# Patient Record
Sex: FEMALE | Race: WHITE | NOT HISPANIC OR LATINO | Employment: UNEMPLOYED | URBAN - METROPOLITAN AREA
[De-identification: names, ages, dates, MRNs, and addresses within clinical notes are randomized per-mention and may not be internally consistent; named-entity substitution may affect disease eponyms.]

---

## 2017-01-30 ENCOUNTER — OFFICE VISIT (OUTPATIENT)
Dept: FAMILY MEDICINE | Facility: OTHER | Age: 32
End: 2017-01-30
Payer: COMMERCIAL

## 2017-01-30 ENCOUNTER — TELEPHONE (OUTPATIENT)
Dept: FAMILY MEDICINE | Facility: OTHER | Age: 32
End: 2017-01-30

## 2017-01-30 ENCOUNTER — APPOINTMENT (OUTPATIENT)
Dept: ULTRASOUND IMAGING | Facility: CLINIC | Age: 32
End: 2017-01-30
Attending: EMERGENCY MEDICINE
Payer: COMMERCIAL

## 2017-01-30 ENCOUNTER — ALLIED HEALTH/NURSE VISIT (OUTPATIENT)
Dept: FAMILY MEDICINE | Facility: CLINIC | Age: 32
End: 2017-01-30
Payer: COMMERCIAL

## 2017-01-30 ENCOUNTER — INFUSION THERAPY VISIT (OUTPATIENT)
Dept: INFUSION THERAPY | Facility: CLINIC | Age: 32
End: 2017-01-30
Attending: PHYSICIAN ASSISTANT
Payer: COMMERCIAL

## 2017-01-30 ENCOUNTER — HOSPITAL ENCOUNTER (OUTPATIENT)
Facility: CLINIC | Age: 32
Setting detail: OBSERVATION
Discharge: HOME | End: 2017-01-31
Attending: EMERGENCY MEDICINE | Admitting: FAMILY MEDICINE
Payer: COMMERCIAL

## 2017-01-30 VITALS
DIASTOLIC BLOOD PRESSURE: 64 MMHG | RESPIRATION RATE: 16 BRPM | TEMPERATURE: 97.6 F | WEIGHT: 114.2 LBS | OXYGEN SATURATION: 97 % | HEART RATE: 105 BPM | BODY MASS INDEX: 19.5 KG/M2 | SYSTOLIC BLOOD PRESSURE: 102 MMHG | HEIGHT: 64 IN

## 2017-01-30 VITALS
TEMPERATURE: 99.6 F | DIASTOLIC BLOOD PRESSURE: 56 MMHG | OXYGEN SATURATION: 99 % | HEART RATE: 100 BPM | RESPIRATION RATE: 16 BRPM | SYSTOLIC BLOOD PRESSURE: 104 MMHG

## 2017-01-30 DIAGNOSIS — R52 BODY ACHES: ICD-10-CM

## 2017-01-30 DIAGNOSIS — A08.4 VIRAL GASTROENTERITIS: Primary | ICD-10-CM

## 2017-01-30 DIAGNOSIS — B34.9 VIRAL ILLNESS: Primary | ICD-10-CM

## 2017-01-30 DIAGNOSIS — R10.13 ABDOMINAL PAIN, EPIGASTRIC: ICD-10-CM

## 2017-01-30 DIAGNOSIS — R11.10 INTRACTABLE VOMITING, PRESENCE OF NAUSEA NOT SPECIFIED, UNSPECIFIED VOMITING TYPE: ICD-10-CM

## 2017-01-30 DIAGNOSIS — R11.2 NAUSEA AND VOMITING: Primary | ICD-10-CM

## 2017-01-30 DIAGNOSIS — E86.0 DEHYDRATION: ICD-10-CM

## 2017-01-30 DIAGNOSIS — F32.A DEPRESSION, UNSPECIFIED DEPRESSION TYPE: ICD-10-CM

## 2017-01-30 PROBLEM — R11.14 BILIOUS VOMITING WITH NAUSEA: Status: ACTIVE | Noted: 2017-01-30

## 2017-01-30 PROBLEM — R79.89 ELEVATED LFTS: Status: ACTIVE | Noted: 2017-01-30

## 2017-01-30 PROBLEM — K52.9 GASTROENTERITIS: Status: ACTIVE | Noted: 2017-01-30

## 2017-01-30 LAB
ALBUMIN SERPL-MCNC: 3 G/DL (ref 3.4–5)
ALBUMIN UR-MCNC: 30 MG/DL
ALP SERPL-CCNC: 113 U/L (ref 40–150)
ALT SERPL W P-5'-P-CCNC: 104 U/L (ref 0–50)
ANION GAP SERPL CALCULATED.3IONS-SCNC: 9 MMOL/L (ref 3–14)
APPEARANCE UR: CLEAR
AST SERPL W P-5'-P-CCNC: 188 U/L (ref 0–45)
BASOPHILS # BLD AUTO: 0 10E9/L (ref 0–0.2)
BASOPHILS NFR BLD AUTO: 0.4 %
BILIRUB SERPL-MCNC: 0.3 MG/DL (ref 0.2–1.3)
BILIRUB UR QL STRIP: ABNORMAL
BUN SERPL-MCNC: 12 MG/DL (ref 7–30)
CALCIUM SERPL-MCNC: 7.9 MG/DL (ref 8.5–10.1)
CHLORIDE SERPL-SCNC: 112 MMOL/L (ref 94–109)
CO2 SERPL-SCNC: 23 MMOL/L (ref 20–32)
COLOR UR AUTO: YELLOW
CREAT SERPL-MCNC: 0.54 MG/DL (ref 0.52–1.04)
DEPRECATED S PYO AG THROAT QL EIA: NORMAL
DIFFERENTIAL METHOD BLD: NORMAL
EOSINOPHIL # BLD AUTO: 0 10E9/L (ref 0–0.7)
EOSINOPHIL NFR BLD AUTO: 0 %
ERYTHROCYTE [DISTWIDTH] IN BLOOD BY AUTOMATED COUNT: 12.9 % (ref 10–15)
FLUAV+FLUBV AG SPEC QL: NEGATIVE
FLUAV+FLUBV AG SPEC QL: NORMAL
GFR SERPL CREATININE-BSD FRML MDRD: ABNORMAL ML/MIN/1.7M2
GLUCOSE SERPL-MCNC: 80 MG/DL (ref 70–99)
GLUCOSE UR STRIP-MCNC: NEGATIVE MG/DL
HCG UR QL: NEGATIVE
HCT VFR BLD AUTO: 35.3 % (ref 35–47)
HGB BLD-MCNC: 11.8 G/DL (ref 11.7–15.7)
HGB UR QL STRIP: NEGATIVE
IMM GRANULOCYTES # BLD: 0 10E9/L (ref 0–0.4)
IMM GRANULOCYTES NFR BLD: 0.2 %
KETONES UR STRIP-MCNC: >=80 MG/DL
LEUKOCYTE ESTERASE UR QL STRIP: NEGATIVE
LIPASE SERPL-CCNC: 100 U/L (ref 73–393)
LYMPHOCYTES # BLD AUTO: 1.4 10E9/L (ref 0.8–5.3)
LYMPHOCYTES NFR BLD AUTO: 26.7 %
MCH RBC QN AUTO: 28.9 PG (ref 26.5–33)
MCHC RBC AUTO-ENTMCNC: 33.4 G/DL (ref 31.5–36.5)
MCV RBC AUTO: 87 FL (ref 78–100)
MICRO REPORT STATUS: NORMAL
MONOCYTES # BLD AUTO: 0.6 10E9/L (ref 0–1.3)
MONOCYTES NFR BLD AUTO: 12 %
MUCOUS THREADS #/AREA URNS LPF: PRESENT /LPF
NEUTROPHILS # BLD AUTO: 3.1 10E9/L (ref 1.6–8.3)
NEUTROPHILS NFR BLD AUTO: 60.7 %
NITRATE UR QL: NEGATIVE
PH UR STRIP: 6.5 PH (ref 5–7)
PLATELET # BLD AUTO: 202 10E9/L (ref 150–450)
POTASSIUM SERPL-SCNC: 3.9 MMOL/L (ref 3.4–5.3)
PROT SERPL-MCNC: 6.1 G/DL (ref 6.8–8.8)
RBC # BLD AUTO: 4.08 10E12/L (ref 3.8–5.2)
RBC #/AREA URNS AUTO: ABNORMAL /HPF (ref 0–2)
SODIUM SERPL-SCNC: 144 MMOL/L (ref 133–144)
SP GR UR STRIP: 1.02 (ref 1–1.03)
SPECIMEN SOURCE: NORMAL
SPECIMEN SOURCE: NORMAL
URN SPEC COLLECT METH UR: ABNORMAL
UROBILINOGEN UR STRIP-ACNC: 0.2 EU/DL (ref 0.2–1)
WBC # BLD AUTO: 5.2 10E9/L (ref 4–11)
WBC #/AREA URNS AUTO: ABNORMAL /HPF (ref 0–2)

## 2017-01-30 PROCEDURE — 99207 ZZC NO CHARGE NURSE ONLY: CPT

## 2017-01-30 PROCEDURE — G0378 HOSPITAL OBSERVATION PER HR: HCPCS

## 2017-01-30 PROCEDURE — 99214 OFFICE O/P EST MOD 30 MIN: CPT | Performed by: PHYSICIAN ASSISTANT

## 2017-01-30 PROCEDURE — 99285 EMERGENCY DEPT VISIT HI MDM: CPT | Mod: 25

## 2017-01-30 PROCEDURE — 25000125 ZZHC RX 250: Performed by: FAMILY MEDICINE

## 2017-01-30 PROCEDURE — 25000128 H RX IP 250 OP 636: Performed by: EMERGENCY MEDICINE

## 2017-01-30 PROCEDURE — 87081 CULTURE SCREEN ONLY: CPT | Performed by: NURSE PRACTITIONER

## 2017-01-30 PROCEDURE — 96376 TX/PRO/DX INJ SAME DRUG ADON: CPT

## 2017-01-30 PROCEDURE — 96375 TX/PRO/DX INJ NEW DRUG ADDON: CPT

## 2017-01-30 PROCEDURE — 25000132 ZZH RX MED GY IP 250 OP 250 PS 637: Performed by: EMERGENCY MEDICINE

## 2017-01-30 PROCEDURE — 25000125 ZZHC RX 250: Performed by: EMERGENCY MEDICINE

## 2017-01-30 PROCEDURE — 25000128 H RX IP 250 OP 636: Performed by: FAMILY MEDICINE

## 2017-01-30 PROCEDURE — 87804 INFLUENZA ASSAY W/OPTIC: CPT | Performed by: NURSE PRACTITIONER

## 2017-01-30 PROCEDURE — 80053 COMPREHEN METABOLIC PANEL: CPT | Performed by: EMERGENCY MEDICINE

## 2017-01-30 PROCEDURE — 99285 EMERGENCY DEPT VISIT HI MDM: CPT | Performed by: EMERGENCY MEDICINE

## 2017-01-30 PROCEDURE — 81001 URINALYSIS AUTO W/SCOPE: CPT | Performed by: EMERGENCY MEDICINE

## 2017-01-30 PROCEDURE — 85025 COMPLETE CBC W/AUTO DIFF WBC: CPT | Performed by: EMERGENCY MEDICINE

## 2017-01-30 PROCEDURE — 81025 URINE PREGNANCY TEST: CPT | Performed by: EMERGENCY MEDICINE

## 2017-01-30 PROCEDURE — 96361 HYDRATE IV INFUSION ADD-ON: CPT

## 2017-01-30 PROCEDURE — 87880 STREP A ASSAY W/OPTIC: CPT | Performed by: NURSE PRACTITIONER

## 2017-01-30 PROCEDURE — 96374 THER/PROPH/DIAG INJ IV PUSH: CPT

## 2017-01-30 PROCEDURE — 99219 ZZC INITIAL OBSERVATION CARE,LEVL II: CPT | Performed by: FAMILY MEDICINE

## 2017-01-30 PROCEDURE — 83690 ASSAY OF LIPASE: CPT | Performed by: EMERGENCY MEDICINE

## 2017-01-30 RX ORDER — NALOXONE HYDROCHLORIDE 0.4 MG/ML
.1-.4 INJECTION, SOLUTION INTRAMUSCULAR; INTRAVENOUS; SUBCUTANEOUS
Status: DISCONTINUED | OUTPATIENT
Start: 2017-01-30 | End: 2017-01-31 | Stop reason: HOSPADM

## 2017-01-30 RX ORDER — ONDANSETRON 2 MG/ML
4 INJECTION INTRAMUSCULAR; INTRAVENOUS EVERY 30 MIN PRN
Status: DISCONTINUED | OUTPATIENT
Start: 2017-01-30 | End: 2017-01-30

## 2017-01-30 RX ORDER — METOCLOPRAMIDE HYDROCHLORIDE 5 MG/ML
10 INJECTION INTRAMUSCULAR; INTRAVENOUS EVERY 6 HOURS PRN
Status: DISCONTINUED | OUTPATIENT
Start: 2017-01-30 | End: 2017-01-31 | Stop reason: HOSPADM

## 2017-01-30 RX ORDER — METOCLOPRAMIDE HYDROCHLORIDE 5 MG/ML
10 INJECTION INTRAMUSCULAR; INTRAVENOUS EVERY 4 HOURS PRN
Status: DISCONTINUED | OUTPATIENT
Start: 2017-01-30 | End: 2017-01-30

## 2017-01-30 RX ORDER — HYDROMORPHONE HYDROCHLORIDE 1 MG/ML
0.5 INJECTION, SOLUTION INTRAMUSCULAR; INTRAVENOUS; SUBCUTANEOUS
Status: COMPLETED | OUTPATIENT
Start: 2017-01-30 | End: 2017-01-30

## 2017-01-30 RX ORDER — SODIUM CHLORIDE 9 MG/ML
1000 INJECTION, SOLUTION INTRAVENOUS CONTINUOUS
Status: DISCONTINUED | OUTPATIENT
Start: 2017-01-30 | End: 2017-01-31

## 2017-01-30 RX ORDER — KETOROLAC TROMETHAMINE 30 MG/ML
30 INJECTION, SOLUTION INTRAMUSCULAR; INTRAVENOUS ONCE
Qty: 1 ML | Refills: 0 | OUTPATIENT
Start: 2017-01-30 | End: 2017-01-30

## 2017-01-30 RX ORDER — KETOROLAC TROMETHAMINE 30 MG/ML
30 INJECTION, SOLUTION INTRAMUSCULAR; INTRAVENOUS ONCE
Status: COMPLETED | OUTPATIENT
Start: 2017-01-30 | End: 2017-01-30

## 2017-01-30 RX ORDER — LORAZEPAM 2 MG/ML
0.5 INJECTION INTRAMUSCULAR
Status: DISCONTINUED | OUTPATIENT
Start: 2017-01-30 | End: 2017-01-30

## 2017-01-30 RX ORDER — PROCHLORPERAZINE 25 MG
25 SUPPOSITORY, RECTAL RECTAL EVERY 12 HOURS PRN
Status: DISCONTINUED | OUTPATIENT
Start: 2017-01-30 | End: 2017-01-31 | Stop reason: HOSPADM

## 2017-01-30 RX ORDER — ONDANSETRON 2 MG/ML
4 INJECTION INTRAMUSCULAR; INTRAVENOUS ONCE
Status: COMPLETED | OUTPATIENT
Start: 2017-01-30 | End: 2017-01-30

## 2017-01-30 RX ORDER — LORAZEPAM 2 MG/ML
0.5 INJECTION INTRAMUSCULAR EVERY 4 HOURS PRN
Status: DISCONTINUED | OUTPATIENT
Start: 2017-01-30 | End: 2017-01-31

## 2017-01-30 RX ORDER — METOCLOPRAMIDE 5 MG/1
10 TABLET ORAL EVERY 6 HOURS PRN
Status: DISCONTINUED | OUTPATIENT
Start: 2017-01-30 | End: 2017-01-31 | Stop reason: HOSPADM

## 2017-01-30 RX ORDER — PROCHLORPERAZINE MALEATE 5 MG
5-10 TABLET ORAL EVERY 6 HOURS PRN
Status: DISCONTINUED | OUTPATIENT
Start: 2017-01-30 | End: 2017-01-31 | Stop reason: HOSPADM

## 2017-01-30 RX ORDER — ONDANSETRON 2 MG/ML
4 INJECTION INTRAMUSCULAR; INTRAVENOUS EVERY 6 HOURS PRN
Status: DISCONTINUED | OUTPATIENT
Start: 2017-01-30 | End: 2017-01-31 | Stop reason: HOSPADM

## 2017-01-30 RX ORDER — LIDOCAINE 40 MG/G
CREAM TOPICAL
Status: DISCONTINUED | OUTPATIENT
Start: 2017-01-30 | End: 2017-01-31 | Stop reason: HOSPADM

## 2017-01-30 RX ORDER — ONDANSETRON 4 MG/1
4 TABLET, ORALLY DISINTEGRATING ORAL EVERY 6 HOURS PRN
Status: DISCONTINUED | OUTPATIENT
Start: 2017-01-30 | End: 2017-01-31 | Stop reason: HOSPADM

## 2017-01-30 RX ORDER — HYDROMORPHONE HYDROCHLORIDE 1 MG/ML
.3-.5 INJECTION, SOLUTION INTRAMUSCULAR; INTRAVENOUS; SUBCUTANEOUS
Status: DISCONTINUED | OUTPATIENT
Start: 2017-01-30 | End: 2017-01-31

## 2017-01-30 RX ADMIN — METOCLOPRAMIDE 10 MG: 5 INJECTION, SOLUTION INTRAMUSCULAR; INTRAVENOUS at 16:51

## 2017-01-30 RX ADMIN — HYDROMORPHONE HYDROCHLORIDE 0.5 MG: 1 INJECTION, SOLUTION INTRAMUSCULAR; INTRAVENOUS; SUBCUTANEOUS at 16:50

## 2017-01-30 RX ADMIN — LIDOCAINE HYDROCHLORIDE 30 ML: 20 SOLUTION ORAL; TOPICAL at 20:12

## 2017-01-30 RX ADMIN — ONDANSETRON 4 MG: 2 INJECTION INTRAMUSCULAR; INTRAVENOUS at 13:06

## 2017-01-30 RX ADMIN — SODIUM CHLORIDE 1000 ML: 9 INJECTION, SOLUTION INTRAVENOUS at 16:14

## 2017-01-30 RX ADMIN — HYDROMORPHONE HYDROCHLORIDE 0.5 MG: 1 INJECTION, SOLUTION INTRAMUSCULAR; INTRAVENOUS; SUBCUTANEOUS at 21:42

## 2017-01-30 RX ADMIN — LORAZEPAM 0.5 MG: 2 INJECTION INTRAMUSCULAR; INTRAVENOUS at 17:30

## 2017-01-30 RX ADMIN — KETOROLAC TROMETHAMINE 30 MG: 30 INJECTION, SOLUTION INTRAMUSCULAR at 13:42

## 2017-01-30 RX ADMIN — SODIUM CHLORIDE 1000 ML: 9 INJECTION, SOLUTION INTRAVENOUS at 19:22

## 2017-01-30 RX ADMIN — PROCHLORPERAZINE EDISYLATE 10 MG: 5 INJECTION INTRAMUSCULAR; INTRAVENOUS at 16:43

## 2017-01-30 RX ADMIN — ONDANSETRON HYDROCHLORIDE 4 MG: 2 SOLUTION INTRAMUSCULAR; INTRAVENOUS at 16:14

## 2017-01-30 RX ADMIN — HYDROMORPHONE HYDROCHLORIDE 0.5 MG: 1 INJECTION, SOLUTION INTRAMUSCULAR; INTRAVENOUS; SUBCUTANEOUS at 16:14

## 2017-01-30 RX ADMIN — SODIUM CHLORIDE 1000 ML: 9 INJECTION, SOLUTION INTRAVENOUS at 13:03

## 2017-01-30 RX ADMIN — SODIUM CHLORIDE 1000 ML: 9 INJECTION, SOLUTION INTRAVENOUS at 16:56

## 2017-01-30 RX ADMIN — ONDANSETRON 4 MG: 2 INJECTION INTRAMUSCULAR; INTRAVENOUS at 22:33

## 2017-01-30 ASSESSMENT — ENCOUNTER SYMPTOMS
CHOKING: 1
CONSTIPATION: 0
DIARRHEA: 0
CHILLS: 1
FEVER: 1
FREQUENCY: 0
BLOOD IN STOOL: 0
DYSURIA: 0
NAUSEA: 1
ABDOMINAL PAIN: 1
FLANK PAIN: 0
VOMITING: 1

## 2017-01-30 ASSESSMENT — PAIN SCALES - GENERAL: PAINLEVEL: MODERATE PAIN (4)

## 2017-01-30 NOTE — TELEPHONE ENCOUNTER
[phone] [facility]     Pt is not improving, still dry heaving. Stacy just put in new vitals for you to view. Please return her call at the number above to talk about treatment plan and moving forward as pt is not showing improvement.

## 2017-01-30 NOTE — NURSING NOTE
Written order for IV fluids in [facility] from Brendan Ryder from visit today.      22 gauge IV started in right arm.  Fluids started at 1150.      At 1215, patient is having abdominal pain and dry heaves.      1220 call was placed to Cathy COX RN in [facility], she will speak with Brendan Ryder.     1235 per Cathy patient can be taken to Infusion for IV Zofran.     1240 fluids stopped and patient taken to Infusion.     Anamaria Aguilar RN

## 2017-01-30 NOTE — PROGRESS NOTES
S-(situation): Spoke with Dr. Ryder as patient does not feel well enough to go home. She is dry heaving and still feels overall unwell. Dr. Ryder said her next option would be to go through the Emergency department.     B-(background): Gave pt 1000 ml of normal saline over 1 hour as well as IV toradol, and  IV ondansetron with no results.     A-(assessment): patient still feeling very ill and unable to care for self or her 2 young children at home. Both children under the age of 3. Pt says her dizziness is increasing along with her stomach pain. Patient verbalized feeling like she would not be able to care for herself or others if she left the hospital at this time. Got Pt into wheelchair and brought her down to ER registration.     R-(recommendations): ER providers will assess patient and see what will best suit her in this current state of health.

## 2017-01-30 NOTE — PROGRESS NOTES
"Report from [facility] RN received that patient is worsening (increase in nausea and dry heaving) after \"a couple hundred\" cc's administered via IV.   Huddled with  for plan.   He does not feel patient needs ED.   [facility] states they do not have Zofran ODT.   Infusion orders placed for 1000 mls NS over 1 hour and Zofran 4 mg IV over 2-5 minutes once.   Infusion center notified.     Cathy Medel, RN, BSN    "

## 2017-01-30 NOTE — IP AVS SNAPSHOT
MRN:[id number]                      After Visit Summary   1/30/2017    Erni Ashley    MRN: [id number]           Thank you!     Thank you for choosing [facility] for your care. Our goal is always to provide you with excellent care. Hearing back from our patients is one way we can continue to improve our services. Please take a few minutes to complete the written survey that you may receive in the mail after you visit with us. Thank you!        Patient Information     Date Of Birth          1985        About your hospital stay     You were admitted on:  January 30, 2017 You last received care in the:  [address] [facility]    You were discharged on:  January 31, 2017        Reason for your hospital stay       Hospitalized for suspected gastroenteritis with dehydration and improved                  Who to Call     For medical emergencies, please call 911.  For non-urgent questions about your medical care, please call your primary care provider or clinic, None          Attending Provider     Provider    Joe Winters MD Gould, MD Yuliya Lazaro James T, MD       Primary Care Provider    None       No address on file        After Care Instructions     Activity       Your activity upon discharge: activity as tolerated and no driving while on narcotic analgesics (oxycodone)            Diet       Follow this diet upon discharge: Advance to a regular diet as tolerated                  Follow-up Appointments     Follow-up and recommended labs and tests        Follow up with primary care provider, as needed, .                  Pending Results     Date and Time Order Name Status Description    1/30/2017 1019 BETA STREP GROUP A CULTURE In process             Statement of Approval     Ordered          01/31/17 1355  I have reviewed and agree with all the recommendations and orders detailed in this document.   EFFECTIVE NOW     Approved and electronically signed by:   "Yonathan Dwyer MD             Admission Information        Provider Department Dept Phone    2017 Yonathan Dwyer MD [facility] [phone]      Your Vitals Were     Blood Pressure Pulse Temperature Respirations    114/67 mmHg 92 98.3  F (36.8  C) (Oral) 16    Height Weight BMI (Body Mass Index) Pulse Oximetry    1.626 m (5' 4\") 54 kg (119 lb 0.8 oz) 20.42 kg/m2 96%    Last Period             2017         [employer] Information     [employer] lets you send messages to your doctor, view your test results, renew your prescriptions, schedule appointments and more. To sign up, go to [email or web address] . Click on \"Log in\" on the left side of the screen, which will take you to the Welcome page. Then click on \"Sign up Now\" on the right side of the page.     You will be asked to enter the access code listed below, as well as some personal information. Please follow the directions to create your username and password.     Your access code is: [email or web address]  Expires: 2017 10:41 AM     Your access code will  in 90 days. If you need help or a new code, please call your [facility] clinic or [phone].        Care EveryWhere ID     This is your Care EveryWhere ID. This could be used by other organizations to access your [facility] medical records  [id number]           Review of your medicines      START taking        Dose / Directions    ondansetron 4 MG ODT tab   Commonly known as:  ZOFRAN-ODT   Used for:  Viral gastroenteritis        Dose:  4-8 mg   Take 1-2 tablets (4-8 mg) by mouth every 6 hours as needed for nausea or vomiting   Quantity:  20 tablet   Refills:  0       oxyCODONE 5 MG IR tablet   Commonly known as:  ROXICODONE   Used for:  Abdominal pain, epigastric        Dose:  5-10 mg   Take 1-2 tablets (5-10 mg) by mouth every 4 hours as needed for moderate to severe pain   Quantity:  20 tablet   Refills:  0       prochlorperazine 5 MG tablet   Commonly known as:  COMPAZINE   Used for: "  Viral gastroenteritis        Dose:  5-10 mg   Take 1-2 tablets (5-10 mg) by mouth every 6 hours as needed for vomiting   Quantity:  20 tablet   Refills:  0         CONTINUE these medicines which have NOT CHANGED        Dose / Directions    PARAGARD INTRAUTERINE COPPER IU        Refills:  0       traZODone 50 MG tablet   Commonly known as:  DESYREL   Used for:  Insomnia due to medical condition        Dose:  50 mg   Take 1 tablet (50 mg) by mouth nightly as needed for sleep   Quantity:  30 tablet   Refills:  3            Where to get your medicines      Some of these will need a paper prescription and others can be bought over the counter. Ask your nurse if you have questions.     Bring a paper prescription for each of these medications    - ondansetron 4 MG ODT tab  - oxyCODONE 5 MG IR tablet  - prochlorperazine 5 MG tablet             Protect others around you: Learn how to safely use, store and throw away your medicines at www.disposemymeds.org.             Medication List: This is a list of all your medications and when to take them. Check marks below indicate your daily home schedule. Keep this list as a reference.      Medications           Morning Afternoon Evening Bedtime As Needed    ondansetron 4 MG ODT tab   Commonly known as:  ZOFRAN-ODT   Take 1-2 tablets (4-8 mg) by mouth every 6 hours as needed for nausea or vomiting                                   oxyCODONE 5 MG IR tablet   Commonly known as:  ROXICODONE   Take 1-2 tablets (5-10 mg) by mouth every 4 hours as needed for moderate to severe pain   Last time this was given:  10 mg on 1/31/2017  1:02 PM   Next Dose Due:  Available at 5:00 PM                                   PARAGARD INTRAUTERINE COPPER IU                                prochlorperazine 5 MG tablet   Commonly known as:  COMPAZINE   Take 1-2 tablets (5-10 mg) by mouth every 6 hours as needed for vomiting                                   traZODone 50 MG tablet   Commonly known as:   LATOSHAYREL   Take 1 tablet (50 mg) by mouth nightly as needed for sleep                                             More Information        Patient Education    Oxycodone Hydrochloride Oral capsule    Oxycodone Hydrochloride Oral solution    Oxycodone Hydrochloride Oral tablet    Oxycodone Hydrochloride Oral tablet [Abuse Deterrent]    Oxycodone Hydrochloride Oral tablet, extended-release  Oxycodone Hydrochloride Oral tablet  What is this medicine?  OXYCODONE (ox i KOE done) is a pain reliever. It is used to treat moderate to severe pain.  This medicine may be used for other purposes; ask your health care provider or pharmacist if you have questions.  What should I tell my health care provider before I take this medicine?  They need to know if you have any of these conditions:    Rodríguez's disease    brain tumor    drug abuse or addiction    head injury    heart disease    if you frequently drink alcohol containing drinks    kidney disease or problems going to the bathroom    liver disease    lung disease, asthma, or breathing problems    mental problems    an unusual or allergic reaction to oxycodone, codeine, hydrocodone, morphine, other medicines, foods, dyes, or preservatives    pregnant or trying to get pregnant    breast-feeding  How should I use this medicine?  Take this medicine by mouth with a glass of water. Follow the directions on the prescription label. You can take it with or without food. If it upsets your stomach, take it with food. Take your medicine at regular intervals. Do not take it more often than directed. Do not stop taking except on your doctor's advice.  Some brands of this medicine, like Oxecta, have special instructions. Ask your doctor or pharmacist if these directions are for you: Do not cut, crush or chew this medicine. Swallow only one tablet at a time. Do not wet, soak, or lick the tablet before you take it.  Talk to your pediatrician regarding the use of this medicine in children.  Special care may be needed.  Overdosage: If you think you have taken too much of this medicine contact a poison control center or emergency room at once.  NOTE: This medicine is only for you. Do not share this medicine with others.  What if I miss a dose?  If you miss a dose, take it as soon as you can. If it is almost time for your next dose, take only that dose. Do not take double or extra doses.  What may interact with this medicine?    alcohol    antihistamines    certain medicines used for nausea like chlorpromazine, droperidol    erythromycin    ketoconazole    medicines for depression, anxiety, or psychotic disturbances    medicines for sleep    muscle relaxants    naloxone    naltrexone    narcotic medicines (opiates) for pain    nilotinib    phenobarbital    phenytoin    rifampin    ritonavir    voriconazole  This list may not describe all possible interactions. Give your health care provider a list of all the medicines, herbs, non-prescription drugs, or dietary supplements you use. Also tell them if you smoke, drink alcohol, or use illegal drugs. Some items may interact with your medicine.  What should I watch for while using this medicine?  Tell your doctor or health care professional if your pain does not go away, if it gets worse, or if you have new or a different type of pain. You may develop tolerance to the medicine. Tolerance means that you will need a higher dose of the medicine for pain relief. Tolerance is normal and is expected if you take this medicine for a long time.  Do not suddenly stop taking your medicine because you may develop a severe reaction. Your body becomes used to the medicine. This does NOT mean you are addicted. Addiction is a behavior related to getting and using a drug for a non-medical reason. If you have pain, you have a medical reason to take pain medicine. Your doctor will tell you how much medicine to take. If your doctor wants you to stop the medicine, the dose will be  slowly lowered over time to avoid any side effects.  You may get drowsy or dizzy when you first start taking this medicine or change doses. Do not drive, use machinery, or do anything that may be dangerous until you know how the medicine affects you. Stand or sit up slowly.  There are different types of narcotic medicines (opiates) for pain. If you take more than one type at the same time, you may have more side effects. Give your health care provider a list of all medicines you use. Your doctor will tell you how much medicine to take. Do not take more medicine than directed. Call emergency for help if you have problems breathing.  This medicine will cause constipation. Try to have a bowel movement at least every 2 to 3 days. If you do not have a bowel movement for 3 days, call your doctor or health care professional.  Your mouth may get dry. Drinking water, chewing sugarless gum, or sucking on hard candy may help. See your dentist every 6 months.  What side effects may I notice from receiving this medicine?  Side effects that you should report to your doctor or health care professional as soon as possible:    allergic reactions like skin rash, itching or hives, swelling of the face, lips, or tongue    breathing problems    confusion    feeling faint or lightheaded, falls    trouble passing urine or change in the amount of urine    unusually weak or tired  Side effects that usually do not require medical attention (report to your doctor or health care professional if they continue or are bothersome):    constipation    dry mouth    itching    nausea, vomiting    upset stomach  This list may not describe all possible side effects. Call your doctor for medical advice about side effects. You may report side effects to FDA at 5-438-FDA-4469.  Where should I keep my medicine?  Keep out of the reach of children. This medicine can be abused. Keep your medicine in a safe place to protect it from theft. Do not share this  medicine with anyone. Selling or giving away this medicine is dangerous and against the law.  Store at room temperature between 15 and 30 degrees C (59 and 86 degrees F). Protect from light. Keep container tightly closed.  This medicine may cause accidental overdose and death if it is taken by other adults, children, or pets. Flush any unused medicine down the toilet to reduce the chance of harm. Do not use the medicine after the expiration date.  NOTE: This sheet is a summary. It may not cover all possible information. If you have questions about this medicine, talk to your doctor, pharmacist, or health care provider.  NOTE:This sheet is a summary. It may not cover all possible information. If you have questions about this medicine, talk to your doctor, pharmacist, or health care provider. Copyright  2016 [employer]                Patient Education    Prochlorperazine Edisylate Solution for injection    Prochlorperazine Maleate Oral tablet    Prochlorperazine Rectal suppository  Prochlorperazine Maleate Oral tablet  What is this medicine?  PROCHLORPERAZINE (proe klor PER a taryn) helps to control severe nausea and vomiting. This medicine is also used to treat schizophrenia. It can also help patients who experience anxiety that is not due to psychological illness.  This medicine may be used for other purposes; ask your health care provider or pharmacist if you have questions.  What should I tell my health care provider before I take this medicine?  They need to know if you have any of these conditions:    blood disorders or disease    dementia    liver disease or jaundice    Parkinson's disease    uncontrollable movement disorder    an unusual or allergic reaction to prochlorperazine, other medicines, foods, dyes, or preservatives    pregnant or trying to get pregnant    breast-feeding  How should I use this medicine?  Take this medicine by mouth with a glass of water. Follow the directions on the prescription  label. Take your doses at regular intervals. Do not take your medicine more often than directed. Do not stop taking this medicine suddenly. This can cause nausea, vomiting, and dizziness. Ask your doctor or health care professional for advice.  Talk to your pediatrician regarding the use of this medicine in children. Special care may be needed. While this drug may be prescribed for children as young as 2 years for selected conditions, precautions do apply.  Overdosage: If you think you have taken too much of this medicine contact a poison control center or emergency room at once.  NOTE: This medicine is only for you. Do not share this medicine with others.  What if I miss a dose?  If you miss a dose, take it as soon as you can. If it is almost time for your next dose, take only that dose. Do not take double or extra doses.  What may interact with this medicine?  Do not take this medicine with any of the following medications:    amoxapine    antidepressants like citalopram, escitalopram, fluoxetine, paroxetine, and sertraline    deferoxamine    dofetilide    maprotiline    tricyclic antidepressants like amitriptyline, clomipramine, imipramine, nortiptyline and others  This medicine may also interact with the following medications:    lithium    medicines for pain    phenytoin    propranolol    warfarin  This list may not describe all possible interactions. Give your health care provider a list of all the medicines, herbs, non-prescription drugs, or dietary supplements you use. Also tell them if you smoke, drink alcohol, or use illegal drugs. Some items may interact with your medicine.  What should I watch for while using this medicine?  Visit your doctor or health care professional for regular checks on your progress.  You may get drowsy or dizzy. Do not drive, use machinery, or do anything that needs mental alertness until you know how this medicine affects you. Do not stand or sit up quickly, especially if you are  an older patient. This reduces the risk of dizzy or fainting spells. Alcohol may interfere with the effect of this medicine. Avoid alcoholic drinks.  This medicine can reduce the response of your body to heat or cold. Dress warm in cold weather and stay hydrated in hot weather. If possible, avoid extreme temperatures like saunas, hot tubs, very hot or cold showers, or activities that can cause dehydration such as vigorous exercise.  This medicine can make you more sensitive to the sun. Keep out of the sun. If you cannot avoid being in the sun, wear protective clothing and use sunscreen. Do not use sun lamps or tanning beds/booths.  Your mouth may get dry. Chewing sugarless gum or sucking hard candy, and drinking plenty of water may help. Contact your doctor if the problem does not go away or is severe.  What side effects may I notice from receiving this medicine?  Side effects that you should report to your doctor or health care professional as soon as possible:    blurred vision    breast enlargement in men or women    breast milk in women who are not breast-feeding    chest pain, fast or irregular heartbeat    confusion, restlessness    dark yellow or brown urine    difficulty breathing or swallowing    dizziness or fainting spells    drooling, shaking, movement difficulty (shuffling walk) or rigidity    fever, chills, sore throat    involuntary or uncontrollable movements of the eyes, mouth, head, arms, and legs    seizures    stomach area pain    unusually weak or tired    unusual bleeding or bruising    yellowing of skin or eyes  Side effects that usually do not require medical attention (report to your doctor or health care professional if they continue or are bothersome):    difficulty passing urine    difficulty sleeping    headache    sexual dysfunction    skin rash, or itching  This list may not describe all possible side effects. Call your doctor for medical advice about side effects. You may report side  effects to FDA at 6-545-FDA-5065.  Where should I keep my medicine?  Keep out of the reach of children.  Store at room temperature between 15 and 30 degrees C (59 and 86 degrees F). Protect from light. Throw away any unused medicine after the expiration date.  NOTE:This sheet is a summary. It may not cover all possible information. If you have questions about this medicine, talk to your doctor, pharmacist, or health care provider. Copyright  2016 [employer]                Patient Education    Dextrose, Ondansetron Hydrochloride Solution for injection    Ondansetron Hydrochloride Oral solution    Ondansetron Hydrochloride Oral tablet    Ondansetron Hydrochloride Solution for injection    Ondansetron Hydrochloride, Sodium Chloride Solution for injection    Ondansetron Oral disintegrating tablet    Ondansetron Oral dissolving film  Ondansetron Hydrochloride Oral tablet  What is this medicine?  ONDANSETRON (on DAN se tyson) is used to treat nausea and vomiting caused by chemotherapy. It is also used to prevent or treat nausea and vomiting after surgery.  This medicine may be used for other purposes; ask your health care provider or pharmacist if you have questions.  What should I tell my health care provider before I take this medicine?  They need to know if you have any of these conditions:    heart disease    history of irregular heartbeat    liver disease    low levels of magnesium or potassium in the blood    an unusual or allergic reaction to ondansetron, granisetron, other medicines, foods, dyes, or preservatives    pregnant or trying to get pregnant    breast-feeding  How should I use this medicine?  Take this medicine by mouth with a glass of water. Follow the directions on your prescription label. Take your doses at regular intervals. Do not take your medicine more often than directed.  Talk to your pediatrician regarding the use of this medicine in children. Special care may be needed.  Overdosage: If you  think you have taken too much of this medicine contact a poison control center or emergency room at once.  NOTE: This medicine is only for you. Do not share this medicine with others.  What if I miss a dose?  If you miss a dose, take it as soon as you can. If it is almost time for your next dose, take only that dose. Do not take double or extra doses.  What may interact with this medicine?  Do not take this medicine with any of the following medications:    apomorphine    certain medicines for fungal infections like fluconazole, itraconazole, ketoconazole, posaconazole, voriconazole    cisapride    dofetilide    dronedarone    pimozide    thioridazine    ziprasidone  This medicine may also interact with the following medications:    carbamazepine    certain medicines for depression, anxiety, or psychotic disturbances    fentanyl    linezolid    MAOIs like Carbex, Eldepryl, Marplan, Nardil, and Parnate    methylene blue (injected into a vein)    other medicines that prolong the QT interval (cause an abnormal heart rhythm)    phenytoin    rifampicin    tramadol  This list may not describe all possible interactions. Give your health care provider a list of all the medicines, herbs, non-prescription drugs, or dietary supplements you use. Also tell them if you smoke, drink alcohol, or use illegal drugs. Some items may interact with your medicine.  What should I watch for while using this medicine?  Check with your doctor or health care professional right away if you have any sign of an allergic reaction.  What side effects may I notice from receiving this medicine?  Side effects that you should report to your doctor or health care professional as soon as possible:    allergic reactions like skin rash, itching or hives, swelling of the face, lips or tongue    breathing problems    confusion    dizziness    fast or irregular heartbeat    feeling faint or lightheaded, falls    fever and chills    loss of balance or  coordination    seizures    sweating    swelling of the hands or feet    tightness in the chest    tremors    unusually weak or tired  Side effects that usually do not require medical attention (report to your doctor or health care professional if they continue or are bothersome):    constipation or diarrhea    headache  This list may not describe all possible side effects. Call your doctor for medical advice about side effects. You may report side effects to FDA at 7-010-LDM-5377.  Where should I keep my medicine?  Keep out of the reach of children.  Store between 2 and 30 degrees C (36 and 86 degrees F). Throw away any unused medicine after the expiration date.  NOTE:This sheet is a summary. It may not cover all possible information. If you have questions about this medicine, talk to your doctor, pharmacist, or health care provider. Copyright  2016 [employer]

## 2017-01-30 NOTE — MR AVS SNAPSHOT
"              After Visit Summary   1/30/2017    Erin Ashley    MRN: [id number]           Patient Information     Date Of Birth          1985        Visit Information        Provider Department      1/30/2017 10:00 AM Hussain Ryder PA-C [facility]        Today's Diagnoses     Viral illness    -  1     Dehydration         Body aches           Care Instructions    You likely have a viral illness but I believe you are also dehydrated.  The IV fluids so hopefully make you feel better.  Try to drink plenty of fluids and eat a bland diet.  Rest as much as possible.  Tylenol/ibuprofen as needed for fevers/aching.  Follow up if symptoms are not improving.         Follow-ups after your visit        Who to contact     If you have questions or need follow up information about today's clinic visit or your schedule please contact [facility] directly at [phone].  Normal or non-critical lab and imaging results will be communicated to you by [employer], letter or phone within 4 business days after the clinic has received the results. If you do not hear from us within 7 days, please contact the clinic through [employer] or phone. If you have a critical or abnormal lab result, we will notify you by phone as soon as possible.  Submit refill requests through [employer] or call your pharmacy and they will forward the refill request to us. Please allow 3 business days for your refill to be completed.          Additional Information About Your Visit        MyChart Information     [employer] lets you send messages to your doctor, view your test results, renew your prescriptions, schedule appointments and more. To sign up, go to [email or web address] . Click on \"Log in\" on the left side of the screen, which will take you to the Welcome page. Then click on \"Sign up Now\" on the right side of the page.     You will be asked to enter the access code listed below, as well as some personal information. " "Please follow the directions to create your username and password.     Your access code is: [email or web address]  Expires: 2017 10:41 AM     Your access code will  in 90 days. If you need help or a new code, please call your [place] clinic or [phone].        Care EveryWhere ID     This is your Care EveryWhere ID. This could be used by other organizations to access your [place] medical records  [id number]        Your Vitals Were     Pulse Temperature Respirations Height BMI (Body Mass Index) Pulse Oximetry    105 97.6  F (36.4  C) (Oral) 16 5' 4.33\" (1.634 m) 19.40 kg/m2 97%    Last Period                   2017            Blood Pressure from Last 3 Encounters:   17 102/64   10/07/16 92/60   16 100/64    Weight from Last 3 Encounters:   17 114 lb 3.2 oz (51.801 kg)   10/07/16 118 lb 6.4 oz (53.706 kg)   16 112 lb (50.803 kg)              We Performed the Following     Beta strep group A culture     Influenza A/B antigen     Strep, Rapid Screen        Primary Care Provider    None Specified       No primary provider on file.        Thank you!     Thank you for choosing [facility]  for your care. Our goal is always to provide you with excellent care. Hearing back from our patients is one way we can continue to improve our services. Please take a few minutes to complete the written survey that you may receive in the mail after your visit with us. Thank you!             Your Updated Medication List - Protect others around you: Learn how to safely use, store and throw away your medicines at www.disposemymeds.org.          This list is accurate as of: 17 10:41 AM.  Always use your most recent med list.                   Brand Name Dispense Instructions for use    HYDROcodone-acetaminophen 5-325 MG per tablet    NORCO    30 tablet    Take 1-2 tablets by mouth every 4 hours as needed for moderate to severe pain (maximum 10 tablets per day)       PARAGARD " INTRAUTERINE COPPER IU          traZODone 50 MG tablet    DESYREL    30 tablet    Take 1 tablet (50 mg) by mouth nightly as needed for sleep

## 2017-01-30 NOTE — PROGRESS NOTES
S-(situation): body aches     B-(background): patient here for IV saline and ondansetron.     A-(assessment): patient asking for something for body aches. Requesting ibuprofen, however patient has had nothing by mouth.     R-(recommendations): Contacted Dr. Ryder and obtained order for IV toradol to help with her body aches.

## 2017-01-30 NOTE — PROGRESS NOTES
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    Had a cough last week but started to have body aches this weekend along with chills and sweats. \"I feel like I am going to pass out when I walk\". Heart rate was really fast this morning, 148. No chest pain or shortness of breath. Was vomiting yesterday so has not been able to drink too much and thinks she may be dehydrated. Denies diarrhea.     Problem list and histories reviewed & adjusted, as indicated.  Additional history: none    Problem list, Medication list, Allergies, and Medical/Social/Surgical histories reviewed in [facility] and updated as appropriate.    ROS:  GENERAL: +Fatigue, chills. Denies fever, weakness, weight gain, or weight loss.  HEENT: Eyes-Denies pain, redness, loss of vision, double or blurred vision.     Ears/Nose- +Congestion, dry lips. Denies tinnitus, loss of hearing, epistaxis, decreased sense of smell. Denies loss of sense of taste.  CARDIOVASCULAR: Denies chest pain, shortness of breath, irregular heartbeats,  palpitations, or edema.  RESPIRATORY: +Cough. Denies hemoptysis and shortness of breath.  GASTROINTESTINAL: +Nausea, vomiting. Denies abdominal pain, diarrhea, or constipation.  NEUROLOGIC: +Lightheadedness. Denies headache, dizziness, memory loss, numbness, tingling, or " "seizures.    OBJECTIVE:                                                    /64 mmHg  Pulse 105  Temp(Src) 97.6  F (36.4  C) (Oral)  Resp 16  Ht 5' 4.33\" (1.634 m)  Wt 114 lb 3.2 oz (51.801 kg)  BMI 19.40 kg/m2  SpO2 97%  LMP 01/02/2017  Body mass index is 19.4 kg/(m^2).  GENERAL: healthy, alert and no distress  EYES: Eyes grossly normal to inspection, PERRL and conjunctivae and sclerae normal  HENT: ear canals and TM's normal, nose and mouth without ulcers or lesions. Tongue is moist but lips are dry.  NECK: no adenopathy, no asymmetry, masses  RESP: lungs clear to auscultation - no rales, rhonchi or wheezes  CV: slight sinus tachycardia with regular rhythm, normal S1 S2, no S3 or S4, no murmur, click or rub, no peripheral edema   NEURO: Normal strength and tone, mentation intact and speech normal    Rapid strep: Negative  Influenza A/B: Negative       ASSESSMENT/PLAN:                                                        ICD-10-CM    1. Viral illness B34.9 Influenza A/B antigen     Strep, Rapid Screen     Influenza A/B antigen     Strep, Rapid Screen     Beta strep group A culture     NORMAL SALINE SOLUTION INFUS   2. Dehydration E86.0 Influenza A/B antigen     Strep, Rapid Screen     Influenza A/B antigen     Strep, Rapid Screen     Beta strep group A culture     NORMAL SALINE SOLUTION INFUS   3. Body aches R52 Influenza A/B antigen     Strep, Rapid Screen     Influenza A/B antigen     Strep, Rapid Screen     Beta strep group A culture     NORMAL SALINE SOLUTION INFUS     ketorolac (TORADOL) 30 MG/ML injection       Negative influenza and strep testing.  Symptoms are consistent with a viral illness but patient appears dehydrated and with her lightheadedness and tachycardia, I have ordered 1 L of NS in [place] for rehydration.  *Patient developed nausea during infusion so was sent to infusion clinic and given IV Zofran. I also ordered Toradol to help with the body aches.   I encouraged a bland diet " at home and trying to push the fluids as much as possible.  Tylenol/ibuprofen for fevers/body aches.  Rest as much as possible.  Follow up if symptoms not improving.    Greater than 50% of 25 minute visit spent on education and plan of care.         Hussain Ryder PA-C  [facility]

## 2017-01-30 NOTE — LETTER
[facility] EMERGENCY DEPARTMENT  [address] [place] [address]  [phone]    2017    Erin Ashley  [address] [facility] [address]  [phone] (home)     : 1985      To Whom it may concern:    Erin Ashley was seen in our Emergency Department today, 2017.  She is admitted.   I expect her condition to improve over the next 7 days.  She may return to work/school when improved.    Sincerely,        Joe Winters MD

## 2017-01-30 NOTE — PATIENT INSTRUCTIONS
You likely have a viral illness but I believe you are also dehydrated.  The IV fluids so hopefully make you feel better.  Try to drink plenty of fluids and eat a bland diet.  Rest as much as possible.  Tylenol/ibuprofen as needed for fevers/aching.  Follow up if symptoms are not improving.

## 2017-01-30 NOTE — IP AVS SNAPSHOT
[address] [facility]    [address] [place] [address]    Phone:  [phone]                                       After Visit Summary   1/30/2017    Erin Ashley    MRN: [id number]           After Visit Summary Signature Page     I have received my discharge instructions, and my questions have been answered. I have discussed any challenges I see with this plan with the nurse or doctor.    ..........................................................................................................................................  Patient/Patient Representative Signature      ..........................................................................................................................................  Patient Representative Print Name and Relationship to Patient    ..................................................               ................................................  Date                                            Time    ..........................................................................................................................................  Reviewed by Signature/Title    ...................................................              ..............................................  Date                                                            Time

## 2017-01-30 NOTE — NURSING NOTE
"Chief Complaint   Patient presents with     Flu       Initial /64 mmHg  Pulse 105  Temp(Src) 97.6  F (36.4  C) (Oral)  Resp 16  Ht 5' 4.33\" (1.634 m)  Wt 114 lb 3.2 oz (51.801 kg)  BMI 19.40 kg/m2  SpO2 97%  LMP 01/02/2017 Estimated body mass index is 19.4 kg/(m^2) as calculated from the following:    Height as of this encounter: 5' 4.33\" (1.634 m).    Weight as of this encounter: 114 lb 3.2 oz (51.801 kg).  BP completed using cuff size: sawyer [place] CMA (AAMA)    "

## 2017-01-30 NOTE — TELEPHONE ENCOUNTER
I spoke with Stacy who states patient is not feeling better and does not feel like she can manage at home so I recommend she go to the ED for further evaluation.     Hussain yRder PA-C

## 2017-01-31 VITALS
OXYGEN SATURATION: 96 % | RESPIRATION RATE: 16 BRPM | HEIGHT: 64 IN | BODY MASS INDEX: 20.32 KG/M2 | SYSTOLIC BLOOD PRESSURE: 114 MMHG | TEMPERATURE: 98.3 F | WEIGHT: 119.05 LBS | DIASTOLIC BLOOD PRESSURE: 67 MMHG | HEART RATE: 92 BPM

## 2017-01-31 PROBLEM — R11.14 BILIOUS VOMITING WITH NAUSEA: Status: RESOLVED | Noted: 2017-01-30 | Resolved: 2017-01-31

## 2017-01-31 PROBLEM — R19.7 DIARRHEA: Status: ACTIVE | Noted: 2017-01-30

## 2017-01-31 LAB
ALBUMIN SERPL-MCNC: 3.1 G/DL (ref 3.4–5)
ALP SERPL-CCNC: 102 U/L (ref 40–150)
ALT SERPL W P-5'-P-CCNC: 93 U/L (ref 0–50)
ANION GAP SERPL CALCULATED.3IONS-SCNC: 12 MMOL/L (ref 3–14)
AST SERPL W P-5'-P-CCNC: 151 U/L (ref 0–45)
BILIRUB SERPL-MCNC: 0.4 MG/DL (ref 0.2–1.3)
BUN SERPL-MCNC: 8 MG/DL (ref 7–30)
CALCIUM SERPL-MCNC: 7.4 MG/DL (ref 8.5–10.1)
CHLORIDE SERPL-SCNC: 113 MMOL/L (ref 94–109)
CO2 SERPL-SCNC: 18 MMOL/L (ref 20–32)
CREAT SERPL-MCNC: 0.51 MG/DL (ref 0.52–1.04)
GFR SERPL CREATININE-BSD FRML MDRD: ABNORMAL ML/MIN/1.7M2
GLUCOSE SERPL-MCNC: 70 MG/DL (ref 70–99)
POTASSIUM SERPL-SCNC: 3.8 MMOL/L (ref 3.4–5.3)
PROT SERPL-MCNC: 6.1 G/DL (ref 6.8–8.8)
SODIUM SERPL-SCNC: 143 MMOL/L (ref 133–144)

## 2017-01-31 PROCEDURE — 36415 COLL VENOUS BLD VENIPUNCTURE: CPT | Performed by: FAMILY MEDICINE

## 2017-01-31 PROCEDURE — 25000132 ZZH RX MED GY IP 250 OP 250 PS 637: Performed by: PEDIATRICS

## 2017-01-31 PROCEDURE — 25000128 H RX IP 250 OP 636: Performed by: FAMILY MEDICINE

## 2017-01-31 PROCEDURE — G0378 HOSPITAL OBSERVATION PER HR: HCPCS

## 2017-01-31 PROCEDURE — 96376 TX/PRO/DX INJ SAME DRUG ADON: CPT

## 2017-01-31 PROCEDURE — 99217 ZZC OBSERVATION CARE DISCHARGE: CPT | Performed by: PEDIATRICS

## 2017-01-31 PROCEDURE — 80053 COMPREHEN METABOLIC PANEL: CPT | Performed by: FAMILY MEDICINE

## 2017-01-31 PROCEDURE — 25000125 ZZHC RX 250: Performed by: FAMILY MEDICINE

## 2017-01-31 RX ORDER — OXYCODONE HYDROCHLORIDE 5 MG/1
5-10 TABLET ORAL EVERY 4 HOURS PRN
Qty: 20 TABLET | Refills: 0 | Status: SHIPPED | OUTPATIENT
Start: 2017-01-31 | End: 2017-02-22

## 2017-01-31 RX ORDER — ONDANSETRON 4 MG/1
4-8 TABLET, ORALLY DISINTEGRATING ORAL EVERY 6 HOURS PRN
Qty: 20 TABLET | Refills: 0 | Status: SHIPPED | OUTPATIENT
Start: 2017-01-31 | End: 2017-02-22

## 2017-01-31 RX ORDER — PROCHLORPERAZINE MALEATE 5 MG
5-10 TABLET ORAL EVERY 6 HOURS PRN
Qty: 20 TABLET | Refills: 0 | Status: SHIPPED | OUTPATIENT
Start: 2017-01-31 | End: 2017-02-22

## 2017-01-31 RX ORDER — OXYCODONE HYDROCHLORIDE 5 MG/1
5-10 TABLET ORAL
Status: DISCONTINUED | OUTPATIENT
Start: 2017-01-31 | End: 2017-01-31 | Stop reason: HOSPADM

## 2017-01-31 RX ADMIN — ONDANSETRON 4 MG: 2 INJECTION INTRAMUSCULAR; INTRAVENOUS at 06:36

## 2017-01-31 RX ADMIN — HYDROMORPHONE HYDROCHLORIDE 0.5 MG: 1 INJECTION, SOLUTION INTRAMUSCULAR; INTRAVENOUS; SUBCUTANEOUS at 10:48

## 2017-01-31 RX ADMIN — OXYCODONE HYDROCHLORIDE 10 MG: 5 TABLET ORAL at 13:02

## 2017-01-31 RX ADMIN — PROCHLORPERAZINE EDISYLATE 10 MG: 5 INJECTION INTRAMUSCULAR; INTRAVENOUS at 01:34

## 2017-01-31 RX ADMIN — HYDROMORPHONE HYDROCHLORIDE 0.5 MG: 1 INJECTION, SOLUTION INTRAMUSCULAR; INTRAVENOUS; SUBCUTANEOUS at 06:37

## 2017-01-31 RX ADMIN — PROCHLORPERAZINE EDISYLATE 10 MG: 5 INJECTION INTRAMUSCULAR; INTRAVENOUS at 07:59

## 2017-01-31 RX ADMIN — HYDROMORPHONE HYDROCHLORIDE 0.5 MG: 1 INJECTION, SOLUTION INTRAMUSCULAR; INTRAVENOUS; SUBCUTANEOUS at 01:34

## 2017-01-31 RX ADMIN — HYDROMORPHONE HYDROCHLORIDE 0.5 MG: 1 INJECTION, SOLUTION INTRAMUSCULAR; INTRAVENOUS; SUBCUTANEOUS at 08:38

## 2017-01-31 NOTE — PROGRESS NOTES
S-(situation): Patient registered to Observation. Patient arrived to room 271 via cart from ED.    B-(background): N/V/D    A-(assessment): Patient still nauseated, no emesis, abdominal pain.    R-(recommendations): Orders and observation goals reviewed with patient.    Nursing Observation criteria listed below was met:    Skin issues/needs documented:NA  Isolation needs addressed, if appropriate: NA  Fall Prevention: Education given and documented and signage used: Yes  Education Assessment documented:Yes  Education Documented (Pre-existing chronic infection such as, MRSA/VRE need education on admission): Yes  New medication patient education completed and documented (Possible Side Effects of Common Medications handout): Yes  Home medications if not able to send immediately home with family stored here: NA  Reminder note placed in discharge instructions: NA  Patient has discharge needs (If yes, please explain): No

## 2017-01-31 NOTE — DISCHARGE SUMMARY
[facility] Observation Discharge Summary    Erin Ashley MRN# [id number]   Age: 31 year old YOB: 1985     Date of Observation:  1/30/2017  Date of Discharge:  1/31/2017   Initial Physician:  Gurjit Gotti MD  Discharge Physician:  Yonathan Dwyer MD     Primary care provider: None          Admission Diagnoses:   Abdominal pain, epigastric [R10.13]  Dehydration [E86.0]  Depression, unspecified depression type [F32.9]  Intractable vomiting, presence of nausea not specified, unspecified vomiting type [R11.10]          Discharge Diagnoses:   Principal diagnosis: Viral gastroenteritis    Secondary diagnoses:    Viral gastroenteritis    Elevated LFTs    Diarrhea    Bilious vomiting with nausea            Procedures:   No procedures performed during this hospital stay           Discharge Medications:     Outpatient Prescriptions Marked as Taking for the 1/30/17 encounter (Hospital Encounter)   Medication Sig     oxyCODONE (ROXICODONE) 5 MG IR tablet Take 1-2 tablets (5-10 mg) by mouth every 4 hours as needed for moderate to severe pain     ondansetron (ZOFRAN-ODT) 4 MG ODT tab Take 1-2 tablets (4-8 mg) by mouth every 6 hours as needed for nausea or vomiting     prochlorperazine (COMPAZINE) 5 MG tablet Take 1-2 tablets (5-10 mg) by mouth every 6 hours as needed for vomiting     traZODone (DESYREL) 50 MG tablet Take 1 tablet (50 mg) by mouth nightly as needed for sleep       Current Discharge Medication List      START taking these medications    Details   oxyCODONE (ROXICODONE) 5 MG IR tablet Take 1-2 tablets (5-10 mg) by mouth every 4 hours as needed for moderate to severe pain  Qty: 20 tablet, Refills: 0    Associated Diagnoses: Abdominal pain, epigastric      ondansetron (ZOFRAN-ODT) 4 MG ODT tab Take 1-2 tablets (4-8 mg) by mouth every 6 hours as needed for nausea or vomiting  Qty: 20 tablet, Refills: 0    Associated Diagnoses: Viral gastroenteritis      prochlorperazine (COMPAZINE) 5 MG  tablet Take 1-2 tablets (5-10 mg) by mouth every 6 hours as needed for vomiting  Qty: 20 tablet, Refills: 0    Associated Diagnoses: Viral gastroenteritis         CONTINUE these medications which have NOT CHANGED    Details   traZODone (DESYREL) 50 MG tablet Take 1 tablet (50 mg) by mouth nightly as needed for sleep  Qty: 30 tablet, Refills: 3    Associated Diagnoses: Insomnia due to medical condition      PARAGARD INTRAUTERINE COPPER IU                    Consultations:   No consultations were requested during this hospital stay          Brief History of Illness:   31 year old female patient presented with 2 days history of worsening abdominal pain, nausea, vomiting and diarrhea.  Due to concern for intractable vomiting refractory to initial treatment in the emergency room, she was hospitalized for observation. See ER note and history and physical for details.          Hospital Course:   Patient was observed in the hospital.  Initial laboratory evaluation was notable for mildly increased liver transaminases about 2-3 times the upper limit of normal but was otherwise generally unremarkable.  Right upper quadrant ultrasound was without any abnormalities.   She was treated with IV fluids, bowel rest, analgesics, and antiemetics.  She developed worsening diarrhea as nausea subsided and had some associated abdominal cramping.  However, she tolerated adequate oral intake by discharge, and ongoing symptoms were adequately controlled with oral analgesics and antibiotics.  She expressed preference to discharge home with outpatient follow-up.  She had an otherwise unremarkable clinical course during this hospital stay without fever, severe abdominal pain, or bloody diarrhea.    I evaluated this patient on the day of discharge.  She remained afebrile with stable vital signs. Bowel sounds were hypoactive.  Her abdomen was soft without distention.  There was mild epigastric tenderness without peritoneal signs.             Discharge Instructions and Follow-Up:   Discharge diet: Advance to a regular diet as tolerated   Discharge activity: Activity as tolerated, no driving while taking narcotic analgesics   Discharge follow-up: Follow-up with primary care provider as needed      Pending test results:   Unresulted Labs Ordered in the Past 30 Days of this Admission     Date and Time Order Name Status Description    1/30/2017 1019 BETA STREP GROUP A CULTURE In process                Discharge Disposition:   Discharged to home      Attestation:  I have reviewed today's vital signs, notes, medications, and test results.    Yonathan Dwyer MD

## 2017-01-31 NOTE — PROGRESS NOTES
Remains very tired and nauseated at times. Medicated for nausea and pain with dilaudid and compazine with relief. Afebrile.

## 2017-01-31 NOTE — PROGRESS NOTES
S-(situation): Patient discharged to home via ambulation with mother    B-(background): Observation goals met yes    A-(assessment): stomach pain controlled with oral oxycodone. Nausea better this afternoon, able to drink. Up ambulating independently. States she feels stronger than yesterday.     R-(recommendations): Discharge instructions reviewed with patient. Listed belongings gathered and returned to patient.  Patient Education resolved: Yes  New medications-Pt. Has been educated about reason of use and side effects Yes  Home and hospital acquired medications returned to patient NA  Medication Bin checked and emptied on discharge Yes

## 2017-01-31 NOTE — ED NOTES
ED Nursing criteria listed below was addressed during verbal handoff:     Abnormal vitals: Yes  Abnormal results: Yes  Med Reconciliation completed: Yes  Meds given in ED: Yes  Any Overdue Meds: Yes  Core Measures: Yes  Isolation: Yes  Special needs: Yes  Skin assessment: Yes    Observation Patient  Education provided: Yes

## 2017-01-31 NOTE — H&P
[facility]    History and Physical  Hospitalist       Date of Admission:  1/30/2017  Date of Service (when I saw the patient): 01/30/2017    Assessment and Plan  Erin Ashley is a 31 year old female who presents with nausea and vomiting over the past few days. Has progressed the point where he's not able to keep any fluids down and on arrival to the emergency room and had episode of diarrhea. This feeling weak, dizzy. Workup in the emergency department a showing of probable viral gastroenteritis with clinical dehydration with secondary tachycardia. Lab work is notable for elevation of her LFTs, likely due to the gastroenteritis with abdominal ultrasound being negative for any liver or gallbladder pathology. She will be registered observation with continued IV fluids, use of anti-emetics and will recheck liver function tests tomorrow. Hopefully will feel better and will be able to go home tomorrow.    Principal Problem:    Viral gastroenteritis  Active Problems:    Bilious vomiting with nausea    Elevated LFTs    DVT Prophylaxis: Low Risk/Ambulatory with no VTE prophylaxis indicated  Code Status: Full Code    Disposition: Expected discharge in 1-2 days once feeling better, able to tolerate a diet    Gurjit Gotti MD    Primary Care Physician  None    Chief Complaint  31-year-old female with 2 day history of vomiting    History is obtained from the patient, electronic health record and emergency department physician    History of Present Illness  Erin Ashley is a 31 year old female who presents with nausea, vomiting and diarrhea. Became ill 2 nights ago with vomiting which progressed through yesterday to the point that she was only throwing up bile. Not able to keep any fluid down. Feeling increasingly weak, dizzy with any exertion. Today had an episode of diarrhea, no blood in the emesis or stool noted. She has had some chills without fever. She's had mid-epigastric  abdominal pain without radiation. She said decreased urination but denies any dysuria. 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 She denies any recent travel or obvious bad food. She has 2 children at home ages 3 and 4 who had been healthy.    Past Medical History   I have reviewed this patient's medical history and updated it with pertinent information if needed.   Past Medical History   Diagnosis Date     Appendicitis with perforation 08/04/08     Admit. DIscharged 08/07/08     TALIB II (cervical intraepithelial neoplasia II) 2008 5/29/08 on coloposcopy bx TALIB 2/3.  CKC on 6/13/08 showed TALIB 1/2 *yearly paps are indicated*     Abnormal Pap smear      Renal calculus or stone        Past Surgical History  I have reviewed this patient's surgical history and updated it with pertinent information if needed.  Past Surgical History   Procedure Laterality Date     Tonsillectomy  7/3/2007     Hc conization cervix,knife/laser  06/130/8     cervix.  TALIB 1/2     Appendectomy  8/4/2008       Prior to Admission Medications  Prior to Admission Medications   Prescriptions Last Dose Informant Patient Reported? Taking?   PARAGARD INTRAUTERINE COPPER IU   Yes No   traZODone (DESYREL) 50 MG tablet 1/29/2017 at Unknown time  No Yes   Sig: Take 1 tablet (50 mg) by mouth nightly as needed for sleep      Facility-Administered Medications: None     Allergies  Allergies   Allergen Reactions     No Known Drug Allergies        Social History  I have reviewed this patient's social history and updated it with pertinent information if needed. Erin VAZQUEZ Gabi  reports that she has never smoked. She has never used smokeless tobacco. She reports that she does not drink alcohol or use illicit drugs.    Family History  I have reviewed this patient's family history and updated it with pertinent information if needed.   Family History   Problem Relation Age of Onset     Hypertension Maternal Grandmother       borderline     Lipids Maternal Grandmother      Respiratory Maternal Grandmother      COPD     Hypertension Maternal Grandfather      borderline     CANCER Maternal Grandfather      lung cancer, smoker     GASTROINTESTINAL DISEASE Mother      cholecystitis     Lipids Mother      Prostate Cancer Paternal Grandfather      Hearing Loss Paternal Grandfather      Unknown/Adopted No family hx of      Asthma No family hx of      C.A.D. No family hx of      DIABETES No family hx of      CEREBROVASCULAR DISEASE No family hx of      Breast Cancer No family hx of      Cancer - colorectal No family hx of      Alcohol/Drug No family hx of      Allergies No family hx of      Alzheimer Disease No family hx of      Anesthesia Reaction No family hx of      Arthritis No family hx of      Blood Disease No family hx of      Cardiovascular No family hx of      Circulatory No family hx of      Congenital Anomalies No family hx of      Connective Tissue Disorder No family hx of      Depression No family hx of      Genetic Disorder No family hx of      Genitourinary Problems No family hx of      Gynecology No family hx of      HEART DISEASE No family hx of      Musculoskeletal Disorder No family hx of      Neurologic Disorder No family hx of      Obesity No family hx of      OSTEOPOROSIS No family hx of      Psychotic Disorder No family hx of      Other Cancer Paternal Grandmother      pancreatic cancer       Review of Systems  The 10 point Review of Systems is negative other than noted in the HPI or here.     Physical Exam  Temp: 97.4  F (36.3  C) Temp src: Oral BP: 102/61 mmHg Pulse: 99   Resp: 14 SpO2: 96 % O2 Device: None (Room air)    Vital Signs with Ranges  Temp:  [97.4  F (36.3  C)-99.6  F (37.6  C)] 97.4  F (36.3  C)  Pulse:  [] 99  Resp:  [14-16] 14  BP: ()/(49-69) 102/61 mmHg  SpO2:  [95 %-99 %] 96 %  114 lbs 0 oz    EXAM:  Constitutional: alert, mild distress, cooperative and thin, pale looking   Cardiovascular:  PMI normal. No lifts, heaves, or thrills. RRR. No murmurs, clicks gallops or rub  Respiratory: Percussion normal. Good diaphragmatic excursion. Lungs clear  Psychiatric: fatigued  Head: Normocephalic. No masses, lesions, tenderness or abnormalities  Neck: Neck supple. No adenopathy. Thyroid symmetric, normal size,  ENT: ENT exam normal, no neck nodes or sinus tenderness  Abdomen: soft, no obvious distention. Mildly tender over the epigastric region without guarding a rebound. No masses palpated.  NEURO: nonfocal,  SKIN: no suspicious lesions or rashes  LYMPH: Normal cervical lymph nodes  JOINT/EXTREMITIES: extremities normal- no gross deformities noted and normal muscle tone     Data  Data reviewed today:  I personally reviewed the Ultrasound image(s) showing Radiology not showing any pathology.    Recent Labs  Lab 01/30/17  1615   WBC 5.2   HGB 11.8   MCV 87         POTASSIUM 3.9   CHLORIDE 112*   CO2 23   BUN 12   CR 0.54   ANIONGAP 9   RODRIGUE 7.9*   GLC 80   ALBUMIN 3.0*   PROTTOTAL 6.1*   BILITOTAL 0.3   ALKPHOS 113   *   *   LIPASE 100       Recent Results (from the past 24 hour(s))   Abdomen US, limited (RUQ only)    Narrative    US ABDOMEN LIMITED 1/30/2017 6:49 PM    CLINICAL INFORMATION: Abdominal pain, elevated LFTs.    COMPARISON: None.    FINDINGS:  Limited right upper quadrant ultrasound demonstrates a negative  gallbladder. No gallstones or gallbladder wall thickening. No bile  duct dilatation. The common hepatic duct measures 0.2 cm in diameter.  Negative liver. Visualized portions of the  pancreas are negative. The  visualized portion of the right kidney is unremarkable. No  hydronephrosis on the right.      Impression    IMPRESSION: No acute sonographic findings in the right upper quadrant.

## 2017-02-01 ENCOUNTER — TELEPHONE (OUTPATIENT)
Dept: FAMILY MEDICINE | Facility: OTHER | Age: 32
End: 2017-02-01

## 2017-02-01 LAB
BACTERIA SPEC CULT: NORMAL
MICRO REPORT STATUS: NORMAL
SPECIMEN SOURCE: NORMAL

## 2017-02-01 NOTE — TELEPHONE ENCOUNTER
RN to call for hospital follow up:    Reason for follow up: Erin Ashley appeared on our list for being seen in an Emergency Room or a recent Hospital discharge.    Admitting date: 1/30/17  Discharge date: 1/31/17  Location: [facility]  Reason for visit: Chief Complaint: Abdominal Pain, Epigastric, Viral Gastroenteritis    Sharron Rowe RN, BSN

## 2017-02-01 NOTE — TELEPHONE ENCOUNTER
IP Discharge Outreach Protocol  Attempt # 1   Was call answered? No. Left message for pt to call back.    Discharge diet:  Advance to a regular diet as tolerated    Discharge activity:  Activity as tolerated, no driving while taking narcotic analgesics    Discharge follow-up:  Follow-up with primary care provider as needed               Martha Mcclellan RN, BSN

## 2017-02-01 NOTE — TELEPHONE ENCOUNTER
"Hospital/TCU/ED for chronic condition Discharge Protocol    \"Hi, my name is Jude Quick, a registered nurse, and I am calling from [facility].  I am calling to follow up and see how things are going for you after your recent emergency visit/hospital/TCU stay.\"    Tell me how you are doing now that you are home?\" pain is getting better, felling a little better overall      Discharge Instructions    \"Let's review your discharge instructions.  What is/are the follow-up recommendations?  Pt. Response: follow up as needed    \"Has an appointment with your primary care provider been scheduled?\"   no , patient declined    \"When you see the provider, I would recommend that you bring your medications with you.\"    Medications    \"Tell me what changed about your medicines when you discharged?\"    Changes to chronic meds?    0-1    \"What questions do you have about your medications?\"    None     New diagnoses of heart failure, COPD, diabetes, or MI?    No              Medication reconciliation completed? Yes  Was MTM referral placed (*Make sure to put transitions as reason for referral)?   No    Call Summary    \"What questions or concerns do you have about your recent visit and your follow-up care?\"     none    \"If you have questions or things don't continue to improve, we encourage you contact us through the main clinic number (give number).  Even if the clinic is not open, triage nurses are available 24/7 to help you.     We would like you to know that our clinic has extended hours (provide information).  We also have urgent care (provide details on closest location and hours/contact info)\"      \"Thank you for your time and take care!\"             "

## 2017-02-21 ENCOUNTER — OFFICE VISIT (OUTPATIENT)
Dept: SURGERY | Facility: CLINIC | Age: 32
End: 2017-02-21
Payer: COMMERCIAL

## 2017-02-21 VITALS — WEIGHT: 117.9 LBS | BODY MASS INDEX: 20.24 KG/M2 | HEART RATE: 130 BPM | TEMPERATURE: 97.1 F | OXYGEN SATURATION: 97 %

## 2017-02-21 DIAGNOSIS — K64.4 RESIDUAL HEMORRHOIDAL SKIN TAG: Primary | ICD-10-CM

## 2017-02-21 PROCEDURE — 99213 OFFICE O/P EST LOW 20 MIN: CPT | Performed by: SPECIALIST

## 2017-02-21 NOTE — MR AVS SNAPSHOT
"              After Visit Summary   2/21/2017    Erin Ashley    MRN: [id number]           Patient Information     Date Of Birth          1985        Visit Information        Provider Department      2/21/2017 10:00 AM Britton Palomo MD [facility]         Follow-ups after your visit        Your next 10 appointments already scheduled     Feb 22, 2017  1:30 PM CST   Pre-Op physical with Hussain Ryder PA-C   [facility] ([facility]    [address] [place] [address] [address]  [facility] [id number] [phone]              Who to contact     If you have questions or need follow up information about today's clinic visit or your schedule please contact [facility] directly at [phone].  Normal or non-critical lab and imaging results will be communicated to you by MyChart, letter or phone within 4 business days after the clinic has received the results. If you do not hear from us within 7 days, please contact the clinic through MyChart or phone. If you have a critical or abnormal lab result, we will notify you by phone as soon as possible.  Submit refill requests through [facility] or call your pharmacy and they will forward the refill request to us. Please allow 3 business days for your refill to be completed.          Additional Information About Your Visit        MyChart Information     [facility] lets you send messages to your doctor, view your test results, renew your prescriptions, schedule appointments and more. To sign up, go to [email or web address] . Click on \"Log in\" on the left side of the screen, which will take you to the Welcome page. Then click on \"Sign up Now\" on the right side of the page.     You will be asked to enter the access code listed below, as well as some personal information. Please follow the directions to create your username and password.     Your access code is: [email or web address]  Expires: 4/30/2017 10:41 AM     Your access code " will  in 90 days. If you need help or a new code, please call your [facility] clinic or [phone].        Care EveryWhere ID     This is your Care EveryWhere ID. This could be used by other organizations to access your [facility] medical records  [id number]        Your Vitals Were     Pulse Temperature Last Period Pulse Oximetry BMI (Body Mass Index)       130 97.1  F (36.2  C) (Temporal) 2017 97% 20.24 kg/m2        Blood Pressure from Last 3 Encounters:   17 114/67   17 104/56   17 102/64    Weight from Last 3 Encounters:   17 117 lb 14.4 oz (53.5 kg)   17 119 lb 0.8 oz (54 kg)   17 114 lb 3.2 oz (51.8 kg)              Today, you had the following     No orders found for display       Primary Care Provider    None       No address on file        Thank you!     Thank you for choosing [facility]  for your care. Our goal is always to provide you with excellent care. Hearing back from our patients is one way we can continue to improve our services. Please take a few minutes to complete the written survey that you may receive in the mail after your visit with us. Thank you!             Your Updated Medication List - Protect others around you: Learn how to safely use, store and throw away your medicines at www.disposemymeds.org.          This list is accurate as of: 17 10:32 AM.  Always use your most recent med list.                   Brand Name Dispense Instructions for use    ondansetron 4 MG ODT tab    ZOFRAN-ODT    20 tablet    Take 1-2 tablets (4-8 mg) by mouth every 6 hours as needed for nausea or vomiting       oxyCODONE 5 MG IR tablet    ROXICODONE    20 tablet    Take 1-2 tablets (5-10 mg) by mouth every 4 hours as needed for moderate to severe pain       PARAGARD INTRAUTERINE COPPER IU          prochlorperazine 5 MG tablet    COMPAZINE    20 tablet    Take 1-2 tablets (5-10 mg) by mouth every 6 hours as needed for vomiting       traZODone 50  MG tablet    DESYREL    30 tablet    Take 1 tablet (50 mg) by mouth nightly as needed for sleep

## 2017-02-21 NOTE — NURSING NOTE
"Chief Complaint   Patient presents with     RECHECK     rectal skin tags       Initial Pulse 130  Temp 97.1  F (36.2  C) (Temporal)  Wt 117 lb 14.4 oz (53.5 kg)  LMP 01/02/2017  SpO2 97%  BMI 20.24 kg/m2 Estimated body mass index is 20.24 kg/(m^2) as calculated from the following:    Height as of 1/30/17: 5' 4\" (1.626 m).    Weight as of this encounter: 117 lb 14.4 oz (53.5 kg).  Medication Reconciliation: complete    "

## 2017-02-21 NOTE — PATIENT INSTRUCTIONS
Before Your Surgery      Call your surgeon if there is any change in your health. This includes signs of a cold or flu (such as a sore throat, runny nose, cough, rash or fever).    Do not smoke, drink alcohol or take over the counter medicine (unless your surgeon or primary care doctor tells you to) for the 24 hours before and after surgery.    If you take prescribed drugs: Follow your doctor s orders about which medicines to take and which to stop until after surgery.    Eating and drinking prior to surgery: follow the instructions from your surgeon    Take a shower or bath the night before surgery. Use the soap your surgeon gave you to gently clean your skin. If you do not have soap from your surgeon, use your regular soap. Do not shave or scrub the surgery site.  Wear clean pajamas and have clean sheets on your bed.

## 2017-02-21 NOTE — PROGRESS NOTES
F/U for hemorrhoidal tags    Patient is a 31-year-old white female who last had hemorrhoids 3 years ago her most recent pregnancy. Since then she has had residual skin tags in the area. She has difficulty keeping them clean and they do get irritated when she runs. She denies any pain constipation or diarrhea.       She was seen 9 months ago but procedure was delayed due to insurance issues.  She would like to have the tags excised.        Objective:  B/P: Data Unavailable, T: 97.1, P: 130, R: Data Unavailable  Rectal: No masses, residual tags at 7 and 5 o'clock positions.    Impression/plan:  This is a 31-year-old lady with residual hemorrhoidal tags after her most recent pregnancy. They are difficult to keep clean and get irritated with running.  After discussion with the patient, the  plan is for an excision. The procedure, risks, benefits alternatives discussed she agrees to proceed. She'll be scheduled in the near future.    Britton Palomo MD, FACS

## 2017-02-21 NOTE — PROGRESS NOTES
[address]  [facility] [address]  [phone]  Dept: [phone]    PRE-OP EVALUATION:  Today's date: 2017    Erin Ashley (: 1985) presents for pre-operative evaluation assessment as requested by Dr. Palomo.  She requires evaluation and anesthesia risk assessment prior to undergoing surgery/procedure for treatment of hemorrhoidal skin tags.  Proposed procedure: rectal exam under anesthesia, excision of hemorrhoid tags    Date of Surgery/ Procedure: 17  Time of Surgery/ Procedure: 11:45 am  Hospital/Surgical Facility: [facility]  Fax number for surgical facility:   Primary Physician: None  Type of Anesthesia Anticipated: to be determined    Patient has a Health Care Directive or Living Will:  NO    Preop Questions 2017   1.  Do you have a history of heart attack, stroke, stent, bypass or surgery on an artery in the head, neck, heart or legs? No   2.  Do you ever have any pain or discomfort in your chest? No   3.  Do you have a history of  Heart Failure? No   4.   Are you troubled by shortness of breath when:  walking on a level surface, or up a slight hill, or at night? No   5.  Do you currently have a cold, bronchitis or other respiratory infection? No   6.  Do you have a cough, shortness of breath, or wheezing? No   7.  Do you sometimes get pains in the calves of your legs when you walk? No   8. Do you or anyone in your family have previous history of blood clots? No   9.  Do you or does anyone in your family have a serious bleeding problem such as prolonged bleeding following surgeries or cuts? No   10. Have you ever had problems with anemia or been told to take iron pills? No   11. Have you had any abnormal blood loss such as black, tarry or bloody stools, or abnormal vaginal bleeding? No   12. Have you ever had a blood transfusion? No   13. Have you or any of your relatives ever had problems with anesthesia? No   14. Do you have  sleep apnea, excessive snoring or daytime drowsiness? No   15. Do you have any prosthetic heart valves? No   16. Do you have prosthetic joints? No   17. Is there any chance that you may be pregnant? No       HPI:                                                      Brief HPI related to upcoming procedure:   Patient has residual hemorrhoidal skin tags that she will have removed on 2/24/17 with Dr. Palomo.     She recovered from viral gastroenteritis 3 weeks ago.    See problem list for active medical problems.  Problems all longstanding and stable, except as noted/documented.  See ROS for pertinent symptoms related to these conditions.                                                                                                      MEDICAL HISTORY:                                                      Patient Active Problem List    Diagnosis Date Noted     Elevated LFTs 01/30/2017     Priority: Medium     Personal history of ovarian cyst 05/23/2016     Priority: Medium     Skin tags, anus or rectum 05/23/2016     Priority: Medium     Insomnia due to medical condition 05/23/2016              Acne 11/01/2013     Paragard placed 10/15/13 10/15/2013     CARDIOVASCULAR SCREENING; LDL GOAL LESS THAN 160 10/31/2010     TALIB 3 - cervical intraepithelial neoplasia grade 3 06/04/2008 4/9/08 pap- ASCUS + high risk HPV  5/29/08 colposcopy- bx (TALIB 2/3) ECC (neg)  6/4/08 consult- [facility] recommended  6/13/08 [facility]- (pathology- TALIB 1/2) repeat pap 3 months  11/7/08 pap-NIL- repeat pap in 4 months  5/21/09 reminder letter sent  6/5/09 pap-ASCUS negative HPV- recommend repeat pap in 1 year  6/7/10 pap- NIL- repeat in 1 year (6/2011)  7/29/11 pap NIL. Plan--pap in 1 year  6/14/12 pap NIL  8/1/13 pap NIL/neg HPV. Plan-- pap in 3 years  05/23/16 Pap= NIL.   Problem list name updated by automated process. Provider to review and confirm          Past Medical History   Diagnosis Date     Abnormal Pap smear      Appendicitis with  perforation 08/04/08     Admit. DIscharged 08/07/08     TALIB II (cervical intraepithelial neoplasia II) 2008 5/29/08 on coloposcopy bx TALIB 2/3.  CK[facility] on 6/13/08 showed TALIB 1/2 *yearly paps are indicated*     Renal calculus or stone      Past Surgical History   Procedure Laterality Date     Tonsillectomy  7/3/2007     Hc conization cervix,knife/laser  06/130/8     cervix.  TALIB 1/2     Appendectomy  8/4/2008     Current Outpatient Prescriptions   Medication Sig Dispense Refill     traZODone (DESYREL) 50 MG tablet Take 1 tablet (50 mg) by mouth nightly as needed for sleep (Patient not taking: Reported on 2/22/2017) 30 tablet 3     PARAGARD INTRAUTERINE COPPER IU        OTC products: no recent use of OTC ASA, NSAIDS or Steroids    Allergies   Allergen Reactions     No Known Drug Allergies       Latex Allergy: NO    Social History   Substance Use Topics     Smoking status: Never Smoker     Smokeless tobacco: Never Used      Comment: no smokers in the household     Alcohol use No      Comment: OCC. none during pregnancy     History   Drug Use No       REVIEW OF SYSTEMS:                                                    C: NEGATIVE for fever, chills, change in weight  I: +Hemorrhoidal skin tags. NEGATIVE for other worrisome rashes, moles or lesions  E: NEGATIVE for vision changes or irritation  E/M: NEGATIVE for ear, mouth and throat problems  R: NEGATIVE for significant cough or SOB  B: NEGATIVE for masses, tenderness or discharge  CV: NEGATIVE for chest pain, palpitations or peripheral edema  GI: NEGATIVE for nausea, abdominal pain, heartburn, or change in bowel habits  : NEGATIVE for frequency, dysuria, or hematuria  M: NEGATIVE for significant arthralgias or myalgia  N: NEGATIVE for weakness, dizziness or paresthesias  E: NEGATIVE for temperature intolerance, skin/hair changes  H: NEGATIVE for bleeding problems  P: NEGATIVE for changes in mood or affect    EXAM:                                                     /60  Pulse 60  Temp 97.6  F (36.4  C) (Temporal)  Resp 12  Wt 120 lb 3.2 oz (54.5 kg)  LMP 01/02/2017  BMI 20.63 kg/m2    GENERAL APPEARANCE: healthy, alert and no distress     EYES: EOMI,- PERRL     HENT: ear canals and TM's normal and nose and mouth without ulcers or lesions     NECK: no adenopathy, no asymmetry, masses, or scars and thyroid normal to palpation     RESP: lungs clear to auscultation - no rales, rhonchi or wheezes     CV: regular rate and rhythm, normal S1 S2, no S3 or S4 and no murmur, click or rub -     ABDOMEN:  soft, nontender, no HSM or masses and bowel sounds normal     MS: extremities normal- no gross deformities noted, no evidence of inflammation in joints, FROM in all extremities.     SKIN: no suspicious lesions or rashes     NEURO: Normal strength and tone, mentation intact and speech normal. Cranial nerves II-XII are grossly intact. DTRs are 2+/4 throughout and symmetric. Gait is stable. \     PSYCH: mentation appears normal. and affect normal/bright     LYMPHATICS: No axillary, cervical, inguinal, or supraclavicular nodes    DIAGNOSTICS:                                                    No labs or EKG required for low risk surgery (cataract, skin procedure, breast biopsy, etc)    Recent Labs   Lab Test  01/31/17   0805  01/30/17   1615  10/07/16   1135   HGB   --   11.8  13.1   PLT   --   202  219   NA  143  144   --    POTASSIUM  3.8  3.9   --    CR  0.51*  0.54   --       IMPRESSION:                                                    Reason for surgery/procedure: hemorrhoidal skin tags  Diagnosis/reason for consult: pre-op clearance    The proposed surgical procedure is considered LOW risk.    REVISED CARDIAC RISK INDEX  The patient has the following serious cardiovascular risks for perioperative complications such as (MI, PE, VFib and 3  AV Block):  No serious cardiac risks  INTERPRETATION: 0 risks: Class I (very low risk - 0.4% complication rate)    The patient has the  following additional risks for perioperative complications:  No identified additional risks      ICD-10-CM    1. Preop general physical exam Z01.818    2. Residual hemorrhoidal skin tags K64.4    3. Elevated LFTs R79.89 Comprehensive metabolic panel (BMP + Alb, Alk Phos, ALT, AST, Total. Bili, TP)     Will order updated CMP to make sure LFTs have improved since being treated for gastroenteritis.   RECOMMENDATIONS:                                                      APPROVAL GIVEN to proceed with proposed procedure, without further diagnostic evaluation     Signed Electronically by: Hussain Ryder PA-C    Copy of this evaluation report is provided to requesting physician.    [facility] Preop Guidelines

## 2017-02-21 NOTE — NURSING NOTE
[facility] Surgical Services    Erin Ashley has been given the following teaching information:  Before Your Surgery booklet  Instructions for Showering or Bathing before Surgery  Request for surgery faxed to Asha at [facility]

## 2017-02-22 ENCOUNTER — OFFICE VISIT (OUTPATIENT)
Dept: FAMILY MEDICINE | Facility: OTHER | Age: 32
End: 2017-02-22
Payer: COMMERCIAL

## 2017-02-22 ENCOUNTER — TELEPHONE (OUTPATIENT)
Dept: SURGERY | Facility: OTHER | Age: 32
End: 2017-02-22

## 2017-02-22 VITALS
SYSTOLIC BLOOD PRESSURE: 100 MMHG | RESPIRATION RATE: 12 BRPM | TEMPERATURE: 97.6 F | HEART RATE: 60 BPM | DIASTOLIC BLOOD PRESSURE: 60 MMHG | WEIGHT: 120.2 LBS | BODY MASS INDEX: 20.63 KG/M2

## 2017-02-22 DIAGNOSIS — K64.4 RESIDUAL HEMORRHOIDAL SKIN TAGS: ICD-10-CM

## 2017-02-22 DIAGNOSIS — Z01.818 PREOP GENERAL PHYSICAL EXAM: Primary | ICD-10-CM

## 2017-02-22 DIAGNOSIS — R79.89 ELEVATED LFTS: ICD-10-CM

## 2017-02-22 PROBLEM — E86.0 DEHYDRATION: Status: RESOLVED | Noted: 2017-01-30 | Resolved: 2017-02-22

## 2017-02-22 PROBLEM — R19.7 DIARRHEA: Status: RESOLVED | Noted: 2017-01-30 | Resolved: 2017-02-22

## 2017-02-22 PROBLEM — B34.9 VIRAL ILLNESS: Status: RESOLVED | Noted: 2017-01-30 | Resolved: 2017-02-22

## 2017-02-22 PROBLEM — A08.4 VIRAL GASTROENTERITIS: Status: RESOLVED | Noted: 2017-01-30 | Resolved: 2017-02-22

## 2017-02-22 PROCEDURE — 99213 OFFICE O/P EST LOW 20 MIN: CPT | Performed by: PHYSICIAN ASSISTANT

## 2017-02-22 PROCEDURE — 80053 COMPREHEN METABOLIC PANEL: CPT | Performed by: PHYSICIAN ASSISTANT

## 2017-02-22 PROCEDURE — 36415 COLL VENOUS BLD VENIPUNCTURE: CPT | Performed by: PHYSICIAN ASSISTANT

## 2017-02-22 NOTE — TELEPHONE ENCOUNTER
Surgery Scheduled    Date of Surgery 02/24/17 Time of Surgery 11:45am  Procedure: Rectal Exam Under Anesthsia, Excision of Hemorrhoid Tags  Hospital/Surgical Facility: [place]  Surgeon: Dr Palomo  Type of Anesthesia Anticipated: LMA  Pre-Op: 02/22/17 with Brendan Ryder   Post-Op: patient to schedule   Pre-Certification -to be completed  Consent Signed -to be completed  Hospital Stay -same day procedure    Surgery Packet (and/or) Colonscopy Prep (was given/or mailed) to patient. Patient was also instructed to arrive 1 hour(s) prior to surgery.  Patient understood and agrees to the plan.      Asha Lizarraga  Specialty

## 2017-02-22 NOTE — NURSING NOTE
"Chief Complaint   Patient presents with     Pre-Op Exam     Panel Management     mychart, flu       Initial Wt 120 lb 3.2 oz (54.5 kg)  LMP 01/02/2017  BMI 20.63 kg/m2 Estimated body mass index is 20.63 kg/(m^2) as calculated from the following:    Height as of 1/30/17: 5' 4\" (1.626 m).    Weight as of this encounter: 120 lb 3.2 oz (54.5 kg).  Medication Reconciliation: complete   Val Mckeon CMA    "

## 2017-02-22 NOTE — MR AVS SNAPSHOT
After Visit Summary   2/22/2017    Erin Ashley    MRN: [id number]           Patient Information     Date Of Birth          1985        Visit Information        Provider Department      2/22/2017 1:30 PM Hussain Ryder PA-C [facility]        Today's Diagnoses     Preop general physical exam    -  1    Residual hemorrhoidal skin tags        Elevated LFTs          Care Instructions      Before Your Surgery      Call your surgeon if there is any change in your health. This includes signs of a cold or flu (such as a sore throat, runny nose, cough, rash or fever).    Do not smoke, drink alcohol or take over the counter medicine (unless your surgeon or primary care doctor tells you to) for the 24 hours before and after surgery.    If you take prescribed drugs: Follow your doctor s orders about which medicines to take and which to stop until after surgery.    Eating and drinking prior to surgery: follow the instructions from your surgeon    Take a shower or bath the night before surgery. Use the soap your surgeon gave you to gently clean your skin. If you do not have soap from your surgeon, use your regular soap. Do not shave or scrub the surgery site.  Wear clean pajamas and have clean sheets on your bed.             Follow-ups after your visit        Your next 10 appointments already scheduled     Feb 24, 2017   Procedure with Britton Palomo MD   [facility] Periop Services ([facility])    [address] [place] [address]   [phone]           From [address]: Exit at [address] on [place] side of [place]. Turn right on [address]. Turn left at stoplight on [address]. [facility] will be in view two blocks ahead              Who to contact     If you have questions or need follow up information about today's clinic visit or your schedule please contact [facility] directly at [phone].  Normal or  "non-critical lab and imaging results will be communicated to you by MyChart, letter or phone within 4 business days after the clinic has received the results. If you do not hear from us within 7 days, please contact the clinic through [email or web address] or phone. If you have a critical or abnormal lab result, we will notify you by phone as soon as possible.  Submit refill requests through [employer] or call your pharmacy and they will forward the refill request to us. Please allow 3 business days for your refill to be completed.          Additional Information About Your Visit        [employer] Information     [employer] lets you send messages to your doctor, view your test results, renew your prescriptions, schedule appointments and more. To sign up, go to [email or web address] . Click on \"Log in\" on the left side of the screen, which will take you to the Welcome page. Then click on \"Sign up Now\" on the right side of the page.     You will be asked to enter the access code listed below, as well as some personal information. Please follow the directions to create your username and password.     Your access code is: [email or web address]  Expires: 2017 10:41 AM     Your access code will  in 90 days. If you need help or a new code, please call your [place] clinic or [phone].        Care EveryWhere ID     This is your Care EveryWhere ID. This could be used by other organizations to access your [place] medical records  [id number]        Your Vitals Were     Pulse Temperature Respirations Last Period BMI (Body Mass Index)       60 97.6  F (36.4  C) (Temporal) 12 2017 20.63 kg/m2        Blood Pressure from Last 3 Encounters:   17 100/60   17 114/67   17 104/56    Weight from Last 3 Encounters:   17 120 lb 3.2 oz (54.5 kg)   17 117 lb 14.4 oz (53.5 kg)   17 119 lb 0.8 oz (54 kg)              We Performed the Following     Comprehensive metabolic panel (BMP + Alb, Alk Phos, ALT, AST, " Total. Consuelo, DELVIS)          Today's Medication Changes          These changes are accurate as of: 2/22/17  1:52 PM.  If you have any questions, ask your nurse or doctor.               Stop taking these medicines if you haven't already. Please contact your care team if you have questions.     ondansetron 4 MG ODT tab   Commonly known as:  ZOFRAN-ODT   Stopped by:  Hussain Ryder PA-C           oxyCODONE 5 MG IR tablet   Commonly known as:  ROXICODONE   Stopped by:  Hussain Ryder PA-C           prochlorperazine 5 MG tablet   Commonly known as:  COMPAZINE   Stopped by:  Hussain Ryder PA-C                    Primary Care Provider    None       No address on file        Thank you!     Thank you for choosing [facility]  for your care. Our goal is always to provide you with excellent care. Hearing back from our patients is one way we can continue to improve our services. Please take a few minutes to complete the written survey that you may receive in the mail after your visit with us. Thank you!             Your Updated Medication List - Protect others around you: Learn how to safely use, store and throw away your medicines at www.disposemymeds.org.          This list is accurate as of: 2/22/17  1:52 PM.  Always use your most recent med list.                   Brand Name Dispense Instructions for use    PARAGARD INTRAUTERINE COPPER IU          traZODone 50 MG tablet    DESYREL    30 tablet    Take 1 tablet (50 mg) by mouth nightly as needed for sleep

## 2017-02-23 LAB
ALBUMIN SERPL-MCNC: 3.6 G/DL (ref 3.4–5)
ALP SERPL-CCNC: 69 U/L (ref 40–150)
ALT SERPL W P-5'-P-CCNC: 60 U/L (ref 0–50)
ANION GAP SERPL CALCULATED.3IONS-SCNC: 9 MMOL/L (ref 3–14)
AST SERPL W P-5'-P-CCNC: 28 U/L (ref 0–45)
BILIRUB SERPL-MCNC: 0.3 MG/DL (ref 0.2–1.3)
BUN SERPL-MCNC: 12 MG/DL (ref 7–30)
CALCIUM SERPL-MCNC: 8.3 MG/DL (ref 8.5–10.1)
CHLORIDE SERPL-SCNC: 104 MMOL/L (ref 94–109)
CO2 SERPL-SCNC: 27 MMOL/L (ref 20–32)
CREAT SERPL-MCNC: 0.68 MG/DL (ref 0.52–1.04)
GFR SERPL CREATININE-BSD FRML MDRD: ABNORMAL ML/MIN/1.7M2
GLUCOSE SERPL-MCNC: 90 MG/DL (ref 70–99)
POTASSIUM SERPL-SCNC: 4 MMOL/L (ref 3.4–5.3)
PROT SERPL-MCNC: 6.7 G/DL (ref 6.8–8.8)
SODIUM SERPL-SCNC: 140 MMOL/L (ref 133–144)

## 2017-02-23 NOTE — H&P (VIEW-ONLY)
[address]  [facility] [address]  [phone]  Dept: [phone]    PRE-OP EVALUATION:  Today's date: 2017    Erin Ashley (: 1985) presents for pre-operative evaluation assessment as requested by Dr. Palomo.  She requires evaluation and anesthesia risk assessment prior to undergoing surgery/procedure for treatment of hemorrhoidal skin tags.  Proposed procedure: rectal exam under anesthesia, excision of hemorrhoid tags    Date of Surgery/ Procedure: 17  Time of Surgery/ Procedure: 11:45 am  Hospital/Surgical Facility: [facility]  Fax number for surgical facility:   Primary Physician: None  Type of Anesthesia Anticipated: to be determined    Patient has a Health Care Directive or Living Will:  NO    Preop Questions 2017   1.  Do you have a history of heart attack, stroke, stent, bypass or surgery on an artery in the head, neck, heart or legs? No   2.  Do you ever have any pain or discomfort in your chest? No   3.  Do you have a history of  Heart Failure? No   4.   Are you troubled by shortness of breath when:  walking on a level surface, or up a slight hill, or at night? No   5.  Do you currently have a cold, bronchitis or other respiratory infection? No   6.  Do you have a cough, shortness of breath, or wheezing? No   7.  Do you sometimes get pains in the calves of your legs when you walk? No   8. Do you or anyone in your family have previous history of blood clots? No   9.  Do you or does anyone in your family have a serious bleeding problem such as prolonged bleeding following surgeries or cuts? No   10. Have you ever had problems with anemia or been told to take iron pills? No   11. Have you had any abnormal blood loss such as black, tarry or bloody stools, or abnormal vaginal bleeding? No   12. Have you ever had a blood transfusion? No   13. Have you or any of your relatives ever had problems with anesthesia? No   14. Do you have  sleep apnea, excessive snoring or daytime drowsiness? No   15. Do you have any prosthetic heart valves? No   16. Do you have prosthetic joints? No   17. Is there any chance that you may be pregnant? No       HPI:                                                      Brief HPI related to upcoming procedure:   Patient has residual hemorrhoidal skin tags that she will have removed on 2/24/17 with Dr. Palomo.     She recovered from viral gastroenteritis 3 weeks ago.    See problem list for active medical problems.  Problems all longstanding and stable, except as noted/documented.  See ROS for pertinent symptoms related to these conditions.                                                                                                      MEDICAL HISTORY:                                                      Patient Active Problem List    Diagnosis Date Noted     Elevated LFTs 01/30/2017     Priority: Medium     Personal history of ovarian cyst 05/23/2016     Priority: Medium     Skin tags, anus or rectum 05/23/2016     Priority: Medium     Insomnia due to medical condition 05/23/2016              Acne 11/01/2013     Paragard placed 10/15/13 10/15/2013     CARDIOVASCULAR SCREENING; LDL GOAL LESS THAN 160 10/31/2010     TALIB 3 - cervical intraepithelial neoplasia grade 3 06/04/2008 4/9/08 pap- ASCUS + high risk HPV  5/29/08 colposcopy- bx (TALIB 2/3) ECC (neg)  6/4/08 consult- [facility] recommended  6/13/08 [facility]- (pathology- TALIB 1/2) repeat pap 3 months  11/7/08 pap-NIL- repeat pap in 4 months  5/21/09 reminder letter sent  6/5/09 pap-ASCUS negative HPV- recommend repeat pap in 1 year  6/7/10 pap- NIL- repeat in 1 year (6/2011)  7/29/11 pap NIL. Plan--pap in 1 year  6/14/12 pap NIL  8/1/13 pap NIL/neg HPV. Plan-- pap in 3 years  05/23/16 Pap= NIL.   Problem list name updated by automated process. Provider to review and confirm          Past Medical History   Diagnosis Date     Abnormal Pap smear      Appendicitis with  perforation 08/04/08     Admit. DIscharged 08/07/08     TALIB II (cervical intraepithelial neoplasia II) 2008 5/29/08 on coloposcopy bx TALIB 2/3.  CKC on 6/13/08 showed TALIB 1/2 *yearly paps are indicated*     Renal calculus or stone      Past Surgical History   Procedure Laterality Date     Tonsillectomy  7/3/2007     Hc conization cervix,knife/laser  06/130/8     cervix.  TALIB 1/2     Appendectomy  8/4/2008     Current Outpatient Prescriptions   Medication Sig Dispense Refill     traZODone (DESYREL) 50 MG tablet Take 1 tablet (50 mg) by mouth nightly as needed for sleep (Patient not taking: Reported on 2/22/2017) 30 tablet 3     PARAGARD INTRAUTERINE COPPER IU        OTC products: no recent use of OTC ASA, NSAIDS or Steroids    Allergies   Allergen Reactions     No Known Drug Allergies       Latex Allergy: NO    Social History   Substance Use Topics     Smoking status: Never Smoker     Smokeless tobacco: Never Used      Comment: no smokers in the household     Alcohol use No      Comment: OCC. none during pregnancy     History   Drug Use No       REVIEW OF SYSTEMS:                                                    C: NEGATIVE for fever, chills, change in weight  I: +Hemorrhoidal skin tags. NEGATIVE for other worrisome rashes, moles or lesions  E: NEGATIVE for vision changes or irritation  E/M: NEGATIVE for ear, mouth and throat problems  R: NEGATIVE for significant cough or SOB  B: NEGATIVE for masses, tenderness or discharge  CV: NEGATIVE for chest pain, palpitations or peripheral edema  GI: NEGATIVE for nausea, abdominal pain, heartburn, or change in bowel habits  : NEGATIVE for frequency, dysuria, or hematuria  M: NEGATIVE for significant arthralgias or myalgia  N: NEGATIVE for weakness, dizziness or paresthesias  E: NEGATIVE for temperature intolerance, skin/hair changes  H: NEGATIVE for bleeding problems  P: NEGATIVE for changes in mood or affect    EXAM:                                                     /60  Pulse 60  Temp 97.6  F (36.4  C) (Temporal)  Resp 12  Wt 120 lb 3.2 oz (54.5 kg)  LMP 01/02/2017  BMI 20.63 kg/m2    GENERAL APPEARANCE: healthy, alert and no distress     EYES: EOMI,- PERRL     HENT: ear canals and TM's normal and nose and mouth without ulcers or lesions     NECK: no adenopathy, no asymmetry, masses, or scars and thyroid normal to palpation     RESP: lungs clear to auscultation - no rales, rhonchi or wheezes     CV: regular rate and rhythm, normal S1 S2, no S3 or S4 and no murmur, click or rub -     ABDOMEN:  soft, nontender, no HSM or masses and bowel sounds normal     MS: extremities normal- no gross deformities noted, no evidence of inflammation in joints, FROM in all extremities.     SKIN: no suspicious lesions or rashes     NEURO: Normal strength and tone, mentation intact and speech normal. Cranial nerves II-XII are grossly intact. DTRs are 2+/4 throughout and symmetric. Gait is stable. \     PSYCH: mentation appears normal. and affect normal/bright     LYMPHATICS: No axillary, cervical, inguinal, or supraclavicular nodes    DIAGNOSTICS:                                                    No labs or EKG required for low risk surgery (cataract, skin procedure, breast biopsy, etc)    Recent Labs   Lab Test  01/31/17   0805  01/30/17   1615  10/07/16   1135   HGB   --   11.8  13.1   PLT   --   202  219   NA  143  144   --    POTASSIUM  3.8  3.9   --    CR  0.51*  0.54   --       IMPRESSION:                                                    Reason for surgery/procedure: hemorrhoidal skin tags  Diagnosis/reason for consult: pre-op clearance    The proposed surgical procedure is considered LOW risk.    REVISED CARDIAC RISK INDEX  The patient has the following serious cardiovascular risks for perioperative complications such as (MI, PE, VFib and 3  AV Block):  No serious cardiac risks  INTERPRETATION: 0 risks: Class I (very low risk - 0.4% complication rate)    The patient has the  following additional risks for perioperative complications:  No identified additional risks      ICD-10-CM    1. Preop general physical exam Z01.818    2. Residual hemorrhoidal skin tags K64.4    3. Elevated LFTs R79.89 Comprehensive metabolic panel (BMP + Alb, Alk Phos, ALT, AST, Total. Bili, TP)     Will order updated CMP to make sure LFTs have improved since being treated for gastroenteritis.   RECOMMENDATIONS:                                                      APPROVAL GIVEN to proceed with proposed procedure, without further diagnostic evaluation     Signed Electronically by: Hussain Ryder PA-C    Copy of this evaluation report is provided to requesting physician.    [facility] Preop Guidelines

## 2017-02-24 ENCOUNTER — ANESTHESIA EVENT (OUTPATIENT)
Dept: SURGERY | Facility: CLINIC | Age: 32
End: 2017-02-24
Payer: COMMERCIAL

## 2017-02-24 ENCOUNTER — HOSPITAL ENCOUNTER (OUTPATIENT)
Facility: CLINIC | Age: 32
Discharge: HOME | End: 2017-02-24
Attending: SPECIALIST | Admitting: SPECIALIST
Payer: COMMERCIAL

## 2017-02-24 ENCOUNTER — ANESTHESIA (OUTPATIENT)
Dept: SURGERY | Facility: CLINIC | Age: 32
End: 2017-02-24
Payer: COMMERCIAL

## 2017-02-24 VITALS
WEIGHT: 120.2 LBS | OXYGEN SATURATION: 96 % | SYSTOLIC BLOOD PRESSURE: 111 MMHG | BODY MASS INDEX: 20.63 KG/M2 | TEMPERATURE: 98.7 F | DIASTOLIC BLOOD PRESSURE: 68 MMHG | RESPIRATION RATE: 12 BRPM

## 2017-02-24 DIAGNOSIS — K59.00 CONSTIPATION, UNSPECIFIED CONSTIPATION TYPE: ICD-10-CM

## 2017-02-24 DIAGNOSIS — G89.18 POST-OP PAIN: Primary | ICD-10-CM

## 2017-02-24 LAB — HCG UR QL: NEGATIVE

## 2017-02-24 PROCEDURE — 25000125 ZZHC RX 250: Performed by: NURSE ANESTHETIST, CERTIFIED REGISTERED

## 2017-02-24 PROCEDURE — 88304 TISSUE EXAM BY PATHOLOGIST: CPT | Performed by: SPECIALIST

## 2017-02-24 PROCEDURE — 36000050 ZZH SURGERY LEVEL 2 1ST 30 MIN: Performed by: SPECIALIST

## 2017-02-24 PROCEDURE — 81025 URINE PREGNANCY TEST: CPT | Performed by: NURSE ANESTHETIST, CERTIFIED REGISTERED

## 2017-02-24 PROCEDURE — 46230 REMOVAL OF ANAL TAGS: CPT | Performed by: SPECIALIST

## 2017-02-24 PROCEDURE — 25800025 ZZH RX 258: Performed by: NURSE ANESTHETIST, CERTIFIED REGISTERED

## 2017-02-24 PROCEDURE — 71000014 ZZH RECOVERY PHASE 1 LEVEL 2 FIRST HR: Performed by: SPECIALIST

## 2017-02-24 PROCEDURE — 71000027 ZZH RECOVERY PHASE 2 EACH 15 MINS: Performed by: SPECIALIST

## 2017-02-24 PROCEDURE — 25000125 ZZHC RX 250: Performed by: SPECIALIST

## 2017-02-24 PROCEDURE — 36000052 ZZH SURGERY LEVEL 2 EA 15 ADDTL MIN: Performed by: SPECIALIST

## 2017-02-24 PROCEDURE — 40000306 ZZH STATISTIC PRE PROC ASSESS II: Performed by: SPECIALIST

## 2017-02-24 PROCEDURE — S0074 INJECTION, CEFOTETAN DISODIU: HCPCS | Performed by: SPECIALIST

## 2017-02-24 PROCEDURE — 25000128 H RX IP 250 OP 636: Performed by: NURSE ANESTHETIST, CERTIFIED REGISTERED

## 2017-02-24 PROCEDURE — 25000132 ZZH RX MED GY IP 250 OP 250 PS 637: Performed by: NURSE ANESTHETIST, CERTIFIED REGISTERED

## 2017-02-24 PROCEDURE — 37000009 ZZH ANESTHESIA TECHNICAL FEE, EACH ADDTL 15 MIN: Performed by: SPECIALIST

## 2017-02-24 PROCEDURE — 37000008 ZZH ANESTHESIA TECHNICAL FEE, 1ST 30 MIN: Performed by: SPECIALIST

## 2017-02-24 PROCEDURE — 88304 TISSUE EXAM BY PATHOLOGIST: CPT | Mod: 26 | Performed by: SPECIALIST

## 2017-02-24 PROCEDURE — 25000132 ZZH RX MED GY IP 250 OP 250 PS 637: Performed by: SPECIALIST

## 2017-02-24 PROCEDURE — 27210794 ZZH OR GENERAL SUPPLY STERILE: Performed by: SPECIALIST

## 2017-02-24 RX ORDER — DIMENHYDRINATE 50 MG/ML
INJECTION, SOLUTION INTRAMUSCULAR; INTRAVENOUS PRN
Status: DISCONTINUED | OUTPATIENT
Start: 2017-02-24 | End: 2017-02-24

## 2017-02-24 RX ORDER — MEPERIDINE HYDROCHLORIDE 25 MG/ML
12.5 INJECTION INTRAMUSCULAR; INTRAVENOUS; SUBCUTANEOUS
Status: DISCONTINUED | OUTPATIENT
Start: 2017-02-24 | End: 2017-02-24 | Stop reason: HOSPADM

## 2017-02-24 RX ORDER — ALBUTEROL SULFATE 0.83 MG/ML
2.5 SOLUTION RESPIRATORY (INHALATION) EVERY 4 HOURS PRN
Status: DISCONTINUED | OUTPATIENT
Start: 2017-02-24 | End: 2017-02-24 | Stop reason: HOSPADM

## 2017-02-24 RX ORDER — FENTANYL CITRATE 50 UG/ML
25-50 INJECTION, SOLUTION INTRAMUSCULAR; INTRAVENOUS
Status: DISCONTINUED | OUTPATIENT
Start: 2017-02-24 | End: 2017-02-24 | Stop reason: HOSPADM

## 2017-02-24 RX ORDER — PROPOFOL 10 MG/ML
INJECTION, EMULSION INTRAVENOUS CONTINUOUS PRN
Status: DISCONTINUED | OUTPATIENT
Start: 2017-02-24 | End: 2017-02-24

## 2017-02-24 RX ORDER — HYDROMORPHONE HYDROCHLORIDE 1 MG/ML
.3-.5 INJECTION, SOLUTION INTRAMUSCULAR; INTRAVENOUS; SUBCUTANEOUS EVERY 10 MIN PRN
Status: DISCONTINUED | OUTPATIENT
Start: 2017-02-24 | End: 2017-02-24 | Stop reason: HOSPADM

## 2017-02-24 RX ORDER — ONDANSETRON 2 MG/ML
INJECTION INTRAMUSCULAR; INTRAVENOUS PRN
Status: DISCONTINUED | OUTPATIENT
Start: 2017-02-24 | End: 2017-02-24

## 2017-02-24 RX ORDER — KETOROLAC TROMETHAMINE 30 MG/ML
INJECTION, SOLUTION INTRAMUSCULAR; INTRAVENOUS PRN
Status: DISCONTINUED | OUTPATIENT
Start: 2017-02-24 | End: 2017-02-24

## 2017-02-24 RX ORDER — LIDOCAINE 40 MG/G
CREAM TOPICAL
Status: DISCONTINUED | OUTPATIENT
Start: 2017-02-24 | End: 2017-02-24 | Stop reason: HOSPADM

## 2017-02-24 RX ORDER — HYDRALAZINE HYDROCHLORIDE 20 MG/ML
2.5-5 INJECTION INTRAMUSCULAR; INTRAVENOUS EVERY 10 MIN PRN
Status: DISCONTINUED | OUTPATIENT
Start: 2017-02-24 | End: 2017-02-24 | Stop reason: HOSPADM

## 2017-02-24 RX ORDER — NALOXONE HYDROCHLORIDE 0.4 MG/ML
.1-.4 INJECTION, SOLUTION INTRAMUSCULAR; INTRAVENOUS; SUBCUTANEOUS
Status: DISCONTINUED | OUTPATIENT
Start: 2017-02-24 | End: 2017-02-24 | Stop reason: HOSPADM

## 2017-02-24 RX ORDER — BUPIVACAINE HYDROCHLORIDE AND EPINEPHRINE 2.5; 5 MG/ML; UG/ML
INJECTION, SOLUTION INFILTRATION; PERINEURAL PRN
Status: DISCONTINUED | OUTPATIENT
Start: 2017-02-24 | End: 2017-02-24 | Stop reason: HOSPADM

## 2017-02-24 RX ORDER — SODIUM CHLORIDE, SODIUM LACTATE, POTASSIUM CHLORIDE, CALCIUM CHLORIDE 600; 310; 30; 20 MG/100ML; MG/100ML; MG/100ML; MG/100ML
INJECTION, SOLUTION INTRAVENOUS CONTINUOUS
Status: DISCONTINUED | OUTPATIENT
Start: 2017-02-24 | End: 2017-02-24 | Stop reason: HOSPADM

## 2017-02-24 RX ORDER — DIMENHYDRINATE 50 MG/ML
25 INJECTION, SOLUTION INTRAMUSCULAR; INTRAVENOUS
Status: DISCONTINUED | OUTPATIENT
Start: 2017-02-24 | End: 2017-02-24 | Stop reason: HOSPADM

## 2017-02-24 RX ORDER — DEXAMETHASONE SODIUM PHOSPHATE 10 MG/ML
INJECTION, SOLUTION INTRAMUSCULAR; INTRAVENOUS PRN
Status: DISCONTINUED | OUTPATIENT
Start: 2017-02-24 | End: 2017-02-24

## 2017-02-24 RX ORDER — PROPOFOL 10 MG/ML
INJECTION, EMULSION INTRAVENOUS PRN
Status: DISCONTINUED | OUTPATIENT
Start: 2017-02-24 | End: 2017-02-24

## 2017-02-24 RX ORDER — CEFOTETAN DISODIUM 1 G/10ML
1 INJECTION, POWDER, FOR SOLUTION INTRAMUSCULAR; INTRAVENOUS SEE ADMIN INSTRUCTIONS
Status: DISCONTINUED | OUTPATIENT
Start: 2017-02-24 | End: 2017-02-24 | Stop reason: HOSPADM

## 2017-02-24 RX ORDER — ACETAMINOPHEN 325 MG/1
975 TABLET ORAL ONCE
Status: COMPLETED | OUTPATIENT
Start: 2017-02-24 | End: 2017-02-24

## 2017-02-24 RX ORDER — CEFOTETAN DISODIUM 1 G/10ML
1 INJECTION, POWDER, FOR SOLUTION INTRAMUSCULAR; INTRAVENOUS
Status: COMPLETED | OUTPATIENT
Start: 2017-02-24 | End: 2017-02-24

## 2017-02-24 RX ORDER — ONDANSETRON 2 MG/ML
4 INJECTION INTRAMUSCULAR; INTRAVENOUS EVERY 30 MIN PRN
Status: DISCONTINUED | OUTPATIENT
Start: 2017-02-24 | End: 2017-02-24 | Stop reason: HOSPADM

## 2017-02-24 RX ORDER — FENTANYL CITRATE 50 UG/ML
INJECTION, SOLUTION INTRAMUSCULAR; INTRAVENOUS PRN
Status: DISCONTINUED | OUTPATIENT
Start: 2017-02-24 | End: 2017-02-24

## 2017-02-24 RX ORDER — LIDOCAINE HYDROCHLORIDE 20 MG/ML
INJECTION, SOLUTION INFILTRATION; PERINEURAL PRN
Status: DISCONTINUED | OUTPATIENT
Start: 2017-02-24 | End: 2017-02-24

## 2017-02-24 RX ORDER — OXYCODONE AND ACETAMINOPHEN 5; 325 MG/1; MG/1
1-2 TABLET ORAL
Status: DISCONTINUED | OUTPATIENT
Start: 2017-02-24 | End: 2017-02-24 | Stop reason: HOSPADM

## 2017-02-24 RX ORDER — OXYCODONE HYDROCHLORIDE 5 MG/1
10 TABLET ORAL EVERY 4 HOURS PRN
Status: DISCONTINUED | OUTPATIENT
Start: 2017-02-24 | End: 2017-02-24 | Stop reason: HOSPADM

## 2017-02-24 RX ORDER — OXYCODONE HYDROCHLORIDE 5 MG/1
15 TABLET ORAL EVERY 4 HOURS PRN
Status: DISCONTINUED | OUTPATIENT
Start: 2017-02-24 | End: 2017-02-24 | Stop reason: HOSPADM

## 2017-02-24 RX ORDER — OXYCODONE AND ACETAMINOPHEN 5; 325 MG/1; MG/1
1-2 TABLET ORAL EVERY 4 HOURS PRN
Qty: 30 TABLET | Refills: 0 | Status: SHIPPED | OUTPATIENT
Start: 2017-02-24 | End: 2017-03-07

## 2017-02-24 RX ORDER — ONDANSETRON 4 MG/1
4 TABLET, ORALLY DISINTEGRATING ORAL EVERY 30 MIN PRN
Status: DISCONTINUED | OUTPATIENT
Start: 2017-02-24 | End: 2017-02-24 | Stop reason: HOSPADM

## 2017-02-24 RX ADMIN — KETOROLAC TROMETHAMINE 15 MG: 30 INJECTION, SOLUTION INTRAMUSCULAR at 12:18

## 2017-02-24 RX ADMIN — PROCHLORPERAZINE EDISYLATE 10 MG: 5 INJECTION INTRAMUSCULAR; INTRAVENOUS at 12:49

## 2017-02-24 RX ADMIN — CEFOTETAN DISODIUM 1 G: 1 INJECTION, POWDER, FOR SOLUTION INTRAMUSCULAR; INTRAVENOUS at 12:00

## 2017-02-24 RX ADMIN — FENTANYL CITRATE 50 MCG: 50 INJECTION, SOLUTION INTRAMUSCULAR; INTRAVENOUS at 13:00

## 2017-02-24 RX ADMIN — ONDANSETRON 4 MG: 2 INJECTION INTRAMUSCULAR; INTRAVENOUS at 12:18

## 2017-02-24 RX ADMIN — LIDOCAINE HYDROCHLORIDE 1 ML: 10 INJECTION, SOLUTION EPIDURAL; INFILTRATION; INTRACAUDAL; PERINEURAL at 11:21

## 2017-02-24 RX ADMIN — MIDAZOLAM HYDROCHLORIDE 2 MG: 1 INJECTION, SOLUTION INTRAMUSCULAR; INTRAVENOUS at 11:50

## 2017-02-24 RX ADMIN — FENTANYL CITRATE 50 MCG: 50 INJECTION, SOLUTION INTRAMUSCULAR; INTRAVENOUS at 11:50

## 2017-02-24 RX ADMIN — PROPOFOL 150 MG: 10 INJECTION, EMULSION INTRAVENOUS at 11:57

## 2017-02-24 RX ADMIN — SODIUM CHLORIDE, POTASSIUM CHLORIDE, SODIUM LACTATE AND CALCIUM CHLORIDE: 600; 310; 30; 20 INJECTION, SOLUTION INTRAVENOUS at 11:50

## 2017-02-24 RX ADMIN — LIDOCAINE HYDROCHLORIDE 40 MG: 20 INJECTION, SOLUTION INFILTRATION; PERINEURAL at 11:57

## 2017-02-24 RX ADMIN — SODIUM CHLORIDE, POTASSIUM CHLORIDE, SODIUM LACTATE AND CALCIUM CHLORIDE: 600; 310; 30; 20 INJECTION, SOLUTION INTRAVENOUS at 11:21

## 2017-02-24 RX ADMIN — FENTANYL CITRATE 50 MCG: 50 INJECTION, SOLUTION INTRAMUSCULAR; INTRAVENOUS at 11:55

## 2017-02-24 RX ADMIN — PROPOFOL 100 MCG/KG/MIN: 10 INJECTION, EMULSION INTRAVENOUS at 11:56

## 2017-02-24 RX ADMIN — FENTANYL CITRATE 50 MCG: 50 INJECTION, SOLUTION INTRAMUSCULAR; INTRAVENOUS at 13:27

## 2017-02-24 RX ADMIN — OXYCODONE HYDROCHLORIDE 10 MG: 5 TABLET ORAL at 13:59

## 2017-02-24 RX ADMIN — SODIUM CHLORIDE, POTASSIUM CHLORIDE, SODIUM LACTATE AND CALCIUM CHLORIDE: 600; 310; 30; 20 INJECTION, SOLUTION INTRAVENOUS at 13:04

## 2017-02-24 RX ADMIN — ACETAMINOPHEN 975 MG: 325 TABLET ORAL at 11:11

## 2017-02-24 RX ADMIN — DEXAMETHASONE SODIUM PHOSPHATE 10 MG: 10 INJECTION, SOLUTION INTRAMUSCULAR; INTRAVENOUS at 12:01

## 2017-02-24 RX ADMIN — DIMENHYDRINATE 25 MG: 50 INJECTION, SOLUTION INTRAMUSCULAR; INTRAVENOUS at 12:17

## 2017-02-24 RX ADMIN — FENTANYL CITRATE 50 MCG: 50 INJECTION, SOLUTION INTRAMUSCULAR; INTRAVENOUS at 12:54

## 2017-02-24 ASSESSMENT — PAIN DESCRIPTION - DESCRIPTORS
DESCRIPTORS: DISCOMFORT
DESCRIPTORS: BURNING
DESCRIPTORS: BURNING

## 2017-02-24 NOTE — OP NOTE
DATE OF PROCEDURE:  2/24/2017      PREOPERATIVE DIAGNOSIS:  Hemorrhoidal skin tags, symptomatic.      POSTOPERATIVE DIAGNOSIS:  Hemorrhoidal skin tags, symptomatic.      PROCEDURE PERFORMED:  Rectal exam under anesthesia, excision of hemorrhoidal tags at 7 and 5 o'clock position in lithotomy.      SURGEON:   Britton Palomo M.D.      ASSISTANT: Kristen Multani MS3.      ANESTHESIA:  LMA.      INDICATIONS FOR PROCEDURE:  Ms. Erin Ashley is a 31-year-old lady with a history of hemorrhoids and then returned with a residual tag that caused her discomfort when she would go running and they were very difficult to keep clean so she wanted to have them excised.      OPERATIVE FINDINGS:  Included hemorrhoidal tags at the 7 and 5 o'clock lithotomy position.      DETAILS OF PROCEDURE:  The patient was taken to the operating room and placed on the table in supine position.  After induction of anesthesia, she was placed in lithotomy position and the perirectal area was prepped and draped in sterile fashion.  A timeout was performed confirming the identity of the patient as well as the procedure to be performed.      After this was completed, a rectal exam was then carried out and there were no abnormalities noted in the canal.  The 7 o'clock tag was then grasped with an Allis clamp infiltrated with local anesthetic and a 3-0 Vicryl suture was then placed at the base.  This tag was excised using a 15 blade.  Hemostasis was achieved using cautery.  The defect was then closed using a running locking 3-0 Vicryl stitch.        We then turned our attention to the 5 o'clock tag; it was then grasped with an Allis clamp infiltrated with local anesthetic and excised using a 15 blade.  The skin defect was then closed using a running 3-0 Vicryl in a locking fashion.  Hemostasis was achieved using cautery.  The wound was then packed with dry gauze.      The patient was then taken from the operating room to the recovery room in stable  condition to be sent home.         STACEY RODRIGUEZ MD             D: 2017 12:34   T: 2017 13:17   MT: CHANTELLE#[id number]      Name:     FELIPE BOACNEGRA   MRN:      [id number]        Account:        [id number]   :      1985           Procedure Date: 2017      Document: [id number]

## 2017-02-24 NOTE — ANESTHESIA PREPROCEDURE EVALUATION
Anesthesia Evaluation     . Pt has had prior anesthetic. Type: General      ROS/MED HX    ENT/Pulmonary:  - neg pulmonary ROS     Neurologic:  - neg neurologic ROS     Cardiovascular:  - neg cardiovascular ROS       METS/Exercise Tolerance:     Hematologic:  - neg hematologic  ROS       Musculoskeletal:  - neg musculoskeletal ROS       GI/Hepatic: Comment: Mildly elevated LFT's in January of this year.          Renal/Genitourinary:  - ROS Renal section negative       Endo:  - neg endo ROS       Psychiatric:  - neg psychiatric ROS       Infectious Disease:  - neg infectious disease ROS       Malignancy:      - no malignancy   Other:    - neg other ROS           Physical Exam  Normal systems: dental    Airway   Mallampati: I  TM distance: >3 FB  Neck ROM: full    Dental     Cardiovascular   Rhythm and rate: regular and normal      Pulmonary    breath sounds clear to auscultation               PAC Discussion and Assessment    ASA Classification: 1  Case is suitable for:   Anesthetic techniques and relevant risks discussed: GA  Invasive monitoring and risk discussed: Yes  Types:   Possibility and Risk of blood transfusion discussed: No  NPO instructions given: NPO after midnight  Additional anesthetic preparation and risks discussed:   Needs early admission to pre-op area: No  Other:     PAC Resident/NP Anesthesia Assessment:        Mid-Level Provider/Resident:   Date:   Time:     Attending Anesthesiologist Anesthesia Assessment:        Anesthesiologist:   Date:   Time:   Pass/Fail:   Disposition:     PAC Pharmacist Assessment:        Pharmacist:   Date:   Time:      Anesthesia Plan      History & Physical Review  History and physical reviewed and following examination; no interval change.    ASA Status:  1 .    NPO Status:  > 8 hours    Plan for General and LMA with Intravenous and Propofol induction. Maintenance will be Balanced.    PONV prophylaxis:  Ondansetron (or other 5HT-3), Dexamethasone or Solumedrol and  Meclizine or Dimenhydrinate       Postoperative Care  Postoperative pain management:  IV analgesics and Oral pain medications.      Consents  Anesthetic plan, risks, benefits and alternatives discussed with:  Patient.  Use of blood products discussed: No .   .                          .

## 2017-02-24 NOTE — OR NURSING
Verbal report received from Kaye WHALEN. Phase 1 recovery assumed by Anabelle WHALEN. Pt post-op general anesthesia for rectal exam and excosopm of 2 hemrrhoidal skin tags per .

## 2017-02-24 NOTE — IP AVS SNAPSHOT
MRN:[id number]                      After Visit Summary   2/24/2017    Erin Ashley    MRN: [id number]           Thank you!     Thank you for choosing [facility] for your care. Our goal is always to provide you with excellent care. Hearing back from our patients is one way we can continue to improve our services. Please take a few minutes to complete the written survey that you may receive in the mail after you visit with us. Thank you!        Patient Information     Date Of Birth          1985        About your hospital stay     You were admitted on:  February 24, 2017 You last received care in the:  [facility] Phase II    You were discharged on:  February 24, 2017       Who to Call     For medical emergencies, please call 911.  For non-urgent questions about your medical care, please call your primary care provider or clinic, [phone]  For questions related to your surgery, please call your surgery clinic        Attending Provider     Provider Specialty    Britton Palomo MD General Surgery       Primary Care Provider Office Phone #    [facility] [phone]       No address on file        After Care Instructions     Diet Instructions       Resume pre-procedure diet            Discharge Instructions       Patient to follow up with appointment in  7-10 days.            Dressing       Keep dressing clean and dry.  Dressing / incisional care as instructed by RN and or Surgeon            Ice to affected area       Ice to operative site PRN            No Alcohol       For 24 hours post procedure            No driving or operating machinery        until the day after procedure            No lifting        No lifting over 20 lbs and no strenuous physical activity for 2 weeks            Shower       No shower for 24 hours post procedure. May shower Postoperative Day (POD)  1  - Start BID sitz baths BID and PRN POD 1                  Further instructions from your care  "team       A sitz bath refers to a warm water bath that people use for heating or cleansing.  To use it, you sit in a tub with water covering your hips and buttocks.  Doctors often recommend a sitz bath to minimize pain and encourage healing after hemorrhoid surgery or episiotomy.  A sitz bath can also reduce itching and muscle spasms.    Things you will need:  Warm water  Sitz bath pan or regular bath tub  Medication (optional)  Towel or hair dryer    Fill a sitz bath pan or regular bath tub with a few inches of warm water.    Pour any medication that your doctor recommends into the water.  Follow the recommended dosage and instructions.    Sit in the sitz bath for 20 minutes.    Rinse with warm water    Pat dry with towel or use hair dryer on LOW setting    Tips:  Sit in the sitz bath after each bowel movement and two to three additional times during the day.  Do not rub or wipe when drying the anal area with a towel.    Use Colace according to prescription..    Pending Results     Date and Time Order Name Status Description    2/24/2017 1214 Surgical pathology exam In process             Admission Information     Date & Time Provider Department Dept. Phone    2/24/2017 Britton Palomo MD [facility] Phase [phone]      Your Vitals Were     Blood Pressure Temperature Respirations Weight Last Period Pulse Oximetry    107/62 98.7  F (37.1  C) (Oral) 12 54.5 kg (120 lb 3.2 oz) 02/07/2017 94%    BMI (Body Mass Index)                   20.63 kg/m2           [employer] Information     [employer] lets you send messages to your doctor, view your test results, renew your prescriptions, schedule appointments and more. To sign up, go to [email or web address] . Click on \"Log in\" on the left side of the screen, which will take you to the Welcome page. Then click on \"Sign up Now\" on the right side of the page.     You will be asked to enter the access code listed below, as well as some personal information. Please " follow the directions to create your username and password.     Your access code is: [email or web address]  Expires: 2017 10:41 AM     Your access code will  in 90 days. If you need help or a new code, please call your [facility] clinic or [phone].        Care EveryWhere ID     This is your Care EveryWhere ID. This could be used by other organizations to access your [facility] medical records  [id number]           Review of your medicines      START taking        Dose / Directions    hydrocortisone 2.5 % cream   Commonly known as:  ANUSOL-HC   Used for:  Post-op pain        Place rectally 2 times daily   Quantity:  30 g   Refills:  1       oxyCODONE-acetaminophen 5-325 MG per tablet   Commonly known as:  PERCOCET   Used for:  Post-op pain        Dose:  1-2 tablet   Take 1-2 tablets by mouth every 4 hours as needed for pain (moderate to severe)   Quantity:  30 tablet   Refills:  0         CONTINUE these medicines which have NOT CHANGED        Dose / Directions    PARAGARD INTRAUTERINE COPPER IU        Refills:  0       traZODone 50 MG tablet   Commonly known as:  DESYREL   Used for:  Insomnia due to medical condition        Dose:  50 mg   Take 1 tablet (50 mg) by mouth nightly as needed for sleep   Quantity:  30 tablet   Refills:  3            Where to get your medicines      Some of these will need a paper prescription and others can be bought over the counter. Ask your nurse if you have questions.     Bring a paper prescription for each of these medications     hydrocortisone 2.5 % cream    oxyCODONE-acetaminophen 5-325 MG per tablet                Protect others around you: Learn how to safely use, store and throw away your medicines at www.disposemymeds.org.             Medication List: This is a list of all your medications and when to take them. Check marks below indicate your daily home schedule. Keep this list as a reference.      Medications           Morning Afternoon Evening Bedtime As Needed     hydrocortisone 2.5 % cream   Commonly known as:  ANUSOL-HC   Place rectally 2 times daily                                oxyCODONE-acetaminophen 5-325 MG per tablet   Commonly known as:  PERCOCET   Take 1-2 tablets by mouth every 4 hours as needed for pain (moderate to severe)   Next Dose Due:  6 pm.                     6 pm               PARAGARD INTRAUTERINE COPPER IU                                traZODone 50 MG tablet   Commonly known as:  DESYREL   Take 1 tablet (50 mg) by mouth nightly as needed for sleep

## 2017-02-24 NOTE — ANESTHESIA POSTPROCEDURE EVALUATION
Patient: Erin Ashley    Procedure(s):  rectal exam under anesthesia, excision of hemorrhoid tags      lma - Wound Class: II-Clean Contaminated   - Wound Class: II-Clean Contaminated    Diagnosis:hemorrhoid skin tags  Diagnosis Additional Information: No value filed.    Anesthesia Type:  General, LMA    Note:  Anesthesia Post Evaluation    Patient location during evaluation: PACU  Patient participation: Able to fully participate in evaluation  Level of consciousness: awake and alert  Pain management: satisfactory to patient  Airway patency: patent  Cardiovascular status: acceptable  Respiratory status: acceptable and room air  Hydration status: acceptable  PONV: controlled             Last vitals:  Vitals:    02/24/17 1102 02/24/17 1237 02/24/17 1240   BP: 99/66 101/57 102/64   Resp: 14 13 (!) 7   Temp: 98.7  F (37.1  C)     SpO2: 97% 99% 98%         Electronically Signed By: Alicia Razo  February 24, 2017  12:55 PM

## 2017-02-24 NOTE — BRIEF OP NOTE
[facility] Brief Operative Note    Pre-operative diagnosis: hemorrhoid skin tags   Post-operative diagnosis Same   Procedure: Procedure(s):  rectal exam under anesthesia, excision of hemorrhoid tags      lma - Wound Class: II-Clean Contaminated   - Wound Class: II-Clean Contaminated   Surgeon: Britton Palomo MD, FACS   Assistants(s): Kristen Multani MS3   Estimated blood loss: Less than 10 ml    Specimens: Hemorroidal tags   Findings: Hemorrhoidal tags at 7 and 5 o'clock lithotomy     Britton Palomo MD, FACS    #[id number]

## 2017-02-24 NOTE — IP AVS SNAPSHOT
[facility] Phase II    [address]     [place] [address]    Phone:  [phone]                                       After Visit Summary   2/24/2017    Erin Ashley    MRN: [id number]           After Visit Summary Signature Page     I have received my discharge instructions, and my questions have been answered. I have discussed any challenges I see with this plan with the nurse or doctor.    ..........................................................................................................................................  Patient/Patient Representative Signature      ..........................................................................................................................................  Patient Representative Print Name and Relationship to Patient    ..................................................               ................................................  Date                                            Time    ..........................................................................................................................................  Reviewed by Signature/Title    ...................................................              ..............................................  Date                                                            Time

## 2017-02-24 NOTE — DISCHARGE INSTRUCTIONS
A sitz bath refers to a warm water bath that people use for heating or cleansing.  To use it, you sit in a tub with water covering your hips and buttocks.  Doctors often recommend a sitz bath to minimize pain and encourage healing after hemorrhoid surgery or episiotomy.  A sitz bath can also reduce itching and muscle spasms.    Things you will need:  Warm water  Sitz bath pan or regular bath tub  Medication (optional)  Towel or hair dryer    Fill a sitz bath pan or regular bath tub with a few inches of warm water.    Pour any medication that your doctor recommends into the water.  Follow the recommended dosage and instructions.    Sit in the sitz bath for 20 minutes.    Rinse with warm water    Pat dry with towel or use hair dryer on LOW setting    Tips:  Sit in the sitz bath after each bowel movement and two to three additional times during the day.  Do not rub or wipe when drying the anal area with a towel.    Use Colace according to prescription..

## 2017-02-27 ENCOUNTER — TELEPHONE (OUTPATIENT)
Dept: SURGERY | Facility: CLINIC | Age: 32
End: 2017-02-27

## 2017-02-27 DIAGNOSIS — G89.18 POST-OP PAIN: Primary | ICD-10-CM

## 2017-02-27 RX ORDER — HYDROCODONE BITARTRATE AND ACETAMINOPHEN 5; 325 MG/1; MG/1
1 TABLET ORAL EVERY 4 HOURS PRN
Qty: 30 TABLET | Refills: 0 | Status: SHIPPED | OUTPATIENT
Start: 2017-02-27 | End: 2017-03-07

## 2017-02-27 NOTE — LETTER
[address] [address]  [phone]  Dept: [phone]      2/27/2017    Re: Erin Ashley      TO WHOM IT MAY CONCERN:    Erin VAZQUEZ Connorhina  was seen on 02/24/17.  Please excuse her  until 03/06/17 due to surgery.    Cordially,          Britton Palomo MD  [facility]

## 2017-02-27 NOTE — TELEPHONE ENCOUNTER
Writer walked script to [facility] retail pharmacy and letter is at front upper specialy desk.  Yaron JORDAN CMA

## 2017-02-27 NOTE — TELEPHONE ENCOUNTER
Patient had surgery on Friday 02/24/17. She is requesting a different pain medication. The Percocet 5/325mg is not covering her pain, she would like Norco or is up to other suggestions. Please advise.    Yaron JORDAN CMA

## 2017-03-01 ENCOUNTER — TELEPHONE (OUTPATIENT)
Dept: SURGERY | Facility: OTHER | Age: 32
End: 2017-03-01

## 2017-03-01 DIAGNOSIS — G89.18 POST-OP PAIN: Primary | ICD-10-CM

## 2017-03-01 LAB — COPATH REPORT: NORMAL

## 2017-03-01 RX ORDER — HYDROMORPHONE HYDROCHLORIDE 2 MG/1
2-4 TABLET ORAL EVERY 4 HOURS PRN
Qty: 30 TABLET | Refills: 0 | Status: SHIPPED | OUTPATIENT
Start: 2017-03-01 | End: 2017-03-07

## 2017-03-01 NOTE — TELEPHONE ENCOUNTER
Reason for call:  Symptom  Reason for call:  Patient reporting a symptom    Symptom or request: backwards with her pain nothing is helping, is more sharp now.     Duration (how long have symptoms been present):     Have you been treated for this before? Yes    Additional comments: is there anything she should be doing. Had surgery 5 days ago    Phone Number patient can be reached at:  Home number on file [phone] (home)     Best Time:  any    Can we leave a detailed message on this number:  YES    Call taken on 3/1/2017 at 8:26 AM by Akanksha Garza

## 2017-03-01 NOTE — TELEPHONE ENCOUNTER
Pt is 5 days post op from EUA and excision of hemorrhoidal tags with Dr. Palomo. She does not feel that she is making any progress with her pain. She is back to taking Percocet 2 tabs q4hrs. This is not relieving her pain. She tried the Norco and got HA from this right away. Has had 1 BM. Is seeing brown drainage on toilet paper, no bright red bldg. Is not sleeping. Having sharp pain dylan on right side. Is icing and sitz baths and using the Anusol cream.   I told her I would speak to Dr. Palomo and get back to her. MARLEE Veliz

## 2017-03-07 ENCOUNTER — OFFICE VISIT (OUTPATIENT)
Dept: SURGERY | Facility: CLINIC | Age: 32
End: 2017-03-07
Payer: COMMERCIAL

## 2017-03-07 VITALS — HEART RATE: 92 BPM | TEMPERATURE: 98.4 F | DIASTOLIC BLOOD PRESSURE: 58 MMHG | SYSTOLIC BLOOD PRESSURE: 100 MMHG

## 2017-03-07 DIAGNOSIS — G89.18 POST-OP PAIN: ICD-10-CM

## 2017-03-07 DIAGNOSIS — K64.4 SKIN TAGS, ANUS OR RECTUM: Primary | ICD-10-CM

## 2017-03-07 PROCEDURE — 99024 POSTOP FOLLOW-UP VISIT: CPT | Performed by: SURGERY

## 2017-03-07 RX ORDER — HYDROMORPHONE HYDROCHLORIDE 2 MG/1
2-4 TABLET ORAL EVERY 4 HOURS PRN
Qty: 30 TABLET | Refills: 0 | Status: SHIPPED | OUTPATIENT
Start: 2017-03-07 | End: 2017-03-13

## 2017-03-07 RX ORDER — LIDOCAINE 50 MG/G
OINTMENT TOPICAL PRN
Qty: 30 G | Refills: 1 | Status: SHIPPED | OUTPATIENT
Start: 2017-03-07 | End: 2017-03-27

## 2017-03-07 NOTE — NURSING NOTE
"Chief Complaint   Patient presents with     Surgical Followup     rectal exam under anesthesia, excision of hemorrhoid tags done on 02/24/17 with Dr. Palomo       Initial /58  Pulse 92  Temp 98.4  F (36.9  C) (Skin)  LMP 02/07/2017 Estimated body mass index is 20.63 kg/(m^2) as calculated from the following:    Height as of 1/30/17: 5' 4\" (1.626 m).    Weight as of 2/24/17: 120 lb 3.2 oz (54.5 kg).  Medication Reconciliation: complete   Yaron JORDAN CMA      "

## 2017-03-07 NOTE — MR AVS SNAPSHOT
After Visit Summary   3/7/2017    Erin Ashley    MRN: [id number]           Patient Information     Date Of Birth          1985        Visit Information        Provider Department      3/7/2017 2:00 PM Elvia Josue MD [facility]        Today's Diagnoses     Skin tags, anus or rectum    -  1    Post-op pain          Care Instructions    Return to see Dr. Tejada in 2 weeks.  Use Lidocaine to help with pain  Ice the area for comfort.        Follow-ups after your visit        Follow-up notes from your care team     Return for Physical Exam.      Your next 10 appointments already scheduled     Mar 16, 2017  8:00 AM CDT   [facility] Britton [facility]    [address] [address] [phone]              Who to contact     If you have questions or need follow up information about today's clinic visit or your schedule please contact [facility] directly at [phone].  Normal or non-critical lab and imaging results will be communicated to you by [employer], letter or phone within 4 business days after the clinic has received the results. If you do not hear from us within 7 days, please contact the clinic through [employer] or phone. If you have a critical or abnormal lab result, we will notify you by phone as soon as possible.  Submit refill requests through [employer] or call your pharmacy and they will forward the refill request to us. Please allow 3 business days for your refill to be completed.          Additional Information About Your Visit        [employer] Information     [employer] gives you secure access to your electronic health record. If you see a primary care provider, you can also send messages to your care team and make appointments. If you have questions, please call your primary care clinic.  If you do not have a primary care provider, please call [phone] and they will  assist you.        Care EveryWhere ID     This is your Care EveryWhere ID. This could be used by other organizations to access your [facility] medical records  [id number]        Your Vitals Were     Pulse Temperature Last Period             92 98.4  F (36.9  C) (Skin) 02/07/2017          Blood Pressure from Last 3 Encounters:   03/07/17 100/58   02/24/17 111/68   02/22/17 100/60    Weight from Last 3 Encounters:   02/24/17 120 lb 3.2 oz (54.5 kg)   02/22/17 120 lb 3.2 oz (54.5 kg)   02/21/17 117 lb 14.4 oz (53.5 kg)              Today, you had the following     No orders found for display         Today's Medication Changes          These changes are accurate as of: 3/7/17  2:40 PM.  If you have any questions, ask your nurse or doctor.               Start taking these medicines.        Dose/Directions    lidocaine 5 % ointment   Commonly known as:  XYLOCAINE   Used for:  Post-op pain, Skin tags, anus or rectum        Apply topically as needed for moderate pain Apply to perianal region every 2 hours as needed for moderate pain   Quantity:  30 g   Refills:  1            Where to get your medicines      These medications were sent to [facility], [place] - [address]  [address], [facility] [address]     Phone:  [phone]     lidocaine 5 % ointment         Some of these will need a paper prescription and others can be bought over the counter.  Ask your nurse if you have questions.     Bring a paper prescription for each of these medications     HYDROmorphone 2 MG tablet                Primary Care Provider Office Phone #    [facility] [phone]       No address on file        Thank you!     Thank you for choosing [facility]  for your care. Our goal is always to provide you with excellent care. Hearing back from our patients is one way we can continue to improve our services. Please take a few minutes to complete the written survey that you may receive  in the mail after your visit with us. Thank you!             Your Updated Medication List - Protect others around you: Learn how to safely use, store and throw away your medicines at www.disposemymeds.org.          This list is accurate as of: 3/7/17  2:40 PM.  Always use your most recent med list.                   Brand Name Dispense Instructions for use    hydrocortisone 2.5 % cream    ANUSOL-HC    30 g    Place rectally 2 times daily       HYDROmorphone 2 MG tablet    DILAUDID    30 tablet    Take 1-2 tablets (2-4 mg) by mouth every 4 hours as needed for pain       lidocaine 5 % ointment    XYLOCAINE    30 g    Apply topically as needed for moderate pain Apply to perianal region every 2 hours as needed for moderate pain       PARAGARD INTRAUTERINE COPPER IU          traZODone 50 MG tablet    DESYREL    30 tablet    Take 1 tablet (50 mg) by mouth nightly as needed for sleep

## 2017-03-07 NOTE — PROGRESS NOTES
General Surgery Postop Clinic Follow-up    Subjective:  Erin presents for follow-up of hemorrhoidal skin tags. Patient is 10 days postop from DREW and excision of hemorrhoidal tags at 5 and 7 o'clock. Patient reports sharp pain on the right side. The left side feels fine. . She has pain with Bowel movements and without. She is taking the miralax, dulcolax and doing sitz baths as prescribed.    Dull and achy by end of day, sharp off on. Feels sutures poking out.   Bleed until Friday, now still when passing stools.     Best 4-5/10, worst is 8/10. Taking 3 colace per day, 1 capful of miralax daily.     Objective:  /58  Pulse 92  Temp 98.4  F (36.9  C) (Skin)  LMP 02/07/2017  GEN: Patient appears in mild distress.  Exam: patient in decubitus position. She has a new large skin tag (or the original tag) at approximately 7 o'clock. The suture at the 5 o'clock skin tag is loose and dangling. The wound is open but in good opposition and clean.  The 7o'clock skin tag excision site has sutures in place and secured and was free of infection.      Assessment/Plan:  Erin Ashley is a 31 year old female s/p skin tag removal presenting for postop follow-up. She continues to have pain however has done a good job of keeping the area clean. We have given her lidocaine to help with the post operative pain control locally and have refilled the dilaudid prescription.   We have asked her to ice the area for comfort.    I have asked that she return to see Dr. Tejada within 2 weeks to address the new skin and any associated future plan and for follow up.

## 2017-03-08 ENCOUNTER — TELEPHONE (OUTPATIENT)
Dept: SURGERY | Facility: CLINIC | Age: 32
End: 2017-03-08

## 2017-03-08 NOTE — TELEPHONE ENCOUNTER
Reason for Call: Request for follow up    Order or referral being requested: Patient states she saw Dr Josue yesterday and was told to wait until next week when she sees Dr Palomo to close her incision.  Patient is asking if she can see someone else to close the incision, doesn't want to wait.  Please advise    Date needed: at your convenience    Has the patient been seen by the PCP for this problem? YES    Additional comments:     Phone number Patient can be reached at:  Home number on file [phone] (home)    Best Time:  any    Can we leave a detailed message on this number?  YES    Call taken on 3/8/2017 at 8:58 AM by Asha Orozco

## 2017-03-13 ENCOUNTER — OFFICE VISIT (OUTPATIENT)
Dept: SURGERY | Facility: CLINIC | Age: 32
End: 2017-03-13
Payer: COMMERCIAL

## 2017-03-13 ENCOUNTER — MYC REFILL (OUTPATIENT)
Dept: SURGERY | Facility: CLINIC | Age: 32
End: 2017-03-13

## 2017-03-13 VITALS — BODY MASS INDEX: 19.97 KG/M2 | WEIGHT: 117 LBS | HEIGHT: 64 IN | TEMPERATURE: 99.5 F

## 2017-03-13 DIAGNOSIS — Z98.890 POST-OPERATIVE STATE: Primary | ICD-10-CM

## 2017-03-13 DIAGNOSIS — L91.0 SCAR, HYPERTROPHIC: Primary | ICD-10-CM

## 2017-03-13 DIAGNOSIS — G89.18 POST-OP PAIN: ICD-10-CM

## 2017-03-13 PROCEDURE — 99024 POSTOP FOLLOW-UP VISIT: CPT | Performed by: SPECIALIST

## 2017-03-13 RX ORDER — HYDROMORPHONE HYDROCHLORIDE 2 MG/1
2-4 TABLET ORAL EVERY 4 HOURS PRN
Qty: 20 TABLET | Refills: 0 | Status: SHIPPED | OUTPATIENT
Start: 2017-03-13 | End: 2017-03-16

## 2017-03-13 NOTE — NURSING NOTE
"Chief Complaint   Patient presents with     Surgical Followup     stitch check from excision of hemorrhoid tags, done on 02/24/17       Initial Temp 99.5  F (37.5  C) (Skin)  Ht 5' 4\" (1.626 m)  Wt 117 lb (53.1 kg)  LMP 02/07/2017  BMI 20.08 kg/m2 Estimated body mass index is 20.08 kg/(m^2) as calculated from the following:    Height as of this encounter: 5' 4\" (1.626 m).    Weight as of this encounter: 117 lb (53.1 kg).  Medication Reconciliation: complete   Yaron JORDAN CMA      "

## 2017-03-13 NOTE — MR AVS SNAPSHOT
After Visit Summary   3/13/2017    Erin Ashley    MRN: [id number]           Patient Information     Date Of Birth          1985        Visit Information        Provider Department      3/13/2017 10:15 AM Britton Palomo MD [facility]        Today's Diagnoses     Post-operative state    -  1       Follow-ups after your visit        Your next 10 appointments already scheduled     Mar 16, 2017  8:00 AM CDT   [facility] Visit with Britton [facility]omo MD   [facility]    [address] [address] [phone]              Who to contact     If you have questions or need follow up information about today's clinic visit or your schedule please contact [facility] directly at [phone].  Normal or non-critical lab and imaging results will be communicated to you by MyChart, letter or phone within 4 business days after the clinic has received the results. If you do not hear from us within 7 days, please contact the clinic through MyChart or phone. If you have a critical or abnormal lab result, we will notify you by phone as soon as possible.  Submit refill requests through ZuzuChe or call your pharmacy and they will forward the refill request to us. Please allow 3 business days for your refill to be completed.          Additional Information About Your Visit        MyChart Information     ZuzuChe gives you secure access to your electronic health record. If you see a primary care provider, you can also send messages to your care team and make appointments. If you have questions, please call your primary care clinic.  If you do not have a primary care provider, please call [phone] and they will assist you.        Care EveryWhere ID     This is your Care EveryWhere ID. This could be used by other organizations to access your [place] medical records  [id number]        Your Vitals Were     Temperature Height  "Last Period BMI (Body Mass Index)          99.5  F (37.5  C) (Skin) 5' 4\" (1.626 m) 02/07/2017 20.08 kg/m2         Blood Pressure from Last 3 Encounters:   03/07/17 100/58   02/24/17 111/68   02/22/17 100/60    Weight from Last 3 Encounters:   03/13/17 117 lb (53.1 kg)   02/24/17 120 lb 3.2 oz (54.5 kg)   02/22/17 120 lb 3.2 oz (54.5 kg)              Today, you had the following     No orders found for display       Primary Care Provider Office Phone #    [facility] [phone]       No address on file        Thank you!     Thank you for choosing [facility]  for your care. Our goal is always to provide you with excellent care. Hearing back from our patients is one way we can continue to improve our services. Please take a few minutes to complete the written survey that you may receive in the mail after your visit with us. Thank you!             Your Updated Medication List - Protect others around you: Learn how to safely use, store and throw away your medicines at www.disposemymeds.org.          This list is accurate as of: 3/13/17 10:39 AM.  Always use your most recent med list.                   Brand Name Dispense Instructions for use    hydrocortisone 2.5 % cream    ANUSOL-HC    30 g    Place rectally 2 times daily       HYDROmorphone 2 MG tablet    DILAUDID    30 tablet    Take 1-2 tablets (2-4 mg) by mouth every 4 hours as needed for pain       lidocaine 5 % ointment    XYLOCAINE    30 g    Apply topically as needed for moderate pain Apply to perianal region every 2 hours as needed for moderate pain       PARAGARD INTRAUTERINE COPPER IU          traZODone 50 MG tablet    DESYREL    30 tablet    Take 1 tablet (50 mg) by mouth nightly as needed for sleep         "

## 2017-03-13 NOTE — PROGRESS NOTES
F/U for hemorrhoidal tags      Subjective:  Pt has some concerns - loose suture.  Has some concerns that new tags have formed and are looks worse than pre-op.  Pain improving.  Pre-op sx resolved.    Objective:  B/P: Data Unavailable, T: 99.5, P: Data Unavailable, R: Data Unavailable  Rectal - loose vicryl removed.  Hypertrophic scarring.  No infection    Assessment/Plan:  Pt s/p hemorrhoidal tag excision.  Has some concerns - explained that raised areas are hypertrophic scarring - and is cosmetic.  Explained scar remodels over 6 months and that we would just observe the area.  Reminded her that procedure was done for relief of sx and not cosmesis.  Will observe for now.  Consider scar revision after area has fully healed.    Britton Palomo MD, FACS

## 2017-03-13 NOTE — TELEPHONE ENCOUNTER
Message from Radha:  ConradlizbetOlivia davenportelizabeth Peoples Mar 13, 2017 11:16 AM        ----- Message -----   From: Erin Ashley   Sent: 3/13/2017 11:07 AM   To: [facility]  Subject: Medication Renewal Request     Original authorizing provider: MD Erin Ventura would like a refill of the following medications:  HYDROmorphone (DILAUDID) 2 MG tablet [Israel Josue MD]    Preferred pharmacy: [address]     Comment:

## 2017-03-15 ENCOUNTER — TELEPHONE (OUTPATIENT)
Dept: SURGERY | Facility: CLINIC | Age: 32
End: 2017-03-15

## 2017-03-16 ENCOUNTER — OFFICE VISIT (OUTPATIENT)
Dept: SURGERY | Facility: OTHER | Age: 32
End: 2017-03-16
Payer: COMMERCIAL

## 2017-03-16 VITALS — TEMPERATURE: 97.8 F | OXYGEN SATURATION: 97 % | BODY MASS INDEX: 19.53 KG/M2 | HEART RATE: 80 BPM | WEIGHT: 113.8 LBS

## 2017-03-16 DIAGNOSIS — G89.18 POST-OP PAIN: ICD-10-CM

## 2017-03-16 PROCEDURE — 99024 POSTOP FOLLOW-UP VISIT: CPT | Performed by: SPECIALIST

## 2017-03-16 RX ORDER — HYDROMORPHONE HYDROCHLORIDE 2 MG/1
2-4 TABLET ORAL EVERY 4 HOURS PRN
Qty: 20 TABLET | Refills: 0 | Status: SHIPPED | OUTPATIENT
Start: 2017-03-16 | End: 2017-03-27

## 2017-03-16 NOTE — MR AVS SNAPSHOT
After Visit Summary   3/16/2017    Erin Ashley    MRN: [id number]           Patient Information     Date Of Birth          1985        Visit Information        Provider Department      3/16/2017 8:00 AM Britton Palomo MD [facility]        Today's Diagnoses     Post-op pain           Follow-ups after your visit        Your next 10 appointments already scheduled     Mar 24, 2017  8:15 AM CDT   [facility]    [address] [place] [address] [address]  [facility] [address] [phone]              Who to contact     If you have questions or need follow up information about today's clinic visit or your schedule please contact [facility] directly at [phone].  Normal or non-critical lab and imaging results will be communicated to you by MyChart, letter or phone within 4 business days after the clinic has received the results. If you do not hear from us within 7 days, please contact the clinic through MyChart or phone. If you have a critical or abnormal lab result, we will notify you by phone as soon as possible.  Submit refill requests through Vigno or call your pharmacy and they will forward the refill request to us. Please allow 3 business days for your refill to be completed.          Additional Information About Your Visit        MyChart Information     Vigno gives you secure access to your electronic health record. If you see a primary care provider, you can also send messages to your care team and make appointments. If you have questions, please call your primary care clinic.  If you do not have a primary care provider, please call [phone] and they will assist you.        Care EveryWhere ID     This is your Care EveryWhere ID. This could be used by other organizations to access your [place] medical records  [id number]        Your Vitals Were     Pulse Temperature Last  Period Pulse Oximetry BMI (Body Mass Index)       80 97.8  F (36.6  C) (Temporal) 02/07/2017 97% 19.53 kg/m2        Blood Pressure from Last 3 Encounters:   03/07/17 100/58   02/24/17 111/68   02/22/17 100/60    Weight from Last 3 Encounters:   03/16/17 113 lb 12.8 oz (51.6 kg)   03/13/17 117 lb (53.1 kg)   02/24/17 120 lb 3.2 oz (54.5 kg)              Today, you had the following     No orders found for display         Where to get your medicines      Some of these will need a paper prescription and others can be bought over the counter.  Ask your nurse if you have questions.     Bring a paper prescription for each of these medications     HYDROmorphone 2 MG tablet          Primary Care Provider Office Phone #    [facility] [phone]       No address on file        Thank you!     Thank you for choosing [facility]  for your care. Our goal is always to provide you with excellent care. Hearing back from our patients is one way we can continue to improve our services. Please take a few minutes to complete the written survey that you may receive in the mail after your visit with us. Thank you!             Your Updated Medication List - Protect others around you: Learn how to safely use, store and throw away your medicines at www.disposemymeds.org.          This list is accurate as of: 3/16/17  8:40 AM.  Always use your most recent med list.                   Brand Name Dispense Instructions for use    * hydrocortisone 2.5 % cream    ANUSOL-HC    30 g    Place rectally 2 times daily       * hydrocortisone 2.5 % cream    ANUSOL-HC    30 g    Place rectally 2 times daily       HYDROmorphone 2 MG tablet    DILAUDID    20 tablet    Take 1-2 tablets (2-4 mg) by mouth every 4 hours as needed for pain       lidocaine 5 % ointment    XYLOCAINE    30 g    Apply topically as needed for moderate pain Apply to perianal region every 2 hours as needed for moderate pain       PARAGARD INTRAUTERINE  COPPER IU          traZODone 50 MG tablet    DESYREL    30 tablet    Take 1 tablet (50 mg) by mouth nightly as needed for sleep       * Notice:  This list has 2 medication(s) that are the same as other medications prescribed for you. Read the directions carefully, and ask your doctor or other care provider to review them with you.

## 2017-03-16 NOTE — NURSING NOTE
"Chief Complaint   Patient presents with     Rectal Problem     rectal skin tag       Initial Pulse 80  Temp 97.8  F (36.6  C) (Temporal)  Wt 113 lb 12.8 oz (51.6 kg)  LMP 02/07/2017  SpO2 97%  BMI 19.53 kg/m2 Estimated body mass index is 19.53 kg/(m^2) as calculated from the following:    Height as of 3/13/17: 5' 4\" (1.626 m).    Weight as of this encounter: 113 lb 12.8 oz (51.6 kg).  Medication Reconciliation: complete    "

## 2017-03-16 NOTE — PROGRESS NOTES
F/u for hemorrhoidal tag excision      Subjective:  Pt returns with concerns about scarring.  Has some hypertrophic scarring at 7 oclock postions.  Suspect it was from vicryl stitch.  Pt very anxious and concerned about it, thinks it will irritate her when she runs marathons.  We had a lengthy discussion regarding observation vs excision.      Objective:  B/P: Data Unavailable, T: 97.8, P: 80, R: Data Unavailable  Rectal:  No masses.  Some hypertrophic scarring at 7 o'clock position.    Assessment/Plan:  Pt is s/p excision of hemorrhoidal tags with some hypertrophic scarring at 7 oclock position.  We had a lengthy discussion regarding observation vs excision.  The patient wanted the the scar revised under local anesthesia with the understanding that it may scar even worse as she is concerned it will irritate her during her marathons.  She also is supposed to go to a concert tomorrow - I advised her to reschedule procedure or skip concert - pt stated she would give tickets away.  The procedure, risks benefits and alternatives were discussed and she agreed to proceed.  Scar was revised under local with a nylon closure.  Pt will f/u in 1 week for suture removal.    Britton Palomo MD, FACS

## 2017-03-20 ENCOUNTER — MYC MEDICAL ADVICE (OUTPATIENT)
Dept: SURGERY | Facility: CLINIC | Age: 32
End: 2017-03-20

## 2017-03-20 NOTE — TELEPHONE ENCOUNTER
Patient called and informed letter has been completed and placed at [facility] specialty upstairs  for patient pick-up, also patient informed Dr. Palomo will not be refilling pain RX at this time. Patient verbally states she understands and is in agreement and understands place of letter pickup.................................................Yari Rodas CMA  ([facility])

## 2017-03-23 ENCOUNTER — TELEPHONE (OUTPATIENT)
Dept: OBGYN | Facility: OTHER | Age: 32
End: 2017-03-23

## 2017-03-23 DIAGNOSIS — R10.2 PELVIC PAIN IN FEMALE: Primary | ICD-10-CM

## 2017-03-23 NOTE — TELEPHONE ENCOUNTER
Reason for call:  Medication  Reason for Call:  Medication or medication refill:    Do you use a [facility]?  Name of the pharmacy and phone number for the current request:  [phone]    Name of the medication requested: pain medication for pelvic pain, possible ovarian cyst    Other request: patient has seen Ana Maria for this in the past and she cannot get in to see her until next Tuesday. She is wondering if she will give her something for pain until then.     Can we leave a detailed message on this number? YES    Phone number patient can be reached at: Home number on file [phone] (home)    Best Time:any    Call taken on 3/23/2017 at 6:00 PM by Stephanie Nobles

## 2017-03-23 NOTE — TELEPHONE ENCOUNTER
Understands that we can't do narcotics without a visit or exam of some type.   Will order ultrasound.   GUANACO Kumar, CNM

## 2017-03-24 ENCOUNTER — OFFICE VISIT (OUTPATIENT)
Dept: SURGERY | Facility: OTHER | Age: 32
End: 2017-03-24
Payer: COMMERCIAL

## 2017-03-24 VITALS — HEART RATE: 80 BPM | BODY MASS INDEX: 19.87 KG/M2 | TEMPERATURE: 96.1 F | WEIGHT: 115.75 LBS

## 2017-03-24 DIAGNOSIS — Z98.890 POST-OPERATIVE STATE: Primary | ICD-10-CM

## 2017-03-24 PROCEDURE — 99024 POSTOP FOLLOW-UP VISIT: CPT | Performed by: SPECIALIST

## 2017-03-24 ASSESSMENT — PAIN SCALES - GENERAL: PAINLEVEL: NO PAIN (0)

## 2017-03-24 NOTE — MR AVS SNAPSHOT
After Visit Summary   3/24/2017    Erin Ashley    MRN: [id number]           Patient Information     Date Of Birth          1985        Visit Information        Provider Department      3/24/2017 8:15 AM Britton Palomo MD [facility]         Follow-ups after your visit        Your next 10 appointments already scheduled     Mar 27, 2017  8:30 AM CDT   US PELVIC COMPLETE W TRANSVAGINAL with ERUS1   [facility]    [address] [address]   [phone]           Please bring a list of your medicines (including vitamins, minerals and over-the-counter drugs). Also, tell your doctor about any allergies you may have. Wear comfortable clothes and leave your valuables at home.  Adults: Drink six 8-ounce glasses of fluid one hour before your exam. Do NOT empty your bladder.  If you need to empty your bladder before your exam, try to release only a little bit of urine. Then, drink another 8oz glass of fluid.  Children: Children who are potty trained should drink at least 4 cups (32 oz) of liquid 45 minutes to one hour prior to the exam. The child s bladder must be full in order to achieve a diagnostic exam. If your child is very uncomfortable or has an urgent need to pee, please notify a technologist; they will try to find out how much longer the wait may be and provide instructions to help relieve the pressure. Occasionally it is medically necessary to insert a urinary catheter to fill the bladder.  Please call the Imaging Department at your exam site with any questions.            Mar 28, 2017  1:15 PM CDT   Radha Blum with GUANACO Youngblood CNM   [facility]    [address] [address]   [phone]              Future tests that were ordered for you today     Open Future Orders        Priority Expected Expires Ordered    US Pelvic Complete with  Transvaginal Routine 6/21/2017 9/19/2017 3/23/2017            Who to contact     If you have questions or need follow up information about today's clinic visit or your schedule please contact [facility] directly at [phone].  Normal or non-critical lab and imaging results will be communicated to you by MyChart, letter or phone within 4 business days after the clinic has received the results. If you do not hear from us within 7 days, please contact the clinic through MEDOVENThart or phone. If you have a critical or abnormal lab result, we will notify you by phone as soon as possible.  Submit refill requests through Codemasters or call your pharmacy and they will forward the refill request to us. Please allow 3 business days for your refill to be completed.          Additional Information About Your Visit        MEDOVENThart Information     C[employer]asters gives you secure access to your electronic health record. If you see a primary care provider, you can also send messages to your care team and make appointments. If you have questions, please call your primary care clinic.  If you do not have a primary care provider, please call [phone] and they will assist you.        Care EveryWhere ID     This is your Care EveryWhere ID. This could be used by other organizations to access your [place] medical records  [id number]        Your Vitals Were     Pulse Temperature Last Period BMI (Body Mass Index)          80 96.1  F (35.6  C) (Temporal) 02/07/2017 19.87 kg/m2         Blood Pressure from Last 3 Encounters:   03/07/17 100/58   02/24/17 111/68   02/22/17 100/60    Weight from Last 3 Encounters:   03/24/17 52.5 kg (115 lb 12 oz)   03/16/17 51.6 kg (113 lb 12.8 oz)   03/13/17 53.1 kg (117 lb)              Today, you had the following     No orders found for display       Primary Care Provider Office Phone #    [facility] [phone]       No address on file        Thank you!     Thank you for choosing  [facility]  for your care. Our goal is always to provide you with excellent care. Hearing back from our patients is one way we can continue to improve our services. Please take a few minutes to complete the written survey that you may receive in the mail after your visit with us. Thank you!             Your Updated Medication List - Protect others around you: Learn how to safely use, store and throw away your medicines at www.disposemymeds.org.          This list is accurate as of: 3/24/17  8:17 AM.  Always use your most recent med list.                   Brand Name Dispense Instructions for use    * hydrocortisone 2.5 % cream    ANUSOL-HC    30 g    Place rectally 2 times daily       * hydrocortisone 2.5 % cream    ANUSOL-HC    30 g    Place rectally 2 times daily       HYDROmorphone 2 MG tablet    DILAUDID    20 tablet    Take 1-2 tablets (2-4 mg) by mouth every 4 hours as needed for pain       lidocaine 5 % ointment    XYLOCAINE    30 g    Apply topically as needed for moderate pain Apply to perianal region every 2 hours as needed for moderate pain       PARAGARD INTRAUTERINE COPPER IU          traZODone 50 MG tablet    DESYREL    30 tablet    Take 1 tablet (50 mg) by mouth nightly as needed for sleep       * Notice:  This list has 2 medication(s) that are the same as other medications prescribed for you. Read the directions carefully, and ask your doctor or other care provider to review them with you.

## 2017-03-24 NOTE — NURSING NOTE
"Chief Complaint   Patient presents with     RECHECK     removal of sutures       Initial Pulse 80  Temp 96.1  F (35.6  C) (Temporal)  Wt 52.5 kg (115 lb 12 oz)  LMP 02/07/2017  BMI 19.87 kg/m2 Estimated body mass index is 19.87 kg/(m^2) as calculated from the following:    Height as of 3/13/17: 1.626 m (5' 4\").    Weight as of this encounter: 52.5 kg (115 lb 12 oz).  Medication Reconciliation: complete    "

## 2017-03-24 NOTE — PROGRESS NOTES
F/u for hemorrhoidal tag excision      Subjective:  Pt returns with concerns about scarring.  Has some hypertrophic scarring at 7 oclock postions.  Suspect it was from vicryl stitch. Had a revision last week.  Much happier with the results.    Objective:  B/P: Data Unavailable, T: 96.1, P: 80, R: Data Unavailable  Rectal:  No masses.  Revised scar well healed.  Sutures removed.    Assessment/Plan:  Pt is s/p excision of hemorrhoidal tags with some hypertrophic scarring at 7 oclock position - much improved after revision.  She will f/u with me PRN.    Britton Palomo MD, FACS

## 2017-03-27 ENCOUNTER — APPOINTMENT (OUTPATIENT)
Dept: CT IMAGING | Facility: CLINIC | Age: 32
End: 2017-03-27
Attending: EMERGENCY MEDICINE
Payer: COMMERCIAL

## 2017-03-27 ENCOUNTER — HOSPITAL ENCOUNTER (EMERGENCY)
Facility: CLINIC | Age: 32
Discharge: HOME | End: 2017-03-27
Attending: EMERGENCY MEDICINE | Admitting: EMERGENCY MEDICINE
Payer: COMMERCIAL

## 2017-03-27 ENCOUNTER — TELEPHONE (OUTPATIENT)
Dept: FAMILY MEDICINE | Facility: OTHER | Age: 32
End: 2017-03-27

## 2017-03-27 ENCOUNTER — RADIANT APPOINTMENT (OUTPATIENT)
Dept: ULTRASOUND IMAGING | Facility: OTHER | Age: 32
End: 2017-03-27
Attending: ADVANCED PRACTICE MIDWIFE
Payer: COMMERCIAL

## 2017-03-27 VITALS
OXYGEN SATURATION: 98 % | RESPIRATION RATE: 18 BRPM | HEIGHT: 64 IN | HEART RATE: 80 BPM | DIASTOLIC BLOOD PRESSURE: 73 MMHG | WEIGHT: 112 LBS | SYSTOLIC BLOOD PRESSURE: 113 MMHG | BODY MASS INDEX: 19.12 KG/M2

## 2017-03-27 DIAGNOSIS — R51.9 NONINTRACTABLE HEADACHE, UNSPECIFIED CHRONICITY PATTERN, UNSPECIFIED HEADACHE TYPE: ICD-10-CM

## 2017-03-27 DIAGNOSIS — R10.2 PELVIC PAIN IN FEMALE: ICD-10-CM

## 2017-03-27 LAB
ANION GAP SERPL CALCULATED.3IONS-SCNC: 5 MMOL/L (ref 3–14)
BASOPHILS # BLD AUTO: 0 10E9/L (ref 0–0.2)
BASOPHILS NFR BLD AUTO: 0.3 %
BUN SERPL-MCNC: 9 MG/DL (ref 7–30)
CALCIUM SERPL-MCNC: 8.8 MG/DL (ref 8.5–10.1)
CHLORIDE SERPL-SCNC: 105 MMOL/L (ref 94–109)
CO2 SERPL-SCNC: 30 MMOL/L (ref 20–32)
CREAT SERPL-MCNC: 0.57 MG/DL (ref 0.52–1.04)
DIFFERENTIAL METHOD BLD: NORMAL
EOSINOPHIL # BLD AUTO: 0 10E9/L (ref 0–0.7)
EOSINOPHIL NFR BLD AUTO: 0.2 %
ERYTHROCYTE [DISTWIDTH] IN BLOOD BY AUTOMATED COUNT: 13.2 % (ref 10–15)
GFR SERPL CREATININE-BSD FRML MDRD: NORMAL ML/MIN/1.7M2
GLUCOSE SERPL-MCNC: 91 MG/DL (ref 70–99)
HCT VFR BLD AUTO: 40 % (ref 35–47)
HGB BLD-MCNC: 13.6 G/DL (ref 11.7–15.7)
IMM GRANULOCYTES # BLD: 0 10E9/L (ref 0–0.4)
IMM GRANULOCYTES NFR BLD: 0.2 %
LYMPHOCYTES # BLD AUTO: 2.1 10E9/L (ref 0.8–5.3)
LYMPHOCYTES NFR BLD AUTO: 22.7 %
MCH RBC QN AUTO: 29.2 PG (ref 26.5–33)
MCHC RBC AUTO-ENTMCNC: 34 G/DL (ref 31.5–36.5)
MCV RBC AUTO: 86 FL (ref 78–100)
MONOCYTES # BLD AUTO: 0.6 10E9/L (ref 0–1.3)
MONOCYTES NFR BLD AUTO: 6.2 %
NEUTROPHILS # BLD AUTO: 6.5 10E9/L (ref 1.6–8.3)
NEUTROPHILS NFR BLD AUTO: 70.4 %
PLATELET # BLD AUTO: 280 10E9/L (ref 150–450)
POTASSIUM SERPL-SCNC: 3.9 MMOL/L (ref 3.4–5.3)
RBC # BLD AUTO: 4.65 10E12/L (ref 3.8–5.2)
SODIUM SERPL-SCNC: 140 MMOL/L (ref 133–144)
WBC # BLD AUTO: 9.3 10E9/L (ref 4–11)

## 2017-03-27 PROCEDURE — 99285 EMERGENCY DEPT VISIT HI MDM: CPT | Mod: 25

## 2017-03-27 PROCEDURE — 70450 CT HEAD/BRAIN W/O DYE: CPT

## 2017-03-27 PROCEDURE — 80048 BASIC METABOLIC PNL TOTAL CA: CPT | Performed by: EMERGENCY MEDICINE

## 2017-03-27 PROCEDURE — 96361 HYDRATE IV INFUSION ADD-ON: CPT

## 2017-03-27 PROCEDURE — 76856 US EXAM PELVIC COMPLETE: CPT

## 2017-03-27 PROCEDURE — 85025 COMPLETE CBC W/AUTO DIFF WBC: CPT | Performed by: EMERGENCY MEDICINE

## 2017-03-27 PROCEDURE — 25000128 H RX IP 250 OP 636: Performed by: EMERGENCY MEDICINE

## 2017-03-27 PROCEDURE — 96375 TX/PRO/DX INJ NEW DRUG ADDON: CPT

## 2017-03-27 PROCEDURE — 76830 TRANSVAGINAL US NON-OB: CPT

## 2017-03-27 PROCEDURE — 96374 THER/PROPH/DIAG INJ IV PUSH: CPT

## 2017-03-27 PROCEDURE — 99284 EMERGENCY DEPT VISIT MOD MDM: CPT | Performed by: EMERGENCY MEDICINE

## 2017-03-27 RX ORDER — SODIUM CHLORIDE 9 MG/ML
1000 INJECTION, SOLUTION INTRAVENOUS CONTINUOUS
Status: DISCONTINUED | OUTPATIENT
Start: 2017-03-27 | End: 2017-03-27 | Stop reason: HOSPADM

## 2017-03-27 RX ORDER — LIDOCAINE 40 MG/G
CREAM TOPICAL
Status: DISCONTINUED | OUTPATIENT
Start: 2017-03-27 | End: 2017-03-27 | Stop reason: HOSPADM

## 2017-03-27 RX ORDER — KETOROLAC TROMETHAMINE 30 MG/ML
30 INJECTION, SOLUTION INTRAMUSCULAR; INTRAVENOUS ONCE
Status: COMPLETED | OUTPATIENT
Start: 2017-03-27 | End: 2017-03-27

## 2017-03-27 RX ADMIN — SODIUM CHLORIDE 1000 ML: 9 INJECTION, SOLUTION INTRAVENOUS at 15:39

## 2017-03-27 RX ADMIN — KETOROLAC TROMETHAMINE 30 MG: 30 INJECTION, SOLUTION INTRAMUSCULAR at 15:38

## 2017-03-27 RX ADMIN — PROCHLORPERAZINE EDISYLATE 10 MG: 5 INJECTION INTRAMUSCULAR; INTRAVENOUS at 15:52

## 2017-03-27 ASSESSMENT — ENCOUNTER SYMPTOMS
HEADACHES: 1
FEVER: 0
CHILLS: 1

## 2017-03-27 NOTE — ED AVS SNAPSHOT
[facility] Emergency Department    [address] [place] [address]    Phone:  [phone]    Fax:  [phone]                                       Erin Ashley   MRN: [id number]    Department:  [facility] Emergency Department   Date of Visit:  3/27/2017           After Visit Summary Signature Page     I have received my discharge instructions, and my questions have been answered. I have discussed any challenges I see with this plan with the nurse or doctor.    ..........................................................................................................................................  Patient/Patient Representative Signature      ..........................................................................................................................................  Patient Representative Print Name and Relationship to Patient    ..................................................               ................................................  Date                                            Time    ..........................................................................................................................................  Reviewed by Signature/Title    ...................................................              ..............................................  Date                                                            Time

## 2017-03-27 NOTE — ED NOTES
Suddenly had black spots in both eyes and sudden headache.  Did throw up in car before she got home, took ibuprofen and laid down and headache, vision remain the same.  Onset 3 hrs PTA

## 2017-03-27 NOTE — ED PROVIDER NOTES
History     Chief Complaint   Patient presents with     Headache     The history is provided by the patient.     Erin Ashley is a 32 year old female who presents to the ED with a headache and vision problems. Patient was sitting at a [facility] in [facility] 3 hours ago when she had a sudden onset of green and black blotches in her vision and right after she developed a headache. Her headache is all over and radiating down back of neck. She has been chilled since her symptom started. She is currently having the same symptoms. She has no history of a headache or migraine. No recent illness. Her speech is normal. No chronic health problems per patient.     Patient Active Problem List   Diagnosis     TALIB 3 - cervical intraepithelial neoplasia grade 3     CARDIOVASCULAR SCREENING; LDL GOAL LESS THAN 160     Paragard placed 10/15/13     Acne     Insomnia due to medical condition     Personal history of ovarian cyst     Skin tags, anus or rectum     Elevated LFTs     Past Medical History:   Diagnosis Date     Abnormal Pap smear      Appendicitis with perforation 08/04/08    Admit. DIscharged 08/07/08     TALIB II (cervical intraepithelial neoplasia II) 2008 5/29/08 on coloposcopy bx TALIB 2/3.  CKC on 6/13/08 showed TALIB 1/2 *yearly paps are indicated*     Renal calculus or stone        Past Surgical History:   Procedure Laterality Date     APPENDECTOMY  8/4/2008     EXAM UNDER ANESTHESIA RECTUM N/A 2/24/2017    Procedure: EXAM UNDER ANESTHESIA RECTUM;  Surgeon: Britton Palomo MD;  Location: [facility] OR     HC CONIZATION CERVIX,KNIFE/LASER  06/130/8    cervix.  TALIB 1/2     HEMORRHOIDECTOMY EXTERNAL N/A 2/24/2017    Procedure: HEMORRHOIDECTOMY EXTERNAL;  Surgeon: Britton Palomo MD;  Location: [facility]     TONSILLECTOMY  7/3/2007       Family History   Problem Relation Age of Onset     Hypertension Maternal Grandmother      borderline     Lipids Maternal Grandmother      Respiratory Maternal Grandmother      COPD      Hypertension Maternal Grandfather      borderline     CANCER Maternal Grandfather      lung cancer, smoker     GASTROINTESTINAL DISEASE Mother      cholecystitis     Lipids Mother      Prostate Cancer Paternal Grandfather      Hearing Loss Paternal Grandfather      Other Cancer Paternal Grandmother      pancreatic cancer     Unknown/Adopted No family hx of      Asthma No family hx of      C.A.D. No family hx of      DIABETES No family hx of      CEREBROVASCULAR DISEASE No family hx of      Breast Cancer No family hx of      Cancer - colorectal No family hx of      Alcohol/Drug No family hx of      Allergies No family hx of      Alzheimer Disease No family hx of      Anesthesia Reaction No family hx of      Arthritis No family hx of      Blood Disease No family hx of      Cardiovascular No family hx of      Circulatory No family hx of      Congenital Anomalies No family hx of      Connective Tissue Disorder No family hx of      Depression No family hx of      Genetic Disorder No family hx of      Genitourinary Problems No family hx of      Gynecology No family hx of      HEART DISEASE No family hx of      Musculoskeletal Disorder No family hx of      Neurologic Disorder No family hx of      Obesity No family hx of      OSTEOPOROSIS No family hx of      Psychotic Disorder No family hx of        Social History   Substance Use Topics     Smoking status: Never Smoker     Smokeless tobacco: Never Used      Comment: no smokers in the household     Alcohol use 0.0 oz/week     0 Standard drinks or equivalent per week      Comment: OCC.        Immunization History   Administered Date(s) Administered     Hepatitis B 06/02/2006, 07/07/2006     Influenza (IIV3) 11/07/2012     MMR 05/14/1999     Mantoux 03/14/2006, 08/09/2016, 08/29/2016     TD (ADULT, 7+) 05/14/1999, 11/12/2003     TDAP Vaccine (Adacel) 06/19/2013          Allergies   Allergen Reactions     No Known Drug Allergies        Current Outpatient Prescriptions  "  Medication Sig Dispense Refill     traZODone (DESYREL) 50 MG tablet Take 1 tablet (50 mg) by mouth nightly as needed for sleep 30 tablet 3     PARAGARD INTRAUTERINE COPPER IU              I have reviewed the Medications, Allergies, Past Medical and Surgical History, and Social History in the Epic system.    Review of Systems   Constitutional: Positive for chills. Negative for fever.   Eyes: Positive for visual disturbance (black and green blotches. ).   Neurological: Positive for headaches (all over, radiating down back of neck).   All other systems reviewed and are negative.      Physical Exam   BP: 116/81  Pulse: 82  Resp: 19  Height: 162.6 cm (5' 4\")  Weight: 50.8 kg (112 lb)  SpO2: 98 %  Physical Exam   Constitutional: She is oriented to person, place, and time. She appears well-developed and well-nourished. No distress.   HENT:   Head: Normocephalic and atraumatic.   Mouth/Throat: Oropharynx is clear and moist.   Eyes: Conjunctivae are normal. Pupils are equal, round, and reactive to light. No scleral icterus.   Neck: Normal range of motion. Neck supple.   Cardiovascular: Normal rate, regular rhythm, normal heart sounds and intact distal pulses.    No murmur heard.  Pulmonary/Chest: Effort normal and breath sounds normal. No stridor. No respiratory distress. She has no wheezes. She has no rales.   Abdominal: Soft. There is no tenderness.   Musculoskeletal: She exhibits no edema or tenderness.   Neurological: She is alert and oriented to person, place, and time. She has normal strength. No cranial nerve deficit or sensory deficit. GCS eye subscore is 4. GCS verbal subscore is 5. GCS motor subscore is 6.   Strength intact to upper and lower extremities.    Skin: Skin is warm and dry. No rash noted. She is not diaphoretic. No erythema. No pallor.   Psychiatric: She has a normal mood and affect. Her behavior is normal.   Nursing note and vitals reviewed.      ED Course     ED Course     Procedures         "     Critical Care time:  none             .  Results for orders placed or performed during the hospital encounter of 03/27/17 (from the past 24 hour(s))   CBC with platelets differential   Result Value Ref Range    WBC 9.3 4.0 - 11.0 10e9/L    RBC Count 4.65 3.8 - 5.2 10e12/L    Hemoglobin 13.6 11.7 - 15.7 g/dL    Hematocrit 40.0 35.0 - 47.0 %    MCV 86 78 - 100 fl    MCH 29.2 26.5 - 33.0 pg    MCHC 34.0 31.5 - 36.5 g/dL    RDW 13.2 10.0 - 15.0 %    Platelet Count 280 150 - 450 10e9/L    Diff Method Automated Method     % Neutrophils 70.4 %    % Lymphocytes 22.7 %    % Monocytes 6.2 %    % Eosinophils 0.2 %    % Basophils 0.3 %    % Immature Granulocytes 0.2 %    Absolute Neutrophil 6.5 1.6 - 8.3 10e9/L    Absolute Lymphocytes 2.1 0.8 - 5.3 10e9/L    Absolute Monocytes 0.6 0.0 - 1.3 10e9/L    Absolute Eosinophils 0.0 0.0 - 0.7 10e9/L    Absolute Basophils 0.0 0.0 - 0.2 10e9/L    Abs Immature Granulocytes 0.0 0 - 0.4 10e9/L   Basic metabolic panel   Result Value Ref Range    Sodium 140 133 - 144 mmol/L    Potassium 3.9 3.4 - 5.3 mmol/L    Chloride 105 94 - 109 mmol/L    Carbon Dioxide 30 20 - 32 mmol/L    Anion Gap 5 3 - 14 mmol/L    Glucose 91 70 - 99 mg/dL    Urea Nitrogen 9 7 - 30 mg/dL    Creatinine 0.57 0.52 - 1.04 mg/dL    GFR Estimate >90  Non  GFR Calc   >60 mL/min/1.7m2    GFR Estimate If Black >90   GFR Calc   >60 mL/min/1.7m2    Calcium 8.8 8.5 - 10.1 mg/dL   CT Head w/o Contrast    Narrative    CT OF THE HEAD WITHOUT CONTRAST 3/27/2017 3:48 PM     COMPARISON: Head CT 10/27/2010.    HISTORY:  Headache.    TECHNIQUE: Axial CT images of the head from the skull base to the  vertex were acquired without IV contrast.    FINDINGS:  The ventricles and basal cisterns are within normal limits  in configuration. There is no midline shift. There are no extra-axial  fluid collections.  Gray-white differentiation is well maintained.    No intracranial hemorrhage, mass or recent  infarct.    The visualized paranasal sinuses are well-aerated. There is no  mastoiditis. There are no fractures of the visualized bones.      Impression    IMPRESSION:  Normal head CT.      Radiation dose for this scan was reduced using automated exposure  control, adjustment of the mA and/or kV according to patient size, or  iterative reconstruction technique.    SIMIN MONDRAGON MD     Medications   lidocaine 1 % 1 mL (not administered)   lidocaine (LMX4) kit (not administered)   sodium chloride (PF) 0.9% PF flush 3 mL (not administered)   sodium chloride (PF) 0.9% PF flush 3 mL (not administered)   0.9% sodium chloride BOLUS (0 mLs Intravenous Stopped 3/27/17 1648)     Followed by   0.9% sodium chloride infusion (not administered)   prochlorperazine (COMPAZINE) injection 10 mg (10 mg Intravenous Given 3/27/17 1552)   ketorolac (TORADOL) injection 30 mg (30 mg Intravenous Given 3/27/17 1538)         Assessments & Plan (with Medical Decision Making)  Erin Ashley is a 32 year old female presenting with  onset of vision changes and a headache 3 hours prior to arrival.  This may represent an ocular migraine although patient reports no history of a headache/migraine. She was given IV fluids, 10 mg of compazine and 30 mg of Toradol with improvement. Patient would like to go home. I asked her to come back tomorrow if symptoms develop again or still symptomatic. She was discharged home.        I have reviewed the nursing notes.    I have reviewed the findings, diagnosis, plan and need for follow up with the patient.    New Prescriptions    No medications on file       Final diagnoses:   Nonintractable headache, unspecified chronicity pattern, unspecified headache type   This document serves as a record of services personally performed by Joe Winters MD. It was created on their behalf by Karrie Bowman, a trained medical scribe. The creation of this record is based on the provider's personal observations and the  statements of the patient. This document has been checked and approved by the attending provider.   Note: Chart documentation done in part with Dragon Voice Recognition software. Although reviewed after completion, some word and grammatical errors may remain.        3/27/2017   [facility] EMERGENCY DEPARTMENT     Joe Winters MD  03/28/17 2013

## 2017-03-27 NOTE — ED AVS SNAPSHOT
[facility] Emergency Department    [address] [place] [address]    Phone:  [phone]    Fax:  [phone]                                       Erin Ashley   MRN: [id number]    Department:  [facility] Emergency Department   Date of Visit:  3/27/2017           Patient Information     Date Of Birth          1985        Your diagnoses for this visit were:     Nonintractable headache, unspecified chronicity pattern, unspecified headache type        You were seen by Joe Winters MD.      Follow-up Information     Follow up with Susana Keenan MD.    Specialty:  Family Practice    Why:  As needed    Contact information:    [facility]  [address] [place] [address]   [place] [address] [phone]          Follow up with [facility] Emergency Department.    Specialty:  EMERGENCY MEDICINE    Why:  if still not feeling well tomorrow    Contact information:    [facility]   [place] [address] [phone]    Additional information:    From [address]: Exit at [address] on [address] side of [place]. Turn right on [address]. Turn left at stoplight on [address]. [facility] will be in view two blocks ahead        Discharge Instructions          * HEADACHE [unspecified]    The cause of your headache today is not clear, but it does not appear to be the sign of any serious illness.  Under stress, some people tense the muscles of their shoulder, neck and scalp without knowing it. If this condition lasts long enough, a TENSION HEADACHE can occur.  A MIGRAINE HEADACHE is caused by changes in blood flow to the brain. It can be mild or severe. A migraine attack may be triggered by emotional stress, hormone changes during the menstrual cycle, oral contraceptives, alcohol use, certain foods containing tyramine, eye strain, weather changes, missing meals, lack of sleep or oversleeping.  Other causes of headache include a viral illness, sinus,  ear or throat infection, dental pain and TMJ (jaw joint) pain.  HOME CARE:    If you were given pain medicine for this headache, do not drive yourself home. Arrange for a ride, instead. When you get home, try to sleep. You should feel much better when you wake up.    If you are having nausea or vomiting, follow a light diet until your headache is relieved.    If you have a migraine type headache, use sunglasses when in the daylight or around bright indoor lighting until symptoms improve. Bright glaring light can worsen this kind of headache.  FOLLOW UP with your doctor if the headache is not better within the next 24 hours. If you have frequent headaches you should discuss a treatment plan with your primary care doctor. By being aware of the earliest signs of headache, and starting treatment right away, you may be able to stop the pain yourself.  GET PROMPT MEDICAL ATTENTION if any of the following occur:    Worsening of your head pain or no improvement within 24 hours    Repeated vomiting (unable to keep liquids down)    Fever over 101 F (38.3 C)    Stiff neck    Extreme drowsiness, confusion or fainting    Weakness of an arm or leg or one side of the face    Difficulty with speech or vision    [address] [place] [address]. All rights reserved. This information is not intended as a substitute for professional medical care. Always follow your healthcare professional's instructions.      Future Appointments        Provider Department Dept Phone Center    3/28/2017 1:15 PM Day GUANACO Sin CNM [facility] [phone] [facility]      24 Hour Appointment Hotline       To make an appointment at any [facility], call [phone] ([phone]). If you don't have a family doctor or clinic, we will help you find one. [facility] are conveniently located to serve the needs of you and your family.             Review of your medicines      Our records  show that you are taking the medicines listed below. If these are incorrect, please call your family doctor or clinic.        Dose / Directions Last dose taken    PARAGARD INTRAUTERINE COPPER IU        Refills:  0        traZODone 50 MG tablet   Commonly known as:  DESYREL   Dose:  50 mg   Quantity:  30 tablet        Take 1 tablet (50 mg) by mouth nightly as needed for sleep   Refills:  3                Procedures and tests performed during your visit     Basic metabolic panel    CBC with platelets differential    CT Head w/o Contrast    Peripheral IV catheter      Orders Needing Specimen Collection     None      Pending Results     Date and Time Order Name Status Description    3/27/2017 0826 US PELVIC COMPLETE WITH TRANSVAGINAL Preliminary             Pending Culture Results     No orders found from 3/25/2017 to 3/28/2017.            Thank you for choosing [facility]       Thank you for choosing [facility] for your care. Our goal is always to provide you with excellent care. Hearing back from our patients is one way we can continue to improve our services. Please take a few minutes to complete the written survey that you may receive in the mail after you visit with us. Thank you!        [employer] Information     [employer] gives you secure access to your electronic health record. If you see a primary care provider, you can also send messages to your care team and make appointments. If you have questions, please call your primary care clinic.  If you do not have a primary care provider, please call [phone] and they will assist you.        Care EveryWhere ID     This is your Care EveryWhere ID. This could be used by other organizations to access your [facility] medical records  [id number]        After Visit Summary       This is your record. Keep this with you and show to your community pharmacist(s) and doctor(s) at your next visit.

## 2017-03-27 NOTE — TELEPHONE ENCOUNTER
"Erin Ashley is a 32 year old female who calls with report of a sudden headache that she reports as \"the worst headache of my entire life.\"  Started with vision changes, \"like I was looking into light.'  Feels like neck is sore. Afebrile.  No hx of migraine.  She reports vomiting a few times.    NURSING ASSESSMENT:  Description:  \"worse headache of life  Onset/duration:  Few hours ago  Associated symptoms:  Vision changes  Last exam/Treatment:  02/22/2017  Allergies:   Allergies   Allergen Reactions     No Known Drug Allergies      NURSING PLAN: Recommend patient be evaluated in ED with report of \"worst headache of life and accompanying vision changes.      RECOMMENDED DISPOSITION:  To ED  Will comply with recommendation: Yes  If further questions/concerns or if symptoms do not improve, worsen or new symptoms develop, call your PCP or [facility] as soon as possible.      Guideline used: Headache  Telephone Triage Protocols for Nurses, Fourth Edition, Bita Mondragon RN    "

## 2017-03-28 ENCOUNTER — OFFICE VISIT (OUTPATIENT)
Dept: OBGYN | Facility: OTHER | Age: 32
End: 2017-03-28
Payer: COMMERCIAL

## 2017-03-28 VITALS
SYSTOLIC BLOOD PRESSURE: 112 MMHG | WEIGHT: 116 LBS | HEART RATE: 68 BPM | BODY MASS INDEX: 19.91 KG/M2 | DIASTOLIC BLOOD PRESSURE: 74 MMHG

## 2017-03-28 DIAGNOSIS — R10.2 PELVIC PAIN IN FEMALE: Primary | ICD-10-CM

## 2017-03-28 LAB
ALBUMIN UR-MCNC: NEGATIVE MG/DL
APPEARANCE UR: CLEAR
BILIRUB UR QL STRIP: NEGATIVE
COLOR UR AUTO: YELLOW
GLUCOSE UR STRIP-MCNC: NEGATIVE MG/DL
HGB UR QL STRIP: NEGATIVE
KETONES UR STRIP-MCNC: NEGATIVE MG/DL
LEUKOCYTE ESTERASE UR QL STRIP: NEGATIVE
NITRATE UR QL: NEGATIVE
PH UR STRIP: 7.5 PH (ref 5–7)
SP GR UR STRIP: 1.01 (ref 1–1.03)
URN SPEC COLLECT METH UR: ABNORMAL
UROBILINOGEN UR STRIP-ACNC: 0.2 EU/DL (ref 0.2–1)

## 2017-03-28 PROCEDURE — 81003 URINALYSIS AUTO W/O SCOPE: CPT | Performed by: ADVANCED PRACTICE MIDWIFE

## 2017-03-28 PROCEDURE — 99213 OFFICE O/P EST LOW 20 MIN: CPT | Performed by: ADVANCED PRACTICE MIDWIFE

## 2017-03-28 NOTE — PROGRESS NOTES
Erin Ashley is a 32 year old who presents to the clinic for evaluation of pelvic pain. Pt reports onset of pain 5 days ago in her right lower quadrant radiating into her right leg/thigh area and into her right back/flank area. Pt describes pain as intense cramping, constant, rating it 4-5/10 and occasionally will increase to a 7/10. Pt reports a sharp stabbing pain with intercourse. Pt has tried ibuprofen, hot pack with minimal relief. Pt denies fever/chills, burning/frequency/hesitency/odor/blood with urination, vaginal discharge/odor, no musculoskeletal injury to area, or GERD. Pt is sexually active with males only, no new partner in the last year, no concerns regarding STIs, pt using paraguard for birth control, has heavy periods lasting 3-4 days every 28-30 days. Pt has bowel movement every 1-2 days, soft stools, no change in consistency or pattern. Pt has hx of appendectomy, cone biopsy of cervix- with normal PAPs since, hx of kidney stones in past, hx of ovarian cyst in past. Pt denies famiy history of kidney/liver disease. Pt reports to rarely drink alcohol, does not smoke, only drink 1-2 glasses of water/day, and drinks 1-2 cups of coffee and 1-2 red bulls/day. Pt had transvaginal ultrasound completed recently- showing a left small ovarian cyst. Pt sleeping ok, pain does not wake her or keep her up.      Histories reviewed and updated  Past Medical History:   Diagnosis Date     Abnormal Pap smear      Appendicitis with perforation 08/04/08    Admit. DIscharged 08/07/08     TALIB II (cervical intraepithelial neoplasia II) 2008 5/29/08 on coloposcopy bx TALIB 2/3.  CKC on 6/13/08 showed TALIB 1/2 *yearly paps are indicated*     Renal calculus or stone      Past Surgical History:   Procedure Laterality Date     APPENDECTOMY  8/4/2008     EXAM UNDER ANESTHESIA RECTUM N/A 2/24/2017    Procedure: EXAM UNDER ANESTHESIA RECTUM;  Surgeon: Britton Palomo MD;  Location: [facility] OR     HC CONIZATION CERVIX,KNIFE/LASER   06/130/8    cervix.  TALIB 1/2     HEMORRHOIDECTOMY EXTERNAL N/A 2/24/2017    Procedure: HEMORRHOIDECTOMY EXTERNAL;  Surgeon: Britton Palomo MD;  Location: [facility] OR     TONSILLECTOMY  7/3/2007     Social History     Social History     Marital status:      Spouse name: N/A     Number of children: 0     Years of education: 16     Occupational History     RN Other     [employer]     Social History Main Topics     Smoking status: Never Smoker     Smokeless tobacco: Never Used      Comment: no smokers in the household     Alcohol use 0.0 oz/week     0 Standard drinks or equivalent per week      Comment: OCC.     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     Hobby Hazards No     Sleep Concern No     Stress Concern No     Weight Concern No     Special Diet No     Back Care No     Exercise No     Bike Helmet No     Seat Belt Yes     Self-Exams Yes     know BSE     Parent/Sibling W/ Cabg, Mi Or Angioplasty Before 65f 55m? No     Social History Narrative     to [place] and lives in [place] with their daughter.  No indoor cats/kittens.  No concerns about domestic violence.  No smokers in the home.     Family History   Problem Relation Age of Onset     Hypertension Maternal Grandmother      borderline     Lipids Maternal Grandmother      Respiratory Maternal Grandmother      COPD     Hypertension Maternal Grandfather      borderline     CANCER Maternal Grandfather      lung cancer, smoker     GASTROINTESTINAL DISEASE Mother      cholecystitis     Lipids Mother      Prostate Cancer Paternal Grandfather      Hearing Loss Paternal Grandfather      Other Cancer Paternal Grandmother      pancreatic cancer     Unknown/Adopted No family hx of      Asthma No family hx of      C.A.D.  No family hx of      DIABETES No family hx of      CEREBROVASCULAR DISEASE No family hx of      Breast Cancer No family hx of      Cancer - colorectal No family hx of      Alcohol/Drug No family hx of      Allergies No family hx of      Alzheimer Disease No family hx of      Anesthesia Reaction No family hx of      Arthritis No family hx of      Blood Disease No family hx of      Cardiovascular No family hx of      Circulatory No family hx of      Congenital Anomalies No family hx of      Connective Tissue Disorder No family hx of      Depression No family hx of      Genetic Disorder No family hx of      Genitourinary Problems No family hx of      Gynecology No family hx of      HEART DISEASE No family hx of      Musculoskeletal Disorder No family hx of      Neurologic Disorder No family hx of      Obesity No family hx of      OSTEOPOROSIS No family hx of      Psychotic Disorder No family hx of            CONSTITUTIONAL: Healthy, alert, in mild distress.  RESP: NEGATIVE  CV: NEGATIVE  GI: see HPI  : See HPI  MUSCULOSKELETAL: NEGATIVE  INTEGUMENTARY/SKIN: NEGATIVE  BREAST: NEGATIVE  ENDOCRINE: NEGATIVE  EXAM:  /74 (BP Location: Left arm, Patient Position: Chair, Cuff Size: Adult Regular)  Pulse 68  Wt 116 lb (52.6 kg)  LMP 03/07/2017 (Approximate)  BMI 19.91 kg/m2      GI: positive for: slight CVA tenderness  : PELVIC EXAM:  Vulva: No external lesions, normal hair distribution, no adenopathy, BUS WNL  Cervix: neg CMT  Uterus: Normal size, anteverted, non-tender, mobile  Ovaries: Right adnexa tenderness with palpation. No mass, mobile  Rectal exam: deferred    UMAC: negative    ASSESSMENT/PLAN:    (R10.2) Pelvic pain in female  (primary encounter diagnosis)  Plan:     UA pending. Discussed possible reasons for pelvic pain. Follow-up with family practice, if pain continues. Return to see Dr. Dallas/Shavonne if pain continue to occur towards the end of cycles for evaluation for possible endometriosis.   Declined trial of COCs.         Scribe Disclosure:   I, Marcella Mackenzie, am serving as a scribe; to document services personally performed by GUANACO Kumar CNM  - -based on data collection and the provider's statements to me.     Provider Disclosure:  I agree with above History, Review of Systems, Physical exam and Plan.  I have reviewed the content of the documentation and have edited it as needed. I have personally performed the services documented here and the documentation accurately represents those services and the decisions I have made.      Electronically signed by:  GUANACO Kumar CNM

## 2017-03-28 NOTE — MR AVS SNAPSHOT
After Visit Summary   3/28/2017    Erin Ashley    MRN: [id number]           Patient Information     Date Of Birth          1985        Visit Information        Provider Department      3/28/2017 1:15 PM Ana Maria Fuentes APRN [facility]        Today's Diagnoses     Pelvic pain in female    -  1       Follow-ups after your visit        Who to contact     If you have questions or need follow up information about today's clinic visit or your schedule please contact [facility] directly at [phone].  Normal or non-critical lab and imaging results will be communicated to you by Certonahart, letter or phone within 4 business days after the clinic has received the results. If you do not hear from us within 7 days, please contact the clinic through [employer] or phone. If you have a critical or abnormal lab result, we will notify you by phone as soon as possible.  Submit refill requests through RoverTown or call your pharmacy and they will forward the refill request to us. Please allow 3 business days for your refill to be completed.          Additional Information About Your Visit        MyChart Information     R[employer]Town gives you secure access to your electronic health record. If you see a primary care provider, you can also send messages to your care team and make appointments. If you have questions, please call your primary care clinic.  If you do not have a primary care provider, please call [phone] and they will assist you.        Care EveryWhere ID     This is your Care EveryWhere ID. This could be used by other organizations to access your [place] medical records  [id number]        Your Vitals Were     Pulse Last Period BMI (Body Mass Index)             68 03/07/2017 (Approximate) 19.91 kg/m2          Blood Pressure from Last 3 Encounters:   03/28/17 112/74   03/27/17 113/73   03/07/17 100/58    Weight from Last 3 Encounters:   03/28/17 116 lb (52.6 kg)    03/27/17 112 lb (50.8 kg)   03/24/17 115 lb 12 oz (52.5 kg)              We Performed the Following     UA reflex to Microscopic        Primary Care Provider    None       No address on file        Thank you!     Thank you for choosing [facility]  for your care. Our goal is always to provide you with excellent care. Hearing back from our patients is one way we can continue to improve our services. Please take a few minutes to complete the written survey that you may receive in the mail after your visit with us. Thank you!             Your Updated Medication List - Protect others around you: Learn how to safely use, store and throw away your medicines at www.disposemymeds.org.          This list is accurate as of: 3/28/17  4:26 PM.  Always use your most recent med list.                   Brand Name Dispense Instructions for use    PARAGARD INTRAUTERINE COPPER IU          traZODone 50 MG tablet    DESYREL    30 tablet    Take 1 tablet (50 mg) by mouth nightly as needed for sleep

## 2017-04-11 ENCOUNTER — MYC MEDICAL ADVICE (OUTPATIENT)
Dept: OBGYN | Facility: OTHER | Age: 32
End: 2017-04-11

## 2017-04-11 NOTE — TELEPHONE ENCOUNTER
Left message on answering machine for patient to call back to further discuss sxs (see other DeNova[employer]hart message from today explaining sxs).    JKD - please review and respond to Cmedt messages (there are 2 from today) with recommendations.  Per LOV notes on 3/28/17 (copied and pasted):    ASSESSMENT/PLAN:  (R10.2) Pelvic pain in female (primary encounter diagnosis)  Plan:   UA pending. Discussed possible reasons for pelvic pain. Follow-up with family practice, if pain continues. Return to see Dr. Dallas/Shavonne if pain continue to occur towards the end of cycles for evaluation for possible endometriosis. Declined trial of COCs.     Erin Adams, RN, BSN

## 2017-04-11 NOTE — TELEPHONE ENCOUNTER
Replied to Patient [employer]versation [employer] message asking pt to call back to discuss sxs further or schedule with OB/GYN.    Erin Admas, RN, BSN

## 2017-04-11 NOTE — TELEPHONE ENCOUNTER
Replied back to other Philtro message asking pt to call back to discuss sxs or schedule with OB/GYN.  Erin Adams, RN, BSN

## 2017-04-13 ENCOUNTER — MYC MEDICAL ADVICE (OUTPATIENT)
Dept: OBGYN | Facility: OTHER | Age: 32
End: 2017-04-13

## 2017-04-14 ENCOUNTER — OFFICE VISIT (OUTPATIENT)
Dept: OBGYN | Facility: CLINIC | Age: 32
End: 2017-04-14
Payer: COMMERCIAL

## 2017-04-14 VITALS
SYSTOLIC BLOOD PRESSURE: 110 MMHG | RESPIRATION RATE: 14 BRPM | BODY MASS INDEX: 21.11 KG/M2 | WEIGHT: 123 LBS | HEART RATE: 81 BPM | DIASTOLIC BLOOD PRESSURE: 68 MMHG

## 2017-04-14 DIAGNOSIS — N83.202 CYST OF LEFT OVARY: ICD-10-CM

## 2017-04-14 DIAGNOSIS — G89.18 POST-OP PAIN: ICD-10-CM

## 2017-04-14 DIAGNOSIS — R10.2 PELVIC PAIN IN FEMALE: Primary | ICD-10-CM

## 2017-04-14 PROCEDURE — 99214 OFFICE O/P EST MOD 30 MIN: CPT | Performed by: OBSTETRICS & GYNECOLOGY

## 2017-04-14 RX ORDER — NORETHINDRONE ACETATE AND ETHINYL ESTRADIOL .02; 1 MG/1; MG/1
1 TABLET ORAL DAILY
Qty: 63 TABLET | Refills: 3 | Status: SHIPPED | OUTPATIENT
Start: 2017-04-14 | End: 2017-07-17

## 2017-04-14 RX ORDER — HYDROMORPHONE HYDROCHLORIDE 2 MG/1
2-4 TABLET ORAL EVERY 4 HOURS PRN
Qty: 20 TABLET | Refills: 0 | Status: SHIPPED | OUTPATIENT
Start: 2017-04-14 | End: 2017-07-17

## 2017-04-14 ASSESSMENT — PAIN SCALES - GENERAL: PAINLEVEL: MODERATE PAIN (5)

## 2017-04-14 NOTE — NURSING NOTE
"Chief Complaint   Patient presents with     Pelvic Pain     Health Maintenance       Initial /68 (BP Location: Right arm, Patient Position: Chair, Cuff Size: Adult Regular)  Pulse 81  Resp 14  Wt 123 lb (55.8 kg)  LMP 2017 (Approximate)  BMI 21.11 kg/m2 Estimated body mass index is 21.11 kg/(m^2) as calculated from the following:    Height as of 3/27/17: 5' 4\" (1.626 m).    Weight as of this encounter: 123 lb (55.8 kg).  BP completed using cuff size: regular        The following HM Due: NONE      The following patient reported/Care Every where data was sent to:  P ABSTRACT QUALITY INITIATIVES [02317]     Michelle Dobbins CMA ([facility])            "

## 2017-04-14 NOTE — MR AVS SNAPSHOT
After Visit Summary   4/14/2017    Erin Ashley    MRN: [id number]           Patient Information     Date Of Birth          1985        Visit Information        Provider Department      4/14/2017 1:30 PM Amada Dallas MD [facility]        Today's Diagnoses     Pelvic pain in female    -  1    Cyst of left ovary        Post-op pain           Follow-ups after your visit        Follow-up notes from your care team     Return if symptoms worsen or fail to improve.      Who to contact     If you have questions or need follow up information about today's clinic visit or your schedule please contact [facility] directly at [phone].  Normal or non-critical lab and imaging results will be communicated to you by MyChart, letter or phone within 4 business days after the clinic has received the results. If you do not hear from us within 7 days, please contact the clinic through Local Reputationhart or phone. If you have a critical or abnormal lab result, we will notify you by phone as soon as possible.  Submit refill requests through [employer] or call your pharmacy and they will forward the refill request to us. Please allow 3 business days for your refill to be completed.          Additional Information About Your Visit        MyChart Information     [employer] gives you secure access to your electronic health record. If you see a primary care provider, you can also send messages to your care team and make appointments. If you have questions, please call your primary care clinic.  If you do not have a primary care provider, please call [phone] and they will assist you.        Care EveryWhere ID     This is your Care EveryWhere ID. This could be used by other organizations to access your [place] medical records  [id number]        Your Vitals Were     Pulse Respirations Last Period BMI (Body Mass Index)          81 14 03/07/2017 (Approximate) 21.11 kg/m2         Blood  Pressure from Last 3 Encounters:   04/14/17 110/68   03/28/17 112/74   03/27/17 113/73    Weight from Last 3 Encounters:   04/14/17 123 lb (55.8 kg)   03/28/17 116 lb (52.6 kg)   03/27/17 112 lb (50.8 kg)              Today, you had the following     No orders found for display         Today's Medication Changes          These changes are accurate as of: 4/14/17 11:59 PM.  If you have any questions, ask your nurse or doctor.               Start taking these medicines.        Dose/Directions    HYDROmorphone 2 MG tablet   Commonly known as:  DILAUDID   Used for:  Post-op pain   Started by:  Amada Dallas MD        Dose:  2-4 mg   Take 1-2 tablets (2-4 mg) by mouth every 4 hours as needed for pain   Quantity:  20 tablet   Refills:  0       norethindrone-ethinyl estradiol 1-20 MG-MCG per tablet   Commonly known as:  MICROGESTIN 1/20   Used for:  Cyst of left ovary   Started by:  Amada Dallas MD        Dose:  1 tablet   Take 1 tablet by mouth daily   Quantity:  63 tablet   Refills:  3            Where to get your medicines      These medications were sent to [facility] [address] [place], [place] - [address] [address] [place] [address]  [address] [address] [facility] [address]     Phone:  [phone]     norethindrone-ethinyl estradiol 1-20 MG-MCG per tablet         Some of these will need a paper prescription and others can be bought over the counter.  Ask your nurse if you have questions.     Bring a paper prescription for each of these medications     HYDROmorphone 2 MG tablet                Primary Care Provider Office Phone #    [facility] [phone]       No address on file        Thank you!     Thank you for [place] [facility]  [place]. Our goal is always to provide you with excellent care. Hearing back from our patients is one way we can continue to improve our services. Please take a few minutes to complete the written survey that you may receive in the mail after your visit with us. Thank  you!             Your Updated Medication List - Protect others around you: Learn how to safely use, store and throw away your medicines at www.disposemymeds.org.          This list is accurate as of: 4/14/17 11:59 PM.  Always use your most recent med list.                   Brand Name Dispense Instructions for use    HYDROmorphone 2 MG tablet    DILAUDID    20 tablet    Take 1-2 tablets (2-4 mg) by mouth every 4 hours as needed for pain       norethindrone-ethinyl estradiol 1-20 MG-MCG per tablet    MICROGESTIN 1/20    63 tablet    Take 1 tablet by mouth daily       PARAGARD INTRAUTERINE COPPER IU      Reported on 4/14/2017       traZODone 50 MG tablet    DESYREL    30 tablet    Take 1 tablet (50 mg) by mouth nightly as needed for sleep

## 2017-05-03 DIAGNOSIS — G47.01 INSOMNIA DUE TO MEDICAL CONDITION: ICD-10-CM

## 2017-05-04 NOTE — TELEPHONE ENCOUNTER
trazodone     Last Written Prescription Date: 05/23/16  Last Fill Quantity: 30; # refills: 3  Last Office Visit with [facility], [facility] or  Health prescribing provider:  02/22/17        Last PHQ-9 score on record=   PHQ-9 SCORE 8/1/2013   Total Score 0       Lab Results   Component Value Date    AST 28 02/22/2017     Lab Results   Component Value Date    ALT 60 02/22/2017

## 2017-05-05 ENCOUNTER — OFFICE VISIT (OUTPATIENT)
Dept: FAMILY MEDICINE | Facility: OTHER | Age: 32
End: 2017-05-05
Payer: COMMERCIAL

## 2017-05-05 ENCOUNTER — RADIANT APPOINTMENT (OUTPATIENT)
Dept: GENERAL RADIOLOGY | Facility: OTHER | Age: 32
End: 2017-05-05
Attending: STUDENT IN AN ORGANIZED HEALTH CARE EDUCATION/TRAINING PROGRAM
Payer: COMMERCIAL

## 2017-05-05 VITALS
TEMPERATURE: 97.3 F | WEIGHT: 118 LBS | BODY MASS INDEX: 20.14 KG/M2 | HEIGHT: 64 IN | SYSTOLIC BLOOD PRESSURE: 106 MMHG | DIASTOLIC BLOOD PRESSURE: 54 MMHG | HEART RATE: 76 BPM | RESPIRATION RATE: 16 BRPM

## 2017-05-05 DIAGNOSIS — S29.012A UPPER BACK STRAIN, INITIAL ENCOUNTER: Primary | ICD-10-CM

## 2017-05-05 DIAGNOSIS — R06.02 SHORTNESS OF BREATH: ICD-10-CM

## 2017-05-05 DIAGNOSIS — R07.9 CHEST PAIN, UNSPECIFIED TYPE: ICD-10-CM

## 2017-05-05 PROCEDURE — 99214 OFFICE O/P EST MOD 30 MIN: CPT | Performed by: STUDENT IN AN ORGANIZED HEALTH CARE EDUCATION/TRAINING PROGRAM

## 2017-05-05 PROCEDURE — 71020 XR CHEST 2 VW: CPT

## 2017-05-05 PROCEDURE — 93000 ELECTROCARDIOGRAM COMPLETE: CPT | Performed by: STUDENT IN AN ORGANIZED HEALTH CARE EDUCATION/TRAINING PROGRAM

## 2017-05-05 RX ORDER — CYCLOBENZAPRINE HCL 10 MG
5-10 TABLET ORAL 3 TIMES DAILY PRN
Qty: 30 TABLET | Refills: 1 | Status: SHIPPED | OUTPATIENT
Start: 2017-05-05 | End: 2017-12-27

## 2017-05-05 RX ORDER — TRAZODONE HYDROCHLORIDE 50 MG/1
TABLET, FILM COATED ORAL
Qty: 30 TABLET | Refills: 5 | Status: SHIPPED | OUTPATIENT
Start: 2017-05-05 | End: 2017-07-26

## 2017-05-05 ASSESSMENT — PAIN SCALES - GENERAL: PAINLEVEL: MODERATE PAIN (4)

## 2017-05-05 NOTE — MR AVS SNAPSHOT
"              After Visit Summary   5/5/2017    Erin Ashley    MRN: [id number]           Patient Information     Date Of Birth          1985        Visit Information        Provider Department      5/5/2017 1:00 PM Olga Jacinto APRN CNP [facility]        Today's Diagnoses     Upper back strain, initial encounter    -  1    Chest pain, unspecified type        Shortness of breath           Follow-ups after your visit        Who to contact     If you have questions or need follow up information about today's clinic visit or your schedule please contact [facility] directly at [phone].  Normal or non-critical lab and imaging results will be communicated to you by Oceana Therapeuticshart, letter or phone within 4 business days after the clinic has received the results. If you do not hear from us within 7 days, please contact the clinic through O[employer] The[employer]hart or phone. If you have a critical or abnormal lab result, we will notify you by phone as soon as possible.  Submit refill requests through IMASTE or call your pharmacy and they will forward the refill request to us. Please allow 3 business days for your refill to be completed.          Additional Information About Your Visit        MyChart Information     IMASTE gives you secure access to your electronic health record. If you see a primary care provider, you can also send messages to your care team and make appointments. If you have questions, please call your primary care clinic.  If you do not have a primary care provider, please call [phone] and they will assist you.        Care EveryWhere ID     This is your Care EveryWhere ID. This could be used by other organizations to access your [place] medical records  [id number]        Your Vitals Were     Pulse Temperature Respirations Height BMI (Body Mass Index)       76 97.3  F (36.3  C) (Oral) 16 5' 4.02\" (1.626 m) 20.24 kg/m2        Blood Pressure from Last 3 Encounters: "   05/05/17 106/54   04/14/17 110/68   03/28/17 112/74    Weight from Last 3 Encounters:   05/05/17 118 lb (53.5 kg)   04/14/17 123 lb (55.8 kg)   03/28/17 116 lb (52.6 kg)              We Performed the Following     EKG 12-lead complete w/read - Clinics          Today's Medication Changes          These changes are accurate as of: 5/5/17 11:59 PM.  If you have any questions, ask your nurse or doctor.               Start taking these medicines.        Dose/Directions    cyclobenzaprine 10 MG tablet   Commonly known as:  FLEXERIL   Used for:  Upper back strain, initial encounter   Started by:  Olga Jacinto APRN CNP        Dose:  5-10 mg   Take 0.5-1 tablets (5-10 mg) by mouth 3 times daily as needed for muscle spasms   Quantity:  30 tablet   Refills:  1            Where to get your medicines      These medications were sent to [facility] [address] [place] [facility] [place] - [address] [address] [place] [address]  [address] [address] [address]     Phone:  [phone]     cyclobenzaprine 10 MG tablet                Primary Care Provider Office Phone #    [facility] [phone]       No address on file        Thank you!     Thank you for choosing [facility]  for your care. Our goal is always to provide you with excellent care. Hearing back from our patients is one way we can continue to improve our services. Please take a few minutes to complete the written survey that you may receive in the mail after your visit with us. Thank you!             Your Updated Medication List - Protect others around you: Learn how to safely use, store and throw away your medicines at www.disposemymeds.org.          This list is accurate as of: 5/5/17 11:59 PM.  Always use your most recent med list.                   Brand Name Dispense Instructions for use    cyclobenzaprine 10 MG tablet    FLEXERIL    30 tablet    Take 0.5-1 tablets (5-10 mg) by mouth 3 times daily as needed for muscle spasms       HYDROmorphone 2 MG  tablet    DILAUDID    20 tablet    Take 1-2 tablets (2-4 mg) by mouth every 4 hours as needed for pain       norethindrone-ethinyl estradiol 1-20 MG-MCG per tablet    MICROGESTIN 1/20    63 tablet    Take 1 tablet by mouth daily       PARAGARD INTRAUTERINE COPPER IU      Reported on 4/14/2017       traZODone 50 MG tablet    DESYREL    30 tablet    TAKE ONE TABLET BY MOUTH NIGHTLY AT BEDTIME AS NEEDED FOR SLEEP

## 2017-05-05 NOTE — NURSING NOTE
"Chief Complaint   Patient presents with     Back Pain       Initial /54 (BP Location: Right arm, Patient Position: Chair, Cuff Size: Adult Regular)  Pulse 76  Temp 97.3  F (36.3  C) (Oral)  Resp 16  Ht 5' 4.02\" (1.626 m)  Wt 118 lb (53.5 kg)  BMI 20.24 kg/m2 Estimated body mass index is 20.24 kg/(m^2) as calculated from the following:    Height as of this encounter: 5' 4.02\" (1.626 m).    Weight as of this encounter: 118 lb (53.5 kg).  Medication Reconciliation: complete   Shania Juarez CMA      "

## 2017-05-05 NOTE — PROGRESS NOTES
"  SUBJECTIVE:                                                    Erin Ashley is a 32 year old female who presents to clinic today for the following health issues:    HPI    Back Pain      Duration: 1 week        Specific cause: none    Description:   Location of pain: middle of back left  Character of pain: Pressure, sore  Pain radiation: left fingers  New numbness or weakness in legs, not attributed to pain:  no     Intensity: moderate    History:   Pain interferes with job: Noticed pain at work last night.   History of back problems: no prior back problems  Any previous MRI or X-rays: None  Sees a specialist for back pain:  No  Therapies tried without relief: none    Alleviating factors:   Improved by: none      Precipitating factors:  Worsened by: Lying Flat    Functional and Psychosocial Screen (Ron STarT Back):      Not performed today       Accompanying Signs & Symptoms:  Risk of Fracture:  None  Risk of Cauda Equina:  None  Risk of Infection:  None  Risk of Cancer:  None  Risk of Ankylosing Spondylitis:  Onset at age <35, male, AND morning back stiffness. no            Pt states she feels short of breath all the time.       Problem list and histories reviewed & adjusted, as indicated.  Additional history: as documented    Labs reviewed in EPIC    ROS:  Constitutional, HEENT, cardiovascular, pulmonary, gi and gu systems are negative, except as otherwise noted.    OBJECTIVE:                                                    /54 (BP Location: Right arm, Patient Position: Chair, Cuff Size: Adult Regular)  Pulse 76  Temp 97.3  F (36.3  C) (Oral)  Resp 16  Ht 5' 4.02\" (1.626 m)  Wt 118 lb (53.5 kg)  BMI 20.24 kg/m2  Body mass index is 20.24 kg/(m^2).  GENERAL: healthy, alert and no distress  EYES: Eyes grossly normal to inspection, PERRL and conjunctivae and sclerae normal  HENT: ear canals and TM's normal, nose and mouth without ulcers or lesions  NECK: no adenopathy, no asymmetry, masses, or " scars and thyroid normal to palpation  RESP: lungs clear to auscultation - no rales, rhonchi or wheezes  CV: regular rate and rhythm, normal S1 S2, no S3 or S4, no murmur, click or rub, no peripheral edema and peripheral pulses strong  ABDOMEN: soft, nontender, no hepatosplenomegaly, no masses and bowel sounds normal  MS: left thoracic paraspinal muscles very tight and tender to palpation. When patient rotates shoulders vigorously there is an audible grating sound, no gross musculoskeletal defects noted, no edema  SKIN: no suspicious lesions or rashes  NEURO: Normal strength and tone, mentation intact and speech normal  PSYCH: mentation appears normal, affect normal/bright  LYMPH: no cervical, supraclavicular, axillary, or inguinal adenopathy    Diagnostic Test Results:  CXR - unremarkable  EKG - negative     ASSESSMENT/PLAN:                                                      1. Upper back strain, initial encounter  Patient has significantly tightened paraspinal muscles on the left side, likely due to overuse.  Recommend massage, muscle relaxer, NSAIDs, rest, reducing overuse, no heavy lifting until pain resolves.  Return to clinic if symptoms worsen or persist.  - cyclobenzaprine (FLEXERIL) 10 MG tablet; Take 0.5-1 tablets (5-10 mg) by mouth 3 times daily as needed for muscle spasms  Dispense: 30 tablet; Refill: 1    2. Chest pain, unspecified type  Chest pain work-up completed to rule out cardiac etiology.  CXR showed no cardiopulmonary abnormalities.  EKG was NSR.  Pain in chest is likely radiation of pain from upper back strain/pain.  She does have a grating sound that can be heard without stethoscope when she rotates her shoulders.  Unclear etiology of this sound but does seem to cause pain or worsen the pain in her back.  Return to clinic if symptoms worsen or persist.    - XR Chest 2 Views; Future  - EKG 12-lead complete w/read - Clinics    3. Shortness of breath  Shortness of breath comes with exercise.   Patient has been worried about the chest pain that seems to be musculoskeletal in nature.  Unsure if shortness of breath is more related to anxiety about chest/back pain and that she was thinking it was cardiac related.  Recommended patient continue to monitor and return to clinic is worsens or persists.  - EKG 12-lead complete w/read - Clinics    Greater than 50% of 30 minute visit were spent on counseling or coordination of care regarding chest pain, shortness of breath, upper back strain.     GUANACO Gonzalez [facility]

## 2017-07-17 ENCOUNTER — TELEPHONE (OUTPATIENT)
Dept: FAMILY MEDICINE | Facility: OTHER | Age: 32
End: 2017-07-17

## 2017-07-17 ENCOUNTER — OFFICE VISIT (OUTPATIENT)
Dept: FAMILY MEDICINE | Facility: OTHER | Age: 32
End: 2017-07-17
Payer: COMMERCIAL

## 2017-07-17 VITALS
WEIGHT: 101 LBS | SYSTOLIC BLOOD PRESSURE: 100 MMHG | RESPIRATION RATE: 16 BRPM | HEART RATE: 110 BPM | BODY MASS INDEX: 17.33 KG/M2 | TEMPERATURE: 97.4 F | DIASTOLIC BLOOD PRESSURE: 58 MMHG

## 2017-07-17 DIAGNOSIS — R10.2 PELVIC PAIN IN FEMALE: Primary | ICD-10-CM

## 2017-07-17 DIAGNOSIS — E04.1 THYROID NODULE: ICD-10-CM

## 2017-07-17 DIAGNOSIS — R00.0 TACHYCARDIA: ICD-10-CM

## 2017-07-17 PROCEDURE — 99214 OFFICE O/P EST MOD 30 MIN: CPT | Performed by: FAMILY MEDICINE

## 2017-07-17 PROCEDURE — 93000 ELECTROCARDIOGRAM COMPLETE: CPT | Performed by: FAMILY MEDICINE

## 2017-07-17 ASSESSMENT — PAIN SCALES - GENERAL: PAINLEVEL: MODERATE PAIN (4)

## 2017-07-17 NOTE — PROGRESS NOTES
SUBJECTIVE:                                                    Erin Ashley is a 32 year old female who presents to clinic today for the following health issues:  Pt does not take dilaudid or bcp     HPI    Diarrhea  Onset: yesterday     Description:   Consistency of stool: watery, yellow and  A tiny bit mucousy  (constantly has the urge to go but usually can't go)  Blood in stool: no   Number of loose stools in past 24 hours: 3    Progression of Symptoms:  worsening    Accompanying Signs & Symptoms:  Fever: no   Nausea or vomiting; YES- nausea and sweaty when trying to go to the bathroom   Abdominal pain: YES- cramping, very low in the abd  Episodes of constipation: no   Weight loss: YES- just a few pds. Usually about 108-110pds     History:   Ill contacts: no   Recent use of antibiotics: no    Recent travels: no          Recent medication-new or changes(Rx or OTC): no     Precipitating factors:   none    Alleviating factors:   None     Therapies Tried and outcome:  Imodium AD; Outcome: helped a bit     Problem list and histories reviewed & adjusted, as indicated.  Additional history: as documented    Chest Pain  Ambulance came last night as pt have left sided chest pain, arm pain, jaw and neck pain. EMTs said EKG and all vitals looked normal. They recommended pt to go to hospital but pt declined.       Onset: Yesterday     Description (location/character/radiation/duration): center of chest (yesterday was more left sided), feels like pressure. Lower jaw and neck on left side goes numb.     Intensity:  moderate    Accompanying signs and symptoms:        Shortness of breath: YES- when heart races. Happened today when walking into store        Sweating: no        Nausea/vomitting: no        Palpitations: YES       Other (fevers/chills/cough/heartburn/lightheadedness): YES- last night     History (similar episodes/previous evaluation): None    Precipitating or alleviating factors:       Worse with exertion: YES        Worse with breathing: no        Related to eating: no        Better with burping: no     Therapies tried and outcome: None    CV: The patient reports that her EKG and vitals looked normal and the patient declined a hospital visit. She was feeling fine until mid noon today where she started experiencing left sided jaw pains and chest pains. She also notes heart palpitations occasionally.    GI: The patient reports abdominal cramping low in the stomach and diarrhea a few times yesterday.    PSYCH: The patient reports that her mood and anxiety have been normal.      Patient Active Problem List   Diagnosis     TALIB 3 - cervical intraepithelial neoplasia grade 3     Paragard placed 10/15/13     Acne     Insomnia due to medical condition     Personal history of ovarian cyst     Skin tags, anus or rectum     Elevated LFTs     Pelvic pain in female     Past Surgical History:   Procedure Laterality Date     APPENDECTOMY  8/4/2008     EXAM UNDER ANESTHESIA RECTUM N/A 2/24/2017    Procedure: EXAM UNDER ANESTHESIA RECTUM;  Surgeon: Britton Palomo MD;  Location: [facility] OR     HC CONIZATION CERVIX,KNIFE/LASER  06/130/8    cervix.  TALIB 1/2     HEMORRHOIDECTOMY EXTERNAL N/A 2/24/2017    Procedure: HEMORRHOIDECTOMY EXTERNAL;  Surgeon: Britton Palomo MD;  Location: [facility] OR     TONSILLECTOMY  7/3/2007       Social History   Substance Use Topics     Smoking status: Never Smoker     Smokeless tobacco: Never Used      Comment: no smokers in the household     Alcohol use 0.0 oz/week     0 Standard drinks or equivalent per week      Comment: OCC.     Family History   Problem Relation Age of Onset     Hypertension Maternal Grandmother      borderline     Lipids Maternal Grandmother      Respiratory Maternal Grandmother      COPD     Lung Cancer Maternal Grandmother      Hypertension Maternal Grandfather      borderline     CANCER Maternal Grandfather      lung cancer, smoker     GASTROINTESTINAL DISEASE Mother      cholecystitis      Lipids Mother      Prostate Cancer Paternal Grandfather      Hearing Loss Paternal Grandfather      Other Cancer Paternal Grandmother      pancreatic cancer     Unknown/Adopted No family hx of      Asthma No family hx of      C.A.D. No family hx of      DIABETES No family hx of      CEREBROVASCULAR DISEASE No family hx of      Breast Cancer No family hx of      Cancer - colorectal No family hx of      Alcohol/Drug No family hx of      Allergies No family hx of      Alzheimer Disease No family hx of      Anesthesia Reaction No family hx of      Arthritis No family hx of      Blood Disease No family hx of      Cardiovascular No family hx of      Circulatory No family hx of      Congenital Anomalies No family hx of      Connective Tissue Disorder No family hx of      Depression No family hx of      Genetic Disorder No family hx of      Genitourinary Problems No family hx of      Gynecology No family hx of      HEART DISEASE No family hx of      Musculoskeletal Disorder No family hx of      Neurologic Disorder No family hx of      Obesity No family hx of      OSTEOPOROSIS No family hx of      Psychotic Disorder No family hx of            ROS:  Constitutional, HEENT, cardiovascular, pulmonary, GI, , musculoskeletal, neuro, skin, endocrine and psych systems are negative, except as in HPI or otherwise noted     This document serves as a record of the services and decisions personally performed and made by Brittny Naqvi MD. It was created on her behalf by Jorge A Nava , a trained medical scribe. The creation of this document is based the provider's statements to the medical scribe.  Jorge A Nava, July 17, 2017 4:02 PM     OBJECTIVE:                                                    /58 (BP Location: Left arm, Patient Position: Chair, Cuff Size: Adult Regular)  Pulse 110  Temp 97.4  F (36.3  C) (Oral)  Resp 16  Wt 45.8 kg (101 lb)  BMI 17.33 kg/m2  Body mass index is 17.33 kg/(m^2).   GENERAL: healthy, alert,  well nourished, no distress, dry mucous membranes noted.  NECK: Nodule noted in the right superior node of the thyroid.  RESP: lungs clear to auscultation - no rales, no rhonchi, no wheezes  CV: Tachycardia noted, normal S1 S2, no S3 or S4 and no murmur, no click or rub -  ABDOMEN: soft, no tenderness, no  hepatosplenomegaly, no masses, normal bowel sounds  MS: Left Costovertebral angle tenderness at the T2 level.  SKIN: no suspicious lesions, no rashes  PSYCH: Alert and oriented times 3; speech- coherent , normal rate and volume; able to articulate logical thoughts, able to abstract reason, no tangential thoughts, no hallucinations or delusions, affect- normal    No results found for this or any previous visit (from the past 24 hour(s)).     ASSESSMENT/PLAN:                                                        ICD-10-CM    1. Pelvic pain in female R10.2    2. Tachycardia R00.0 EKG 12-lead complete w/read - Clinics     CANCELED: Troponin I     CANCELED: CBC with platelets     CANCELED: Comprehensive metabolic panel (BMP + Alb, Alk Phos, ALT, AST, Total. Bili, TP)   3. Thyroid nodule E04.1 US Thyroid     CANCELED: TSH with free T4 reflex     -Pt is presenting with chest + left sided jaw pains along with abdominal cramping. EKG ordered due to tachycardia:: sinus tachycardia noted today. This is possibly due to thyroid issues, dehydration, or anxiety, Ordered labs (see above). Holter monitor if labs are normal.  -Thyroid ultrasound ordered due to noting a nodule in the neck exam.  -Patient will be going to the [facility] ED, I notified them about her condition and plans for labs/ US.  They will determine course from here.  If US not completed, plan as OP and will need to call back to be scheduled.    Patient Instructions   Maintain proper hydration. Follow to the ER if your symptoms worsen.  -Holter monitor if labs are normal.    The information in this document, created by the medical scribe for me, accurately  reflects the services I personally performed and the decisions made by me. I have reviewed and approved this document for accuracy.   MD Brittny Thurman MD, MD  [facility] [place]

## 2017-07-17 NOTE — TELEPHONE ENCOUNTER
Spoke with patient  Yesterday 930/10 am.  Started having cramping low all day yesterday off and on.    Diarrhea a couple times. Better last night.    Left side numb and tingling.face, neck, and chest on left arm. Did call 911 last night.  She did not go to ED with them: today numb and tingling is gone.     Now this am lower stomach is cramping.  Intense cramping.  Left side.  Feels like she has diarrhea.  Yellowish color stool.     Chills no fever. 7-8/10 cramping.     RECOMMENDED DISPOSITION:  See in 24 hours -   Will comply with recommendation: yes   If further questions/concerns or if Sx do not improve, worsen or new Sx develop, call your PCP or [facility] as soon as possible.    NOTES:  Disposition was determined by the first positive assessment question, therefore all previous assessment questions were negative.     Guideline used:diarrhea, adult    Telephone Triage Protocols for Nurses, Fourth Edition, Bita Quick RN

## 2017-07-17 NOTE — PATIENT INSTRUCTIONS
Maintain proper hydration. Follow to the ER if your symptoms worsen.  -Holter monitor if labs are normal.

## 2017-07-17 NOTE — NURSING NOTE
"Chief Complaint   Patient presents with     Abdominal Pain       Initial /58 (BP Location: Left arm, Patient Position: Chair, Cuff Size: Adult Regular)  Pulse 110  Temp 97.4  F (36.3  C) (Oral)  Resp 16  Wt 101 lb (45.8 kg)  BMI 17.33 kg/m2 Estimated body mass index is 17.33 kg/(m^2) as calculated from the following:    Height as of 5/5/17: 5' 4.02\" (1.626 m).    Weight as of this encounter: 101 lb (45.8 kg).  Medication Reconciliation: complete    "

## 2017-07-17 NOTE — MR AVS SNAPSHOT
After Visit Summary   7/17/2017    Erin Ashley    MRN: [id number]           Patient Information     Date Of Birth          1985        Visit Information        Provider Department      7/17/2017 3:45 PM Brittny Naqvi MD [facility]        Today's Diagnoses     Pelvic pain in female    -  1    Tachycardia        Thyroid nodule          Care Instructions    Maintain proper hydration. Follow to the ER if your symptoms worsen.  -Holter monitor if labs are normal.          Follow-ups after your visit        Future tests that were ordered for you today     Open Future Orders        Priority Expected Expires Ordered    US Thyroid Routine  7/17/2018 7/17/2017            Who to contact     If you have questions or need follow up information about today's clinic visit or your schedule please contact [facility] directly at [phone].  Normal or non-critical lab and imaging results will be communicated to you by MyChart, letter or phone within 4 business days after the clinic has received the results. If you do not hear from us within 7 days, please contact the clinic through MyChart or phone. If you have a critical or abnormal lab result, we will notify you by phone as soon as possible.  Submit refill requests through [employer] or call your pharmacy and they will forward the refill request to us. Please allow 3 business days for your refill to be completed.          Additional Information About Your Visit        MyChart Information     [employer] gives you secure access to your electronic health record. If you see a primary care provider, you can also send messages to your care team and make appointments. If you have questions, please call your primary care clinic.  If you do not have a primary care provider, please call [phone] and they will assist you.        Care EveryWhere ID     This is your Care EveryWhere ID. This could be used by other organizations to access  your [facility] medical records  [id number]        Your Vitals Were     Pulse Temperature Respirations BMI (Body Mass Index)          110 97.4  F (36.3  C) (Oral) 16 17.33 kg/m2         Blood Pressure from Last 3 Encounters:   07/17/17 100/58   05/05/17 106/54   04/14/17 110/68    Weight from Last 3 Encounters:   07/17/17 101 lb (45.8 kg)   05/05/17 118 lb (53.5 kg)   04/14/17 123 lb (55.8 kg)              We Performed the Following     EKG 12-lead complete w/read - Clinics          Today's Medication Changes          These changes are accurate as of: 7/17/17  4:49 PM.  If you have any questions, ask your nurse or doctor.               Stop taking these medicines if you haven't already. Please contact your care team if you have questions.     HYDROmorphone 2 MG tablet   Commonly known as:  DILAUDID   Stopped by:  Brittny Naqvi MD           norethindrone-ethinyl estradiol 1-20 MG-MCG per tablet   Commonly known as:  MICROGESTIN 1/20   Stopped by:  Brittny Naqvi MD                    Primary Care Provider Office Phone #    [facility] [phone]       No address on file        Equal Access to Services     ALICIA WAY AH: Benito harris hadasho Soomaali, waaxda luqadaha, qaybta kaalmada adeegyada, rush kraus. So [facility] [place] [phone].    ATENCIÓN: Si habla español, tiene a fischer disposición servicios gratuitos de asistencia lingüística. Llame al [phone].    We comply with applicable federal civil rights laws and [place] laws. We do not discriminate on the basis of race, color, national origin, age, disability sex, sexual orientation or gender identity.            Thank you!     Thank you for choosing [facility]  for your care. Our goal is always to provide you with excellent care. Hearing back from our patients is one way we can continue to improve our services. Please take a few minutes to complete the written survey that you may receive in the mail  after your visit with us. Thank you!             Your Updated Medication List - Protect others around you: Learn how to safely use, store and throw away your medicines at www.disposemymeds.org.          This list is accurate as of: 7/17/17  4:49 PM.  Always use your most recent med list.                   Brand Name Dispense Instructions for use Diagnosis    cyclobenzaprine 10 MG tablet    FLEXERIL    30 tablet    Take 0.5-1 tablets (5-10 mg) by mouth 3 times daily as needed for muscle spasms    Upper back strain, initial encounter       PARAGARD INTRAUTERINE COPPER IU      Reported on 4/14/2017        traZODone 50 MG tablet    DESYREL    30 tablet    TAKE ONE TABLET BY MOUTH NIGHTLY AT BEDTIME AS NEEDED FOR SLEEP    Insomnia due to medical condition

## 2017-07-18 ENCOUNTER — TELEPHONE (OUTPATIENT)
Dept: FAMILY MEDICINE | Facility: OTHER | Age: 32
End: 2017-07-18

## 2017-07-18 ENCOUNTER — ALLIED HEALTH/NURSE VISIT (OUTPATIENT)
Dept: FAMILY MEDICINE | Facility: OTHER | Age: 32
End: 2017-07-18

## 2017-07-18 VITALS — SYSTOLIC BLOOD PRESSURE: 96 MMHG | HEART RATE: 93 BPM | DIASTOLIC BLOOD PRESSURE: 66 MMHG

## 2017-07-18 DIAGNOSIS — R79.89 ELEVATED LFTS: Primary | ICD-10-CM

## 2017-07-18 DIAGNOSIS — Z01.30 BLOOD PRESSURE CHECK: Primary | ICD-10-CM

## 2017-07-18 DIAGNOSIS — R00.0 TACHYCARDIA: ICD-10-CM

## 2017-07-18 DIAGNOSIS — E04.1 THYROID NODULE: ICD-10-CM

## 2017-07-18 DIAGNOSIS — R79.89 ELEVATED LFTS: ICD-10-CM

## 2017-07-18 LAB
ALBUMIN SERPL-MCNC: 3.9 G/DL (ref 3.4–5)
ALP SERPL-CCNC: 62 U/L (ref 40–150)
ALT SERPL W P-5'-P-CCNC: 45 U/L (ref 0–50)
ANION GAP SERPL CALCULATED.3IONS-SCNC: 8 MMOL/L (ref 3–14)
AST SERPL W P-5'-P-CCNC: 22 U/L (ref 0–45)
BILIRUB SERPL-MCNC: 0.4 MG/DL (ref 0.2–1.3)
BUN SERPL-MCNC: 12 MG/DL (ref 7–30)
CALCIUM SERPL-MCNC: 9.3 MG/DL (ref 8.5–10.1)
CHLORIDE SERPL-SCNC: 106 MMOL/L (ref 94–109)
CO2 SERPL-SCNC: 28 MMOL/L (ref 20–32)
CREAT SERPL-MCNC: 0.63 MG/DL (ref 0.52–1.04)
ERYTHROCYTE [DISTWIDTH] IN BLOOD BY AUTOMATED COUNT: 13.6 % (ref 10–15)
GFR SERPL CREATININE-BSD FRML MDRD: NORMAL ML/MIN/1.7M2
GLUCOSE SERPL-MCNC: 90 MG/DL (ref 70–99)
HCT VFR BLD AUTO: 45.5 % (ref 35–47)
HGB BLD-MCNC: 15.5 G/DL (ref 11.7–15.7)
MCH RBC QN AUTO: 30 PG (ref 26.5–33)
MCHC RBC AUTO-ENTMCNC: 34.1 G/DL (ref 31.5–36.5)
MCV RBC AUTO: 88 FL (ref 78–100)
PLATELET # BLD AUTO: 281 10E9/L (ref 150–450)
POTASSIUM SERPL-SCNC: 4.3 MMOL/L (ref 3.4–5.3)
PROT SERPL-MCNC: 7.3 G/DL (ref 6.8–8.8)
RBC # BLD AUTO: 5.16 10E12/L (ref 3.8–5.2)
SODIUM SERPL-SCNC: 142 MMOL/L (ref 133–144)
TROPONIN I SERPL-MCNC: NORMAL UG/L (ref 0–0.04)
TSH SERPL DL<=0.005 MIU/L-ACNC: 0.4 MU/L (ref 0.4–4)
WBC # BLD AUTO: 7.7 10E9/L (ref 4–11)

## 2017-07-18 PROCEDURE — 84484 ASSAY OF TROPONIN QUANT: CPT | Performed by: PHYSICIAN ASSISTANT

## 2017-07-18 PROCEDURE — 85027 COMPLETE CBC AUTOMATED: CPT | Performed by: PHYSICIAN ASSISTANT

## 2017-07-18 PROCEDURE — 80053 COMPREHEN METABOLIC PANEL: CPT | Performed by: PHYSICIAN ASSISTANT

## 2017-07-18 PROCEDURE — 84443 ASSAY THYROID STIM HORMONE: CPT | Performed by: PHYSICIAN ASSISTANT

## 2017-07-18 PROCEDURE — 36415 COLL VENOUS BLD VENIPUNCTURE: CPT | Performed by: PHYSICIAN ASSISTANT

## 2017-07-18 NOTE — TELEPHONE ENCOUNTER
These labs are still in process. Let pt know.    Please check on this again before the end of the day as if labs are elevated pt may need to go to ER and pt is concerned about this.

## 2017-07-18 NOTE — MR AVS SNAPSHOT
After Visit Summary   7/18/2017    Erin Ashley    MRN: [id number]           Patient Information     Date Of Birth          1985        Visit Information        Provider Department      7/18/2017 9:30 AM FLORIAN [facility]ENSA[facility] TEAM B, [facility]        Today's Diagnoses     Blood pressure check    -  1       Follow-ups after your visit        Future tests that were ordered for you today     Open Future Orders        Priority Expected Expires Ordered    US Thyroid Routine  7/17/2018 7/17/2017            Who to contact     If you have questions or need follow up information about today's clinic visit or your schedule please contact [facility] directly at [phone].  Normal or non-critical lab and imaging results will be communicated to you by MyChart, letter or phone within 4 business days after the clinic has received the results. If you do not hear from us within 7 days, please contact the clinic through [employer] or phone. If you have a critical or abnormal lab result, we will notify you by phone as soon as possible.  Submit refill requests through [employer] or call your pharmacy and they will forward the refill request to us. Please allow 3 business days for your refill to be completed.          Additional Information About Your Visit        MyChart Information     [employer] gives you secure access to your electronic health record. If you see a primary care provider, you can also send messages to your care team and make appointments. If you have questions, please call your primary care clinic.  If you do not have a primary care provider, please call [phone] and they will assist you.        Care EveryWhere ID     This is your Care EveryWhere ID. This could be used by other organizations to access your [facility] medical records  [id number]        Your Vitals Were     Pulse                   93            Blood Pressure from Last 3 Encounters:   07/18/17 96/66   07/17/17  100/58   05/05/17 106/54    Weight from Last 3 Encounters:   07/17/17 101 lb (45.8 kg)   05/05/17 118 lb (53.5 kg)   04/14/17 123 lb (55.8 kg)              Today, you had the following     No orders found for display       Primary Care Provider Office Phone #    [facility] [phone]       No address on file        Equal Access to Services     ALICIA WAY : Hadii aad ku hadasho Soomaali, waaxda luqadaha, qaybta kaalmada adeegyada, waxay idiin hayjudithn modestaanjelica chen laAylajuliana . So [facility] [phone].    ATENCIÓN: Si habla español, tiene a fischer disposición servicios gratuitos de asistencia lingüística. Llame al [phone].    We comply with applicable federal civil rights laws and [place] laws. We do not discriminate on the basis of race, color, national origin, age, disability sex, sexual orientation or gender identity.            Thank you!     Thank you for choosing [facility]  for your care. Our goal is always to provide you with excellent care. Hearing back from our patients is one way we can continue to improve our services. Please take a few minutes to complete the written survey that you may receive in the mail after your visit with us. Thank you!             Your Updated Medication List - Protect others around you: Learn how to safely use, store and throw away your medicines at www.disposemymeds.org.          This list is accurate as of: 7/18/17 11:22 AM.  Always use your most recent med list.                   Brand Name Dispense Instructions for use Diagnosis    cyclobenzaprine 10 MG tablet    FLEXERIL    30 tablet    Take 0.5-1 tablets (5-10 mg) by mouth 3 times daily as needed for muscle spasms    Upper back strain, initial encounter       PARAGARD INTRAUTERINE COPPER IU      Reported on 4/14/2017        traZODone 50 MG tablet    DESYREL    30 tablet    TAKE ONE TABLET BY MOUTH NIGHTLY AT BEDTIME AS NEEDED FOR SLEEP    Insomnia due to medical condition

## 2017-07-18 NOTE — NURSING NOTE
Erin Ashley is a 32 year old female who comes in today for a Blood Pressure check because of recent lab work- not feeling the greatest afterwards.    *Document pulse and BP  *Use new set of vitals button for multiple readings.  *Use extended vitals for orthostatic    Vitals as recorded, a regular cuff was used.    Patient's blood pressure in the low range, but stated it is always low and not worried about it at this time.    Current complaints: none    Disposition: patient reminded to call as needed    Michelle Dobbins CMA (AAMA)

## 2017-07-18 NOTE — TELEPHONE ENCOUNTER
Provider is unavailable. Pt was seen yesterday.  Pended labs were cancelled, because pt was going to go to the ER. Pt choose not to. Please sign pended labs so pt can have these completed today.   Zac Edmondson MA  July 18, 2017

## 2017-07-18 NOTE — TELEPHONE ENCOUNTER
Reason for call:  Results  Reason for Call:  Request for results:    Name of test or procedure: labs    Date of test of procedure: today     Location of the test or procedure: [place]    OK to leave the result message on voice mail or with a family member? YES    Phone number Patient can be reached at:  Home number on file [phone] (home)    Additional comments: call with results asap today.    Call taken on 7/18/2017 at 12:18 PM by Akanksha Garza

## 2017-07-18 NOTE — TELEPHONE ENCOUNTER
Reason for Call: Request for an order or referral:    Order or referral being requested: order    Date needed: today    Has the patient been seen by the PCP for this problem? YES    Additional comments: patient was seen yesterday and the provider wanted her to go to [facility] last night and have them done but the patient didn't go she wants to have then done in the clinic today. Please call patient when orders have been placed.    Phone number Patient can be reached at:  Home number on file [phone] (home) or Cell number on file:    Telephone Information:   Mobile [phone]       Best Time:  anytime    Can we leave a detailed message on this number?  YES    Call taken on 7/18/2017 at 7:30 AM by Jazmine Kohli

## 2017-07-18 NOTE — PROGRESS NOTES
Terence Khan    Your results were normal.     The results are attached for your review.       Per Conrad PA-C

## 2017-07-20 ENCOUNTER — MYC MEDICAL ADVICE (OUTPATIENT)
Dept: FAMILY MEDICINE | Facility: OTHER | Age: 32
End: 2017-07-20

## 2017-07-20 NOTE — TELEPHONE ENCOUNTER
I did speak to patient briefly and then the call was lost.   LM for patient to return call.   Will send MyChart as well.     Cathy Medel, RN, BSN

## 2017-07-20 NOTE — PROGRESS NOTES
SUBJECTIVE:                                                    Erin Ashley is a 32 year old female who presents to clinic today for the following health issues:    Trazodone, pt states it is not working   would like to talk about heart rate and anxiety   HPI    Concern - Stomach issues   Onset: two weeks ago     Description:   Cramping in the lower stomach, diaherra     Intensity: moderate    Progression of Symptoms:  improving    Accompanying Signs & Symptoms:  Cramping, waking in the middle of night because of the pain     Previous history of similar problem:   no    Precipitating factors:   Worsened by: no    Alleviating factors:  Improved by: laying down, not moving     Therapies Tried and outcome: no     GI/CV: The patient reports that her stomach pain is improving but she is still experiencing the tachycardia. Her neck and shoulder pain has been improving as well.  She is still experiencing cramping and diarrhea, but the intensity has decreased.    PSYCH: The patient reports that she is able to fall asleep but frequently wakes up from her sleep and has difficulty falling asleep.    Problem list and histories reviewed & adjusted, as indicated.  Additional history: as documented    Patient Active Problem List   Diagnosis     TLAIB 3 - cervical intraepithelial neoplasia grade 3     Paragard placed 10/15/13     Acne     Insomnia due to medical condition     Personal history of ovarian cyst     Skin tags, anus or rectum     Elevated LFTs     Pelvic pain in female     Thyroid nodule     Tachycardia     Past Surgical History:   Procedure Laterality Date     APPENDECTOMY  8/4/2008     EXAM UNDER ANESTHESIA RECTUM N/A 2/24/2017    Procedure: EXAM UNDER ANESTHESIA RECTUM;  Surgeon: Britton Palomo MD;  Location:  OR      CONIZATION CERVIX,KNIFE/LASER  06/130/8    cervix.  TALIB 1/2     HEMORRHOIDECTOMY EXTERNAL N/A 2/24/2017    Procedure: HEMORRHOIDECTOMY EXTERNAL;  Surgeon: Britton Palomo MD;  Location:   OR     TONSILLECTOMY  7/3/2007       Social History   Substance Use Topics     Smoking status: Never Smoker     Smokeless tobacco: Never Used      Comment: no smokers in the household     Alcohol use 0.0 oz/week     0 Standard drinks or equivalent per week      Comment: OCC.     Family History   Problem Relation Age of Onset     Hypertension Maternal Grandmother      borderline     Lipids Maternal Grandmother      Respiratory Maternal Grandmother      COPD     Lung Cancer Maternal Grandmother      Hypertension Maternal Grandfather      borderline     CANCER Maternal Grandfather      lung cancer, smoker     GASTROINTESTINAL DISEASE Mother      cholecystitis     Lipids Mother      Prostate Cancer Paternal Grandfather      Hearing Loss Paternal Grandfather      Other Cancer Paternal Grandmother      pancreatic cancer     Unknown/Adopted No family hx of      Asthma No family hx of      C.A.D. No family hx of      DIABETES No family hx of      CEREBROVASCULAR DISEASE No family hx of      Breast Cancer No family hx of      Cancer - colorectal No family hx of      Alcohol/Drug No family hx of      Allergies No family hx of      Alzheimer Disease No family hx of      Anesthesia Reaction No family hx of      Arthritis No family hx of      Blood Disease No family hx of      Cardiovascular No family hx of      Circulatory No family hx of      Congenital Anomalies No family hx of      Connective Tissue Disorder No family hx of      Depression No family hx of      Genetic Disorder No family hx of      Genitourinary Problems No family hx of      Gynecology No family hx of      HEART DISEASE No family hx of      Musculoskeletal Disorder No family hx of      Neurologic Disorder No family hx of      Obesity No family hx of      OSTEOPOROSIS No family hx of      Psychotic Disorder No family hx of              ROS:  Constitutional, HEENT, cardiovascular, pulmonary, GI, , musculoskeletal, neuro, skin, endocrine and psych systems are  negative, except as in HPI or otherwise noted     This document serves as a record of the services and decisions personally performed and made by Brittny Naqvi MD. It was created on her behalf by Jorge A Nava , a trained medical scribe. The creation of this document is based the provider's statements to the medical scribe.  Jorge A Nava, July 26, 2017 12:05 PM     OBJECTIVE:                                                    BP 98/64  Pulse 106  Temp 97.4  F (36.3  C) (Oral)  Resp 16  Wt 46.3 kg (102 lb)  SpO2 96%  BMI 17.5 kg/m2  Body mass index is 17.5 kg/(m^2).   GENERAL: healthy, alert, well nourished, well hydrated, no distress  RESP: lungs clear to auscultation - no rales, no rhonchi, no wheezes  CV: regular rates and rhythm, normal S1 S2, no S3 or S4 and no murmur, no click or rub -  SKIN: no suspicious lesions, no rashes  PSYCH: Alert and oriented times 3; speech- coherent , normal rate and volume; able to articulate logical thoughts, able to abstract reason, no tangential thoughts, no hallucinations or delusions, affect- normal    No results found for this or any previous visit (from the past 24 hour(s)).     ASSESSMENT/PLAN:                                                        ICD-10-CM    1. Palpitations R00.2    2. Anxiety F41.9 sertraline (ZOLOFT) 50 MG tablet     QUEtiapine (SEROQUEL) 25 MG tablet     Holter Monitor 48 hour - Adult     -Patient is still experiencing tachycardia, will follow and complete her heart monitor for patterns. Discussed Anxiety as another potential cause for her palpitations, I advised yoga + relaxation techniques.   -Prescribed Seroquel. Potential side effects were discussed with the patient.  -Zoloft also started for long acting control of anxiety symptoms filled.  -Dr Dallas discussed laparoscopic procedure for adhesions,but ab pain improved, so not interested at thsi time.  -1 mo F/U.    Patient Instructions   -Heart monitor.  -Yoga + breathing exercises daily.  Practice this daily when not anxious.    -follow up in a month.    The information in this document, created by the medical scribe for me, accurately reflects the services I personally performed and the decisions made by me. I have reviewed and approved this document for accuracy.   MD Brittny Thurman MD, MD  [facility]

## 2017-07-20 NOTE — TELEPHONE ENCOUNTER
"Erin Ashley is a 32 year old female who calls with ongoing symptoms.    NURSING ASSESSMENT:  Description:  We initially started talking about her abdominal pain, which she states is in her bilateral lower abdomen, rated 6-7/10. She then tells me that's not what she is concerned with and states she can feel her heart beating fast. Current BP is 109/92 P 122 - she has been talking to me and sounds anxious prior to reading. She has been \"laying around\" today. She denies chest or jaw pain. Denies dizziness but states she feels \"cloudy\".   Precip. factors:  Was seen 7/17/17  Allergies:   Allergies   Allergen Reactions     Hydrocodone Other (See Comments)     Migraines     No Known Drug Allergies      Oxycodone Nausea and Vomiting       RECOMMENDED DISPOSITION:  Huddled with Dr. Naqvi - she still thinks symptoms are anxiety related. Schedule follow up in clinic to discuss counseling or other options.     Next 5 appointments (look out 90 days)     Jul 24, 2017  4:00 PM CDT   [facility] with Britt[facility] MD   [facility]    [address] [place] [id number] [phone]                  Will comply with recommendation: yes   If further questions/concerns or if Sx do not improve, worsen or new Sx develop, call your PCP or [facility] as soon as possible.    NOTES:  Disposition was determined by the first positive assessment question, therefore all previous assessment questions were negative.     Guideline used:  Message handled by Nurse Triage with [facility] - provider name: Brittny Naqvi MD.      Cathy Medel, RN, BSN      "

## 2017-07-25 ENCOUNTER — RADIANT APPOINTMENT (OUTPATIENT)
Dept: ULTRASOUND IMAGING | Facility: OTHER | Age: 32
End: 2017-07-25
Attending: FAMILY MEDICINE
Payer: COMMERCIAL

## 2017-07-25 DIAGNOSIS — E04.1 THYROID NODULE: ICD-10-CM

## 2017-07-25 PROCEDURE — 76536 US EXAM OF HEAD AND NECK: CPT

## 2017-07-26 ENCOUNTER — OFFICE VISIT (OUTPATIENT)
Dept: FAMILY MEDICINE | Facility: OTHER | Age: 32
End: 2017-07-26
Payer: COMMERCIAL

## 2017-07-26 VITALS
WEIGHT: 102 LBS | BODY MASS INDEX: 17.5 KG/M2 | RESPIRATION RATE: 16 BRPM | DIASTOLIC BLOOD PRESSURE: 64 MMHG | HEART RATE: 106 BPM | OXYGEN SATURATION: 96 % | SYSTOLIC BLOOD PRESSURE: 98 MMHG | TEMPERATURE: 97.4 F

## 2017-07-26 DIAGNOSIS — R00.2 PALPITATIONS: Primary | ICD-10-CM

## 2017-07-26 DIAGNOSIS — F41.9 ANXIETY: ICD-10-CM

## 2017-07-26 PROCEDURE — 99214 OFFICE O/P EST MOD 30 MIN: CPT | Performed by: FAMILY MEDICINE

## 2017-07-26 RX ORDER — QUETIAPINE FUMARATE 25 MG/1
25-50 TABLET, FILM COATED ORAL
Qty: 30 TABLET | Refills: 1 | Status: SHIPPED | OUTPATIENT
Start: 2017-07-26 | End: 2017-10-02

## 2017-07-26 ASSESSMENT — PAIN SCALES - GENERAL: PAINLEVEL: NO PAIN (0)

## 2017-07-26 NOTE — MR AVS SNAPSHOT
After Visit Summary   7/26/2017    Erin Ashley    MRN: [id number]           Patient Information     Date Of Birth          1985        Visit Information        Provider Department      7/26/2017 12:00 PM Brittny Naqvi MD [facility]        Today's Diagnoses     Palpitations    -  1    Anxiety          Care Instructions    -Heart monitor.  -Yoga + breathing exercises daily. Practice this daily when not anxious.    -follow up in a month.          Follow-ups after your visit        Your next 10 appointments already scheduled     Jul 28, 2017  9:30 AM CDT   Holter Monitor with PH EVENT/HOLTER MONITOR   [facility] ([facility])    [address] [place] [id number]   [phone]            Aug 16, 2017 11:00 AM CDT   Office Visit with Brittny Naqvi MD   [facility]    [address]  [facility] [id number] [phone]           Bring a current list of meds and any records pertaining to this visit.  For Physicals, please bring immunization records and any forms needing to be filled out.  Please arrive 10 minutes early to complete paperwork.              Future tests that were ordered for you today     Open Future Orders        Priority Expected Expires Ordered    Holter Monitor 48 hour - Adult Routine  9/9/2017 7/26/2017            Who to contact     If you have questions or need follow up information about today's clinic visit or your schedule please contact [facility] directly at [phone].  Normal or non-critical lab and imaging results will be communicated to you by MyChart, letter or phone within 4 business days after the clinic has received the results. If you do not hear from us within 7 days, please contact the clinic through MyChart or phone. If you have a critical or abnormal lab result, we will notify you by phone as soon as  possible.  Submit refill requests through [facility] or call your pharmacy and they will forward the refill request to us. Please allow 3 business days for your refill to be completed.          Additional Information About Your Visit        [employer] Information     [employer] gives you secure access to your electronic health record. If you see a primary care provider, you can also send messages to your care team and make appointments. If you have questions, please call your primary care clinic.  If you do not have a primary care provider, please call [phone] and they will assist you.        Care EveryWhere ID     This is your Care EveryWhere ID. This could be used by other organizations to access your [place] medical records  [id number]        Your Vitals Were     Pulse Temperature Respirations Pulse Oximetry BMI (Body Mass Index)       106 97.4  F (36.3  C) (Oral) 16 96% 17.5 kg/m2        Blood Pressure from Last 3 Encounters:   07/26/17 98/64   07/18/17 96/66   07/17/17 100/58    Weight from Last 3 Encounters:   07/26/17 102 lb (46.3 kg)   07/17/17 101 lb (45.8 kg)   05/05/17 118 lb (53.5 kg)                 Today's Medication Changes          These changes are accurate as of: 7/26/17 12:26 PM.  If you have any questions, ask your nurse or doctor.               Start taking these medicines.        Dose/Directions    QUEtiapine 25 MG tablet   Commonly known as:  SEROQUEL   Used for:  Anxiety   Started by:  Brittny Naqvi MD        Dose:  25-50 mg   Take 1-2 tablets (25-50 mg) by mouth nightly as needed   Quantity:  30 tablet   Refills:  1       sertraline 50 MG tablet   Commonly known as:  ZOLOFT   Used for:  Anxiety   Started by:  Brittny Naqvi MD        Take 1/2 tablet (25 mg) for 1-2 weeks, then increase to 1 tablet orally daily   Quantity:  30 tablet   Refills:  0         Stop taking these medicines if you haven't already. Please contact your care team if you have questions.     traZODone 50 MG tablet    Commonly known as:  DESYREL   Stopped by:  Brittny Naqvi MD                Where to get your medicines      These medications were sent to [facility] [address] [facility] - [place], [place] - [address]  [address], [place] [address]     Phone:  [phone]     QUEtiapine 25 MG tablet    sertraline 50 MG tablet                Primary Care Provider Office Phone #    [facility] [phone]       No address on file        Equal Access to Services     ALICIA WAY : Hadii aad ku hadasho Soomaali, waaxda luqadaha, qaybta kaalmada adeegyada, waxay idiin hayaan adeeg edouardaragiovanni lalauren . So [facility] [phone].    ATENCIÓN: Si habla español, tiene a fischer disposición servicios gratuitos de asistencia lingüística. [facility] [phone].    We comply with applicable federal civil rights laws and [place] laws. We do not discriminate on the basis of race, color, national origin, age, disability sex, sexual orientation or gender identity.            Thank you!     Thank you for choosing [facility]  for your care. Our goal is always to provide you with excellent care. Hearing back from our patients is one way we can continue to improve our services. Please take a few minutes to complete the written survey that you may receive in the mail after your visit with us. Thank you!             Your Updated Medication List - Protect others around you: Learn how to safely use, store and throw away your medicines at www.disposemymeds.org.          This list is accurate as of: 7/26/17 12:26 PM.  Always use your most recent med list.                   Brand Name Dispense Instructions for use Diagnosis    cyclobenzaprine 10 MG tablet    FLEXERIL    30 tablet    Take 0.5-1 tablets (5-10 mg) by mouth 3 times daily as needed for muscle spasms    Upper back strain, initial encounter       PARAGARD INTRAUTERINE COPPER IU      Reported on 4/14/2017        QUEtiapine 25 MG tablet    SEROQUEL    30 tablet    Take 1-2 tablets  (25-50 mg) by mouth nightly as needed    Anxiety       sertraline 50 MG tablet    ZOLOFT    30 tablet    Take 1/2 tablet (25 mg) for 1-2 weeks, then increase to 1 tablet orally daily    Anxiety

## 2017-07-26 NOTE — PROGRESS NOTES
Erin, your results were all normal.    Please let me know if you have any questions.    Brittny Naqvi MD

## 2017-07-26 NOTE — PATIENT INSTRUCTIONS
-Heart monitor.  -Yoga + breathing exercises daily. Practice this daily when not anxious.    -follow up in a month.

## 2017-07-26 NOTE — NURSING NOTE
"Chief Complaint   Patient presents with     Tachycardia     Abdominal Pain     UTD       Initial BP 98/64  Pulse 106  Temp 97.4  F (36.3  C) (Oral)  Resp 16  Wt 102 lb (46.3 kg)  SpO2 96%  BMI 17.5 kg/m2 Estimated body mass index is 17.5 kg/(m^2) as calculated from the following:    Height as of 5/5/17: 5' 4.02\" (1.626 m).    Weight as of this encounter: 102 lb (46.3 kg).  Medication Reconciliation: chanelle Snyder      "

## 2017-07-28 ENCOUNTER — HOSPITAL ENCOUNTER (OUTPATIENT)
Dept: CARDIOLOGY | Facility: CLINIC | Age: 32
Discharge: HOME | End: 2017-07-28
Attending: FAMILY MEDICINE | Admitting: FAMILY MEDICINE
Payer: COMMERCIAL

## 2017-07-28 DIAGNOSIS — F41.9 ANXIETY: ICD-10-CM

## 2017-07-28 PROCEDURE — 93227 XTRNL ECG REC<48 HR R&I: CPT | Performed by: INTERNAL MEDICINE

## 2017-07-28 PROCEDURE — 93226 XTRNL ECG REC<48 HR SCAN A/R: CPT

## 2017-07-28 PROCEDURE — 93226 XTRNL ECG REC<48 HR SCAN A/R: CPT | Performed by: INTERNAL MEDICINE

## 2017-08-03 ENCOUNTER — MYC MEDICAL ADVICE (OUTPATIENT)
Dept: FAMILY MEDICINE | Facility: OTHER | Age: 32
End: 2017-08-03

## 2017-08-04 ENCOUNTER — TELEPHONE (OUTPATIENT)
Dept: FAMILY MEDICINE | Facility: OTHER | Age: 32
End: 2017-08-04

## 2017-08-04 NOTE — TELEPHONE ENCOUNTER
So her labs have been released but she can't see the actual report of her Holter monitor that she is wondering about.  Huddled with co-workers and we are going to have her sign a release of information and give her her Holter monitor report.  Arian Guevara, CMA

## 2017-08-04 NOTE — PROGRESS NOTES
Terence Khan    Your Holter monitor results were mostly normal. Did show your heart beats fast occasionally. Recommend continue with plan as discussed with Dr. Naqvi and recheck to discuss these results with her in 1 month.     The results are attached for your review.       Per Conrad PA-C

## 2017-08-04 NOTE — TELEPHONE ENCOUNTER
Reason for Call:  Other My Chart    Detailed comments: Patient states she received a message that her labs can be viewed in her my chart but she can not see them she called and talked to someone at my chart but they told her that the provider needs to release the labs so she needs to call the clinic and have the provider release her lab work.    Phone Number Patient can be reached at: Home number on file [phone] (home) or Cell number on file:    Telephone Information:   Mobile [phone]       Best Time: any time    Can we leave a detailed message on this number? YES    Call taken on 8/4/2017 at 2:00 PM by Jazmine Kohli

## 2017-08-04 NOTE — TELEPHONE ENCOUNTER
This was sent to Erin's MyChart yesterday by CDL.    AZUL ReyesC    Terence Khan    Your Holter monitor results were mostly normal. Did show your heart beats fast occasionally. Recommend continue with plan as discussed with Dr. Naqvi and recheck to discuss these results with her in 1 month.     The results are attached for your review.     Per Conrad PA-C

## 2017-08-04 NOTE — TELEPHONE ENCOUNTER
Okay to leave detailed message so informed patient of the message below.  Said to call back with any further questions.  Arian Guevara, CMA

## 2017-08-07 ENCOUNTER — MYC MEDICAL ADVICE (OUTPATIENT)
Dept: FAMILY MEDICINE | Facility: OTHER | Age: 32
End: 2017-08-07

## 2017-08-07 DIAGNOSIS — R00.2 PALPITATIONS: Primary | ICD-10-CM

## 2017-08-09 NOTE — TELEPHONE ENCOUNTER
Sounds like her message, she would like a referral to cardiology if you want to put that referral in.

## 2017-08-10 ENCOUNTER — MYC MEDICAL ADVICE (OUTPATIENT)
Dept: FAMILY MEDICINE | Facility: OTHER | Age: 32
End: 2017-08-10

## 2017-08-21 ENCOUNTER — TELEPHONE (OUTPATIENT)
Dept: FAMILY MEDICINE | Facility: OTHER | Age: 32
End: 2017-08-21

## 2017-08-21 ENCOUNTER — APPOINTMENT (OUTPATIENT)
Dept: GENERAL RADIOLOGY | Facility: CLINIC | Age: 32
End: 2017-08-21
Attending: FAMILY MEDICINE
Payer: COMMERCIAL

## 2017-08-21 ENCOUNTER — HOSPITAL ENCOUNTER (EMERGENCY)
Facility: CLINIC | Age: 32
Discharge: HOME | End: 2017-08-21
Attending: FAMILY MEDICINE | Admitting: FAMILY MEDICINE
Payer: COMMERCIAL

## 2017-08-21 VITALS
RESPIRATION RATE: 17 BRPM | HEIGHT: 64 IN | OXYGEN SATURATION: 100 % | TEMPERATURE: 96.7 F | DIASTOLIC BLOOD PRESSURE: 81 MMHG | HEART RATE: 99 BPM | BODY MASS INDEX: 17.93 KG/M2 | WEIGHT: 105 LBS | SYSTOLIC BLOOD PRESSURE: 117 MMHG

## 2017-08-21 DIAGNOSIS — R07.9 CHEST PAIN, UNSPECIFIED: ICD-10-CM

## 2017-08-21 DIAGNOSIS — B02.9 HERPES ZOSTER WITHOUT COMPLICATION: Primary | ICD-10-CM

## 2017-08-21 LAB
ANION GAP SERPL CALCULATED.3IONS-SCNC: 8 MMOL/L (ref 3–14)
BASOPHILS # BLD AUTO: 0 10E9/L (ref 0–0.2)
BASOPHILS NFR BLD AUTO: 0.3 %
BUN SERPL-MCNC: 15 MG/DL (ref 7–30)
CALCIUM SERPL-MCNC: 8.1 MG/DL (ref 8.5–10.1)
CHLORIDE SERPL-SCNC: 107 MMOL/L (ref 94–109)
CO2 SERPL-SCNC: 27 MMOL/L (ref 20–32)
CREAT SERPL-MCNC: 0.6 MG/DL (ref 0.52–1.04)
DIFFERENTIAL METHOD BLD: NORMAL
EOSINOPHIL # BLD AUTO: 0 10E9/L (ref 0–0.7)
EOSINOPHIL NFR BLD AUTO: 0.7 %
ERYTHROCYTE [DISTWIDTH] IN BLOOD BY AUTOMATED COUNT: 13.9 % (ref 10–15)
GFR SERPL CREATININE-BSD FRML MDRD: >90 ML/MIN/1.7M2
GLUCOSE SERPL-MCNC: 99 MG/DL (ref 70–99)
HCT VFR BLD AUTO: 39.3 % (ref 35–47)
HGB BLD-MCNC: 13.2 G/DL (ref 11.7–15.7)
IMM GRANULOCYTES # BLD: 0 10E9/L (ref 0–0.4)
IMM GRANULOCYTES NFR BLD: 0 %
LYMPHOCYTES # BLD AUTO: 2 10E9/L (ref 0.8–5.3)
LYMPHOCYTES NFR BLD AUTO: 33.2 %
MCH RBC QN AUTO: 30.3 PG (ref 26.5–33)
MCHC RBC AUTO-ENTMCNC: 33.6 G/DL (ref 31.5–36.5)
MCV RBC AUTO: 90 FL (ref 78–100)
MONOCYTES # BLD AUTO: 0.4 10E9/L (ref 0–1.3)
MONOCYTES NFR BLD AUTO: 7.4 %
NEUTROPHILS # BLD AUTO: 3.5 10E9/L (ref 1.6–8.3)
NEUTROPHILS NFR BLD AUTO: 58.4 %
PLATELET # BLD AUTO: 240 10E9/L (ref 150–450)
POTASSIUM SERPL-SCNC: 3.8 MMOL/L (ref 3.4–5.3)
RBC # BLD AUTO: 4.35 10E12/L (ref 3.8–5.2)
SODIUM SERPL-SCNC: 142 MMOL/L (ref 133–144)
TROPONIN I SERPL-MCNC: <0.015 UG/L (ref 0–0.04)
WBC # BLD AUTO: 5.9 10E9/L (ref 4–11)

## 2017-08-21 PROCEDURE — 85025 COMPLETE CBC W/AUTO DIFF WBC: CPT | Performed by: FAMILY MEDICINE

## 2017-08-21 PROCEDURE — 84484 ASSAY OF TROPONIN QUANT: CPT | Performed by: FAMILY MEDICINE

## 2017-08-21 PROCEDURE — 99285 EMERGENCY DEPT VISIT HI MDM: CPT | Mod: 25 | Performed by: FAMILY MEDICINE

## 2017-08-21 PROCEDURE — 80048 BASIC METABOLIC PNL TOTAL CA: CPT | Performed by: FAMILY MEDICINE

## 2017-08-21 PROCEDURE — 71010 XR CHEST PORT 1 VW: CPT | Mod: TC

## 2017-08-21 PROCEDURE — 93005 ELECTROCARDIOGRAM TRACING: CPT | Performed by: FAMILY MEDICINE

## 2017-08-21 PROCEDURE — 93010 ELECTROCARDIOGRAM REPORT: CPT | Mod: Z6 | Performed by: FAMILY MEDICINE

## 2017-08-21 RX ORDER — VALACYCLOVIR HYDROCHLORIDE 1 G/1
1000 TABLET, FILM COATED ORAL 3 TIMES DAILY
Qty: 21 TABLET | Refills: 0 | Status: SHIPPED | OUTPATIENT
Start: 2017-08-21 | End: 2017-09-21

## 2017-08-21 ASSESSMENT — ENCOUNTER SYMPTOMS: NUMBNESS: 1

## 2017-08-21 NOTE — ED NOTES
"Pt w/hx of a \"heart thing\" that has been eval'd w/a holter monitor w/o definitive dx. (pt does state that the holiter may have not been very diagnostic as she did not know how to indicate her events.)  Pt states for the last 3 days she has had a sense of heart palpitations and pain that goes up into her left shoulder/back that has not resolved.  Pt states pain is located over her entire left chest/abd.  This is the same pain as she has had.  Denies any n/v/f/c/uri.  Pt is very thin.  States she does not take any medications r/t her heart.   "

## 2017-08-21 NOTE — ED NOTES
"    5/5/2017:  Office visit  /54 (BP Location: Right arm, Patient Position: Chair, Cuff Size: Adult Regular)  Pulse 76  Temp 97.3  F (36.3  C) (Oral)  Resp 16  Ht 5' 4.02\" (1.626 m)  Wt 118 lb (53.5 kg)    July 26, 2017 office Visit     OBJECTIVE:      BP 98/64  Pulse 106  Temp 97.4  F (36.3  C) (Oral)  Resp 16  Wt 46.3 kg (102 lb)           Pt asked about wt loss and shrugs it off.  States there was concern that it could be thyroid, but it was r/o.  She does affirm that she is depressed r/t sx w/o dx.   "

## 2017-08-21 NOTE — ED AVS SNAPSHOT
[facility]    [address] [place] [address]    Phone:  [phone]    Fax:  [phone]                                       Erin Ashley   MRN: [id number]    Department:  [facility]   Date of Visit:  8/21/2017           After Visit Summary Signature Page     I have received my discharge instructions, and my questions have been answered. I have discussed any challenges I see with this plan with the nurse or doctor.    ..........................................................................................................................................  Patient/Patient Representative Signature      ..........................................................................................................................................  Patient Representative Print Name and Relationship to Patient    ..................................................               ................................................  Date                                            Time    ..........................................................................................................................................  Reviewed by Signature/Title    ...................................................              ..............................................  Date                                                            Time

## 2017-08-21 NOTE — LETTER
To Whom it may concern:      Erin Ashley was seen in our Emergency Department today, 08/21/17.  I think she has early shingles, and today does not have a skin rash.  I expect her condition to improve over the next several days.  She has Herpes Zoster (shingles) and is not allowed to return to work until her rash is dried over.       Sincerely,            Cristóbal Mtz MD

## 2017-08-21 NOTE — ED PROVIDER NOTES
History     Chief Complaint   Patient presents with     Chest Pain     The history is provided by the patient.     Erin Ashley is a 32 year old female who presents to the ED with concerns of chest pain. The patient states that she has been experiencing left sided chest and back pain for the past month. She reports that her heart feels like it is racing and skipping beats. She says that on Saturday her chest, back and upper abdomen on the left side became extremely tender to the lightest touch. She endorses that the medial lower part of her chest feels numb and heavy. She denies having right sided tenderness at all and having a rash.     She called the [facility] this afternoon at about 1:00 PM:    Description: Patient states she has a tenderness on the left side of her chest - she is unable to tell me if it is there without pressure as it hurts with her bra on. She is unable to tell me if it is just on the outer skin or if it is a pain within.  Onset/duration: Saturday  Precip. factors: Has been having palpitations x 1 month  Associated symptoms:  None - denies SOB, dizziness, HA, vision changes, nausea    Triage Note:  Has been having intermittent palpitations for about a month on and off.  Was in with primary and then had holter monitor and is set up to see cardiology this Thursday but started having chest pain and tenderness started 3 days ago and has been consistent all day today and radiates up left shoulder and one spot on shoulder blade.     I have reviewed the Medications, Allergies, Past Medical and Surgical History, and Social History in the Epic system.    Patient Active Problem List   Diagnosis     TALIB 3 - cervical intraepithelial neoplasia grade 3     Paragard placed 10/15/13     Acne     Insomnia due to medical condition     Personal history of ovarian cyst     Skin tags, anus or rectum     Elevated LFTs     Pelvic pain in female     Thyroid nodule     Tachycardia     Anxiety      Palpitations     Past Medical History:   Diagnosis Date     Abnormal Pap smear      Appendicitis with perforation 08/04/08    Admit. DIscharged 08/07/08     TALIB II (cervical intraepithelial neoplasia II) 2008 5/29/08 on coloposcopy bx TALIB 2/3.  CKC on 6/13/08 showed TALIB 1/2 *yearly paps are indicated*     Renal calculus or stone      Past Surgical History:   Procedure Laterality Date     APPENDECTOMY  8/4/2008     EXAM UNDER ANESTHESIA RECTUM N/A 2/24/2017    Procedure: EXAM UNDER ANESTHESIA RECTUM;  Surgeon: Britton Palomo MD;  Location: [facility] OR     HC CONIZATION CERVIX,KNIFE/LASER  06/130/8    cervix.  TALIB 1/2     HEMORRHOIDECTOMY EXTERNAL N/A 2/24/2017    Procedure: HEMORRHOIDECTOMY EXTERNAL;  Surgeon: Britton Palomo MD;  Location: [facility] OR     TONSILLECTOMY  7/3/2007     Family History   Problem Relation Age of Onset     Hypertension Maternal Grandmother      borderline     Lipids Maternal Grandmother      Respiratory Maternal Grandmother      COPD     Lung Cancer Maternal Grandmother      Hypertension Maternal Grandfather      borderline     CANCER Maternal Grandfather      lung cancer, smoker     GASTROINTESTINAL DISEASE Mother      cholecystitis     Lipids Mother      Prostate Cancer Paternal Grandfather      Hearing Loss Paternal Grandfather      Other Cancer Paternal Grandmother      pancreatic cancer     Unknown/Adopted No family hx of      Asthma No family hx of      C.A.D. No family hx of      DIABETES No family hx of      CEREBROVASCULAR DISEASE No family hx of      Breast Cancer No family hx of      Cancer - colorectal No family hx of      Alcohol/Drug No family hx of      Allergies No family hx of      Alzheimer Disease No family hx of      Anesthesia Reaction No family hx of      Arthritis No family hx of      Blood Disease No family hx of      Cardiovascular No family hx of      Circulatory No family hx of      Congenital Anomalies No family hx of      Connective Tissue Disorder No family hx of       Depression No family hx of      Genetic Disorder No family hx of      Genitourinary Problems No family hx of      Gynecology No family hx of      HEART DISEASE No family hx of      Musculoskeletal Disorder No family hx of      Neurologic Disorder No family hx of      Obesity No family hx of      OSTEOPOROSIS No family hx of      Psychotic Disorder No family hx of      Social History   Substance Use Topics     Smoking status: Never Smoker     Smokeless tobacco: Never Used      Comment: no smokers in the household     Alcohol use 0.0 oz/week     0 Standard drinks or equivalent per week      Comment: OCC.      Immunization History   Administered Date(s) Administered     HepB-Peds 06/02/2006, 07/07/2006     Influenza (IIV3) 11/07/2012     MMR 05/14/1999     Mantoux 03/14/2006, 08/09/2016, 08/29/2016     TD (ADULT, 7+) 05/14/1999, 11/12/2003     TDAP Vaccine (Adacel) 06/19/2013     Allergies   Allergen Reactions     Hydrocodone Other (See Comments)     Migraines     No Known Drug Allergies      Oxycodone Nausea and Vomiting     Current Outpatient Prescriptions   Medication Sig Dispense Refill     valACYclovir (VALTREX) 1000 mg tablet Take 1 tablet (1,000 mg) by mouth 3 times daily 21 tablet 0     QUEtiapine (SEROQUEL) 25 MG tablet Take 1-2 tablets (25-50 mg) by mouth nightly as needed 30 tablet 1     cyclobenzaprine (FLEXERIL) 10 MG tablet Take 0.5-1 tablets (5-10 mg) by mouth 3 times daily as needed for muscle spasms (Patient not taking: Reported on 7/26/2017) 30 tablet 1     PARAGARD INTRAUTERINE COPPER IU Reported on 4/14/2017       Review of Systems   Skin: Negative for rash.        Positive for skin tenderness on the left sided chest, mid back, and upper abdomen.   Negative for right sided skin tenderness   Neurological: Positive for numbness (medial lower chest).   All other systems reviewed and are negative.    Physical Exam   BP: (!) 140/97  Pulse: 99  Temp: 96.7  F (35.9  C)  Resp: 15  Height: 162.6 cm (5'  "4\")  Weight: 47.6 kg (105 lb)  SpO2: 100 %    Physical Exam   Constitutional: She is oriented to person, place, and time. She appears well-developed and well-nourished.   HENT:   Head: Normocephalic and atraumatic.   Right Ear: External ear normal.   Left Ear: External ear normal.   Eyes: Conjunctivae and EOM are normal.   Neck: Neck supple.   Cardiovascular: Normal rate, regular rhythm and normal heart sounds.    Pulmonary/Chest: Effort normal and breath sounds normal.   Abdominal: Soft. Bowel sounds are normal.   Musculoskeletal: Normal range of motion. She exhibits no edema or tenderness.   Neurological: She is alert and oriented to person, place, and time.   Skin: Skin is warm and dry. No rash noted.   Positive for tenderness in the left T-7 dermatome.    Psychiatric: She has a normal mood and affect. Her behavior is normal.   Nursing note and vitals reviewed.    ED Course     ED Course     Procedures             EKG Interpretation:      Interpreted by Cristóbal Mtz  Time reviewed: 1450  Symptoms at time of EKG: Left-sided chest tenderness   Rhythm: normal sinus   Rate: normal  Axis: normal  Ectopy: none  Conduction: normal  ST Segments/ T Waves: No ST-T wave changes  Q Waves: none  Comparison to prior: No old EKG available    Clinical Impression: normal EKG      Results for orders placed or performed during the hospital encounter of 08/21/17 (from the past 24 hour(s))   CBC with platelets differential   Result Value Ref Range    WBC 5.9 4.0 - 11.0 10e9/L    RBC Count 4.35 3.8 - 5.2 10e12/L    Hemoglobin 13.2 11.7 - 15.7 g/dL    Hematocrit 39.3 35.0 - 47.0 %    MCV 90 78 - 100 fl    MCH 30.3 26.5 - 33.0 pg    MCHC 33.6 31.5 - 36.5 g/dL    RDW 13.9 10.0 - 15.0 %    Platelet Count 240 150 - 450 10e9/L    Diff Method Automated Method     % Neutrophils 58.4 %    % Lymphocytes 33.2 %    % Monocytes 7.4 %    % Eosinophils 0.7 %    % Basophils 0.3 %    % Immature Granulocytes 0.0 %    Absolute Neutrophil 3.5 1.6 " - 8.3 10e9/L    Absolute Lymphocytes 2.0 0.8 - 5.3 10e9/L    Absolute Monocytes 0.4 0.0 - 1.3 10e9/L    Absolute Eosinophils 0.0 0.0 - 0.7 10e9/L    Absolute Basophils 0.0 0.0 - 0.2 10e9/L    Abs Immature Granulocytes 0.0 0 - 0.4 10e9/L   Troponin I   Result Value Ref Range    Troponin I ES <0.015 0.000 - 0.045 ug/L   Basic metabolic panel   Result Value Ref Range    Sodium 142 133 - 144 mmol/L    Potassium 3.8 3.4 - 5.3 mmol/L    Chloride 107 94 - 109 mmol/L    Carbon Dioxide 27 20 - 32 mmol/L    Anion Gap 8 3 - 14 mmol/L    Glucose 99 70 - 99 mg/dL    Urea Nitrogen 15 7 - 30 mg/dL    Creatinine 0.60 0.52 - 1.04 mg/dL    GFR Estimate >90 >60 mL/min/1.7m2    GFR Estimate If Black >90 >60 mL/min/1.7m2    Calcium 8.1 (L) 8.5 - 10.1 mg/dL   XR Chest Port 1 View    Narrative    PORTABLE CHEST ONE VIEW   8/21/2017 3:33 PM     HISTORY: Left-sided chest pain.    COMPARISON: Chest x-rays dated 5/5/2017.    FINDINGS:  The lungs are clear. No pleural effusions or pneumothorax.  Heart size and pulmonary vascularity are within normal limits. No  acute fracture.      Impression    IMPRESSION: No evidence of acute cardiopulmonary disease is seen.       Medications - No data to display    Assessments & Plan (with Medical Decision Making)  Erin is a 31 yo female here with left-sided chest pain and back pain.  She does not have any skin rash.  She has no history of heart disease.  Vital signs on arrival show blood pressure 117/81 with a pulse of 102.  Her vital signs have normalized during her ED stay.  We did do an EKG immediately on arrival and that was normal.  Exam shows tenderness to light touch on the back over the dermatome of T8 and numbness on the flank and anterior chest wall over the dermatome of T8.  There was no rash on exam.  Her labs here included a CBC, basic metabolic panel and troponin.  These were normal as well.  Portable chest x-ray was unremarkable.  I went back and spoke with the patient again about this.  I  think she has early shingles.  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  I think she has left T8 dermatome shingles and recommended we start her on Valtrex.  We did discuss the groups of people to avoid: Pregnant women,  babies and anyone who is immunocompromised from chemotherapy or disease such as HIV.  The patient agreed with this plan.  She does need a note for work.  She was discharged from the ED.       I have reviewed the nursing notes.    I have reviewed the findings, diagnosis, plan and need for follow up with the patient.       New Prescriptions    VALACYCLOVIR (VALTREX) 1000 MG TABLET    Take 1 tablet (1,000 mg) by mouth 3 times daily       Final diagnoses:   Herpes zoster without complication- left T8 dermatome     This document serves as a record of services personally performed by Cristóbal Mtz MD. It was created on their behalf by Cyn Madison, a trained medical scribe. The creation of this record is based on the provider's personal observations and the statements of the patient. This document has been checked and approved by the attending provider.    Note: Chart documentation done in part with Dragon Voice Recognition software. Although reviewed after completion, some word and grammatical errors may remain.    2017   [facility] EMERGENCY DEPARTMENT     Cristóbal Mtz MD  [phone]

## 2017-08-21 NOTE — ED AVS SNAPSHOT
[facility] Emergency Department    [address] [place] [address]    Phone:  [phone]    Fax:  [phone]                                       Erin Ashley   MRN: [id number]    Department:  [facility] Emergency Department   Date of Visit:  8/21/2017           Patient Information     Date Of Birth          1985        Your diagnoses for this visit were:     Herpes zoster without complication- left T8 dermatome        You were seen by Cristóbal Mtz MD.      Follow-up Information     Schedule an appointment as soon as possible for a visit with [facility], [place].    Why:  As needed    Contact information:    [phone]          Discharge Instructions         Shingles  Shingles is a viral infection caused by the same virus as chicken pox. Anyone who has had chicken pox may get shingles later in life. The virus stays in the body, but remains dormant (asleep). Shingles often occurs in older persons or persons with lowered immunity. But it can affect anyone at any age.  Shingles starts as a tingling patch of skin on one side of the body. Small, painful blisters may then appear. The rash does not spread to the rest of the body.  Exposure to shingles cannot cause shingles. However, it can cause chicken pox in anyone who has not had chicken pox or has not been vaccinated. The contagious period ends when all blisters have crusted over (generally about 2 weeks after the illness begins).  After the blisters heal, the affected skin may be sensitive or painful for months (neuralgia). This often gradually goes away.  A shingles vaccine is available. This can help prevent shingles or make it less painful. It is generally recommended for adults over the age of 60 who have had chicken pox in the past, but who have never had shingles. Adults over 60 who have had neither chicken pox nor shingles can prevent both diseases with the chicken pox vaccine. Ask your healthcare  provider about these vaccines.  Home care    Medicines may be prescribed to help relieve pain. Take these medicines as directed. Ask your healthcare provider or pharmacist before using over-the-counter medicines for helping treat pain and itching.    In certain cases, antiviral medicines may be prescribed to reduce pain, shorten the illness, and prevent neuralgia. Take these medicines as directed.    Compresses made from a solution of cool water mixed with cornstarch or baking soda may help relieve pain and itching.     Gently wash skin daily with soap and water to help prevent infection.  Be certain to rinse off all of the soap, which can be irritating.    Trim fingernails and try not to scratch. Scratching the sores may leave scars.    Stay home from work or school until all blisters have formed a crust and you are no longer contagious.  Follow-up care  Follow up with your healthcare provider or as directed by our staff.  When to seek medical advice    Fever of 100.4 F (38 C) or higher, or as directed by your healthcare provider    Affected skin is on the face or neck and any of the following occur:    Headache    Eye pain    Changes in vision    Sores near the eye    Weakness of facial muscles    Pain, redness, or swelling of a joint    Signs of skin infection: colored drainage from the sores, warmth, increasing redness, or increasing pain    Regarding shingles:    Avoid three groups of people as long as you have the rash:  1. Newborns  2. Pregnant ladies  3. People on chemotherapy or with a depressed immune system    Come to clinic as soon as you get the rash so that we can give you medicine that might help with the rash      Thank you for choosing our Emergency Department for your care.     Sincerely,    Dr Oneal Mtz M.D.            Future Appointments        Provider Department Dept Phone Center    8/22/2017 1:40 PM Karen Cramer MD [facility] [phone] [facility]    8/24/2017  11:30 AM Cherie Francois MD [facility] [phone] [facility]      24 Hour Appointment Hotline       To make an appointment at any [facility] [place], call [phone] ([phone]). If you don't have a family doctor or clinic, we will help you find one. [facility] are conveniently located to serve the needs of you and your family.             Review of your medicines      START taking        Dose / Directions Last dose taken    valACYclovir 1000 mg tablet   Commonly known as:  VALTREX   Dose:  1000 mg   Quantity:  21 tablet        Take 1 tablet (1,000 mg) by mouth 3 times daily   Refills:  0          Our records show that you are taking the medicines listed below. If these are incorrect, please call your family doctor or clinic.        Dose / Directions Last dose taken    cyclobenzaprine 10 MG tablet   Commonly known as:  FLEXERIL   Dose:  5-10 mg   Quantity:  30 tablet        Take 0.5-1 tablets (5-10 mg) by mouth 3 times daily as needed for muscle spasms   Refills:  1        PARAGARD INTRAUTERINE COPPER IU        Reported on 4/14/2017   Refills:  0        QUEtiapine 25 MG tablet   Commonly known as:  SEROQUEL   Dose:  25-50 mg   Quantity:  30 tablet        Take 1-2 tablets (25-50 mg) by mouth nightly as needed   Refills:  1                Prescriptions were sent or printed at these locations (1 Prescription)                   [facility] [address] [place], [place] - [address] [address] [place] [address]   [address] [facility] [address]    Telephone:  [phone]   Fax:  [phone]   Hours:                  E-Prescribed (1 of 1)         valACYclovir (VALTREX) 1000 mg tablet                Procedures and tests performed during your visit     Basic metabolic panel    CBC with platelets differential    EKG 12-lead, tracing only    Troponin I    XR Chest Port 1 View      Orders Needing Specimen Collection     None      Pending Results     Date and Time Order Name Status Description    8/21/2017 1507 XR Chest Port 1  View Preliminary             Pending Culture Results     No orders found from 8/19/2017 to 8/22/2017.            Pending Results Instructions     If you had any lab results that were not finalized at the time of your Discharge, you can call the ED Lab Result RN at [phone]. You will be contacted by this team for any positive Lab results or changes in treatment. The nurses are available 7 days a week from 10A to 6:30P.  You can leave a message 24 hours per day and they will return your call.        Thank you for choosing [facility]       Thank you for choosing [facility] for your care. Our goal is always to provide you with excellent care. Hearing back from our patients is one way we can continue to improve our services. Please take a few minutes to complete the written survey that you may receive in the mail after you visit with us. Thank you!        [facility] Information     [employer] gives you secure access to your electronic health record. If you see a primary care provider, you can also send messages to your care team and make appointments. If you have questions, please call your primary care clinic.  If you do not have a primary care provider, please call [phone] and they will assist you.        Care EveryWhere ID     This is your Care EveryWhere ID. This could be used by other organizations to access your [facility] medical records  [id number]        Equal Access to Services     [address] : Benito mcbrideo Sosantosh, waaxda luqadaha, qaybta kaalmada adeegyada, rush kraus. So [facility] [phone].    ATENCIÓN: Si habla español, tiene a fischer disposición servicios gratuitos de asistencia lingüística. Llame al [phone].    We comply with applicable federal civil rights laws and [place] laws. We do not discriminate on the basis of race, color, national origin, age, disability sex, sexual orientation or gender identity.            After Visit Summary       This is your record.  Keep this with you and show to your community pharmacist(s) and doctor(s) at your next visit.

## 2017-08-21 NOTE — DISCHARGE INSTRUCTIONS
Shingles  Shingles is a viral infection caused by the same virus as chicken pox. Anyone who has had chicken pox may get shingles later in life. The virus stays in the body, but remains dormant (asleep). Shingles often occurs in older persons or persons with lowered immunity. But it can affect anyone at any age.  Shingles starts as a tingling patch of skin on one side of the body. Small, painful blisters may then appear. The rash does not spread to the rest of the body.  Exposure to shingles cannot cause shingles. However, it can cause chicken pox in anyone who has not had chicken pox or has not been vaccinated. The contagious period ends when all blisters have crusted over (generally about 2 weeks after the illness begins).  After the blisters heal, the affected skin may be sensitive or painful for months (neuralgia). This often gradually goes away.  A shingles vaccine is available. This can help prevent shingles or make it less painful. It is generally recommended for adults over the age of 60 who have had chicken pox in the past, but who have never had shingles. Adults over 60 who have had neither chicken pox nor shingles can prevent both diseases with the chicken pox vaccine. Ask your healthcare provider about these vaccines.  Home care    Medicines may be prescribed to help relieve pain. Take these medicines as directed. Ask your healthcare provider or pharmacist before using over-the-counter medicines for helping treat pain and itching.    In certain cases, antiviral medicines may be prescribed to reduce pain, shorten the illness, and prevent neuralgia. Take these medicines as directed.    Compresses made from a solution of cool water mixed with cornstarch or baking soda may help relieve pain and itching.     Gently wash skin daily with soap and water to help prevent infection.  Be certain to rinse off all of the soap, which can be irritating.    Trim fingernails and try not to scratch. Scratching the sores  may leave scars.    Stay home from work or school until all blisters have formed a crust and you are no longer contagious.  Follow-up care  Follow up with your healthcare provider or as directed by our staff.  When to seek medical advice    Fever of 100.4 F (38 C) or higher, or as directed by your healthcare provider    Affected skin is on the face or neck and any of the following occur:    Headache    Eye pain    Changes in vision    Sores near the eye    Weakness of facial muscles    Pain, redness, or swelling of a joint    Signs of skin infection: colored drainage from the sores, warmth, increasing redness, or increasing pain    Regarding shingles:    Avoid three groups of people as long as you have the rash:  1. Newborns  2. Pregnant ladies  3. People on chemotherapy or with a depressed immune system    Come to clinic as soon as you get the rash so that we can give you medicine that might help with the rash      Thank you for choosing our Emergency Department for your care.     Sincerely,    Dr Oneal Mtz M.D.

## 2017-08-21 NOTE — ED NOTES
Has been having intermittent palpitations for about a month on and off.  Was in with primary and then had holter monitor and is set up to see cardiology this Thursday but started having chest pain and tenderness started 3 days ago and has been consistent all day today and radiates up left shoulder and one spot on shoulder blade.

## 2017-08-21 NOTE — TELEPHONE ENCOUNTER
Erin Ashley is a 32 year old female who calls with tenderness in her chest.    NURSING ASSESSMENT:  Description: Patient states she has a tenderness on the left side of her chest - she is unable to tell me if it is there without pressure as it hurts with her bra on. She is unable to tell me if it is just on the outer skin or if it is a pain within.  Onset/duration: Saturday  Precip. factors: Has been having palpitations x 1 month  Associated symptoms:  None - denies SOB, dizziness, HA, vision changes, nausea  Allergies:   Allergies   Allergen Reactions     Hydrocodone Other (See Comments)     Migraines     No Known Drug Allergies      Oxycodone Nausea and Vomiting     RECOMMENDED DISPOSITION:  To ED/UC for evaluation - patient will go to UC  Will comply with recommendation: yes   If further questions/concerns or if Sx do not improve, worsen or new Sx develop, call your PCP or [facility] as soon as possible.    NOTES:  Disposition was determined by the first positive assessment question, therefore all previous assessment questions were negative.     Guideline used:  Telephone Triage Protocols for Nurses, Fifth Edition, Bita Medel, RN, BSN

## 2017-08-24 ENCOUNTER — HOSPITAL ENCOUNTER (EMERGENCY)
Facility: CLINIC | Age: 32
Discharge: HOME | End: 2017-08-24
Attending: PHYSICIAN ASSISTANT | Admitting: PHYSICIAN ASSISTANT
Payer: COMMERCIAL

## 2017-08-24 ENCOUNTER — APPOINTMENT (OUTPATIENT)
Dept: ULTRASOUND IMAGING | Facility: CLINIC | Age: 32
End: 2017-08-24
Attending: PHYSICIAN ASSISTANT
Payer: COMMERCIAL

## 2017-08-24 ENCOUNTER — OFFICE VISIT (OUTPATIENT)
Dept: CARDIOLOGY | Facility: CLINIC | Age: 32
End: 2017-08-24
Attending: FAMILY MEDICINE
Payer: COMMERCIAL

## 2017-08-24 VITALS
SYSTOLIC BLOOD PRESSURE: 108 MMHG | OXYGEN SATURATION: 99 % | HEIGHT: 64 IN | BODY MASS INDEX: 17.4 KG/M2 | HEART RATE: 110 BPM | WEIGHT: 101.9 LBS | DIASTOLIC BLOOD PRESSURE: 68 MMHG

## 2017-08-24 VITALS
BODY MASS INDEX: 18.02 KG/M2 | DIASTOLIC BLOOD PRESSURE: 64 MMHG | TEMPERATURE: 97.4 F | SYSTOLIC BLOOD PRESSURE: 128 MMHG | HEART RATE: 78 BPM | OXYGEN SATURATION: 98 % | WEIGHT: 105 LBS | RESPIRATION RATE: 16 BRPM

## 2017-08-24 DIAGNOSIS — R00.0 SINUS TACHYCARDIA: Primary | ICD-10-CM

## 2017-08-24 DIAGNOSIS — M62.838 SPASM OF ABDOMINAL MUSCLES OF LEFT SIDE: ICD-10-CM

## 2017-08-24 DIAGNOSIS — R10.12 ABDOMINAL PAIN, LEFT UPPER QUADRANT: ICD-10-CM

## 2017-08-24 LAB
ALBUMIN SERPL-MCNC: 4 G/DL (ref 3.4–5)
ALP SERPL-CCNC: 58 U/L (ref 40–150)
ALT SERPL W P-5'-P-CCNC: 46 U/L (ref 0–50)
ANION GAP SERPL CALCULATED.3IONS-SCNC: 10 MMOL/L (ref 3–14)
AST SERPL W P-5'-P-CCNC: 20 U/L (ref 0–45)
BASOPHILS # BLD AUTO: 0 10E9/L (ref 0–0.2)
BASOPHILS NFR BLD AUTO: 0.4 %
BILIRUB SERPL-MCNC: 0.3 MG/DL (ref 0.2–1.3)
BUN SERPL-MCNC: 17 MG/DL (ref 7–30)
CALCIUM SERPL-MCNC: 8.9 MG/DL (ref 8.5–10.1)
CHLORIDE SERPL-SCNC: 108 MMOL/L (ref 94–109)
CO2 SERPL-SCNC: 26 MMOL/L (ref 20–32)
CREAT SERPL-MCNC: 0.68 MG/DL (ref 0.52–1.04)
CRP SERPL-MCNC: <2.9 MG/L (ref 0–8)
DIFFERENTIAL METHOD BLD: NORMAL
EOSINOPHIL # BLD AUTO: 0 10E9/L (ref 0–0.7)
EOSINOPHIL NFR BLD AUTO: 0.3 %
ERYTHROCYTE [DISTWIDTH] IN BLOOD BY AUTOMATED COUNT: 13.8 % (ref 10–15)
ERYTHROCYTE [SEDIMENTATION RATE] IN BLOOD BY WESTERGREN METHOD: 7 MM/H (ref 0–20)
GFR SERPL CREATININE-BSD FRML MDRD: >90 ML/MIN/1.7M2
GLUCOSE SERPL-MCNC: 90 MG/DL (ref 70–99)
HCT VFR BLD AUTO: 42.2 % (ref 35–47)
HGB BLD-MCNC: 14 G/DL (ref 11.7–15.7)
IMM GRANULOCYTES # BLD: 0 10E9/L (ref 0–0.4)
IMM GRANULOCYTES NFR BLD: 0.1 %
LACTATE BLD-SCNC: 0.6 MMOL/L (ref 0.7–2)
LIPASE SERPL-CCNC: 125 U/L (ref 73–393)
LYMPHOCYTES # BLD AUTO: 2.3 10E9/L (ref 0.8–5.3)
LYMPHOCYTES NFR BLD AUTO: 33.2 %
MCH RBC QN AUTO: 30 PG (ref 26.5–33)
MCHC RBC AUTO-ENTMCNC: 33.2 G/DL (ref 31.5–36.5)
MCV RBC AUTO: 91 FL (ref 78–100)
MONOCYTES # BLD AUTO: 0.4 10E9/L (ref 0–1.3)
MONOCYTES NFR BLD AUTO: 6.3 %
NEUTROPHILS # BLD AUTO: 4.1 10E9/L (ref 1.6–8.3)
NEUTROPHILS NFR BLD AUTO: 59.7 %
PLATELET # BLD AUTO: 225 10E9/L (ref 150–450)
POTASSIUM SERPL-SCNC: 3.6 MMOL/L (ref 3.4–5.3)
PROT SERPL-MCNC: 7.2 G/DL (ref 6.8–8.8)
RBC # BLD AUTO: 4.66 10E12/L (ref 3.8–5.2)
SODIUM SERPL-SCNC: 144 MMOL/L (ref 133–144)
WBC # BLD AUTO: 6.8 10E9/L (ref 4–11)

## 2017-08-24 PROCEDURE — 76700 US EXAM ABDOM COMPLETE: CPT

## 2017-08-24 PROCEDURE — 99284 EMERGENCY DEPT VISIT MOD MDM: CPT | Mod: Z6 | Performed by: PHYSICIAN ASSISTANT

## 2017-08-24 PROCEDURE — 80053 COMPREHEN METABOLIC PANEL: CPT | Performed by: PHYSICIAN ASSISTANT

## 2017-08-24 PROCEDURE — 86140 C-REACTIVE PROTEIN: CPT | Performed by: PHYSICIAN ASSISTANT

## 2017-08-24 PROCEDURE — 85652 RBC SED RATE AUTOMATED: CPT | Performed by: PHYSICIAN ASSISTANT

## 2017-08-24 PROCEDURE — 25000132 ZZH RX MED GY IP 250 OP 250 PS 637: Performed by: PHYSICIAN ASSISTANT

## 2017-08-24 PROCEDURE — 99204 OFFICE O/P NEW MOD 45 MIN: CPT | Performed by: INTERNAL MEDICINE

## 2017-08-24 PROCEDURE — 83605 ASSAY OF LACTIC ACID: CPT | Performed by: PHYSICIAN ASSISTANT

## 2017-08-24 PROCEDURE — 99284 EMERGENCY DEPT VISIT MOD MDM: CPT | Mod: 25 | Performed by: PHYSICIAN ASSISTANT

## 2017-08-24 PROCEDURE — 85025 COMPLETE CBC W/AUTO DIFF WBC: CPT | Performed by: PHYSICIAN ASSISTANT

## 2017-08-24 PROCEDURE — 25000128 H RX IP 250 OP 636: Performed by: PHYSICIAN ASSISTANT

## 2017-08-24 PROCEDURE — 83690 ASSAY OF LIPASE: CPT | Performed by: PHYSICIAN ASSISTANT

## 2017-08-24 PROCEDURE — 96374 THER/PROPH/DIAG INJ IV PUSH: CPT | Performed by: PHYSICIAN ASSISTANT

## 2017-08-24 RX ORDER — KETOROLAC TROMETHAMINE 15 MG/ML
15 INJECTION, SOLUTION INTRAMUSCULAR; INTRAVENOUS ONCE
Status: COMPLETED | OUTPATIENT
Start: 2017-08-24 | End: 2017-08-24

## 2017-08-24 RX ORDER — LIDOCAINE 50 MG/G
1-2 PATCH TOPICAL ONCE
Status: COMPLETED | OUTPATIENT
Start: 2017-08-24 | End: 2017-08-24

## 2017-08-24 RX ORDER — LIDOCAINE 40 MG/G
CREAM TOPICAL
Status: DISCONTINUED | OUTPATIENT
Start: 2017-08-24 | End: 2017-08-25 | Stop reason: HOSPADM

## 2017-08-24 RX ADMIN — KETOROLAC TROMETHAMINE 15 MG: 15 INJECTION, SOLUTION INTRAMUSCULAR; INTRAVENOUS at 22:01

## 2017-08-24 RX ADMIN — LIDOCAINE 1 PATCH: 50 PATCH TOPICAL at 22:32

## 2017-08-24 ASSESSMENT — ENCOUNTER SYMPTOMS
HEMATURIA: 0
LIGHT-HEADEDNESS: 1
NAUSEA: 0
DYSURIA: 0
BACK PAIN: 1
VOMITING: 0
FEVER: 0

## 2017-08-24 NOTE — ED AVS SNAPSHOT
[facility] Emergency Department    [address] [place] [address]    Phone:  [phone]    Fax:  [phone]                                       Erin Ashley   MRN: [id number]    Department:  [facility] Emergency Department   Date of Visit:  8/24/2017           Patient Information     Date Of Birth          1985        Your diagnoses for this visit were:     Spasm of abdominal muscles of left side        You were seen by Kristen Mtz PA-C.      Follow-up Information     Follow up with Clinic, [facility] In 5 days.    Why:  As needed for persistent symptoms    Contact information:    [phone]          Follow up with [facility] Emergency Department.    Specialty:  EMERGENCY MEDICINE    Why:  If symptoms worsen    Contact information:    [address]   [place] [address] [phone]    Additional information:    From [address]: Exit at [address] on [place] side of [place]. Turn right on [address]. Turn left at stoplight on [address] [facility] will be in view two blocks ahead        Discharge Instructions       I think your abdominal pain is due to a muscle spasm in your left rectus abdominis muscle.  This can cause it to feel swollen and tender.  If the Lidoderm applied tonight helps, consider purchasing these over-the-counter at your local pharmacy.  Start taking ibuprofen 600 mg every 6 hours.  Apply heat to your abdominal wall and practice light stretching.  Try to massage the area as well.  Be sure to be drinking plenty of water.  Follow-up with your primary care provider as needed next week if he seen no improvement.  For worsening symptoms please return to the emergency department.    Thank you for choosing [facility]'s Emergency Department. It was a pleasure taking care of you today. If you have any questions, please call [phone].    Kristen Mtz PA-C        Discharge  References/Attachments     MUSCLE SPASM (ENGLISH)      Future Appointments        Provider Department Dept Phone Center    8/25/2017 9:00 AM [facility] ECHO ROOM 1 [facility] [phone] [facility]    9/21/2017 9:00 AM Cherie Francois MD [facility] [phone] [facility]      24 Hour Appointment Hotline       To make an appointment at any [facility], call [phone] ([phone]). If you don't have a family doctor or clinic, we will help you find one. [facility] are conveniently located to serve the needs of you and your family.             Review of your medicines      Our records show that you are taking the medicines listed below. If these are incorrect, please call your family doctor or clinic.        Dose / Directions Last dose taken    cyclobenzaprine 10 MG tablet   Commonly known as:  FLEXERIL   Dose:  5-10 mg   Quantity:  30 tablet        Take 0.5-1 tablets (5-10 mg) by mouth 3 times daily as needed for muscle spasms   Refills:  1        PARAGARD INTRAUTERINE COPPER IU        Reported on 4/14/2017   Refills:  0        QUEtiapine 25 MG tablet   Commonly known as:  SEROQUEL   Dose:  25-50 mg   Quantity:  30 tablet        Take 1-2 tablets (25-50 mg) by mouth nightly as needed   Refills:  1        valACYclovir 1000 mg tablet   Commonly known as:  VALTREX   Dose:  1000 mg   Quantity:  21 tablet        Take 1 tablet (1,000 mg) by mouth 3 times daily   Refills:  0                Procedures and tests performed during your visit     CBC with platelets differential    CRP inflammation    Comprehensive metabolic panel    Erythrocyte sedimentation rate auto    Lactic acid whole blood    Lipase    Peripheral IV catheter    US Abdomen Complete      Orders Needing Specimen Collection     Ordered          08/24/17 2025  UA with Microscopic reflex to Culture - STAT, Prio: STAT, Needs to be Collected     Scheduled Task Status   08/24/17 2026 Collect UA with  Microscopic reflex to Culture Open   Order Class:  PCU Collect                  Pending Results     Date and Time Order Name Status Description    8/24/2017 2035 US Abdomen Complete Preliminary             Pending Culture Results     No orders found from 8/22/2017 to 8/25/2017.            Pending Results Instructions     If you had any lab results that were not finalized at the time of your Discharge, you can call the ED Lab Result RN at [phone]. You will be contacted by this team for any positive Lab results or changes in treatment. The nurses are available 7 days a week from 10A to 6:30P.  You can leave a message 24 hours per day and they will return your call.        Thank you for choosing [facility]       Thank you for choosing [facility] for your care. Our goal is always to provide you with excellent care. Hearing back from our patients is one way we can continue to improve our services. Please take a few minutes to complete the written survey that you may receive in the mail after you visit with us. Thank you!        [facility] Information     [facility] gives you secure access to your electronic health record. If you see a primary care provider, you can also send messages to your care team and make appointments. If you have questions, please call your primary care clinic.  If you do not have a primary care provider, please call [phone] and they will assist you.        Care EveryWhere ID     This is your Care EveryWhere ID. This could be used by other organizations to access your [facility] medical records  [id number]        Equal Access to Services     ALICIA WAY : Hadii eran Gomes, monica huitron, qaybta rush kilgore. So [facility] [phone].    ATENCIÓN: Si habla español, tiene a fischer disposición servicios gratuitos de asistencia lingüística. Llame al [phone].    We comply with applicable federal civil rights laws and [place] laws. We do not  discriminate on the basis of race, color, national origin, age, disability sex, sexual orientation or gender identity.            After Visit Summary       This is your record. Keep this with you and show to your community pharmacist(s) and doctor(s) at your next visit.

## 2017-08-24 NOTE — MR AVS SNAPSHOT
After Visit Summary   8/24/2017    Erin Ashley    MRN: [id number]           Patient Information     Date Of Birth          1985        Visit Information        Provider Department      8/24/2017 11:30 AM Cherie Francois MD [facility]        Today's Diagnoses     Sinus tachycardia    -  1       Follow-ups after your visit        Additional Services     Follow-Up with Electrophysiologist                 Future tests that were ordered for you today     Open Future Orders        Priority Expected Expires Ordered    Follow-Up with Electrophysiologist Routine 9/20/2017 8/24/2018 8/24/2017    Echocardiogram Routine 8/31/2017 8/24/2018 8/24/2017            Who to contact     If you have questions or need follow up information about today's clinic visit or your schedule please contact [facility] directly at [phone].  Normal or non-critical lab and imaging results will be communicated to you by MyChart, letter or phone within 4 business days after the clinic has received the results. If you do not hear from us within 7 days, please contact the clinic through [facility] or phone. If you have a critical or abnormal lab result, we will notify you by phone as soon as possible.  Submit refill requests through [employer] or call your pharmacy and they will forward the refill request to us. Please allow 3 business days for your refill to be completed.          Additional Information About Your Visit        MyChart Information     [facility] gives you secure access to your electronic health record. If you see a primary care provider, you can also send messages to your care team and make appointments. If you have questions, please call your primary care clinic.  If you do not have a primary care provider, please call [phone] and they will assist you.        Care EveryWhere ID     This is your Care EveryWhere ID. This could be used by other organizations to access your [facility]  "medical records  [id number]        Your Vitals Were     Pulse Height Pulse Oximetry BMI (Body Mass Index)          110 1.626 m (5' 4\") 99% 17.49 kg/m2         Blood Pressure from Last 3 Encounters:   08/24/17 108/68   08/21/17 117/81   07/26/17 98/64    Weight from Last 3 Encounters:   08/24/17 46.2 kg (101 lb 14.4 oz)   08/21/17 47.6 kg (105 lb)   07/26/17 46.3 kg (102 lb)               Primary Care Provider Office Phone #    [facility] [phone]       No address on file        Equal Access to Services     ALICIA WAY : Benito Gomes, wanavid huitron, shahana kaalmada joseph, rush kraus. So [facility] [phone].    ATENCIÓN: Si habla español, tiene a fischer disposición servicios gratuitos de asistencia lingüística. Llame al [phone].    We comply with applicable federal civil rights laws and [place] laws. We do not discriminate on the basis of race, color, national origin, age, disability sex, sexual orientation or gender identity.            Thank you!     Thank you for choosing [facility]  for your care. Our goal is always to provide you with excellent care. Hearing back from our patients is one way we can continue to improve our services. Please take a few minutes to complete the written survey that you may receive in the mail after your visit with us. Thank you!             Your Updated Medication List - Protect others around you: Learn how to safely use, store and throw away your medicines at www.disposemymeds.org.          This list is accurate as of: 8/24/17 11:50 AM.  Always use your most recent med list.                   Brand Name Dispense Instructions for use Diagnosis    cyclobenzaprine 10 MG tablet    FLEXERIL    30 tablet    Take 0.5-1 tablets (5-10 mg) by mouth 3 times daily as needed for muscle spasms    Upper back strain, initial encounter       PARAGARD INTRAUTERINE COPPER IU      Reported on 4/14/2017        " QUEtiapine 25 MG tablet    SEROQUEL    30 tablet    Take 1-2 tablets (25-50 mg) by mouth nightly as needed    Anxiety       valACYclovir 1000 mg tablet    VALTREX    21 tablet    Take 1 tablet (1,000 mg) by mouth 3 times daily    Herpes zoster without complication

## 2017-08-24 NOTE — LETTER
8/24/2017      RE: Erin Ashley  [address] [facility] [address]       Dear Colleague,    Thank you for the opportunity to participate in the care of your patient, Erin Ashley, at the [facility]. Please see a copy of my visit note below.    HPI and Plan:   See dictation    Orders Placed This Encounter   Procedures     Follow-Up with Electrophysiologist     Echocardiogram       No orders of the defined types were placed in this encounter.      There are no discontinued medications.      Encounter Diagnosis   Name Primary?     Sinus tachycardia Yes       CURRENT MEDICATIONS:  Current Outpatient Prescriptions   Medication Sig Dispense Refill     valACYclovir (VALTREX) 1000 mg tablet Take 1 tablet (1,000 mg) by mouth 3 times daily 21 tablet 0     QUEtiapine (SEROQUEL) 25 MG tablet Take 1-2 tablets (25-50 mg) by mouth nightly as needed 30 tablet 1     cyclobenzaprine (FLEXERIL) 10 MG tablet Take 0.5-1 tablets (5-10 mg) by mouth 3 times daily as needed for muscle spasms 30 tablet 1     PARAGARD INTRAUTERINE COPPER IU Reported on 4/14/2017         ALLERGIES     Allergies   Allergen Reactions     Hydrocodone Other (See Comments)     Migraines     Oxycodone Nausea and Vomiting       PAST MEDICAL HISTORY:  Past Medical History:   Diagnosis Date     Abnormal Pap smear      Appendicitis with perforation 08/04/08    Admit. DIscharged 08/07/08     Back pain      TALIB II (cervical intraepithelial neoplasia II) 2008 5/29/08 on coloposcopy bx TALIB 2/3.  CKC on 6/13/08 showed TALIB 1/2 *yearly paps are indicated*     Insomnia      Renal calculus or stone        PAST SURGICAL HISTORY:  Past Surgical History:   Procedure Laterality Date     APPENDECTOMY  8/4/2008     EXAM UNDER ANESTHESIA RECTUM N/A 2/24/2017    Procedure: EXAM UNDER ANESTHESIA RECTUM;  Surgeon: Britton Palomo MD;  Location: [facility] OR     HC CONIZATION CERVIX,KNIFE/LASER  06/130/8     cervix.  TALIB 1/2     HEMORRHOIDECTOMY EXTERNAL N/A 2/24/2017    Procedure: HEMORRHOIDECTOMY EXTERNAL;  Surgeon: Britton Palomo MD;  Location: [facility] OR     TONSILLECTOMY  7/3/2007       FAMILY HISTORY:  Family History   Problem Relation Age of Onset     Hypertension Maternal Grandmother      borderline     Lipids Maternal Grandmother      Respiratory Maternal Grandmother      COPD     Lung Cancer Maternal Grandmother      Hypertension Maternal Grandfather      borderline     CANCER Maternal Grandfather      lung cancer, smoker     GASTROINTESTINAL DISEASE Mother      cholecystitis     Lipids Mother      Prostate Cancer Paternal Grandfather      Hearing Loss Paternal Grandfather      Other Cancer Paternal Grandmother      pancreatic cancer     Unknown/Adopted No family hx of      Asthma No family hx of      C.A.D. No family hx of      DIABETES No family hx of      CEREBROVASCULAR DISEASE No family hx of      Breast Cancer No family hx of      Cancer - colorectal No family hx of      Alcohol/Drug No family hx of      Allergies No family hx of      Alzheimer Disease No family hx of      Anesthesia Reaction No family hx of      Arthritis No family hx of      Blood Disease No family hx of      Cardiovascular No family hx of      Circulatory No family hx of      Congenital Anomalies No family hx of      Connective Tissue Disorder No family hx of      Depression No family hx of      Genetic Disorder No family hx of      Genitourinary Problems No family hx of      Gynecology No family hx of      HEART DISEASE No family hx of      Musculoskeletal Disorder No family hx of      Neurologic Disorder No family hx of      Obesity No family hx of      OSTEOPOROSIS No family hx of      Psychotic Disorder No family hx of        SOCIAL HISTORY:  Social History     Social History     Marital status:      Spouse name: N/A     Number of children: 0     Years of education: 16     Occupational History     RN Other     Guardian  "[employer]      [employer]     Social History Main Topics     Smoking status: Never Smoker     Smokeless tobacco: Never Used      Comment: no smokers in the household     Alcohol use 0.0 oz/week     0 Standard drinks or equivalent per week      Comment: OCC.     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     Hobby Hazards No     Sleep Concern No     Stress Concern No     Weight Concern No     Special Diet No     Back Care No     Exercise No     Bike Helmet No     Seat Belt Yes     Self-Exams Yes     know BSE     Parent/Sibling W/ Cabg, Mi Or Angioplasty Before 65f 55m? No     Social History Narrative     to [place] and lives in [place] with their daughter.  No indoor cats/kittens.  No concerns about domestic violence.  No smokers in the home.       Review of Systems:  Skin:  Negative       Eyes:  Negative      ENT:  Negative      Respiratory:  Negative       Cardiovascular:    Positive for;chest pain;heaviness;fatigue;lightheadedness;dizziness pressure, bending down is a stabbing pain  Gastroenterology: Negative      Genitourinary:  Negative      Musculoskeletal:  Positive for back pain numbness in one spot of back  Neurologic:  Positive for headaches off and on  Psychiatric:  Positive for sleep disturbances works night  Heme/Lymph/Imm:  Positive for allergies    Endocrine:  Negative        Physical Exam:  Vitals: /68 (BP Location: Right arm, Patient Position: Chair, Cuff Size: Adult Regular)  Pulse 110  Ht 1.626 m (5' 4\")  Wt 46.2 kg (101 lb 14.4 oz)  SpO2 99%  BMI 17.49 kg/m2    Constitutional:  cooperative, alert and oriented, well developed, well nourished, in no acute distress        Skin:  warm and dry to the touch, no apparent skin lesions or masses noted        Head:  " normocephalic, no masses or lesions        Eyes:  pupils equal and round, conjunctivae and lids unremarkable, sclera white, no xanthalasma, EOMS intact, no nystagmus        ENT:  no pallor or cyanosis, dentition good        Neck:  carotid pulses are full and equal bilaterally, JVP normal, no carotid bruit, no thyromegaly        Chest:  normal breath sounds, clear to auscultation, normal A-P diameter, normal symmetry, normal respiratory excursion, no use of accessory muscles          Cardiac: regular rhythm, normal S1/S2, no S3 or S4, apical impulse not displaced, no murmurs, gallops or rubs                  Abdomen:  abdomen soft, non-tender, BS normoactive, no mass, no HSM, no bruits        Vascular: pulses full and equal, no bruits auscultated                                        Extremities and Back:  no deformities, clubbing, cyanosis, erythema observed              Neurological:  affect appropriate, oriented to time, person and place          HISTORY OF PRESENT ILLNESS:  I saw Ms. Erin Ashley for evaluation of palpitations.  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  It is not associated with shortness of breath, but is associated with occasional chest discomfort.  She has not had any syncope or near-syncope.  The symptoms feel like a rapid heart beating that is always associated with exertion.  It does not happen at rest and in fact her symptoms subside when she rests.  She did not check her pulse rate or pulse regularity during the symptoms.  She has had a Holter recently that detected no significant cardiac arrhythmia except for occasional sinus tachycardia.  The heart rate slows down to 52 beats per minute, but the average heart rate was relatively high at 103 beats.      The patient used to exercise 5 days a week and she currently does not exercise because of heart racing problem.  She has no dizziness or near-syncope in association with heart racing.       She takes night shift and has difficulty sleeping.  Therefore, she uses had Seroquel to help her sleep during the day.  She does not think she gets enough sleep.  She is also taking Flexeril for chronic back pain.      The patient has had a thyroid function test that is normal.  She has no fever or chills.      PHYSICAL EXAMINATION:   VITAL SIGNS:  Blood pressure was 108/60, heart rate 110 beats per minute, body weight 101 pounds.   HEENT:  Eyes and ENT were unremarkable.   LUNGS:  Clear.   CARDIAC:  Rhythm was regular and heart sounds were normal without murmur.   ABDOMEN:  Examination showed no hepatomegaly.   EXTREMITIES:  There was no pedal edema.      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  She has heart racing problems and the objective finding has been sinus tachycardia.  The etiology of sinus tachycardia is unknown at the present time and hyperthyroidism has been excluded.  I have ordered an echocardiography to rule out any structural heart disease.  She has lost 5 pounds without explanation over the last few weeks.  I have asked her to ensure sufficient sleep.  I will visit with her in September for further followup evaluation.     Cherie Francois MD     CC:      Brittny Naqvi MD   [address] [address] [place] [address]

## 2017-08-24 NOTE — LETTER
8/24/2017    Brittny Naqvi MD  [address] [place] [address]    RE: Erin Ashley       Dear Colleague,    I had the pleasure of seeing Erin Ashley in the [facility].    I saw Ms. Erin Ashley for evaluation of palpitations.  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  It is not associated with shortness of breath, but is associated with occasional chest discomfort.  She has not had any syncope or near-syncope.  The symptoms feel like a rapid heart beating that is always associated with exertion.  It does not happen at rest and in fact her symptoms subside when she rests.  She did not check her pulse rate or pulse regularity during the symptoms.  She has had a Holter recently that detected no significant cardiac arrhythmia except for occasional sinus tachycardia.  The heart rate slows down to 52 beats per minute, but the average heart rate was relatively high at 103 beats.      The patient used to exercise 5 days a week and she currently does not exercise because of heart racing problem.  She has no dizziness or near-syncope in association with heart racing.      She takes night shift and has difficulty sleeping.  Therefore, she uses had Seroquel to help her sleep during the day.  She does not think she gets enough sleep.  She is also taking Flexeril for chronic back pain.      The patient has had a thyroid function test that is normal.  She has no fever or chills.      PHYSICAL EXAMINATION:   VITAL SIGNS:  Blood pressure was 108/60, heart rate 110 beats per minute, body weight 101 pounds.   HEENT:  Eyes and ENT were unremarkable.   LUNGS:  Clear.   CARDIAC:  Rhythm was regular and heart sounds were normal without murmur.   ABDOMEN:  Examination showed no hepatomegaly.   EXTREMITIES:  There was no pedal edema.     Outpatient Encounter Prescriptions as of 8/24/2017   Medication Sig Dispense Refill     valACYclovir (VALTREX)  1000 mg tablet Take 1 tablet (1,000 mg) by mouth 3 times daily 21 tablet 0     QUEtiapine (SEROQUEL) 25 MG tablet Take 1-2 tablets (25-50 mg) by mouth nightly as needed 30 tablet 1     cyclobenzaprine (FLEXERIL) 10 MG tablet Take 0.5-1 tablets (5-10 mg) by mouth 3 times daily as needed for muscle spasms 30 tablet 1     PARAGARD INTRAUTERINE COPPER IU Reported on 4/14/2017       No facility-administered encounter medications on file as of 8/24/2017.       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  She has heart racing problems and the objective finding has been sinus tachycardia.  The etiology of sinus tachycardia is unknown at the present time and hyperthyroidism has been excluded.  I have ordered an echocardiography to rule out any structural heart disease.  She has lost 5 pounds without explanation over the last few weeks.  I have asked her to ensure sufficient sleep.  I will visit with her in September for further followup evaluation.      Again, thank you for allowing me to participate in the care of your patient.      Sincerely,    Cherie Francois MD     [facility]

## 2017-08-24 NOTE — PROGRESS NOTES
HPI and Plan:   See dictation    Orders Placed This Encounter   Procedures     Follow-Up with Electrophysiologist     Echocardiogram       No orders of the defined types were placed in this encounter.      There are no discontinued medications.      Encounter Diagnosis   Name Primary?     Sinus tachycardia Yes       CURRENT MEDICATIONS:  Current Outpatient Prescriptions   Medication Sig Dispense Refill     valACYclovir (VALTREX) 1000 mg tablet Take 1 tablet (1,000 mg) by mouth 3 times daily 21 tablet 0     QUEtiapine (SEROQUEL) 25 MG tablet Take 1-2 tablets (25-50 mg) by mouth nightly as needed 30 tablet 1     cyclobenzaprine (FLEXERIL) 10 MG tablet Take 0.5-1 tablets (5-10 mg) by mouth 3 times daily as needed for muscle spasms 30 tablet 1     PARAGARD INTRAUTERINE COPPER IU Reported on 4/14/2017         ALLERGIES     Allergies   Allergen Reactions     Hydrocodone Other (See Comments)     Migraines     Oxycodone Nausea and Vomiting       PAST MEDICAL HISTORY:  Past Medical History:   Diagnosis Date     Abnormal Pap smear      Appendicitis with perforation 08/04/08    Admit. DIscharged 08/07/08     Back pain      TALIB II (cervical intraepithelial neoplasia II) 2008 5/29/08 on coloposcopy bx TALIB 2/3.  CKC on 6/13/08 showed TALIB 1/2 *yearly paps are indicated*     Insomnia      Renal calculus or stone        PAST SURGICAL HISTORY:  Past Surgical History:   Procedure Laterality Date     APPENDECTOMY  8/4/2008     EXAM UNDER ANESTHESIA RECTUM N/A 2/24/2017    Procedure: EXAM UNDER ANESTHESIA RECTUM;  Surgeon: Britton Palomo MD;  Location: [facility]     HC CONIZATION CERVIX,KNIFE/LASER  06/130/8    cervix.  TALIB 1/2     HEMORRHOIDECTOMY EXTERNAL N/A 2/24/2017    Procedure: HEMORRHOIDECTOMY EXTERNAL;  Surgeon: Britton Palomo MD;  Location: [facility]     TONSILLECTOMY  7/3/2007       FAMILY HISTORY:  Family History   Problem Relation Age of Onset     Hypertension Maternal Grandmother      borderline     Lipids Maternal  Grandmother      Respiratory Maternal Grandmother      COPD     Lung Cancer Maternal Grandmother      Hypertension Maternal Grandfather      borderline     CANCER Maternal Grandfather      lung cancer, smoker     GASTROINTESTINAL DISEASE Mother      cholecystitis     Lipids Mother      Prostate Cancer Paternal Grandfather      Hearing Loss Paternal Grandfather      Other Cancer Paternal Grandmother      pancreatic cancer     Unknown/Adopted No family hx of      Asthma No family hx of      C.A.D. No family hx of      DIABETES No family hx of      CEREBROVASCULAR DISEASE No family hx of      Breast Cancer No family hx of      Cancer - colorectal No family hx of      Alcohol/Drug No family hx of      Allergies No family hx of      Alzheimer Disease No family hx of      Anesthesia Reaction No family hx of      Arthritis No family hx of      Blood Disease No family hx of      Cardiovascular No family hx of      Circulatory No family hx of      Congenital Anomalies No family hx of      Connective Tissue Disorder No family hx of      Depression No family hx of      Genetic Disorder No family hx of      Genitourinary Problems No family hx of      Gynecology No family hx of      HEART DISEASE No family hx of      Musculoskeletal Disorder No family hx of      Neurologic Disorder No family hx of      Obesity No family hx of      OSTEOPOROSIS No family hx of      Psychotic Disorder No family hx of        SOCIAL HISTORY:  Social History     Social History     Marital status:      Spouse name: N/A     Number of children: 0     Years of education: 16     Occupational History     RN Other     [employer]     Social History Main Topics     Smoking status: Never Smoker     Smokeless tobacco: Never Used      Comment: no smokers in the household     Alcohol use 0.0 oz/week     0 Standard drinks or equivalent per week      Comment: OCC.     Drug use: No     Sexual activity: Not Currently     Partners:  "Male     Birth control/ protection: IUD     Other Topics Concern      Service No     Blood Transfusions No     Caffeine Concern Yes     1-2 coffee a day     Occupational Exposure Yes     R.N.     Hobby Hazards No     Sleep Concern No     Stress Concern No     Weight Concern No     Special Diet No     Back Care No     Exercise No     Bike Helmet No     Seat Belt Yes     Self-Exams Yes     know BSE     Parent/Sibling W/ Cabg, Mi Or Angioplasty Before 65f 55m? No     Social History Narrative     to [place] and lives in [place] with their daughter.  No indoor cats/kittens.  No concerns about domestic violence.  No smokers in the home.       Review of Systems:  Skin:  Negative       Eyes:  Negative      ENT:  Negative      Respiratory:  Negative       Cardiovascular:    Positive for;chest pain;heaviness;fatigue;lightheadedness;dizziness pressure, bending down is a stabbing pain  Gastroenterology: Negative      Genitourinary:  Negative      Musculoskeletal:  Positive for back pain numbness in one spot of back  Neurologic:  Positive for headaches off and on  Psychiatric:  Positive for sleep disturbances works night  Heme/Lymph/Imm:  Positive for allergies    Endocrine:  Negative        Physical Exam:  Vitals: /68 (BP Location: Right arm, Patient Position: Chair, Cuff Size: Adult Regular)  Pulse 110  Ht 1.626 m (5' 4\")  Wt 46.2 kg (101 lb 14.4 oz)  SpO2 99%  BMI 17.49 kg/m2    Constitutional:  cooperative, alert and oriented, well developed, well nourished, in no acute distress        Skin:  warm and dry to the touch, no apparent skin lesions or masses noted        Head:  normocephalic, no masses or lesions        Eyes:  pupils equal and round, conjunctivae and lids unremarkable, sclera white, no xanthalasma, EOMS intact, no nystagmus        ENT:  no pallor or cyanosis, dentition good        Neck:  carotid pulses are full and equal bilaterally, JVP normal, no carotid bruit, no thyromegaly    "     Chest:  normal breath sounds, clear to auscultation, normal A-P diameter, normal symmetry, normal respiratory excursion, no use of accessory muscles          Cardiac: regular rhythm, normal S1/S2, no S3 or S4, apical impulse not displaced, no murmurs, gallops or rubs                  Abdomen:  abdomen soft, non-tender, BS normoactive, no mass, no HSM, no bruits        Vascular: pulses full and equal, no bruits auscultated                                        Extremities and Back:  no deformities, clubbing, cyanosis, erythema observed              Neurological:  affect appropriate, oriented to time, person and place              CC  Brittny Naqvi MD  [address] [place] [address]

## 2017-08-24 NOTE — PROGRESS NOTES
HISTORY OF PRESENT ILLNESS:  I saw Ms. Eirn Ashley for evaluation of palpitations.  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  It is not associated with shortness of breath, but is associated with occasional chest discomfort.  She has not had any syncope or near-syncope.  The symptoms feel like a rapid heart beating that is always associated with exertion.  It does not happen at rest and in fact her symptoms subside when she rests.  She did not check her pulse rate or pulse regularity during the symptoms.  She has had a Holter recently that detected no significant cardiac arrhythmia except for occasional sinus tachycardia.  The heart rate slows down to 52 beats per minute, but the average heart rate was relatively high at 103 beats.      The patient used to exercise 5 days a week and she currently does not exercise because of heart racing problem.  She has no dizziness or near-syncope in association with heart racing.      She takes night shift and has difficulty sleeping.  Therefore, she uses had Seroquel to help her sleep during the day.  She does not think she gets enough sleep.  She is also taking Flexeril for chronic back pain.      The patient has had a thyroid function test that is normal.  She has no fever or chills.      PHYSICAL EXAMINATION:   VITAL SIGNS:  Blood pressure was 108/60, heart rate 110 beats per minute, body weight 101 pounds.   HEENT:  Eyes and ENT were unremarkable.   LUNGS:  Clear.   CARDIAC:  Rhythm was regular and heart sounds were normal without murmur.   ABDOMEN:  Examination showed no hepatomegaly.   EXTREMITIES:  There was no pedal edema.      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  She has heart racing problems and the objective finding has been sinus tachycardia.  The etiology of sinus tachycardia is unknown at the present time and hyperthyroidism has been excluded.  I have  ordered an echocardiography to rule out any structural heart disease.  She has lost 5 pounds without explanation over the last few weeks.  I have asked her to ensure sufficient sleep.  I will visit with her in September for further followup evaluation.      cc:      Brittny Naqvi MD   [address] [address]         ALFIE NARAYANAN MD             D: 2017 11:56   T: 2017 12:10   MT: ALANA      Name:     FELIPE BOCANEGRA   MRN:      [id number]        Account:      [id number]   :      1985           Service Date: 2017      Document: [id number]

## 2017-08-25 ENCOUNTER — HOSPITAL ENCOUNTER (OUTPATIENT)
Dept: CARDIOLOGY | Facility: CLINIC | Age: 32
Discharge: HOME | End: 2017-08-25
Attending: INTERNAL MEDICINE | Admitting: INTERNAL MEDICINE
Payer: COMMERCIAL

## 2017-08-25 DIAGNOSIS — R00.0 SINUS TACHYCARDIA: ICD-10-CM

## 2017-08-25 PROCEDURE — 93306 TTE W/DOPPLER COMPLETE: CPT

## 2017-08-25 PROCEDURE — 93306 TTE W/DOPPLER COMPLETE: CPT | Mod: 26 | Performed by: INTERNAL MEDICINE

## 2017-08-25 ASSESSMENT — ENCOUNTER SYMPTOMS
CONSTIPATION: 0
CHILLS: 0
ABDOMINAL PAIN: 1
SHORTNESS OF BREATH: 0
DIARRHEA: 0
APPETITE CHANGE: 0

## 2017-08-25 NOTE — DISCHARGE INSTRUCTIONS
I think your abdominal pain is due to a muscle spasm in your left rectus abdominis muscle.  This can cause it to feel swollen and tender.  If the Lidoderm applied tonight helps, consider purchasing these over-the-counter at your local pharmacy.  Start taking ibuprofen 600 mg every 6 hours.  Apply heat to your abdominal wall and practice light stretching.  Try to massage the area as well.  Be sure to be drinking plenty of water.  Follow-up with your primary care provider as needed next week if he seen no improvement.  For worsening symptoms please return to the emergency department.    Thank you for choosing [facility]'s Emergency Department. It was a pleasure taking care of you today. If you have any questions, please call [phone].    Kristen Mtz PA-C

## 2017-08-25 NOTE — ED NOTES
Pt reports she was at urgent care and they found a lump on upper left quadrant of abdomen and she is here to have it checked

## 2017-08-25 NOTE — ED PROVIDER NOTES
"  History     Chief Complaint   Patient presents with     LUQ pain     The history is provided by the patient.     Erin Ashley is a 32 year old female who presents to the ED complaining of left upper quadrant abdominal pain. Patient reports that she had RUQ pain on Saturday for 4 hours and intermittently on Sunday. Patient's abdominal pain was constant on Monday, she was seen in the ED that day and was told she had shingles. Patient was given Valtrex but hasn't experienced any relief. Her abdominal pain is constant today and it feels like a deep achy pain to her left abdomen.  The pain radiates around to her back intermittently.  She has not had pain like this before. Her pain increases when laying flat or sitting up, but standing seems to help. Eating doesn't affect the pain level. She has been taking Tylenol and ibuprofen without relief. She has normal bowels. Patient denies dysuria, hematuria, nausea, fever, and emesis. Patient reports she felt lightheaded on Saturday. She was seen at urgent care and was found to have a \"lump\" on her LUQ, she was told to come to the ED to have it checked out. Patient has been experiencing heart palpitations intermittently and is having this worked up with a cardiologist, last appointment was today.    I have reviewed the Medications, Allergies, Past Medical and Surgical History, and Social History in the Epic system.    Allergies:   Allergies   Allergen Reactions     Hydrocodone Other (See Comments)     Migraines     Oxycodone Nausea and Vomiting         No current facility-administered medications on file prior to encounter.   Current Outpatient Prescriptions on File Prior to Encounter:  valACYclovir (VALTREX) 1000 mg tablet Take 1 tablet (1,000 mg) by mouth 3 times daily   QUEtiapine (SEROQUEL) 25 MG tablet Take 1-2 tablets (25-50 mg) by mouth nightly as needed   cyclobenzaprine (FLEXERIL) 10 MG tablet Take 0.5-1 tablets (5-10 mg) by mouth 3 times daily as needed for muscle " "spasms   PARAGARD INTRAUTERINE COPPER IU Reported on 4/14/2017       Patient Active Problem List   Diagnosis     TALIB 3 - cervical intraepithelial neoplasia grade 3     Paragard placed 10/15/13     Acne     Insomnia due to medical condition     Personal history of ovarian cyst     Skin tags, anus or rectum     Elevated LFTs     Pelvic pain in female     Thyroid nodule     Tachycardia     Anxiety     Palpitations       Past Surgical History:   Procedure Laterality Date     APPENDECTOMY  8/4/2008     EXAM UNDER ANESTHESIA RECTUM N/A 2/24/2017    Procedure: EXAM UNDER ANESTHESIA RECTUM;  Surgeon: Britton Palomo MD;  Location: [facility]     HC CONIZATION CERVIX,KNIFE/LASER  06/130/8    cervix.  TALIB 1/2     HEMORRHOIDECTOMY EXTERNAL N/A 2/24/2017    Procedure: HEMORRHOIDECTOMY EXTERNAL;  Surgeon: Britton Palomo MD;  Location: [facility]     TONSILLECTOMY  7/3/2007       Social History   Substance Use Topics     Smoking status: Never Smoker     Smokeless tobacco: Never Used      Comment: no smokers in the household     Alcohol use 0.0 oz/week     0 Standard drinks or equivalent per week      Comment: OCC.       Most Recent Immunizations   Administered Date(s) Administered     HepB-Peds 07/07/2006     Influenza (IIV3) 11/07/2012     MMR 05/14/1999     Mantoux 08/29/2016     TD (ADULT, 7+) 11/12/2003     TDAP Vaccine (Adacel) 06/19/2013       BMI: Estimated body mass index is 18.02 kg/(m^2) as calculated from the following:    Height as of an earlier encounter on 8/24/17: 1.626 m (5' 4\").    Weight as of this encounter: 47.6 kg (105 lb).      Review of Systems   Constitutional: Negative for appetite change, chills and fever.   Respiratory: Negative for shortness of breath.    Cardiovascular: Negative for chest pain.   Gastrointestinal: Positive for abdominal pain (LUQ). Negative for constipation, diarrhea, nausea and vomiting.   Genitourinary: Negative for dysuria and hematuria.   Musculoskeletal: Positive for back pain (left " side).   Neurological: Positive for light-headedness (on Saturday).   All other systems reviewed and are negative.      Physical Exam   BP: (!) 130/94  Pulse: 124  Temp: 99.7  F (37.6  C)  Resp: 16  Weight: 47.6 kg (105 lb)  SpO2: 95 %  Physical Exam   Constitutional: She is oriented to person, place, and time. She appears well-developed and well-nourished. No distress.   HENT:   Head: Normocephalic and atraumatic.   Eyes: Conjunctivae are normal.   Cardiovascular: Normal rate, regular rhythm and normal heart sounds.    Pulmonary/Chest: Effort normal and breath sounds normal.   Abdominal: Soft. There is no tenderness. There is no rebound, no guarding and no CVA tenderness.       No rashes noted   Neurological: She is alert and oriented to person, place, and time.   Skin: Skin is warm and dry. She is not diaphoretic.   Psychiatric: She has a normal mood and affect. Her behavior is normal.   Nursing note and vitals reviewed.      ED Course     ED Course     Procedures  None         Results for orders placed or performed during the hospital encounter of 08/24/17 (from the past 24 hour(s))   CBC with platelets differential   Result Value Ref Range    WBC 6.8 4.0 - 11.0 10e9/L    RBC Count 4.66 3.8 - 5.2 10e12/L    Hemoglobin 14.0 11.7 - 15.7 g/dL    Hematocrit 42.2 35.0 - 47.0 %    MCV 91 78 - 100 fl    MCH 30.0 26.5 - 33.0 pg    MCHC 33.2 31.5 - 36.5 g/dL    RDW 13.8 10.0 - 15.0 %    Platelet Count 225 150 - 450 10e9/L    Diff Method Automated Method     % Neutrophils 59.7 %    % Lymphocytes 33.2 %    % Monocytes 6.3 %    % Eosinophils 0.3 %    % Basophils 0.4 %    % Immature Granulocytes 0.1 %    Absolute Neutrophil 4.1 1.6 - 8.3 10e9/L    Absolute Lymphocytes 2.3 0.8 - 5.3 10e9/L    Absolute Monocytes 0.4 0.0 - 1.3 10e9/L    Absolute Eosinophils 0.0 0.0 - 0.7 10e9/L    Absolute Basophils 0.0 0.0 - 0.2 10e9/L    Abs Immature Granulocytes 0.0 0 - 0.4 10e9/L   Comprehensive metabolic panel   Result Value Ref Range     "Sodium 144 133 - 144 mmol/L    Potassium 3.6 3.4 - 5.3 mmol/L    Chloride 108 94 - 109 mmol/L    Carbon Dioxide 26 20 - 32 mmol/L    Anion Gap 10 3 - 14 mmol/L    Glucose 90 70 - 99 mg/dL    Urea Nitrogen 17 7 - 30 mg/dL    Creatinine 0.68 0.52 - 1.04 mg/dL    GFR Estimate >90 >60 mL/min/1.7m2    GFR Estimate If Black >90 >60 mL/min/1.7m2    Calcium 8.9 8.5 - 10.1 mg/dL    Bilirubin Total 0.3 0.2 - 1.3 mg/dL    Albumin 4.0 3.4 - 5.0 g/dL    Protein Total 7.2 6.8 - 8.8 g/dL    Alkaline Phosphatase 58 40 - 150 U/L    ALT 46 0 - 50 U/L    AST 20 0 - 45 U/L   Lipase   Result Value Ref Range    Lipase 125 73 - 393 U/L   Lactic acid whole blood   Result Value Ref Range    Lactic Acid 0.6 (L) 0.7 - 2.0 mmol/L   CRP inflammation   Result Value Ref Range    CRP Inflammation <2.9 0.0 - 8.0 mg/L   Erythrocyte sedimentation rate auto   Result Value Ref Range    Sed Rate 7 0 - 20 mm/h   US Abdomen Complete    Narrative    COMPLETE ABDOMEN ULTRASOUND  8/24/2017 10:04 PM    HISTORY: Left upper quadrant pain.    COMPARISON: 1/30/2017.    FINDINGS:  Gallbladder: Normal.    Common bile duct: Normal.    Liver: Normal.    Pancreas: Normal.    Spleen: Normal.    Kidneys: Normal.    Proximal abdominal aorta: Normal.    IVC: Normal.      Impression    IMPRESSION: Unremarkable abdominal ultrasound.          KYLE ROCA MD       Medications   ketorolac (TORADOL) injection 15 mg (15 mg Intravenous Given 8/24/17 2201)   lidocaine (LIDODERM) 5 % Patch 1-2 patch (1 patch Transdermal Given 8/24/17 2232)       Assessments & Plan (with Medical Decision Making)  Erin Ashley is a 32 year old female who presented to the ED for concern of left-sided abdominal pain that has been ongoing for a week now.  She was seen at urgent care today and referred here. On arrival to the ED, vitals were notable for tachycardia and a blood pressure of 130/94. She reported this is \"usual\" for her and that cardiology is following her for her tachycardia. It did " normalize during stay to 70s without intervention. Her exam today was notable for abdominal tenderness in epigastric region following down her abdominal wall muscle, as noted above. The lump she described seeing appeared to be a muscle spasm, that I actually was able to induce with gentle massage of surrounding musculature. Though her exam is most consistent with muscular pain, we did not want to miss an intraabdominal cause so labs were obtained as well as an abdominal ultrasound. Labs including CMP, lipase, CRP, CBC, and lactate were all normal. Abdominal ultrasound was also normal.  The patient was very reassured by these findings. She was administered IV Toradol here for pain and a Lidoderm patch was applied to her left abdominal wall.  I do think she is having left abdominal wall muscle spasms contributing to her pain.  This can cause radiation into the back if following the musculature.  I recommended continue use of ibuprofen and Tylenol.  She can purchase over-the-counter Lidoderm patches if the one given today helps things.  She should practice light stretching, gentle massage, and apply heat packs to her abdomen.  If she sees no relief with these therapies she should follow up next week with her PCP for reevaluation.  She was given instructions on when to return to the ED.  The patient was very agreeable to this plan and was discharged home.       I have reviewed the nursing notes.    I have reviewed the findings, diagnosis, plan and need for follow up with the patient.    Discharge Medication List as of 8/24/2017 10:28 PM          Final diagnoses:   Spasm of abdominal muscles of left side     This document serves as a record of services personally performed by Kristen Mtz PA. It was created on their behalf by Polo Avalos, a trained medical scribe. The creation of this record is based on the provider's personal observations and the statements of the patient. This document has been checked and  approved by the attending provider.    Note: Chart documentation done in part with Dragon Voice Recognition software. Although reviewed after completion, some word and grammatical errors may remain.    8/24/2017   [facility] EMERGENCY DEPARTMENT     Kristen Mtz PA-C  08/25/17 [id number]

## 2017-08-29 ENCOUNTER — TELEPHONE (OUTPATIENT)
Dept: CARDIOLOGY | Facility: CLINIC | Age: 32
End: 2017-08-29

## 2017-08-29 NOTE — TELEPHONE ENCOUNTER
Pt calling and asking that echo results be released to MY Chart. It was done as requested. Will review ? Small percardial effusion with Dr Francois prior to calling pt. She is a nurse.

## 2017-08-29 NOTE — TELEPHONE ENCOUNTER
Telephone call to patient to relay Dr. Francois's recommendations. Patient stated she was still a little concerned because she said she can't even work a 12 hour shift with out her chest hurting and feeling fatigued. Writer gave patient the option to follow up with an IRAIS to over echo results in further detail sooner than her appointment with Dr. Francois. Patient stated she would think about it and appreciated the call back.

## 2017-08-29 NOTE — TELEPHONE ENCOUNTER
Patient calling stating she saw her echo results on mychart. She wanting to know what the plan of care is from here. Message routed to Dr. Francois for further recommendations.

## 2017-09-05 ENCOUNTER — HOSPITAL ENCOUNTER (EMERGENCY)
Facility: CLINIC | Age: 32
Discharge: HOME | End: 2017-09-05
Attending: FAMILY MEDICINE | Admitting: FAMILY MEDICINE
Payer: COMMERCIAL

## 2017-09-05 VITALS
HEART RATE: 116 BPM | TEMPERATURE: 98.4 F | OXYGEN SATURATION: 97 % | RESPIRATION RATE: 15 BRPM | SYSTOLIC BLOOD PRESSURE: 118 MMHG | DIASTOLIC BLOOD PRESSURE: 76 MMHG

## 2017-09-05 DIAGNOSIS — R00.2 PALPITATIONS: ICD-10-CM

## 2017-09-05 DIAGNOSIS — R07.89 CHEST WALL PAIN: ICD-10-CM

## 2017-09-05 DIAGNOSIS — R07.9 CHEST PAIN: Primary | ICD-10-CM

## 2017-09-05 DIAGNOSIS — E87.6 HYPOKALEMIA: ICD-10-CM

## 2017-09-05 DIAGNOSIS — R00.0 TACHYCARDIA, UNSPECIFIED: ICD-10-CM

## 2017-09-05 DIAGNOSIS — Z82.49 FH: HEART DISEASE: ICD-10-CM

## 2017-09-05 LAB
ANION GAP SERPL CALCULATED.3IONS-SCNC: 5 MMOL/L (ref 3–14)
BASOPHILS # BLD AUTO: 0 10E9/L (ref 0–0.2)
BASOPHILS NFR BLD AUTO: 0.1 %
BUN SERPL-MCNC: 15 MG/DL (ref 7–30)
CALCIUM SERPL-MCNC: 8.3 MG/DL (ref 8.5–10.1)
CHLORIDE SERPL-SCNC: 107 MMOL/L (ref 94–109)
CO2 SERPL-SCNC: 27 MMOL/L (ref 20–32)
CREAT SERPL-MCNC: 0.53 MG/DL (ref 0.52–1.04)
CRP SERPL-MCNC: <2.9 MG/L (ref 0–8)
DIFFERENTIAL METHOD BLD: NORMAL
EOSINOPHIL # BLD AUTO: 0 10E9/L (ref 0–0.7)
EOSINOPHIL NFR BLD AUTO: 0.5 %
ERYTHROCYTE [DISTWIDTH] IN BLOOD BY AUTOMATED COUNT: 13.1 % (ref 10–15)
ERYTHROCYTE [SEDIMENTATION RATE] IN BLOOD BY WESTERGREN METHOD: 7 MM/H (ref 0–20)
GFR SERPL CREATININE-BSD FRML MDRD: >90 ML/MIN/1.7M2
GLUCOSE SERPL-MCNC: 100 MG/DL (ref 70–99)
HCT VFR BLD AUTO: 37.2 % (ref 35–47)
HGB BLD-MCNC: 12.4 G/DL (ref 11.7–15.7)
IMM GRANULOCYTES # BLD: 0 10E9/L (ref 0–0.4)
IMM GRANULOCYTES NFR BLD: 0.2 %
LYMPHOCYTES # BLD AUTO: 1.3 10E9/L (ref 0.8–5.3)
LYMPHOCYTES NFR BLD AUTO: 15.2 %
MCH RBC QN AUTO: 30.1 PG (ref 26.5–33)
MCHC RBC AUTO-ENTMCNC: 33.3 G/DL (ref 31.5–36.5)
MCV RBC AUTO: 90 FL (ref 78–100)
MONOCYTES # BLD AUTO: 0.6 10E9/L (ref 0–1.3)
MONOCYTES NFR BLD AUTO: 6.9 %
NEUTROPHILS # BLD AUTO: 6.6 10E9/L (ref 1.6–8.3)
NEUTROPHILS NFR BLD AUTO: 77.1 %
PLATELET # BLD AUTO: 182 10E9/L (ref 150–450)
POTASSIUM SERPL-SCNC: 3 MMOL/L (ref 3.4–5.3)
RBC # BLD AUTO: 4.12 10E12/L (ref 3.8–5.2)
SODIUM SERPL-SCNC: 139 MMOL/L (ref 133–144)
TROPONIN I SERPL-MCNC: <0.015 UG/L (ref 0–0.04)
WBC # BLD AUTO: 8.5 10E9/L (ref 4–11)

## 2017-09-05 PROCEDURE — 86140 C-REACTIVE PROTEIN: CPT | Performed by: FAMILY MEDICINE

## 2017-09-05 PROCEDURE — 99284 EMERGENCY DEPT VISIT MOD MDM: CPT | Mod: 25 | Performed by: FAMILY MEDICINE

## 2017-09-05 PROCEDURE — 93010 ELECTROCARDIOGRAM REPORT: CPT | Mod: Z6 | Performed by: FAMILY MEDICINE

## 2017-09-05 PROCEDURE — 85652 RBC SED RATE AUTOMATED: CPT | Performed by: FAMILY MEDICINE

## 2017-09-05 PROCEDURE — 84484 ASSAY OF TROPONIN QUANT: CPT | Performed by: FAMILY MEDICINE

## 2017-09-05 PROCEDURE — 99285 EMERGENCY DEPT VISIT HI MDM: CPT | Mod: Z6 | Performed by: FAMILY MEDICINE

## 2017-09-05 PROCEDURE — 85025 COMPLETE CBC W/AUTO DIFF WBC: CPT | Performed by: FAMILY MEDICINE

## 2017-09-05 PROCEDURE — 80048 BASIC METABOLIC PNL TOTAL CA: CPT | Performed by: FAMILY MEDICINE

## 2017-09-05 PROCEDURE — 96360 HYDRATION IV INFUSION INIT: CPT | Performed by: FAMILY MEDICINE

## 2017-09-05 PROCEDURE — 25000128 H RX IP 250 OP 636: Performed by: FAMILY MEDICINE

## 2017-09-05 PROCEDURE — 93005 ELECTROCARDIOGRAM TRACING: CPT | Performed by: FAMILY MEDICINE

## 2017-09-05 RX ORDER — LIDOCAINE 40 MG/G
CREAM TOPICAL
Status: DISCONTINUED | OUTPATIENT
Start: 2017-09-05 | End: 2017-09-05 | Stop reason: HOSPADM

## 2017-09-05 RX ORDER — ONDANSETRON 2 MG/ML
4 INJECTION INTRAMUSCULAR; INTRAVENOUS EVERY 30 MIN PRN
Status: DISCONTINUED | OUTPATIENT
Start: 2017-09-05 | End: 2017-09-05

## 2017-09-05 RX ORDER — MELOXICAM 15 MG/1
15 TABLET ORAL DAILY
Qty: 30 TABLET | Refills: 0 | Status: SHIPPED | OUTPATIENT
Start: 2017-09-05 | End: 2018-04-27

## 2017-09-05 RX ORDER — METOPROLOL SUCCINATE 25 MG/1
25 TABLET, EXTENDED RELEASE ORAL DAILY
Qty: 30 TABLET | Refills: 0 | Status: SHIPPED | OUTPATIENT
Start: 2017-09-05 | End: 2017-10-18

## 2017-09-05 RX ADMIN — SODIUM CHLORIDE 1000 ML: 9 INJECTION, SOLUTION INTRAVENOUS at 04:28

## 2017-09-05 NOTE — DISCHARGE INSTRUCTIONS
Thank you for giving us the opportunity to see you.  We did not find a serious cause for your chest pain.    It appears that you have regional chest wall pain involving the left side of the chest. You also have baseline tachycardia without an obvious cause.    Take Mobic 15 mg daily for 2-4 weeks to see if it helps with the chest wall pain.    Continues drinking plenty of water throughout the day.  Begin a trial of Toprol 25 mg daily.    Please follow-up with your primary care provider in the next 7-10 days    After discharge, please closely monitor for any new or worsening symptoms. Return to the Emergency Department at any time if your symptoms worsen.

## 2017-09-05 NOTE — ED AVS SNAPSHOT
[facility] Emergency Department    [address] [place] [address]    Phone:  [phone]    Fax:  [phone]                                       Erin Ashley   MRN: [id number]    Department:  [facility] Emergency Department   Date of Visit:  9/5/2017           Patient Information     Date Of Birth          1985        Your diagnoses for this visit were:     Palpitations     Chest wall pain        You were seen by Raman Figueroa MD.      Follow-up Information     Follow up with Primary care provider In 1 week.        Follow up with [facility] Emergency Department.    Specialty:  EMERGENCY MEDICINE    Why:  If symptoms worsen    Contact information:    [address]   [place] [address] [phone]    Additional information:    From [facility] [address]: Exit at [address] on south side of [place]. Turn right on [address]. Turn left at stoplight on [address]. [facility] will be in view two blocks ahead        Discharge Instructions       Thank you for giving us the opportunity to see you.  We did not find a serious cause for your chest pain.    It appears that you have regional chest wall pain involving the left side of the chest. You also have baseline tachycardia without an obvious cause.    Take Mobic 15 mg daily for 2-4 weeks to see if it helps with the chest wall pain.    Continues drinking plenty of water throughout the day.  Begin a trial of Toprol 25 mg daily.    Please follow-up with your primary care provider in the next 7-10 days    After discharge, please closely monitor for any new or worsening symptoms. Return to the Emergency Department at any time if your symptoms worsen.        Future Appointments        Provider Department Dept Phone Center    9/21/2017 9:00 AM Cherie Francois MD [facility] [phone] formerly [facility]      24 Hour Appointment Hotline       To make an appointment at any [facility], call  [phone] ([phone]). If you don't have a family doctor or clinic, we will help you find one. [facility] clinics are conveniently located to serve the needs of you and your family.             Review of your medicines      START taking        Dose / Directions Last dose taken    meloxicam 15 MG tablet   Commonly known as:  MOBIC   Dose:  15 mg   Quantity:  30 tablet        Take 1 tablet (15 mg) by mouth daily   Refills:  0        metoprolol 25 MG 24 hr tablet   Commonly known as:  TOPROL-XL   Dose:  25 mg   Quantity:  30 tablet        Take 1 tablet (25 mg) by mouth daily   Refills:  0          Our records show that you are taking the medicines listed below. If these are incorrect, please call your family doctor or clinic.        Dose / Directions Last dose taken    cyclobenzaprine 10 MG tablet   Commonly known as:  FLEXERIL   Dose:  5-10 mg   Quantity:  30 tablet        Take 0.5-1 tablets (5-10 mg) by mouth 3 times daily as needed for muscle spasms   Refills:  1        PARAGARD INTRAUTERINE COPPER IU        Reported on 4/14/2017   Refills:  0        QUEtiapine 25 MG tablet   Commonly known as:  SEROQUEL   Dose:  25-50 mg   Quantity:  30 tablet        Take 1-2 tablets (25-50 mg) by mouth nightly as needed   Refills:  1        valACYclovir 1000 mg tablet   Commonly known as:  VALTREX   Dose:  1000 mg   Quantity:  21 tablet        Take 1 tablet (1,000 mg) by mouth 3 times daily   Refills:  0                Prescriptions were sent or printed at these locations (2 Prescriptions)                   [facility] [address] [place] [facility] [place] - [address] [address] [place] [address]   [address] [address] [place] [address]    Telephone:  [phone]   Fax:  [phone]   Hours:                  E-Prescribed (2 of 2)         meloxicam (MOBIC) 15 MG tablet               metoprolol (TOPROL-XL) 25 MG 24 hr tablet                Procedures and tests performed during your visit     Basic metabolic panel    CBC with platelets differential    CRP  inflammation    EKG 12 lead    Erythrocyte sedimentation rate auto    Troponin I      Orders Needing Specimen Collection     None      Pending Results     No orders found from 9/3/2017 to 9/6/2017.            Pending Culture Results     No orders found from 9/3/2017 to 9/6/2017.            Pending Results Instructions     If you had any lab results that were not finalized at the time of your Discharge, you can call the ED Lab Result RN at [phone]. You will be contacted by this team for any positive Lab results or changes in treatment. The nurses are available 7 days a week from 10A to 6:30P.  You can leave a message 24 hours per day and they will return your call.        Thank you for choosing [facility]       Thank you for choosing [facility] for your care. Our goal is always to provide you with excellent care. Hearing back from our patients is one way we can continue to improve our services. Please take a few minutes to complete the written survey that you may receive in the mail after you visit with us. Thank you!        [employer] Information     [facility] [employer] [facility] gives you secure access to your electronic health record. If you see a primary care provider, you can also send messages to your care team and make appointments. If you have questions, please call your primary care clinic.  If you do not have a primary care provider, please call [phone] and they will assist you.        Care EveryWhere ID     This is your Care EveryWhere ID. This could be used by other organizations to access your [facility] medical records  [id number]        Equal Access to Services     ALICIA WAY : Hadii eran Gomes, monica huitron, qaybta rush kilgore . So [facility] [phone].    ATENCIÓN: Si habla español, tiene a fischer disposición servicios gratuitos de asistencia lingüística. Llame al [phone].    We comply with applicable federal civil rights laws and [place] laws. We do  not discriminate on the basis of race, color, national origin, age, disability sex, sexual orientation or gender identity.            After Visit Summary       This is your record. Keep this with you and show to your community pharmacist(s) and doctor(s) at your next visit.

## 2017-09-05 NOTE — ED AVS SNAPSHOT
[facility]    [address] [place] [address]    Phone:  [phone]    Fax:  [phone]                                       Erin Ashley   MRN: [id number]    Department:  [facility]   Date of Visit:  9/5/2017           After Visit Summary Signature Page     I have received my discharge instructions, and my questions have been answered. I have discussed any challenges I see with this plan with the nurse or doctor.    ..........................................................................................................................................  Patient/Patient Representative Signature      ..........................................................................................................................................  Patient Representative Print Name and Relationship to Patient    ..................................................               ................................................  Date                                            Time    ..........................................................................................................................................  Reviewed by Signature/Title    ...................................................              ..............................................  Date                                                            Time

## 2017-09-05 NOTE — ED NOTES
Pt has been seen several times since July for chest pain/abd pain sx.  She has been seen by Cardiology and has had an ECHO recently that showed an effusion.  Pt cont with sensation of palpitations/heart racing/chest pain and is concerned that this effusion is getting bigger and that something is being missed.  /84  Pulse 116  Temp 98.4  F (36.9  C) (Oral)  Resp 16  LMP 08/14/2017  SpO2 98%

## 2017-09-06 ENCOUNTER — OFFICE VISIT (OUTPATIENT)
Dept: FAMILY MEDICINE | Facility: CLINIC | Age: 32
End: 2017-09-06
Payer: COMMERCIAL

## 2017-09-06 VITALS
RESPIRATION RATE: 18 BRPM | TEMPERATURE: 99 F | SYSTOLIC BLOOD PRESSURE: 110 MMHG | DIASTOLIC BLOOD PRESSURE: 70 MMHG | HEART RATE: 96 BPM | HEIGHT: 64 IN | WEIGHT: 99.7 LBS | BODY MASS INDEX: 17.02 KG/M2

## 2017-09-06 DIAGNOSIS — G89.29 CHRONIC LEFT-SIDED THORACIC BACK PAIN: Primary | ICD-10-CM

## 2017-09-06 DIAGNOSIS — M54.6 CHRONIC LEFT-SIDED THORACIC BACK PAIN: Primary | ICD-10-CM

## 2017-09-06 DIAGNOSIS — R07.81 RIB PAIN: ICD-10-CM

## 2017-09-06 PROCEDURE — 99213 OFFICE O/P EST LOW 20 MIN: CPT | Performed by: FAMILY MEDICINE

## 2017-09-06 ASSESSMENT — PAIN SCALES - GENERAL: PAINLEVEL: MODERATE PAIN (5)

## 2017-09-06 NOTE — PROGRESS NOTES
SUBJECTIVE:                                                    Erin Ashley is a 32 year old female who presents to clinic today for the following health issues:    Concern - Left Side Rib Pain & chest too  Onset: Since May, due to muscle spasm in back    Description:   Burning pain, hurts to touch and pins and needles down pt's stomach    Intensity: moderate    Progression of Symptoms:  worsening and constant    Accompanying Signs & Symptoms:  Chest pain    Previous history of similar problem:   None    Precipitating factors:   Worsened by: moving certain ways, sitting straight up and laying down hurts worst    Alleviating factors:  Improved by: Mobic    Therapies Tried and outcome: Ice pack, does not help    She reports having left rib cage pain and back pain. Her back pain has been bothering her since May. Her rib pain has been bothering her today. She feels a burning sensation in her back. She feels pins and needles. Her left side bothers her. The back pain and burning are not new. She went to the ER yesterday. She felt a sharp pain on her chest. She felt palpitations. She experienced dizziness. She felt like the floor was moving under her. She has been taking Metoprolol. She is eating well. Today hurts to take a deep breath. Her new job has been stressing her. She works nights now. She has been taking Mobic and Tylenol. She denies having nausea and vomiting.     She is being scheduled for cardiac MRI by cardiology.       Problem list and histories reviewed & adjusted, as indicated.  Additional history: as documented    BP Readings from Last 3 Encounters:   09/06/17 110/70   09/05/17 118/76   08/24/17 128/64    Wt Readings from Last 3 Encounters:   09/06/17 45.2 kg (99 lb 11.2 oz)   08/24/17 47.6 kg (105 lb)   08/24/17 46.2 kg (101 lb 14.4 oz)                      ROS:  C: NEGATIVE for fever, chills, change in weight. See HPI above.   R: NEGATIVE for significant cough or SOB  CV: NEGATIVE for peripheral  "edema. POSITIVE for chest pain and palpitations. See HPI above.  GI: NEGATIVE for nausea, abdominal pain, heartburn, or change in bowel movements. See HPI above.   MUSCULOSKELETAL: NEGATIVE for significant arthralgias. POSITIVE for myalgia. See HPI above.  NEURO: NEGATIVE for weakness, paresthesias. POSITIVE for dizziness. See HPI above.     This document serves as a record of the services and decisions personally performed and made by Michelle Stone MD. It was created on her behalf by Aysha Murphy, a trained medical scribe. The creation of this document is based on the provider's statements to the medical scribe.  Aysha Murphy 3:50 PM September 6, 2017      OBJECTIVE:     /70  Pulse 96  Temp 99  F (37.2  C) (Temporal)  Resp 18  Ht 1.63 m (5' 4.17\")  Wt 45.2 kg (99 lb 11.2 oz)  LMP 08/14/2017  BMI 17.02 kg/m2  Body mass index is 17.02 kg/(m^2).  GENERAL APPEARANCE: healthy, alert and no distress, extremely thin female  RESP: lungs clear to auscultation - no rales, rhonchi or wheezes  CV: regular rates and rhythm, normal S1 S2, no S3 or S4 and no murmur, click or rub  ABDOMEN: soft, nontender, without hepatosplenomegaly or masses and bowel sounds normal  MS: tenderness along left lower ribs, no distinct abnormality noted, some asymmetry, doesn't appear out of normal range, otherwise extremities normal- no gross deformities noted  SKIN: no suspicious lesions or rashes    Diagnostic Test Results:  No results found for this or any previous visit (from the past 24 hour(s)).    ASSESSMENT/PLAN:         1. Chronic left-sided thoracic back pain  Patient reports with concern for her rib cage and chest pain. Discussed ER visit with patient. After physical exam,consider Physical Therapy would be good option to further evaluate the back pain and rib cage pain. Will hold off on further imaging depending on condition after trying Physical Therapy. Will see how well Erlin is working.  - [facility], AND " CHIROPRACTIC REFERRAL    2. Rib pain  Patient reports with concern for her rib cage and chest pain. Discussed recent ER visit with patient. After physical exam, consider Physical Therapy would be option to further evaluate the back pain and rib cage pain. Will hold off on further imaging depending on condition after trying Physical Therapy. Will see how well Morbic is working.  - [facility], AND CHIROPRACTIC REFERRAL    Follow Up: Patient will schedule MRI imaging in future.     All questions invited, asked and answered to the patient's apparent satisfaction.  Patient agrees to plan.     The information in this document, created by the medical scribe for me, accurately reflects the services I personally performed and the decisions made by me. I have reviewed and approved this document for accuracy prior to leaving the patient care area.  September 6, 2017 3:50 PM    JOSEFINA HERRERA MD, MD  [facility]

## 2017-09-06 NOTE — MR AVS SNAPSHOT
After Visit Summary   9/6/2017    Erin Ashley    MRN: [id number]           Patient Information     Date Of Birth          1985        Visit Information        Provider Department      9/6/2017 1:20 PM Michelle Stone MD [facility]        Today's Diagnoses     Chronic left-sided thoracic back pain    -  1    Rib pain           Follow-ups after your visit        Additional Services     [facility], AND CHIR[facility]RACTIC REFERRAL       **This order will print in the [facility] Scheduling Office**    Physical Therapy, Hand Therapy and Chiropractic Care are available through:    *[facility]  *[facility]  *[facility]    Call one number to schedule at any of the above locations: [phone].    Your provider has referred you to: Physical Therapy at [facility] or [facility]    Indication/Reason for Referral: back pain and rib pain  Onset of Illness: several months for back pain. Rib pain is new.   Therapy Orders: Evaluate and Treat  Special Programs: None  Special Request: None    Ron Velasquez      Additional Comments for the Therapist or Chiropractor:     Please be aware that coverage of these services is subject to the terms and limitations of your health insurance plan.  Call member services at your health plan with any benefit or coverage questions.      Please bring the following to your appointment:    *Your personal calendar for scheduling future appointments  *Comfortable clothing                  Your next 10 appointments already scheduled     Sep 21, 2017  9:00 AM CDT   Return Visit with Cherie Francois MD   [facility]    [address] [place] [id number] [phone]              Future tests that were ordered for you today     Open Future Orders        Priority Expected Expires Ordered    Cardiac Event Monitor Routine 9/12/2017 9/5/2018 9/5/2017            Who to contact     If you  "have questions or need follow up information about today's clinic visit or your schedule please contact [facility] directly at [phone].  Normal or non-critical lab and imaging results will be communicated to you by MyChart, letter or phone within 4 business days after the clinic has received the results. If you do not hear from us within 7 days, please contact the clinic through [employer] or phone. If you have a critical or abnormal lab result, we will notify you by phone as soon as possible.  Submit refill requests through [employer] or call your pharmacy and they will forward the refill request to us. Please allow 3 business days for your refill to be completed.          Additional Information About Your Visit        [employer] Information     [employer] gives you secure access to your electronic health record. If you see a primary care provider, you can also send messages to your care team and make appointments. If you have questions, please call your primary care clinic.  If you do not have a primary care provider, please call [phone] and they will assist you.        Care EveryWhere ID     This is your Care EveryWhere ID. This could be used by other organizations to access your [facility] medical records  [id number]        Your Vitals Were     Pulse Temperature Respirations Height Last Period BMI (Body Mass Index)    96 99  F (37.2  C) (Temporal) 18 5' 4.17\" (1.63 m) 08/14/2017 17.02 kg/m2       Blood Pressure from Last 3 Encounters:   09/06/17 110/70   09/05/17 118/76   08/24/17 128/64    Weight from Last 3 Encounters:   09/06/17 99 lb 11.2 oz (45.2 kg)   08/24/17 105 lb (47.6 kg)   08/24/17 101 lb 14.4 oz (46.2 kg)              We Performed the Following     [facility], AND CHIROPRACTIC REFERRAL        Primary Care Provider Office Phone #    [facility] [phone]       No address on file        Equal Access to Services     ALICIA WAY : monica Mazariegos " elana flacavasquezelana kongrush ashford. So [facility] [phone].    ATENCIÓN: Si sami rodriguez, tiene a fischer disposición servicios gratuitos de asistencia lingüística. Krissy al [phone].    We comply with applicable federal civil rights laws and [place] laws. We do not discriminate on the basis of race, color, national origin, age, disability sex, sexual orientation or gender identity.            Thank you!     Thank you for choosing [facility]  for your care. Our goal is always to provide you with excellent care. Hearing back from our patients is one way we can continue to improve our services. Please take a few minutes to complete the written survey that you may receive in the mail after your visit with us. Thank you!             Your Updated Medication List - Protect others around you: Learn how to safely use, store and throw away your medicines at www.disposemymeds.org.          This list is accurate as of: 9/6/17  2:31 PM.  Always use your most recent med list.                   Brand Name Dispense Instructions for use Diagnosis    cyclobenzaprine 10 MG tablet    FLEXERIL    30 tablet    Take 0.5-1 tablets (5-10 mg) by mouth 3 times daily as needed for muscle spasms    Upper back strain, initial encounter       meloxicam 15 MG tablet    MOBIC    30 tablet    Take 1 tablet (15 mg) by mouth daily        metoprolol 25 MG 24 hr tablet    TOPROL-XL    30 tablet    Take 1 tablet (25 mg) by mouth daily        PARAGARD INTRAUTERINE COPPER IU      Reported on 4/14/2017        QUEtiapine 25 MG tablet    SEROQUEL    30 tablet    Take 1-2 tablets (25-50 mg) by mouth nightly as needed    Anxiety       valACYclovir 1000 mg tablet    VALTREX    21 tablet    Take 1 tablet (1,000 mg) by mouth 3 times daily    Herpes zoster without complication

## 2017-09-06 NOTE — NURSING NOTE
"Chief Complaint   Patient presents with     Chest Pain     left side rib pain     Panel Management       Initial /70  Pulse 96  Temp 99  F (37.2  C) (Temporal)  Resp 18  Ht 5' 4.17\" (1.63 m)  Wt 99 lb 11.2 oz (45.2 kg)  LMP 08/14/2017  BMI 17.02 kg/m2 Estimated body mass index is 17.02 kg/(m^2) as calculated from the following:    Height as of this encounter: 5' 4.17\" (1.63 m).    Weight as of this encounter: 99 lb 11.2 oz (45.2 kg).  Medication Reconciliation: complete   April COLIN Magdaleno      "

## 2017-09-07 NOTE — PROGRESS NOTES
SUBJECTIVE:                                                    Erin Aslhey is a 32 year old female who presents to clinic today for the following health issues:      HPI    Patient is establishing care today, she would like to discuss rib pain and recently low potassium  She has been experiencing left lower rib pain for the past 2-3 weeks and feels that her ribs are moving around. She states that her left lower ribs are often in different positions from one day to the next. She was seen in [place] a few days ago and was told it was likely musculoskeletal in nature and PT was recommended. She is still having a lot of pain and is wondering what is going on. She denies any fall or injury. She has also been experiencing chest pain/tightness and palpitations over the past few months and has been to the ED numerous times and is following with cardiology. All work up including an echo, EKG, abdominal ultrasound and Holter have come back negative so far but she continues to experience symptoms. She is following with cardiology and will be undergoing a cardiac MRI and 30 day event monitor. She was started on metoprolol recently for her palpitations and tachycardia and states she has been tolerating it well so far. She does state she has been noticing some early satiety lately and states she has lost weight over the past 6 months. She had a recently normal TSH and thyroid ultrasound. She does state she had a severe illness earlier this year and thinks she lost a lot of weight from that. She is also have right upper back pain with intermittent numbness over the right shoulder blade and right anterior shoulder. She denies any arm weakness.     She had a recently lower potassium level when she was in the ED. She has been trying to eat more potassium rich foods since then.     She has continued daily anxiety and nervousness, especially with all of these recent health issues and palpitations. She was started on sertraline a  few months ago but only tried it for a month and is unsure if it was successful she would like to try this again.     Problem list and histories reviewed & adjusted, as indicated.  Additional history: none      ROS:  GENERAL: +Weight loss. Denies fever, fatigue, weakness, weight gain, or weight loss.  CARDIOVASCULAR: +Left chest pain, palpitations. Denies shortness of breath, irregular heartbeats, or edema.  RESPIRATORY: Denies cough, hemoptysis, and shortness of breath.  GASTROINTESTINAL: +Early satiety. Denies nausea, vomiting, change in appetite, abdominal pain, diarrhea, or constipation.  GENITOURINARY: Denies increased frequency, urgency, dysuria, hematuria, or incontinence.   MUSCULOSKELETAL: +Left lower rib pain, left upper back pain.  NEUROLOGIC: +Left shoulder blade numbness. Denies headache, fainting, dizziness, memory loss, numbness, tingling, or seizures.    OBJECTIVE:     BP 94/60 (BP Location: Right arm, Patient Position: Chair, Cuff Size: Adult Regular)  Pulse 104  Temp 97.5  F (36.4  C) (Temporal)  Resp 16  Wt 102 lb (46.3 kg)  LMP 08/14/2017  SpO2 100%  BMI 17.41 kg/m2  Body mass index is 17.41 kg/(m^2).  GENERAL: healthy, alert and no distress  RESP: lungs clear to auscultation - no rales, rhonchi or wheezes  CV: regular rate and rhythm, normal S1 S2, no S3 or S4, no murmur, click or rub, no peripheral edema  ABDOMEN: soft, nontender, no hepatosplenomegaly, no masses and bowel sounds normal  MS: no gross musculoskeletal defects noted. There is moderate tenderness inferior to the lowest anterior ribs on the left with slight asymetry to palpation compared to the right side. No palpable masses or bruising. Tenderness over the left upper thoracic paraspinal musculature. No cervical spine tenderness.   NEURO: Normal strength and tone, mentation intact and speech normal. Cranial nerves II-XII are grossly intact. DTRs are 2+/4 throughout and symmetric. Gait is stable.     Diagnostic Test  Results:  XR Left Ribs/Chest    IMPRESSION: No evidence of acute cardiopulmonary disease or rib  fracture is seen.           ASSESSMENT/PLAN:       ICD-10-CM    1. Pain in thoracic spine M54.6 MR Thoracic Spine w/o Contrast   2. Thoracic radiculopathy M54.14 MR Thoracic Spine w/o Contrast   3. Costochondritis M94.0    4. Rib pain on left side R07.81 XR Ribs & Chest Left G/E 3 Views   5. Low serum potassium level E87.6 Potassium   6. Palpitations R00.2    7. Weight loss R63.4    8. Early satiety R68.81    9. Anxiety F41.9 sertraline (ZOLOFT) 50 MG tablet       1-2. She is having continued pain in the left upper thoracic spine along with numbness in the left scapula and pain into the left chest. I think this is secondary to thoracic radiculopathy so will order a thoracic spine MRI for further evaluation to determine if there is a herniate disc. In the meantime, I recommend continued NSAIDs along with chiropractic treatment and PT.     3-4. No abnormal findings on rib/chest x-ray. Her symptoms are consistent with costochondritis. I discussed what this is and that the best treatment is rest, ice and NSAIDs. If symptoms worsen, she will let me know.    5. Will recheck potassium today. I encouraged her to continued with a high potassium diet including a daily banana.    6. She was started on metoprolol recently but is still having palpitations. She is still slightly tachycardic in clinic today but her BP is quite low. I recommend she half the dose of metoprolol and continue to follow with cardiology as directed.    7-8. She has recently normal thyroid testing along with normal CXR, labs and abdominal US so I am unsure as to the cause of her weight loss but she states she is having early satiety and belching. She has lost approximately 20 pounds over the past 5 months based on her clinic weights. I think some of her symptoms may be due to gastric reflux so I recommend she try OTC Prilosec or Zantac for 4 weeks and see if  her symptoms improve. If not improving and she continues to lose weight, she may need an abdominal CT or possible EGD.    9. Will restart her on sertraline 50 mg daily for her anxiety. Recommend follow up in 1 month for recheck.    Greater than 50% of the 40 minute visit spent on counseling and plan of care regarding the above issues.         Hussain Ryder PA-C  [facility]

## 2017-09-08 ENCOUNTER — RADIANT APPOINTMENT (OUTPATIENT)
Dept: GENERAL RADIOLOGY | Facility: OTHER | Age: 32
End: 2017-09-08
Attending: PHYSICIAN ASSISTANT
Payer: COMMERCIAL

## 2017-09-08 ENCOUNTER — OFFICE VISIT (OUTPATIENT)
Dept: FAMILY MEDICINE | Facility: OTHER | Age: 32
End: 2017-09-08
Payer: COMMERCIAL

## 2017-09-08 VITALS
DIASTOLIC BLOOD PRESSURE: 60 MMHG | SYSTOLIC BLOOD PRESSURE: 94 MMHG | TEMPERATURE: 97.5 F | BODY MASS INDEX: 17.41 KG/M2 | OXYGEN SATURATION: 100 % | HEART RATE: 104 BPM | RESPIRATION RATE: 16 BRPM | WEIGHT: 102 LBS

## 2017-09-08 DIAGNOSIS — R63.4 WEIGHT LOSS: ICD-10-CM

## 2017-09-08 DIAGNOSIS — M94.0 COSTOCHONDRITIS: ICD-10-CM

## 2017-09-08 DIAGNOSIS — R07.81 RIB PAIN ON LEFT SIDE: ICD-10-CM

## 2017-09-08 DIAGNOSIS — R00.2 PALPITATIONS: ICD-10-CM

## 2017-09-08 DIAGNOSIS — M54.14 THORACIC RADICULOPATHY: ICD-10-CM

## 2017-09-08 DIAGNOSIS — F41.9 ANXIETY: ICD-10-CM

## 2017-09-08 DIAGNOSIS — E87.6 LOW SERUM POTASSIUM LEVEL: ICD-10-CM

## 2017-09-08 DIAGNOSIS — M54.6 PAIN IN THORACIC SPINE: Primary | ICD-10-CM

## 2017-09-08 DIAGNOSIS — R68.81 EARLY SATIETY: ICD-10-CM

## 2017-09-08 LAB — POTASSIUM SERPL-SCNC: 4.2 MMOL/L (ref 3.4–5.3)

## 2017-09-08 PROCEDURE — 36415 COLL VENOUS BLD VENIPUNCTURE: CPT | Performed by: PHYSICIAN ASSISTANT

## 2017-09-08 PROCEDURE — 84132 ASSAY OF SERUM POTASSIUM: CPT | Performed by: PHYSICIAN ASSISTANT

## 2017-09-08 PROCEDURE — 71101 X-RAY EXAM UNILAT RIBS/CHEST: CPT | Mod: LT

## 2017-09-08 PROCEDURE — 99215 OFFICE O/P EST HI 40 MIN: CPT | Performed by: PHYSICIAN ASSISTANT

## 2017-09-08 ASSESSMENT — PAIN SCALES - GENERAL: PAINLEVEL: MODERATE PAIN (4)

## 2017-09-08 NOTE — MR AVS SNAPSHOT
After Visit Summary   9/8/2017    Erin Ashley    MRN: [id number]           Patient Information     Date Of Birth          1985        Visit Information        Provider Department      9/8/2017 10:00 AM Hussain Ryder PA-C [facility]        Today's Diagnoses     Pain in thoracic spine    -  1    Thoracic radiculopathy        Costochondritis        Rib pain on left side        Low serum potassium level        Palpitations        Weight loss        Early satiety        Anxiety          Care Instructions    I think the rib pain is due to costochondritis.  I recommend rest, ice and NSAIDs as needed to help with the pain.  This will improve with time.    I think your back pain may be coming from a disc protrusion in the thoracic spine so will order an MRI for further evaluation.  I think PT or chiropractic treatment would be helpful.    Continue with a daily banana to replenish your potassium.    Take half of the metoprolol so your blood pressure does not go so low.    Restart the Zoloft.    Start a daily Zantac or Prilosec to help with the fullness feeling and belching.     Follow up in 1 month for a recheck.          Follow-ups after your visit        Your next 10 appointments already scheduled     Sep 21, 2017  9:00 AM CDT   Return Visit with Cherie Francois MD   [facility] ([address] [id number] [phone]            Sep 26, 2017  8:00 AM CDT   Office Visit with Pravin Zamarripa MD   [facility] ([address] [id number] [phone]           Bring a current list of meds and any records pertaining to this visit. For Physicals, please bring immunization records and any forms needing to be filled out. Please arrive 10 minutes early to complete paperwork.              Future tests that were ordered for you today     Open Future Orders         Priority Expected Expires Ordered    MR Thoracic Spine w/o Contrast Routine  9/8/2018 9/8/2017            Who to contact     If you have questions or need follow up information about today's clinic visit or your schedule please contact [facility] directly at [phone].  Normal or non-critical lab and imaging results will be communicated to you by MyChart, letter or phone within 4 business days after the clinic has received the results. If you do not hear from us within 7 days, please contact the clinic through [employer] or phone. If you have a critical or abnormal lab result, we will notify you by phone as soon as possible.  Submit refill requests through [employer] or call your pharmacy and they will forward the refill request to us. Please allow 3 business days for your refill to be completed.          Additional Information About Your Visit        MyChart Information     [employer] gives you secure access to your electronic health record. If you see a primary care provider, you can also send messages to your care team and make appointments. If you have questions, please call your primary care clinic.  If you do not have a primary care provider, please call [phone] and they will assist you.        Care EveryWhere ID     This is your Care EveryWhere ID. This could be used by other organizations to access your [facility] medical records  [id number]        Your Vitals Were     Pulse Temperature Respirations Last Period Pulse Oximetry BMI (Body Mass Index)    104 97.5  F (36.4  C) (Temporal) 16 08/14/2017 100% 17.41 kg/m2       Blood Pressure from Last 3 Encounters:   09/08/17 94/60   09/06/17 110/70   09/05/17 118/76    Weight from Last 3 Encounters:   09/08/17 102 lb (46.3 kg)   09/06/17 99 lb 11.2 oz (45.2 kg)   08/24/17 105 lb (47.6 kg)              We Performed the Following     Potassium          Today's Medication Changes          These changes are accurate as of: 9/8/17 11:05 AM.  If you  have any questions, ask your nurse or doctor.               Start taking these medicines.        Dose/Directions    sertraline 50 MG tablet   Commonly known as:  ZOLOFT   Used for:  Anxiety   Started by:  Hussain Ryder PA-C        Dose:  50 mg   Take 1 tablet (50 mg) by mouth daily   Quantity:  30 tablet   Refills:  5            Where to get your medicines      These medications were sent to [facility] [address] [facility] - [place], [place] - [address]  [address] [address]     Phone:  [phone]     sertraline 50 MG tablet                Primary Care Provider Office Phone #    [facility] [phone]       No address on file        Equal Access to Services     [facility] RAYNA : Hadii eran Gomes, waaxda luqadaha, qaybta kaalmada adeanjelicayada, rush mcclain . So [facility] [phone].    ATENCIÓN: Si habla español, tiene a fischer disposición servicios gratuitos de asistencia lingüística. Llame al [phone].    We comply with applicable federal civil rights laws and [place] laws. We do not discriminate on the basis of race, color, national origin, age, disability sex, sexual orientation or gender identity.            Thank you!     Thank you for choosing [facility]  for your care. Our goal is always to provide you with excellent care. Hearing back from our patients is one way we can continue to improve our services. Please take a few minutes to complete the written survey that you may receive in the mail after your visit with us. Thank you!             Your Updated Medication List - Protect others around you: Learn how to safely use, store and throw away your medicines at www.disposemymeds.org.          This list is accurate as of: 9/8/17 11:05 AM.  Always use your most recent med list.                   Brand Name Dispense Instructions for use Diagnosis    cyclobenzaprine 10 MG tablet    FLEXERIL    30 tablet    Take 0.5-1 tablets (5-10 mg) by  mouth 3 times daily as needed for muscle spasms    Upper back strain, initial encounter       meloxicam 15 MG tablet    MOBIC    30 tablet    Take 1 tablet (15 mg) by mouth daily        metoprolol 25 MG 24 hr tablet    TOPROL-XL    30 tablet    Take 1 tablet (25 mg) by mouth daily        PARAGARD INTRAUTERINE COPPER IU      Reported on 4/14/2017        QUEtiapine 25 MG tablet    SEROQUEL    30 tablet    Take 1-2 tablets (25-50 mg) by mouth nightly as needed    Anxiety       sertraline 50 MG tablet    ZOLOFT    30 tablet    Take 1 tablet (50 mg) by mouth daily    Anxiety       valACYclovir 1000 mg tablet    VALTREX    21 tablet    Take 1 tablet (1,000 mg) by mouth 3 times daily    Herpes zoster without complication

## 2017-09-08 NOTE — NURSING NOTE
"Chief Complaint   Patient presents with     Establish Care     Panel Management     flu       Initial BP 94/60 (BP Location: Right arm, Patient Position: Chair, Cuff Size: Adult Regular)  Pulse 104  Temp 97.5  F (36.4  C) (Temporal)  Resp 16  Wt 102 lb (46.3 kg)  LMP 08/14/2017  SpO2 100%  BMI 17.41 kg/m2 Estimated body mass index is 17.41 kg/(m^2) as calculated from the following:    Height as of 9/6/17: 5' 4.17\" (1.63 m).    Weight as of this encounter: 102 lb (46.3 kg).  Medication Reconciliation: complete       "

## 2017-09-08 NOTE — PATIENT INSTRUCTIONS
I think the rib pain is due to costochondritis.  I recommend rest, ice and NSAIDs as needed to help with the pain.  This will improve with time.    I think your back pain may be coming from a disc protrusion in the thoracic spine so will order an MRI for further evaluation.  I think PT or chiropractic treatment would be helpful.    Continue with a daily banana to replenish your potassium.    Take half of the metoprolol so your blood pressure does not go so low.    Restart the Zoloft.    Start a daily Zantac or Prilosec to help with the fullness feeling and belching.     Follow up in 1 month for a recheck.

## 2017-09-11 ENCOUNTER — TELEPHONE (OUTPATIENT)
Dept: OTHER | Facility: CLINIC | Age: 32
End: 2017-09-11

## 2017-09-11 ENCOUNTER — MYC MEDICAL ADVICE (OUTPATIENT)
Dept: FAMILY MEDICINE | Facility: OTHER | Age: 32
End: 2017-09-11

## 2017-09-11 DIAGNOSIS — R07.89 CHEST WALL PAIN: Primary | ICD-10-CM

## 2017-09-11 NOTE — TELEPHONE ENCOUNTER
Clinical Product Navigator RN reviewed chart; patient on payer product coverage.  Review results: Met referral criteria for Care Coordinator; referral to be sent.    Pt has had 3 ED visits in the past month.  Please assess for needs and access concerns.    Rashmi Johnson RN/Clinical Product Navigator

## 2017-09-11 NOTE — TELEPHONE ENCOUNTER
LM for the patient to return call to the clinic to discuss the below. Will await to hear from patient. Renetta Petersen RN, BSN     Responded via [employer]. Renetta Petersen RN, BSN

## 2017-09-12 ENCOUNTER — TELEPHONE (OUTPATIENT)
Dept: FAMILY MEDICINE | Facility: OTHER | Age: 32
End: 2017-09-12

## 2017-09-12 ENCOUNTER — CARE COORDINATION (OUTPATIENT)
Dept: CARE COORDINATION | Facility: CLINIC | Age: 32
End: 2017-09-12

## 2017-09-12 DIAGNOSIS — M54.14 THORACIC RADICULOPATHY: Primary | ICD-10-CM

## 2017-09-12 NOTE — TELEPHONE ENCOUNTER
Reason for Call:  Medication or medication refill:    Do you use a [facility]?  Name of the pharmacy and phone number for the current request:  will  at the     Name of the medication requested: something for claustrophobic and having MRI 9/13/17    Other request: She would like to pick RX at the [facility]  please call when ready    Can we leave a detailed message on this number? YES    Phone number patient can be reached at: Home number on file [phone] (home)    Best Time: any    Call taken on 9/12/2017 at 4:20 PM by Paty Melgar

## 2017-09-12 NOTE — TELEPHONE ENCOUNTER
Patient did not go to the ED last night, ended up falling asleep and feels like she is now sleeping more than her normal. Stated she is still very distended and round in her stomach and this is not normal for her. Abdominal pain is now only tender and distended. Pressure applied to the stomach is tender. Encouraged the patient to be evaluated in the ED. Patient stated she does not feel that she is taken serious at the [facility], discussed other ED options. Patient stated she would be evaluated at the [facility] this afternoon is no improvement. Renetta Petersen, RN, BSN

## 2017-09-12 NOTE — PROGRESS NOTES
Clinic Care Coordination Contact    Situation: Patient chart reviewed by care coordinator.    Background: Clinical Product Navigator RN reviewed chart; patient on payer product coverage.  Review results: Met referral criteria for Care Coordinator; referral to be sent.     Pt has had 3 ED visits in the past month.  Please assess for needs and access concerns.     Rashmi Johnson RN/Clinical Product Navigator             Plan: Writer will outreach patient in 1 to 2 business days    Bassam Sesay RN  Clinic Care Coordinator  [facility]  [phone]

## 2017-09-12 NOTE — TELEPHONE ENCOUNTER
LM for the patient to return call to the clinic to discuss the below. At this time the [employer] message sent at 2:44pm has not been read. Will await to hear from patient. Renetta Petersen RN, BSN

## 2017-09-13 ENCOUNTER — HOSPITAL ENCOUNTER (OUTPATIENT)
Dept: MRI IMAGING | Facility: CLINIC | Age: 32
Discharge: HOME | End: 2017-09-13
Attending: PHYSICIAN ASSISTANT | Admitting: PHYSICIAN ASSISTANT
Payer: COMMERCIAL

## 2017-09-13 ENCOUNTER — MYC MEDICAL ADVICE (OUTPATIENT)
Dept: FAMILY MEDICINE | Facility: OTHER | Age: 32
End: 2017-09-13

## 2017-09-13 DIAGNOSIS — M54.6 PAIN IN THORACIC SPINE: ICD-10-CM

## 2017-09-13 DIAGNOSIS — M54.14 THORACIC RADICULOPATHY: ICD-10-CM

## 2017-09-13 PROCEDURE — 72146 MRI CHEST SPINE W/O DYE: CPT

## 2017-09-13 RX ORDER — DIAZEPAM 10 MG
TABLET ORAL
Qty: 1 TABLET | Refills: 0 | Status: SHIPPED | OUTPATIENT
Start: 2017-09-13 | End: 2017-09-21

## 2017-09-14 ENCOUNTER — CARE COORDINATION (OUTPATIENT)
Dept: CARE COORDINATION | Facility: CLINIC | Age: 32
End: 2017-09-14

## 2017-09-14 DIAGNOSIS — Z76.89 HEALTH CARE HOME: ICD-10-CM

## 2017-09-14 DIAGNOSIS — R07.9 CHEST PAIN, UNSPECIFIED TYPE: Primary | ICD-10-CM

## 2017-09-14 NOTE — PROGRESS NOTES
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. ED visits centered around chest and abdominal pain. She is getting worked up by cardiology. This to include 30 day event monitor and cardiac MRI She tried top schedule cardiac MRI but was told there was no order. Writer Spoke with Vera Chaney RN Cardiac Care Coordinator about this. There is a pending order so this is why it could not be ordered. She placed new order. She will need to have this done at [facility] [phone] or [facility] [phone]    Future Appointments      Provider Department Center   9/21/2017 9:00 AM Cherie Francois MD [facility]   9/21/2017 2:45 PM PH EVENT/HOLTER MONITOR [facility] [place]              Plan:Will outreach patient 9/15 to advise on MRI scheduling    Bassam Sesay RN  Clinic Care Coordinator  [facility]  [phone]

## 2017-09-15 PROBLEM — Z76.89 HEALTH CARE HOME: Status: ACTIVE | Noted: 2017-09-15

## 2017-09-15 NOTE — PROGRESS NOTES
Clinic Care Coordination Contact  [facility]/Voicemail    Referral Source: [employer] Outreach  Clinical Data: Care Coordinator Outreach  Outreach attempted x 1.  Left message on voicemail with call back information and requested return call. Included number to [facility] scheduling for MRI  Plan:  Care Coordinator will try to reach patient again in 1-2 business days.    Bassam Sesay RN  Clinic Care Coordinator  [facility]  [phone]

## 2017-09-21 ENCOUNTER — OFFICE VISIT (OUTPATIENT)
Dept: CARDIOLOGY | Facility: CLINIC | Age: 32
End: 2017-09-21
Payer: COMMERCIAL

## 2017-09-21 ENCOUNTER — CARE COORDINATION (OUTPATIENT)
Dept: CARE COORDINATION | Facility: CLINIC | Age: 32
End: 2017-09-21

## 2017-09-21 VITALS
HEIGHT: 64 IN | SYSTOLIC BLOOD PRESSURE: 100 MMHG | BODY MASS INDEX: 17 KG/M2 | DIASTOLIC BLOOD PRESSURE: 70 MMHG | HEART RATE: 122 BPM | WEIGHT: 99.6 LBS | OXYGEN SATURATION: 100 %

## 2017-09-21 DIAGNOSIS — R00.0 SINUS TACHYCARDIA: ICD-10-CM

## 2017-09-21 PROCEDURE — 99214 OFFICE O/P EST MOD 30 MIN: CPT | Performed by: INTERNAL MEDICINE

## 2017-09-21 NOTE — PROGRESS NOTES
HPI and Plan:   See dictation    No orders of the defined types were placed in this encounter.      No orders of the defined types were placed in this encounter.      Medications Discontinued During This Encounter   Medication Reason     diazepam (VALIUM) 10 MG tablet Therapy completed     valACYclovir (VALTREX) 1000 mg tablet Therapy completed         Encounter Diagnosis   Name Primary?     Sinus tachycardia        CURRENT MEDICATIONS:  Current Outpatient Prescriptions   Medication Sig Dispense Refill     sertraline (ZOLOFT) 50 MG tablet Take 1 tablet (50 mg) by mouth daily 30 tablet 5     metoprolol (TOPROL-XL) 25 MG 24 hr tablet Take 1 tablet (25 mg) by mouth daily 30 tablet 0     QUEtiapine (SEROQUEL) 25 MG tablet Take 1-2 tablets (25-50 mg) by mouth nightly as needed 30 tablet 1     cyclobenzaprine (FLEXERIL) 10 MG tablet Take 0.5-1 tablets (5-10 mg) by mouth 3 times daily as needed for muscle spasms 30 tablet 1     PARAGARD INTRAUTERINE COPPER IU Reported on 4/14/2017       meloxicam (MOBIC) 15 MG tablet Take 1 tablet (15 mg) by mouth daily (Patient not taking: Reported on 9/21/2017) 30 tablet 0       ALLERGIES     Allergies   Allergen Reactions     Hydrocodone Other (See Comments)     Migraines     Oxycodone Nausea and Vomiting       PAST MEDICAL HISTORY:  Past Medical History:   Diagnosis Date     Abnormal Pap smear      Appendicitis with perforation 08/04/08    Admit. DIscharged 08/07/08     Back pain      TALIB II (cervical intraepithelial neoplasia II) 2008 5/29/08 on coloposcopy bx TALIB 2/3.  CKC on 6/13/08 showed TALIB 1/2 *yearly paps are indicated*     Insomnia      Renal calculus or stone        PAST SURGICAL HISTORY:  Past Surgical History:   Procedure Laterality Date     APPENDECTOMY  8/4/2008     EXAM UNDER ANESTHESIA RECTUM N/A 2/24/2017    Procedure: EXAM UNDER ANESTHESIA RECTUM;  Surgeon: Britton Palomo MD;  Location: [facility] OR     HC CONIZATION CERVIX,KNIFE/LASER  06/130/8    cervix.  TALIB 1/2      HEMORRHOIDECTOMY EXTERNAL N/A 2/24/2017    Procedure: HEMORRHOIDECTOMY EXTERNAL;  Surgeon: Britton Palomo MD;  Location: [facility] OR     TONSILLECTOMY  7/3/2007       FAMILY HISTORY:  Family History   Problem Relation Age of Onset     Hypertension Maternal Grandmother      borderline     Lipids Maternal Grandmother      Respiratory Maternal Grandmother      COPD     Lung Cancer Maternal Grandmother      Hypertension Maternal Grandfather      borderline     CANCER Maternal Grandfather      lung cancer, smoker     GASTROINTESTINAL DISEASE Mother      cholecystitis     Lipids Mother      Prostate Cancer Paternal Grandfather      Hearing Loss Paternal Grandfather      Other Cancer Paternal Grandmother      pancreatic cancer     Unknown/Adopted No family hx of      Asthma No family hx of      C.A.D. No family hx of      DIABETES No family hx of      CEREBROVASCULAR DISEASE No family hx of      Breast Cancer No family hx of      Cancer - colorectal No family hx of      Alcohol/Drug No family hx of      Allergies No family hx of      Alzheimer Disease No family hx of      Anesthesia Reaction No family hx of      Arthritis No family hx of      Blood Disease No family hx of      Cardiovascular No family hx of      Circulatory No family hx of      Congenital Anomalies No family hx of      Connective Tissue Disorder No family hx of      Depression No family hx of      Genetic Disorder No family hx of      Genitourinary Problems No family hx of      Gynecology No family hx of      HEART DISEASE No family hx of      Musculoskeletal Disorder No family hx of      Neurologic Disorder No family hx of      Obesity No family hx of      OSTEOPOROSIS No family hx of      Psychotic Disorder No family hx of        SOCIAL HISTORY:  Social History     Social History     Marital status:      Spouse name: N/A     Number of children: 0     Years of education: 16     Occupational History     RN Other     Guardian [employer]      Guardian  "[employer]     Social History Main Topics     Smoking status: Never Smoker     Smokeless tobacco: Never Used      Comment: no smokers in the household     Alcohol use 0.0 oz/week     0 Standard drinks or equivalent per week      Comment: OCC.     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     Hobby Hazards No     Sleep Concern No     Stress Concern No     Weight Concern No     Special Diet No     Back Care No     Exercise No     Bike Helmet No     Seat Belt Yes     Self-Exams Yes     know BSE     Parent/Sibling W/ Cabg, Mi Or Angioplasty Before 65f 55m? No     Social History Narrative     to [place] and lives in [place] with their daughter.  No indoor cats/kittens.  No concerns about domestic violence.  No smokers in the home.       Review of Systems:  Skin:  Negative       Eyes:  Negative      ENT:  Negative      Respiratory:  Negative       Cardiovascular:    Positive for;palpitations;chest pain;lightheadedness;dizziness palp. a lot less frequent since starting metoprolol, pressure mid sternal of chest but is getting better   Gastroenterology: Negative      Genitourinary:  Negative      Musculoskeletal:  Positive for back pain    Neurologic:  Positive for headaches off and on  Psychiatric:  Positive for sleep disturbances works night  Heme/Lymph/Imm:  Positive for allergies    Endocrine:  Negative        Physical Exam:  Vitals: /70 (BP Location: Right arm, Patient Position: Fowlers, Cuff Size: Adult Regular)  Pulse 122  Ht 1.626 m (5' 4\")  Wt 45.2 kg (99 lb 9.6 oz)  LMP 08/14/2017  SpO2 100%  BMI 17.1 kg/m2    Constitutional:  cooperative, alert and oriented, well developed, well nourished, in no acute distress        Skin:  warm and dry to the touch, no apparent skin lesions or masses " noted        Head:  normocephalic, no masses or lesions        Eyes:  pupils equal and round, conjunctivae and lids unremarkable, sclera white, no xanthalasma, EOMS intact, no nystagmus        ENT:  no pallor or cyanosis, dentition good        Neck:  carotid pulses are full and equal bilaterally, JVP normal, no carotid bruit, no thyromegaly        Chest:  normal breath sounds, clear to auscultation, normal A-P diameter, normal symmetry, normal respiratory excursion, no use of accessory muscles          Cardiac: regular rhythm, normal S1/S2, no S3 or S4, apical impulse not displaced, no murmurs, gallops or rubs                  Abdomen:  abdomen soft, non-tender, BS normoactive, no mass, no HSM, no bruits        Vascular: pulses full and equal, no bruits auscultated                                        Extremities and Back:  no deformities, clubbing, cyanosis, erythema observed              Neurological:  affect appropriate, oriented to time, person and place              CC  Cherie Francois MD  [address]  [id number]  [place] [id number]

## 2017-09-21 NOTE — LETTER
9/21/2017    [facility]      RE: Erin Ashley       Dear Colleague,    I had the pleasure of seeing Erin Ashley in the [facility].    I saw Ms. Ashley for followup of palpitations.  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  At that time the physical examination and EKG were unremarkable.  Her Holter was unremarkable as well.  She subsequently had an echocardiography that showed normal LV ejection fraction with possible mild pericardial effusion.  I reviewed the echo imaging and do not think the patient has pericardial effusion.  The dark echo window over the apex could be pericardial fatty tissue with no evidence of fluid around other areas of the heart.        The patient had an intolerance for metoprolol 50 mg once a day.  After the dosing reduction, she has felt better.  In fact, she has been off metoprolol intermittently on some days.  Overall, she has felt significant improvement.  She is now back to her normal activities without restriction.      She has been trying to gain weight, but has not been able to achieve the expected results.  On the other hand, her body weight has been relatively stable for the last several months.  There is no other etiology for weight loss.      Her blood pressure and weight are both stable today without other new findings.     Outpatient Encounter Prescriptions as of 9/21/2017   Medication Sig Dispense Refill     sertraline (ZOLOFT) 50 MG tablet Take 1 tablet (50 mg) by mouth daily 30 tablet 5     metoprolol (TOPROL-XL) 25 MG 24 hr tablet Take 1 tablet (25 mg) by mouth daily 30 tablet 0     QUEtiapine (SEROQUEL) 25 MG tablet Take 1-2 tablets (25-50 mg) by mouth nightly as needed 30 tablet 1     cyclobenzaprine (FLEXERIL) 10 MG tablet Take 0.5-1 tablets (5-10 mg) by mouth 3 times daily as needed for muscle spasms 30 tablet 1     PARAGARD INTRAUTERINE COPPER IU Reported on 4/14/2017        [DISCONTINUED] diazepam (VALIUM) 10 MG tablet Take 30-60 minutes prior to MRI 1 tablet 0     meloxicam (MOBIC) 15 MG tablet Take 1 tablet (15 mg) by mouth daily (Patient not taking: Reported on 9/21/2017) 30 tablet 0     [DISCONTINUED] valACYclovir (VALTREX) 1000 mg tablet Take 1 tablet (1,000 mg) by mouth 3 times daily (Patient not taking: Reported on 9/8/2017) 21 tablet 0     No facility-administered encounter medications on file as of 9/21/2017.       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  Her description appears to suggest intermittent PVCs.  So far the Holter monitor and echocardiogram are unremarkable.  I do not recommend further intervention.  She can use metoprolol low dose p.r.n. and eventually stop the medicine if she continues to do well.  She does not require Cardiology followup.  She can contact our office for assistance in the future.     Again, thank you for allowing me to participate in the care of your patient.      Sincerely,    Cherie Francois MD     [facility]

## 2017-09-21 NOTE — MR AVS SNAPSHOT
"              After Visit Summary   9/21/2017    Erin Ashley    MRN: [id number]           Patient Information     Date Of Birth          1985        Visit Information        Provider Department      9/21/2017 9:00 AM Cherie Francois MD [facility]        Today's Diagnoses     Sinus tachycardia           Follow-ups after your visit        Who to contact     If you have questions or need follow up information about today's clinic visit or your schedule please contact [facility] directly at [phone].  Normal or non-critical lab and imaging results will be communicated to you by [employer], letter or phone within 4 business days after the clinic has received the results. If you do not hear from us within 7 days, please contact the clinic through [employer] or phone. If you have a critical or abnormal lab result, we will notify you by phone as soon as possible.  Submit refill requests through [employer] or call your pharmacy and they will forward the refill request to us. Please allow 3 business days for your refill to be completed.          Additional Information About Your Visit        MyChart Information     [facility] [employer] gives you secure access to your electronic health record. If you see a primary care provider, you can also send messages to your care team and make appointments. If you have questions, please call your primary care clinic.  If you do not have a primary care provider, please call [phone] and they will assist you.        Care EveryWhere ID     This is your Care EveryWhere ID. This could be used by other organizations to access your [facility] medical records  [id number]        Your Vitals Were     Pulse Height Last Period Pulse Oximetry BMI (Body Mass Index)       122 1.626 m (5' 4\") 08/14/2017 100% 17.1 kg/m2        Blood Pressure from Last 3 Encounters:   09/21/17 100/70   09/08/17 94/60   09/06/17 110/70    Weight from Last 3 Encounters:   09/21/17 45.2 kg (99 lb 9.6 " oz)   09/08/17 46.3 kg (102 lb)   09/06/17 45.2 kg (99 lb 11.2 oz)              We Performed the Following     Follow-Up with Electrophysiologist        Primary Care Provider Office Phone # Fax #    [facility] [phone] [address] [facility] [address]        Equal Access to Services     K[address] : Hadii aad ku hadasho Soomaali, waaxda luqadaha, qaybta kaalmada adeegyada, waxay idiin hayaan adeeg edouardsladegiovanni lalauren ah. So [facility] [phone].    ATENCIÓN: Si alaynala jennifer, tiene a fischer disposición servicios gratuitos de asistencia lingüística. Krissy al [phone].    We comply with applicable federal civil rights laws and [place] laws. We do not discriminate on the basis of race, color, national origin, age, disability, sex, sexual orientation, or gender identity.            Thank you!     Thank you for choosing [facility]  for your care. Our goal is always to provide you with excellent care. Hearing back from our patients is one way we can continue to improve our services. Please take a few minutes to complete the written survey that you may receive in the mail after your visit with us. Thank you!             Your Updated Medication List - Protect others around you: Learn how to safely use, store and throw away your medicines at www.disposemymeds.org.          This list is accurate as of: 9/21/17 11:59 PM.  Always use your most recent med list.                   Brand Name Dispense Instructions for use Diagnosis    cyclobenzaprine 10 MG tablet    FLEXERIL    30 tablet    Take 0.5-1 tablets (5-10 mg) by mouth 3 times daily as needed for muscle spasms    Upper back strain, initial encounter       meloxicam 15 MG tablet    MOBIC    30 tablet    Take 1 tablet (15 mg) by mouth daily        metoprolol 25 MG 24 hr tablet    TOPROL-XL    30 tablet    Take 1 tablet (25 mg) by mouth daily        PARAGARD INTRAUTERINE COPPER IU      Reported on 4/14/2017         QUEtiapine 25 MG tablet    SEROQUEL    30 tablet    Take 1-2 tablets (25-50 mg) by mouth nightly as needed    Anxiety       sertraline 50 MG tablet    ZOLOFT    30 tablet    Take 1 tablet (50 mg) by mouth daily    Anxiety

## 2017-09-21 NOTE — PROGRESS NOTES
Clinic Care Coordination Contact  OUTREACH    Referral Information:  Referral Source: [employer]reach  Reason for Contact: Cardiac follow up visit  The following note from today's Dr Francois appointment:    Office Visit  Open      9/21/2017  [facility]    Cherie Francois MD   Cardiology    Sinus tachycardia   Dx    Heart Problem ; Referred by Cherie Francois MD   Reason for visit    Progress Notes      HISTORY OF PRESENT ILLNESS:  I saw Ms. Ashley for followup of palpitations.  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  At that time the physical examination and EKG were unremarkable.  Her Holter was unremarkable as well.  She subsequently had an echocardiography that showed normal LV ejection fraction with possible mild pericardial effusion.  I reviewed the echo imaging and do not think the patient has pericardial effusion.  The dark echo window over the apex could be pericardial fatty tissue with no evidence of fluid around other areas of the heart.         The patient had an intolerance for metoprolol 50 mg once a day.  After the dosing reduction, she has felt better.  In fact, she has been off metoprolol intermittently on some days.  Overall, she has felt significant improvement.  She is now back to her normal activities without restriction.       She has been trying to gain weight, but has not been able to achieve the expected results.  On the other hand, her body weight has been relatively stable for the last several months.  There is no other etiology for weight loss.       Her blood pressure and weight are both stable today without other new findings.       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  Her description appears to suggest intermittent PVCs.  So far the Holter monitor and echocardiogram are unremarkable.  I do not recommend further intervention.  She can use metoprolol low  dose p.r.n. and eventually stop the medicine if she continues to do well.  She does not require Cardiology followup.  She can contact our office for assistance in the future.           ALFIE NARAYANAN MD                D: 09/21/2017 09:42   T: 09/21/2017 12:00   MT: ALANA                 Universal Utilization:   ED Visits in last year: 5  Hospital visits in last year: 2  Last PCP appointment: 09/08/17        Multiple Providers or Specialists: cards    Clinical Concerns:  Current Medical Concerns: Writer spoke with patient. As above note indicates she is feeling better now the the metoprolol dose reduced. The cardiac MRI and Event Monitor testing not needed.She is aware she can f/u with Cardiology if these symptoms return or others develop.      Plan: Erin verified she has my contact information for any primary clinic needs. No further outreach indicated    Bassam Sesay RN  Clinic Care Coordinator  [facility] [facility]  [phone]

## 2017-09-21 NOTE — PROGRESS NOTES
HISTORY OF PRESENT ILLNESS:  I saw Ms. Bocanegra for followup of palpitations.  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  At that time the physical examination and EKG were unremarkable.  Her Holter was unremarkable as well.  She subsequently had an echocardiography that showed normal LV ejection fraction with possible mild pericardial effusion.  I reviewed the echo imaging and do not think the patient has pericardial effusion.  The dark echo window over the apex could be pericardial fatty tissue with no evidence of fluid around other areas of the heart.        The patient had an intolerance for metoprolol 50 mg once a day.  After the dosing reduction, she has felt better.  In fact, she has been off metoprolol intermittently on some days.  Overall, she has felt significant improvement.  She is now back to her normal activities without restriction.      She has been trying to gain weight, but has not been able to achieve the expected results.  On the other hand, her body weight has been relatively stable for the last several months.  There is no other etiology for weight loss.      Her blood pressure and weight are both stable today without other new findings.      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  Her description appears to suggest intermittent PVCs.  So far the Holter monitor and echocardiogram are unremarkable.  I do not recommend further intervention.  She can use metoprolol low dose p.r.n. and eventually stop the medicine if she continues to do well.  She does not require Cardiology followup.  She can contact our office for assistance in the future.         ALFIE NARAYANAN MD             D: 2017 09:42   T: 2017 12:00   MT: ALANA      Name:     FELIPE BOCANEGRA   MRN:      [id number]        Account:      [id number]   :      1985           Service Date: 2017       Document: [id number]

## 2017-09-21 NOTE — LETTER
9/21/2017      RE: Erin Ashley  [id number] [facility] [address]       Dear Colleague,    Thank you for the opportunity to participate in the care of your patient, Erin Ashley, at the [facility]. Please see a copy of my visit note below.    HPI and Plan:     No orders of the defined types were placed in this encounter.      No orders of the defined types were placed in this encounter.      Medications Discontinued During This Encounter   Medication Reason     diazepam (VALIUM) 10 MG tablet Therapy completed     valACYclovir (VALTREX) 1000 mg tablet Therapy completed         Encounter Diagnosis   Name Primary?     Sinus tachycardia        CURRENT MEDICATIONS:  Current Outpatient Prescriptions   Medication Sig Dispense Refill     sertraline (ZOLOFT) 50 MG tablet Take 1 tablet (50 mg) by mouth daily 30 tablet 5     metoprolol (TOPROL-XL) 25 MG 24 hr tablet Take 1 tablet (25 mg) by mouth daily 30 tablet 0     QUEtiapine (SEROQUEL) 25 MG tablet Take 1-2 tablets (25-50 mg) by mouth nightly as needed 30 tablet 1     cyclobenzaprine (FLEXERIL) 10 MG tablet Take 0.5-1 tablets (5-10 mg) by mouth 3 times daily as needed for muscle spasms 30 tablet 1     PARAGARD INTRAUTERINE COPPER IU Reported on 4/14/2017       meloxicam (MOBIC) 15 MG tablet Take 1 tablet (15 mg) by mouth daily (Patient not taking: Reported on 9/21/2017) 30 tablet 0       ALLERGIES     Allergies   Allergen Reactions     Hydrocodone Other (See Comments)     Migraines     Oxycodone Nausea and Vomiting       PAST MEDICAL HISTORY:  Past Medical History:   Diagnosis Date     Abnormal Pap smear      Appendicitis with perforation 08/04/08    Admit. DIscharged 08/07/08     Back pain      TALIB II (cervical intraepithelial neoplasia II) 2008 5/29/08 on coloposcopy bx TALIB 2/3.  CKC on 6/13/08 showed TALIB 1/2 *yearly paps are indicated*     Insomnia      Renal calculus or stone        PAST  SURGICAL HISTORY:  Past Surgical History:   Procedure Laterality Date     APPENDECTOMY  8/4/2008     EXAM UNDER ANESTHESIA RECTUM N/A 2/24/2017    Procedure: EXAM UNDER ANESTHESIA RECTUM;  Surgeon: Britton Palomo MD;  Location: [facility] OR     HC CONIZATION CERVIX,KNIFE/LASER  06/130/8    cervix.  TALIB 1/2     HEMORRHOIDECTOMY EXTERNAL N/A 2/24/2017    Procedure: HEMORRHOIDECTOMY EXTERNAL;  Surgeon: Britton Palomo MD;  Location: [facility] OR     TONSILLECTOMY  7/3/2007       FAMILY HISTORY:  Family History   Problem Relation Age of Onset     Hypertension Maternal Grandmother      borderline     Lipids Maternal Grandmother      Respiratory Maternal Grandmother      COPD     Lung Cancer Maternal Grandmother      Hypertension Maternal Grandfather      borderline     CANCER Maternal Grandfather      lung cancer, smoker     GASTROINTESTINAL DISEASE Mother      cholecystitis     Lipids Mother      Prostate Cancer Paternal Grandfather      Hearing Loss Paternal Grandfather      Other Cancer Paternal Grandmother      pancreatic cancer     Unknown/Adopted No family hx of      Asthma No family hx of      C.A.D. No family hx of      DIABETES No family hx of      CEREBROVASCULAR DISEASE No family hx of      Breast Cancer No family hx of      Cancer - colorectal No family hx of      Alcohol/Drug No family hx of      Allergies No family hx of      Alzheimer Disease No family hx of      Anesthesia Reaction No family hx of      Arthritis No family hx of      Blood Disease No family hx of      Cardiovascular No family hx of      Circulatory No family hx of      Congenital Anomalies No family hx of      Connective Tissue Disorder No family hx of      Depression No family hx of      Genetic Disorder No family hx of      Genitourinary Problems No family hx of      Gynecology No family hx of      HEART DISEASE No family hx of      Musculoskeletal Disorder No family hx of      Neurologic Disorder No family hx of      Obesity No family hx of       OSTEOPOROSIS No family hx of      Psychotic Disorder No family hx of        SOCIAL HISTORY:  Social History     Social History     Marital status:      Spouse name: N/A     Number of children: 0     Years of education: 16     Occupational History     RN Other     [employer]     Social History Main Topics     Smoking status: Never Smoker     Smokeless tobacco: Never Used      Comment: no smokers in the household     Alcohol use 0.0 oz/week     0 Standard drinks or equivalent per week      Comment: OCC.     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     Hobby Hazards No     Sleep Concern No     Stress Concern No     Weight Concern No     Special Diet No     Back Care No     Exercise No     Bike Helmet No     Seat Belt Yes     Self-Exams Yes     know BSE     Parent/Sibling W/ Cabg, Mi Or Angioplasty Before 65f 55m? No     Social History Narrative     to [place] and lives in [place] with their daughter.  No indoor cats/kittens.  No concerns about domestic violence.  No smokers in the home.       Review of Systems:  Skin:  Negative       Eyes:  Negative      ENT:  Negative      Respiratory:  Negative       Cardiovascular:    Positive for;palpitations;chest pain;lightheadedness;dizziness palp. a lot less frequent since starting metoprolol, pressure mid sternal of chest but is getting better   Gastroenterology: Negative      Genitourinary:  Negative      Musculoskeletal:  Positive for back pain    Neurologic:  Positive for headaches off and on  Psychiatric:  Positive for sleep disturbances works night  Heme/Lymph/Imm:  Positive for allergies    Endocrine:  Negative        Physical Exam:  Vitals: /70 (BP Location: Right arm, Patient Position: Fowlers, Cuff Size:  "Adult Regular)  Pulse 122  Ht 1.626 m (5' 4\")  Wt 45.2 kg (99 lb 9.6 oz)  LMP 08/14/2017  SpO2 100%  BMI 17.1 kg/m2    Constitutional:  cooperative, alert and oriented, well developed, well nourished, in no acute distress        Skin:  warm and dry to the touch, no apparent skin lesions or masses noted        Head:  normocephalic, no masses or lesions        Eyes:  pupils equal and round, conjunctivae and lids unremarkable, sclera white, no xanthalasma, EOMS intact, no nystagmus        ENT:  no pallor or cyanosis, dentition good        Neck:  carotid pulses are full and equal bilaterally, JVP normal, no carotid bruit, no thyromegaly        Chest:  normal breath sounds, clear to auscultation, normal A-P diameter, normal symmetry, normal respiratory excursion, no use of accessory muscles          Cardiac: regular rhythm, normal S1/S2, no S3 or S4, apical impulse not displaced, no murmurs, gallops or rubs                  Abdomen:  abdomen soft, non-tender, BS normoactive, no mass, no HSM, no bruits        Vascular: pulses full and equal, no bruits auscultated                                        Extremities and Back:  no deformities, clubbing, cyanosis, erythema observed              Neurological:  affect appropriate, oriented to time, person and place              HISTORY OF PRESENT ILLNESS:  I saw Ms. Ashley for followup of palpitations.  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  At that time the physical examination and EKG were unremarkable.  Her Holter was unremarkable as well.  She subsequently had an echocardiography that showed normal LV ejection fraction with possible mild pericardial effusion.  I reviewed the echo imaging and do not think the patient has pericardial effusion.  The dark echo window over the apex could be pericardial fatty tissue with no evidence of fluid around other areas of the heart.        The patient had an intolerance for metoprolol 50 mg once " a day.  After the dosing reduction, she has felt better.  In fact, she has been off metoprolol intermittently on some days.  Overall, she has felt significant improvement.  She is now back to her normal activities without restriction.      She has been trying to gain weight, but has not been able to achieve the expected results.  On the other hand, her body weight has been relatively stable for the last several months.  There is no other etiology for weight loss.      Her blood pressure and weight are both stable today without other new findings.      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  Her description appears to suggest intermittent PVCs.  So far the Holter monitor and echocardiogram are unremarkable.  I do not recommend further intervention.  She can use metoprolol low dose p.r.n. and eventually stop the medicine if she continues to do well.  She does not require Cardiology followup.  She can contact our office for assistance in the future.         Cherie Francois MD

## 2017-10-02 DIAGNOSIS — F41.9 ANXIETY: ICD-10-CM

## 2017-10-05 RX ORDER — QUETIAPINE FUMARATE 25 MG/1
TABLET, FILM COATED ORAL
Qty: 30 TABLET | Refills: 5 | Status: SHIPPED | OUTPATIENT
Start: 2017-10-05 | End: 2018-04-16

## 2017-10-05 NOTE — TELEPHONE ENCOUNTER
Prescription approved per [facility] Refill Protocol.  Jude Quick, RN, BSN

## 2017-10-18 ENCOUNTER — MYC MEDICAL ADVICE (OUTPATIENT)
Dept: CARDIOLOGY | Facility: CLINIC | Age: 32
End: 2017-10-18

## 2017-10-18 DIAGNOSIS — R00.2 PALPITATIONS: Primary | ICD-10-CM

## 2017-10-18 RX ORDER — METOPROLOL SUCCINATE 25 MG/1
25 TABLET, EXTENDED RELEASE ORAL DAILY
Qty: 90 TABLET | Refills: 3 | Status: SHIPPED | OUTPATIENT
Start: 2017-10-18 | End: 2019-03-27

## 2017-10-18 NOTE — TELEPHONE ENCOUNTER
"Pt sent Gripe[employer] message asking for Dr. Francois to refill her metoprolol. Per Dr. 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  Her description appears to suggest intermittent PVCs.  So far the Holter monitor and echocardiogram are unremarkable.  I do not recommend further intervention.  She can use metoprolol low dose p.r.n. and eventually stop the medicine if she continues to do well.  She does not require Cardiology followup.\"    Refilled patient's metoprolol. Sent pt [employer] message that she will either need to f/u with Dr. Francois or a primary doctor annually to keep having this refilled in the future.   "

## 2017-11-21 ENCOUNTER — TELEPHONE (OUTPATIENT)
Dept: FAMILY MEDICINE | Facility: OTHER | Age: 32
End: 2017-11-21

## 2017-11-21 NOTE — TELEPHONE ENCOUNTER
Requested Provider:  anyone    [facility]    Reason for visit: upper abdominal pain after eating    Duration of symptoms: since yesterday    Have you been treated for this in the past? No    Additional comments: declined speaking to a nurse urgently.

## 2017-11-21 NOTE — TELEPHONE ENCOUNTER
Erin Ashley is a 32 year old female who calls with epigastric pain.    NURSING ASSESSMENT:  Description:  Pt would like to be seen for epigastric pain.  Onset/duration:  A few days  Precip. factors:  none  Associated symptoms:  A dull achey constant epigastric pain, bloated.  Denies fever, nausea, vomiting, pregnancy.  Pt had a normal BM yesterday.  Improves/worsens symptoms:  Pt states that she feels very full after eating just a little bit.  Pain scale (0-10)   4/10    Allergies:   Allergies   Allergen Reactions     Hydrocodone Other (See Comments)     Migraines     Oxycodone Nausea and Vomiting       RECOMMENDED DISPOSITION:  See in 24 hours - due to continuous abdominal pain.  There are no available appointments today or tomorrow in [facility] or [place].  Pt will go to urgent care today.  Will comply with recommendation: Yes  If further questions/concerns or if symptoms do not improve, worsen or new symptoms develop, call your PCP or [facility] as soon as possible.      Guideline used: abdominal pain, adult  Telephone Triage Protocols for Nurses, Fifth Edition, Bita Holt RN

## 2017-12-18 ENCOUNTER — TELEPHONE (OUTPATIENT)
Dept: FAMILY MEDICINE | Facility: OTHER | Age: 32
End: 2017-12-18

## 2017-12-18 NOTE — TELEPHONE ENCOUNTER
LM for patient to return call.   Would like to triage symptoms - will need to be rescheduled due to appointment length.     Cathy Medel, RN, BSN

## 2017-12-18 NOTE — TELEPHONE ENCOUNTER
"Patient is on JM schedule on 12/20 for \"left mid/upper abdominal discomfort and tenderness.\"    Will route to RN to triage. If you speak with patient, please see if she can reschedule. She is currently scheduled for a 15 minute which is not long enough.  Lyla Dukes, [facility]      "

## 2017-12-19 NOTE — TELEPHONE ENCOUNTER
Appears patient is no longer scheduled with  12/20/2017. Called patient to triage left mid to upper abdominal discomfort and tenderness. Patient stated abdominal pain has resolved. Renetta Petersen RN, BSN

## 2017-12-27 ENCOUNTER — HOSPITAL ENCOUNTER (EMERGENCY)
Facility: CLINIC | Age: 32
Discharge: HOME | End: 2017-12-27
Attending: FAMILY MEDICINE | Admitting: FAMILY MEDICINE
Payer: COMMERCIAL

## 2017-12-27 ENCOUNTER — APPOINTMENT (OUTPATIENT)
Dept: ULTRASOUND IMAGING | Facility: CLINIC | Age: 32
End: 2017-12-27
Attending: FAMILY MEDICINE
Payer: COMMERCIAL

## 2017-12-27 VITALS
OXYGEN SATURATION: 99 % | BODY MASS INDEX: 18.78 KG/M2 | HEIGHT: 64 IN | SYSTOLIC BLOOD PRESSURE: 120 MMHG | DIASTOLIC BLOOD PRESSURE: 71 MMHG | TEMPERATURE: 99.2 F | WEIGHT: 110 LBS | RESPIRATION RATE: 16 BRPM

## 2017-12-27 DIAGNOSIS — M79.605 BILATERAL LEG PAIN: ICD-10-CM

## 2017-12-27 DIAGNOSIS — R10.2 PELVIC PAIN IN FEMALE: ICD-10-CM

## 2017-12-27 DIAGNOSIS — M79.604 BILATERAL LEG PAIN: ICD-10-CM

## 2017-12-27 PROCEDURE — 76856 US EXAM PELVIC COMPLETE: CPT

## 2017-12-27 PROCEDURE — 99284 EMERGENCY DEPT VISIT MOD MDM: CPT | Mod: Z6 | Performed by: FAMILY MEDICINE

## 2017-12-27 PROCEDURE — 93970 EXTREMITY STUDY: CPT

## 2017-12-27 PROCEDURE — 99284 EMERGENCY DEPT VISIT MOD MDM: CPT | Mod: 25 | Performed by: FAMILY MEDICINE

## 2017-12-27 RX ORDER — OXYCODONE HYDROCHLORIDE 5 MG/1
5 TABLET ORAL EVERY 6 HOURS PRN
Qty: 8 TABLET | Refills: 0 | Status: SHIPPED | OUTPATIENT
Start: 2017-12-27 | End: 2017-12-29

## 2017-12-27 NOTE — ED AVS SNAPSHOT
[facility] Emergency Department    [address] [place] [address]    Phone:  [phone]    Fax:  [phone]                                       Erin Ashley   MRN: [id number]    Department:  [facility] Emergency Department   Date of Visit:  12/27/2017           After Visit Summary Signature Page     I have received my discharge instructions, and my questions have been answered. I have discussed any challenges I see with this plan with the nurse or doctor.    ..........................................................................................................................................  Patient/Patient Representative Signature      ..........................................................................................................................................  Patient Representative Print Name and Relationship to Patient    ..................................................               ................................................  Date                                            Time    ..........................................................................................................................................  Reviewed by Signature/Title    ...................................................              ..............................................  Date                                                            Time

## 2017-12-27 NOTE — ED AVS SNAPSHOT
[facility] Emergency Department    [address]     [place] [address]    Phone:  [phone]    Fax:  [phone]                                       Erin Ashley   MRN: [id number]    Department:  [facility] Emergency Department   Date of Visit:  12/27/2017           Patient Information     Date Of Birth          1985        Your diagnoses for this visit were:     Right pelvic pain     Bilateral leg pain        You were seen by Raman Figueroa MD.      Follow-up Information     Follow up with [facility], [facility] In 5 days.    Why:  if not improving    Contact information:    [address]  [facility] [address]  [phone]          Follow up with [facility] Emergency Department.    Specialty:  EMERGENCY MEDICINE    Why:  If symptoms worsen    Contact information:    [address]   [place] [address] [phone]    Additional information:    From [address]: Exit at [address] on south side of [place]. Turn right on [address]. Turn left at stoplight on [address]. [facility] will be in view two blocks ahead        Discharge Instructions       We did not find serious or obvious cause for your right-sided pain, and bilateral leg pain.    The ultrasound of the pelvis and both legs were reassuring.    It is possible that you may have had a small ruptured ovarian cyst, given your previous history of ovarian cysts.    Monitor for any new or worsening symptoms.  Take oxycodone as needed for moderate to severe pain.    Follow-up in the clinic in 5-7 days if not improving.  Return to the emergency department at any time if your symptoms worsen.    Future Appointments        Provider Department Dept Phone Center    12/28/2017 12:00 PM Brittny Naqvi MD, MD [facility] [phone] [facility]      24 Hour Appointment Hotline       To make an appointment at any [facility], call [phone] ([phone]). If  you don't have a family doctor or clinic, we will help you find one. [facility] clinics are conveniently located to serve the needs of you and your family.             Review of your medicines      START taking        Dose / Directions Last dose taken    oxyCODONE IR 5 MG tablet   Commonly known as:  ROXICODONE   Dose:  5 mg   Quantity:  8 tablet        Take 1 tablet (5 mg) by mouth every 6 hours as needed for moderate to severe pain   Refills:  0          Our records show that you are taking the medicines listed below. If these are incorrect, please call your family doctor or clinic.        Dose / Directions Last dose taken    meloxicam 15 MG tablet   Commonly known as:  MOBIC   Dose:  15 mg   Quantity:  30 tablet        Take 1 tablet (15 mg) by mouth daily   Refills:  0        metoprolol 25 MG 24 hr tablet   Commonly known as:  TOPROL-XL   Dose:  25 mg   Quantity:  90 tablet        Take 1 tablet (25 mg) by mouth daily   Refills:  3        PARAGARD INTRAUTERINE COPPER IU        Reported on 4/14/2017   Refills:  0        QUEtiapine 25 MG tablet   Commonly known as:  SEROquel   Quantity:  30 tablet        TAKE 1-2 TABLETS BY MOUTH NIGHTLY AS NEEDED   Refills:  5        sertraline 50 MG tablet   Commonly known as:  ZOLOFT   Dose:  50 mg   Quantity:  30 tablet        Take 1 tablet (50 mg) by mouth daily   Refills:  5        TYLENOL PO   Dose:  1000 mg        Take 1,000 mg by mouth   Refills:  0                Prescriptions were sent or printed at these locations (1 Prescription)                   [facility]   [address] [address]    Telephone:  [phone]   Fax:  [phone]   Hours:                  Printed at Department/Unit printer (1 of 1)         oxyCODONE IR (ROXICODONE) 5 MG tablet                Procedures and tests performed during your visit     US Lower Extremity Venous Duplex Bilateral    US Pelvic Complete w Transvaginal & Abd/Pel Duplex Limited       Orders Needing Specimen Collection     None      Pending Results     Date and Time Order Name Status Description    12/27/2017 2132 US Pelvic Complete w Transvaginal & Abd/Pel Duplex Limited Preliminary             Pending Culture Results     No orders found from 12/25/2017 to 12/28/2017.            Pending Results Instructions     If you had any lab results that were not finalized at the time of your Discharge, you can call the ED Lab Result RN at [phone]. You will be contacted by this team for any positive Lab results or changes in treatment. The nurses are available 7 days a week from 10A to 6:30P.  You can leave a message 24 hours per day and they will return your call.        Thank you for choosing [facility]       Thank you for choosing [facility] for your care. Our goal is always to provide you with excellent care. Hearing back from our patients is one way we can continue to improve our services. Please take a few minutes to complete the written survey that you may receive in the mail after you visit with us. Thank you!        [facility] Information     [facility] gives you secure access to your electronic health record. If you see a primary care provider, you can also send messages to your care team and make appointments. If you have questions, please call your primary care clinic.  If you do not have a primary care provider, please call [phone] and they will assist you.        Care EveryWhere ID     This is your Care EveryWhere ID. This could be used by other organizations to access your [facility] medical records  [id number]        Equal Access to Services     [address] : Hadii eran Gomes, waallanda elana, qaybta kongalmarush maharaj . So [facility] [phone].    ATENCIÓN: Si habla español, tiene a fischer disposición servicios gratuitos de asistencia lingüística. Llame al [phone].    We comply with applicable federal civil rights laws and [place]  laws. We do not discriminate on the basis of race, color, national origin, age, disability, sex, sexual orientation, or gender identity.            After Visit Summary       This is your record. Keep this with you and show to your community pharmacist(s) and doctor(s) at your next visit.

## 2017-12-28 NOTE — ED PROVIDER NOTES
[facility] ED Provider Note   CC:     Chief Complaint   Patient presents with     Leg Pain     HPI:  Erin Ashley is a 32 year old female who presented to the emergency department with onset of right calf and leg pain, radiating into the groin, with right sided pelvic pain, and now bilateral leg pain.  Patient reports a tingling and numb pain going from the leg up towards the groin.  Over time this evening, the pain is more concentrated in the right pelvic area.  She has a history of ovarian cysts.  She noted slight swelling of her lower abdominal area and a sunken appearance to the groin area on the right side.  She has not noticed any unilateral calf pain or swelling.  There is been no recent injury or trauma, and she does not have a prior history of DVT or PE.  She tried to get into the clinic earlier this morning, but was unable to get an appointment.  She has a history of cervical intraepithelial neoplasia grade 3.  Her palpitations have improved.  She does not have any urinary symptoms, and denies any fever or chills.  She has an IUD in place.  Patient denies any associated chest pain or shortness of breath.    Problem List:  Patient Active Problem List    Diagnosis     Health Care Home     Anxiety     Palpitations     Thyroid nodule     Tachycardia     Pelvic pain in female     Elevated LFTs     Insomnia due to medical condition     Personal history of ovarian cyst     Skin tags, anus or rectum     Acne     Paragard placed 10/15/13     TALIB 3 - cervical intraepithelial neoplasia grade 3       MEDS:   Previous Medications    ACETAMINOPHEN (TYLENOL PO)    Take 1,000 mg by mouth    MELOXICAM (MOBIC) 15 MG TABLET    Take 1 tablet (15 mg) by mouth daily    METOPROLOL (TOPROL-XL) 25 MG 24 HR TABLET    Take 1 tablet (25 mg) by mouth daily    PARAGARD INTRAUTERINE COPPER IU    Reported on 4/14/2017    QUETIAPINE (SEROQUEL) 25 MG TABLET    TAKE 1-2  "TABLETS BY MOUTH NIGHTLY AS NEEDED    SERTRALINE (ZOLOFT) 50 MG TABLET    Take 1 tablet (50 mg) by mouth daily       ALLERGIES:    Allergies   Allergen Reactions     Hydrocodone Other (See Comments)     Migraines     Oxycodone Nausea and Vomiting       Past Surgical History:   Procedure Laterality Date     APPENDECTOMY  8/4/2008     EXAM UNDER ANESTHESIA RECTUM N/A 2/24/2017    Procedure: EXAM UNDER ANESTHESIA RECTUM;  Surgeon: Britton Palomo MD;  Location: [facility] OR     HC CONIZATION CERVIX,KNIFE/LASER  06/130/8    cervix.  TALIB 1/2     HEMORRHOIDECTOMY EXTERNAL N/A 2/24/2017    Procedure: HEMORRHOIDECTOMY EXTERNAL;  Surgeon: Britton Palomo MD;  Location: [facility] OR     TONSILLECTOMY  7/3/2007       Social History   Substance Use Topics     Smoking status: Never Smoker     Smokeless tobacco: Never Used      Comment: no smokers in the household     Alcohol use 0.0 oz/week     0 Standard drinks or equivalent per week      Comment: OCC.         Review of Systems   Except as noted in HPI, all other systems were reviewed and are negative    Physical Exam     Vitals were reviewed  Patient Vitals for the past 8 hrs:   BP Temp Temp src Heart Rate Resp SpO2 Height Weight   12/27/17 2310 - - - - - 99 % - -   12/27/17 2309 118/72 - - - - - - -   12/27/17 2211 - - - - - 100 % - -   12/27/17 2209 110/73 - - - - - - -   12/27/17 2032 (!) 134/96 99.6  F (37.6  C) Temporal 101 20 100 % 1.626 m (5' 4\") 49.9 kg (110 lb)     GENERAL APPEARANCE: Alert, no distress  FACE: normal facies  EYES: Pupils are equal  HENT: normal external exam  NECK: no adenopathy or asymmetry  RESP: normal respiratory effort; clear breath sounds bilaterally  CV: regular rate and rhythm; no significant murmurs, gallops or rubs  ABD: soft, mild right lower quadrant tenderness; no rebound or guarding; bowel sounds are normal; no inguinal adenopathy  MS: no gross deformities noted; normal muscle tone.  EXT: No calf tenderness or pitting edema; no obvious " discoloration.  Good peripheral pulses over the dorsalis pedis artery  SKIN: no worrisome rash  NEURO: no facial droop; no focal deficits, speech is normal  PSYCH: normal mood and affect      Available Lab/Imaging Results     Results for orders placed or performed during the hospital encounter of 12/27/17 (from the past 24 hour(s))   US Lower Extremity Venous Duplex Bilateral    Narrative    US LOWER EXTREMITY VENOUS DUPLEX BILATERAL   12/27/2017 11:10 PM     HISTORY: Bilateral calf pain.    COMPARISON: None.    FINDINGS: Gray-scale, color and Doppler spectral analysis ultrasound  was performed of the legs. Compression and augmentation imaging was  performed.    There is no evidence for deep venous thrombosis. The veins compress  and augment normally.      Impression    IMPRESSION: No DVT.     THAD DENNIS MD   US Pelvic Complete w Transvaginal & Abd/Pel Duplex Limited    Narrative    US PELVIS COMPLETE W TRANSVAGINAL AND DOPPLER LIMITED  12/27/2017  11:10 PM      HISTORY: Leg swelling and pain, right lower quadrant pelvic pain.      COMPARISON: None.    FINDINGS: Transabdominal and transvaginal imaging was performed.  Transvaginal imaging was performed to better visualize the endometrium  and adnexa. The uterus is normal in size and position measuring 7.3 x  5.2 x 5.0 cm. The endometrium is normal in thickness at 0.5 cm. There  is an IUD in normal position. The ovaries are normal in size and  appearance. Color Doppler and Doppler waveform analysis of the ovaries  shows blood flow bilaterally. There is no adnexal mass. No free pelvic  fluid.      Impression    IMPRESSION: Normal pelvis with an IUD in place.         Impression     Final diagnoses:   Right pelvic pain   Bilateral leg pain       ED Course & Medical Decision Making   Erin Ashley is a 32 year old female who presented to the emergency department with complaints of bilateral leg pain with right sided pelvic pain.  She was complaining of some pain  that was shooting into the inguinal region.  She was seen shortly after arrival.  Vital signs were stable with slight low-grade temp of 99.6, and exam was unremarkable.  Because of her prior history of cervical dysplasia, and a prior history of ovarian cysts, patient had a pelvic ultrasound as well as bilateral Doppler ultrasounds.  These are unremarkable.  Patient did not have any increased symptoms while she was in the emergency department, and in fact reported that the pain seemed to be more localized on the right lower quadrant.  She may have had a ruptured ovarian cyst.  Monitor for further symptoms.  Take oxycodone sparingly for moderate to severe pain.  Follow-up in the clinic in 4-5 days if not improving.  Return to the ED at any time if symptoms worsen.      Written after-visit summary and instructions were given at the time of discharge.      New Prescriptions    OXYCODONE IR (ROXICODONE) 5 MG TABLET    Take 1 tablet (5 mg) by mouth every 6 hours as needed for moderate to severe pain         This note was completed in part using Dragon voice recognition, and may contain word and grammatical errors.        Raman Figueroa MD  12/27/17 3809

## 2017-12-28 NOTE — ED NOTES
Pt reports that pain to right calf and front right thigh is now gone but she is now having a burning pain to right front thigh.

## 2017-12-28 NOTE — DISCHARGE INSTRUCTIONS
We did not find serious or obvious cause for your right-sided pain, and bilateral leg pain.    The ultrasound of the pelvis and both legs were reassuring.    It is possible that you may have had a small ruptured ovarian cyst, given your previous history of ovarian cysts.    Monitor for any new or worsening symptoms.  Take oxycodone as needed for moderate to severe pain.    Follow-up in the clinic in 5-7 days if not improving.  Return to the emergency department at any time if your symptoms worsen.

## 2018-03-07 ENCOUNTER — MYC MEDICAL ADVICE (OUTPATIENT)
Dept: FAMILY MEDICINE | Facility: OTHER | Age: 33
End: 2018-03-07

## 2018-03-07 DIAGNOSIS — J10.1 INFLUENZA A: Primary | ICD-10-CM

## 2018-03-07 RX ORDER — OSELTAMIVIR PHOSPHATE 75 MG/1
75 CAPSULE ORAL 2 TIMES DAILY
Qty: 10 CAPSULE | Refills: 0 | Status: SHIPPED | OUTPATIENT
Start: 2018-03-07 | End: 2018-04-27

## 2018-03-07 NOTE — TELEPHONE ENCOUNTER
LM for the patient to return call to the clinic to discuss the below. Will await to hear from patient. Renetta Petersen RN, BSN   Responded via [employer] using NICOLE protocol. VORB with JM to send in Tamiflu. Renetta Petersen RN, BSN

## 2018-04-16 DIAGNOSIS — F41.9 ANXIETY: ICD-10-CM

## 2018-04-16 RX ORDER — QUETIAPINE FUMARATE 25 MG/1
TABLET, FILM COATED ORAL
Qty: 30 TABLET | Refills: 0 | Status: SHIPPED | OUTPATIENT
Start: 2018-04-16 | End: 2018-04-27

## 2018-04-16 NOTE — TELEPHONE ENCOUNTER
"Requested Prescriptions   Pending Prescriptions Disp Refills     QUEtiapine (SEROQUEL) 25 MG tablet [Pharmacy Med Name: QUETIAPINE FUMARATE 25 MG TAB] 30 tablet 5     Sig: TAKE 1-2 TABLETS BY MOUTH NIGHTLY AS NEEDED    Antipsychotic Medications Failed    4/16/2018  1:13 AM       Failed - Lipid panel on file within the past 12 months    Recent Labs   Lab Test  06/13/16   1014  05/14/15   0933   CHOL  157  187   TRIG  72  103   HDL  52  46*   LDL  91  120   NHDL  105   --    VLDL   --   21   CHOLHDLRATIO   --   4.1     Over due         Failed - Recent (6 mo) or future (30 days) visit within the authorizing provider's specialty    Patient had office visit in the last 6 months or has a visit in the next 30 days with authorizing provider or within the authorizing provider's specialty.  See \"Patient Info\" tab in inbasket, or \"Choose Columns\" in Meds & Orders section of the refill encounter.      Overdue last ov 09/08/2017       Passed - Blood pressure under 140/90 in past 12 months    BP Readings from Last 3 Encounters:   12/27/17 120/71   09/21/17 100/70   09/08/17 94/60                Passed - Patient is 12 years of age or older       Passed - CBC on file in past 12 months    Recent Labs   Lab Test  09/05/17   0410   WBC  8.5   RBC  4.12   HGB  12.4   HCT  37.2   PLT  182            Passed - Heart Rate on file within past 12 months    Pulse Readings from Last 3 Encounters:   09/21/17 122   09/08/17 104   09/06/17 96              Passed - A1c or Glucose on file in past 12 months    Recent Labs   Lab Test  09/05/17   0410   GLC  100*       Please review patients last 3 weights. If a weight gain of >10 lbs exists, you may refill the prescription once after instructing the patient to schedule an appointment within the next 30 days.    Wt Readings from Last 3 Encounters:   12/27/17 110 lb (49.9 kg)   09/21/17 99 lb 9.6 oz (45.2 kg)   09/08/17 102 lb (46.3 kg)            Passed - Patient is not pregnant       Passed - No " positve pregnancy test on file in past 12 months      Routing refill request to provider for review/approval because:  Patient needs to be seen because:  Due for mood visit and labs

## 2018-04-17 NOTE — TELEPHONE ENCOUNTER
Left message for patient to return call to clinic.  Please inform message below and help schedule appointment.    Brittny Lyman, COLIN  April 17, 2018

## 2018-04-27 ENCOUNTER — OFFICE VISIT (OUTPATIENT)
Dept: FAMILY MEDICINE | Facility: OTHER | Age: 33
End: 2018-04-27
Payer: COMMERCIAL

## 2018-04-27 ENCOUNTER — RADIANT APPOINTMENT (OUTPATIENT)
Dept: GENERAL RADIOLOGY | Facility: OTHER | Age: 33
End: 2018-04-27
Attending: PHYSICIAN ASSISTANT
Payer: COMMERCIAL

## 2018-04-27 VITALS
OXYGEN SATURATION: 100 % | RESPIRATION RATE: 16 BRPM | BODY MASS INDEX: 17.93 KG/M2 | TEMPERATURE: 97.6 F | DIASTOLIC BLOOD PRESSURE: 50 MMHG | HEART RATE: 88 BPM | WEIGHT: 105 LBS | SYSTOLIC BLOOD PRESSURE: 88 MMHG | HEIGHT: 64 IN

## 2018-04-27 DIAGNOSIS — R07.9 CHEST PAIN, UNSPECIFIED TYPE: ICD-10-CM

## 2018-04-27 DIAGNOSIS — F41.9 ANXIETY: Primary | ICD-10-CM

## 2018-04-27 DIAGNOSIS — R07.1 PAINFUL RESPIRATION: ICD-10-CM

## 2018-04-27 PROCEDURE — 71046 X-RAY EXAM CHEST 2 VIEWS: CPT

## 2018-04-27 PROCEDURE — 99214 OFFICE O/P EST MOD 30 MIN: CPT | Performed by: PHYSICIAN ASSISTANT

## 2018-04-27 RX ORDER — QUETIAPINE FUMARATE 50 MG/1
50 TABLET, FILM COATED ORAL AT BEDTIME
Qty: 90 TABLET | Refills: 1 | Status: SHIPPED | OUTPATIENT
Start: 2018-04-27 | End: 2019-03-27

## 2018-04-27 RX ORDER — QUETIAPINE FUMARATE 25 MG/1
TABLET, FILM COATED ORAL
Qty: 30 TABLET | Refills: 0 | Status: CANCELLED | OUTPATIENT
Start: 2018-04-27

## 2018-04-27 RX ORDER — IBUPROFEN 800 MG/1
800 TABLET, FILM COATED ORAL EVERY 8 HOURS PRN
Qty: 60 TABLET | Refills: 1 | Status: SHIPPED | OUTPATIENT
Start: 2018-04-27 | End: 2019-07-22

## 2018-04-27 ASSESSMENT — ANXIETY QUESTIONNAIRES
4. TROUBLE RELAXING: NOT AT ALL
3. WORRYING TOO MUCH ABOUT DIFFERENT THINGS: NOT AT ALL
2. NOT BEING ABLE TO STOP OR CONTROL WORRYING: NOT AT ALL
5. BEING SO RESTLESS THAT IT IS HARD TO SIT STILL: NOT AT ALL
1. FEELING NERVOUS, ANXIOUS, OR ON EDGE: SEVERAL DAYS
GAD7 TOTAL SCORE: 1
7. FEELING AFRAID AS IF SOMETHING AWFUL MIGHT HAPPEN: NOT AT ALL
6. BECOMING EASILY ANNOYED OR IRRITABLE: NOT AT ALL
IF YOU CHECKED OFF ANY PROBLEMS ON THIS QUESTIONNAIRE, HOW DIFFICULT HAVE THESE PROBLEMS MADE IT FOR YOU TO DO YOUR WORK, TAKE CARE OF THINGS AT HOME, OR GET ALONG WITH OTHER PEOPLE: NOT DIFFICULT AT ALL

## 2018-04-27 NOTE — NURSING NOTE
"Chief Complaint   Patient presents with     Back Pain     Panel Management       Initial BP (!) 88/50 (BP Location: Right arm, Patient Position: Chair, Cuff Size: Adult Regular)  Pulse 88  Temp 97.6  F (36.4  C) (Oral)  Resp 16  Ht 5' 4.02\" (1.626 m)  Wt 105 lb (47.6 kg)  SpO2 100%  BMI 18.01 kg/m2 Estimated body mass index is 18.01 kg/(m^2) as calculated from the following:    Height as of this encounter: 5' 4.02\" (1.626 m).    Weight as of this encounter: 105 lb (47.6 kg).  Medication Reconciliation: complete  "

## 2018-04-27 NOTE — PROGRESS NOTES
SUBJECTIVE:   Erin Ashley is a 33 year old female who presents to clinic today for the following health issues:    HPI  Back Pain       Duration: yesterday        Specific cause: none    Description:   Location of pain: middle of back left  Character of pain: sharp  Pain radiation:to front of chest to back  New numbness or weakness in legs, not attributed to pain:  no     Intensity: Currently 6/10    History:   Pain interferes with job: YES  History of back problems: scoliosis   Any previous MRI or X-rays: Yes  Sees a specialist for back pain:  No  Therapies tried without relief: nothing    Alleviating factors:   Improved by: cold and NSAIDs      Precipitating factors:  Worsened by: taking a deep breath, sometimes tender to the touch    Functional and Psychosocial Screen (Ron STarT Back):      Not performed today    - 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, is a nurse at [employer]   - Doesn't remember doing any lifting that could have caused   - Was bad for 4 hours   - If touches back hurts   - Iced and took tylenol and ibuprofen   - Wants to make sure nothing   - Just with deep breath   - Was seeing chiropractor in the past, didn't help so didn't keep going   - Hurts one spot in back and right under breast with deep breath and certain movements   - Does cardio and lifts weights daily       Accompanying Signs & Symptoms:  Risk of Fracture:  None  Risk of Cauda Equina:  None  Risk of Infection:  None  Risk of Cancer:  None  Risk of Ankylosing Spondylitis:  Onset at age <35, male, AND morning back stiffness. no      Anxiety Follow-Up    Status since last visit: No change    Other associated symptoms:None    Complicating factors:   Significant life event: No   Current substance abuse: None  Depression symptoms: No    GABE-7 SCORE 4/27/2018   Total Score 1       PHQ-9 SCORE 6/14/2012 8/1/2013 4/27/2018   Total Score 0 0 -   Total Score - - 4           Problem list and histories reviewed & adjusted, as  "indicated.  Additional history: as documented      ROS:  Constitutional, HEENT, cardiovascular, pulmonary, gi and gu systems are negative, except as otherwise noted.    OBJECTIVE:   BP (!) 88/50 (BP Location: Right arm, Patient Position: Chair, Cuff Size: Adult Regular)  Pulse 88  Temp 97.6  F (36.4  C) (Oral)  Resp 16  Ht 5' 4.02\" (1.626 m)  Wt 105 lb (47.6 kg)  SpO2 100%  BMI 18.01 kg/m2  Body mass index is 18.01 kg/(m^2).  GENERAL APPEARANCE: healthy, alert and no distress  EYES: Eyes grossly normal to inspection, PERRLA, conjunctivae and sclerae without injection or discharge, EOM intact   RESP: Lungs clear to auscultation - no rales, rhonchi or wheezes   CV: Regular rates and rhythm, normal S1 S2, no S3 or S4, no murmur, click or rub, no peripheral edema and peripheral pulses strong and symmetric bilaterally, pain with compression of right side of chest, intercostal pain along T5-6 anteriorly and posteriorly   MS: No musculoskeletal defects are noted and gait is age appropriate without ataxia   SKIN: No suspicious lesions or rashes, hydration status appears adeuqate with normal skin turgor   PSYCH: Alert and oriented x3; speech- coherent , normal rate and volume; able to articulate logical thoughts, able to abstract reason, no tangential thoughts, no hallucinations or delusions, mentation appears normal, Mood is euthymic. Affect is appropriate for this mood state and bright. Thought content is free of suicidal ideation, hallucinations, and delusions. Dress is adequate and upkept. Eye contact is good during conversation.       Diagnostic Test Results:  CXR: Normal- no infiltrates, effusions, pneumothoraces, cardiomegaly or masses  awaiting formal interpretation from Radiologist at this time      ASSESSMENT/PLAN:       ICD-10-CM    1. Anxiety F41.9 QUEtiapine (SEROQUEL) 50 MG tablet   2. Chest pain, unspecified type R07.9 XR Chest 2 Views     EKG 12-lead complete w/read - Clinics     ibuprofen " (ADVIL/MOTRIN) 800 MG tablet   3. Painful respiration R07.1 ibuprofen (ADVIL/MOTRIN) 800 MG tablet     1. Anxiety   - Stable with Seroquel 50 mg at bedtime (works overnights), reviewed use and side effects, refilled     2&3.   - Question cardiopulmonary etiology (pneumothorax vs. Other) vs. Costochondritis vs. Pleurisy vs. Anxiety vs. 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    Follow up: ATIF Conrad PA-C  [facility]

## 2018-04-27 NOTE — PATIENT INSTRUCTIONS
Costochondritis (Chest Wall Pain)  Information About Your Condition:  Description  Costochondritis is inflammation of the joint between a rib and the breastbone (sternum) or between the bony part of the rib and the rib cartilage. Cartilage is a tough rubbery tissue that lines and cushions the surfaces of joints. The pain is most often on the left side of your chest, but can be on either side. Your healthcare provider might refer to costochondritis by other names, including chest wall pain, costosternal syndrome and costosternal chondrodynia. When the pain of costochondritis is accompanied by swelling it is called Tietze's syndrome. Costochondritis is more common in women than men. It tends to occur more often in people 12 to 14 years old or over 40 years old.   Symptoms  pain or tenderness to touch in the front of the chest near the breastbone   sharp pain when taking a deep breath, coughing, moving a certain way, or when pressing on it   trouble taking a deep breath   Causes  Sometimes costochondritis is caused by an injury to the chest, such as falling or getting hit by something in the chest. It can also be caused by an infection, such as a cold or the flu. Many times the cause cannot be found.   What You Should Do At Home (Follow-up Care)   Avoid activities or movement that makes the pain worse.   Sometimes heat makes the pain better. A heating pad on the lowest setting can be put on the area for 20 minutes 4 to 8 times a day.   When the pain is gone, go back to your normal activities slowly.   Do gentle stretching exercises, but do not do vigorous exercise.   Acetaminophen (Tylenol ) or over-the-counter anti-inflammatory medicine such as ibuprofen (Motrin , Advil ) or naproxen (Aleve , Naprosyn ) may help decrease your pain. You should not take ibuprofen or naproxen if you have a history of bleeding in your stomach.   If you were given a prescription, be sure to get it filled right away. Take the  medicine exactly as prescribed. If you do not think it is helping, call your healthcare provider. Do not increase how much you take or how often you take it without talking to your healthcare provider first.   If the healthcare provider prescribes pain medicine that makes you tired, or sleepy, or contains narcotics, you should not drink alcohol, including beer and wine, drive, or participate in any other activities that you need to be clear-headed for.   Please keep all medicines out of the reach of children.   What You Can Do Stay Healthy  Be sure to stretch and warm up properly before you start any strenuous exercise or activity.   Care Alerts  Call Your Healthcare Provider Right Away Or Return To The Emergency Department If:  You have a fever higher than 101.5  F (38.6  C) orally.   You start to have a cough with yellowish or greenish phlegm (mucus.)   There are streaks of blood in the phlegm you are coughing up.   You have any symptoms that worry you.

## 2018-04-27 NOTE — MR AVS SNAPSHOT
After Visit Summary   4/27/2018    Erin Ashley    MRN: [id number]           Patient Information     Date Of Birth          1985        Visit Information        Provider Department      4/27/2018 9:00 AM Peg Conrad PA-C [facility]        Today's Diagnoses     Anxiety    -  1    Chest pain, unspecified type        Painful respiration          Care Instructions                  Costochondritis (Chest Wall Pain)  Information About Your Condition:  Description  Costochondritis is inflammation of the joint between a rib and the breastbone (sternum) or between the bony part of the rib and the rib cartilage. Cartilage is a tough rubbery tissue that lines and cushions the surfaces of joints. The pain is most often on the left side of your chest, but can be on either side. Your healthcare provider might refer to costochondritis by other names, including chest wall pain, costosternal syndrome and costosternal chondrodynia. When the pain of costochondritis is accompanied by swelling it is called Tietze's syndrome. Costochondritis is more common in women than men. It tends to occur more often in people 12 to 14 years old or over 40 years old.   Symptoms  pain or tenderness to touch in the front of the chest near the breastbone   sharp pain when taking a deep breath, coughing, moving a certain way, or when pressing on it   trouble taking a deep breath   Causes  Sometimes costochondritis is caused by an injury to the chest, such as falling or getting hit by something in the chest. It can also be caused by an infection, such as a cold or the flu. Many times the cause cannot be found.   What You Should Do At Home (Follow-up Care)   Avoid activities or movement that makes the pain worse.   Sometimes heat makes the pain better. A heating pad on the lowest setting can be put on the area for 20 minutes 4 to 8 times a day.   When the pain is gone, go back to your normal  activities slowly.   Do gentle stretching exercises, but do not do vigorous exercise.   Acetaminophen (Tylenol ) or over-the-counter anti-inflammatory medicine such as ibuprofen (Motrin , Advil ) or naproxen (Aleve , Naprosyn ) may help decrease your pain. You should not take ibuprofen or naproxen if you have a history of bleeding in your stomach.   If you were given a prescription, be sure to get it filled right away. Take the medicine exactly as prescribed. If you do not think it is helping, call your healthcare provider. Do not increase how much you take or how often you take it without talking to your healthcare provider first.   If the healthcare provider prescribes pain medicine that makes you tired, or sleepy, or contains narcotics, you should not drink alcohol, including beer and wine, drive, or participate in any other activities that you need to be clear-headed for.   Please keep all medicines out of the reach of children.   What You Can Do Stay Healthy  Be sure to stretch and warm up properly before you start any strenuous exercise or activity.   Care Alerts  Call Your Healthcare Provider Right Away Or Return To The Emergency Department If:  You have a fever higher than 101.5  F (38.6  C) orally.   You start to have a cough with yellowish or greenish phlegm (mucus.)   There are streaks of blood in the phlegm you are coughing up.   You have any symptoms that worry you.             Follow-ups after your visit        Follow-up notes from your care team     Return if symptoms worsen or fail to improve.      Who to contact     If you have questions or need follow up information about today's clinic visit or your schedule please contact [facility] directly at [phone].  Normal or non-critical lab and imaging results will be communicated to you by MyChart, letter or phone within 4 business days after the clinic has received the results. If you do not hear from us within 7 days, please contact  "the clinic through [employer] or phone. If you have a critical or abnormal lab result, we will notify you by phone as soon as possible.  Submit refill requests through [employer] or call your pharmacy and they will forward the refill request to us. Please allow 3 business days for your refill to be completed.          Additional Information About Your Visit        [employer] Information     [employer] gives you secure access to your electronic health record. If you see a primary care provider, you can also send messages to your care team and make appointments. If you have questions, please call your primary care clinic.  If you do not have a primary care provider, please call [phone] and they will assist you.        Care EveryWhere ID     This is your Care EveryWhere ID. This could be used by other organizations to access your [place] medical records  [id number]        Your Vitals Were     Pulse Temperature Respirations Height Pulse Oximetry BMI (Body Mass Index)    88 97.6  F (36.4  C) (Oral) 16 5' 4.02\" (1.626 m) 100% 18.01 kg/m2       Blood Pressure from Last 3 Encounters:   04/27/18 (!) 88/50   12/27/17 120/71   09/21/17 100/70    Weight from Last 3 Encounters:   04/27/18 105 lb (47.6 kg)   12/27/17 110 lb (49.9 kg)   09/21/17 99 lb 9.6 oz (45.2 kg)              We Performed the Following     EKG 12-lead complete w/read - Clinics          Today's Medication Changes          These changes are accurate as of 4/27/18 10:13 AM.  If you have any questions, ask your nurse or doctor.               These medicines have changed or have updated prescriptions.        Dose/Directions    QUEtiapine 50 MG tablet   Commonly known as:  SEROquel   This may have changed:  See the new instructions.   Used for:  Anxiety   Changed by:  Peg Conrad PA-C        Dose:  50 mg   Take 1 tablet (50 mg) by mouth At Bedtime   Quantity:  90 tablet   Refills:  1            Where to get your medicines      These medications were " sent to [facility] [id number] [facility] [employer] [facility] [place] - [address]  [address], [facility] [address]     Phone:  [phone]     QUEtiapine 50 MG tablet                Primary Care Provider Office Phone # Fax #    Hussain Cristóbal Ryder PA-C [phone]       [address]  [facility] [address]        Equal Access to Services     [address] : Hadii aad ku hadasho Soomaali, waaxda luqadaha, qaybta kaalmada adeegyada, waxay idiin hayaan adeeg edouardsladegiovanni lalauren . So [facility] [phone].    ATENCIÓN: Si habla español, tiene a fischer disposición servicios gratuitos de asistencia lingüística. Zariame al [phone].    We comply with applicable federal civil rights laws and [place] laws. We do not discriminate on the basis of race, color, national origin, age, disability, sex, sexual orientation, or gender identity.            Thank you!     Thank you for choosing [facility]  for your care. Our goal is always to provide you with excellent care. Hearing back from our patients is one way we can continue to improve our services. Please take a few minutes to complete the written survey that you may receive in the mail after your visit with us. Thank you!             Your Updated Medication List - Protect others around you: Learn how to safely use, store and throw away your medicines at www.disposemymeds.org.          This list is accurate as of 4/27/18 10:13 AM.  Always use your most recent med list.                   Brand Name Dispense Instructions for use Diagnosis    metoprolol succinate 25 MG 24 hr tablet    TOPROL-XL    90 tablet    Take 1 tablet (25 mg) by mouth daily    Palpitations       PARAGARD INTRAUTERINE COPPER IU      Reported on 4/14/2017        QUEtiapine 50 MG tablet    SEROquel    90 tablet    Take 1 tablet (50 mg) by mouth At Bedtime    Anxiety       TYLENOL PO      Take 1,000 mg by mouth

## 2018-04-28 ASSESSMENT — ANXIETY QUESTIONNAIRES: GAD7 TOTAL SCORE: 1

## 2018-04-28 ASSESSMENT — PATIENT HEALTH QUESTIONNAIRE - PHQ9: SUM OF ALL RESPONSES TO PHQ QUESTIONS 1-9: 4

## 2018-06-15 ENCOUNTER — TELEPHONE (OUTPATIENT)
Dept: OBGYN | Facility: OTHER | Age: 33
End: 2018-06-15

## 2018-06-15 NOTE — TELEPHONE ENCOUNTER
Left message for patient to return call.  Please confirm that patient is coming in for her appointment on Monday 6/18/2018.  She had another appointment scheduled for Thursday 6/14/2018 and did not come for that appointment.    Brittny Lyman, [facility]  Sonali 15, 2018

## 2018-06-18 ENCOUNTER — OFFICE VISIT (OUTPATIENT)
Dept: OBGYN | Facility: OTHER | Age: 33
End: 2018-06-18
Payer: COMMERCIAL

## 2018-06-18 VITALS
BODY MASS INDEX: 16.94 KG/M2 | SYSTOLIC BLOOD PRESSURE: 110 MMHG | WEIGHT: 98.75 LBS | DIASTOLIC BLOOD PRESSURE: 70 MMHG | HEART RATE: 72 BPM

## 2018-06-18 DIAGNOSIS — R10.2 PELVIC PAIN IN FEMALE: Primary | ICD-10-CM

## 2018-06-18 PROCEDURE — 99213 OFFICE O/P EST LOW 20 MIN: CPT | Performed by: OBSTETRICS & GYNECOLOGY

## 2018-06-18 NOTE — MR AVS SNAPSHOT
After Visit Summary   6/18/2018    Erin Ashley    MRN: [id number]           Patient Information     Date Of Birth          1985        Visit Information        Provider Department      6/18/2018 11:00 AM Amada Dallas MD [facility]        Today's Diagnoses     Pelvic pain in female    -  1       Follow-ups after your visit        Follow-up notes from your care team     Return if symptoms worsen or fail to improve.      Who to contact     If you have questions or need follow up information about today's clinic visit or your schedule please contact [facility] directly at [phone].  Normal or non-critical lab and imaging results will be communicated to you by MyChart, letter or phone within 4 business days after the clinic has received the results. If you do not hear from us within 7 days, please contact the clinic through [employer] or phone. If you have a critical or abnormal lab result, we will notify you by phone as soon as possible.  Submit refill requests through [employer] or call your pharmacy and they will forward the refill request to us. Please allow 3 business days for your refill to be completed.          Additional Information About Your Visit        MyChart Information     [employer] gives you secure access to your electronic health record. If you see a primary care provider, you can also send messages to your care team and make appointments. If you have questions, please call your primary care clinic.  If you do not have a primary care provider, please call [phone] and they will assist you.        Care EveryWhere ID     This is your Care EveryWhere ID. This could be used by other organizations to access your [place] medical records  [id number]        Your Vitals Were     Pulse Last Period BMI (Body Mass Index)             72 06/14/2018 (Approximate) 16.94 kg/m2          Blood Pressure from Last 3 Encounters:   06/18/18 110/70   04/27/18 (!)  88/50   12/27/17 120/71    Weight from Last 3 Encounters:   06/18/18 98 lb 12 oz (44.8 kg)   04/27/18 105 lb (47.6 kg)   12/27/17 110 lb (49.9 kg)               Primary Care Provider Office Phone # Fax #    Hussain Ryder PA-C [phone]       [address]  [facility] [address]        Equal Access to Services     [facility] RAYNA : Hadii aad ku hadasho Soomaali, waaxda luqadaha, qaybta kaalmada adeegyada, waxay idiin hayaan adeeg gustavo mcclain . So [facility] [phone].    ATENCIÓN: Si habla español, tiene a fischer disposición servicios gratuitos de asistencia lingüística. [facility] [phone].    We comply with applicable federal civil rights laws and [place] laws. We do not discriminate on the basis of race, color, national origin, age, disability, sex, sexual orientation, or gender identity.            Thank you!     Thank you for choosing [facility]  for your care. Our goal is always to provide you with excellent care. Hearing back from our patients is one way we can continue to improve our services. Please take a few minutes to complete the written survey that you may receive in the mail after your visit with us. Thank you!             Your Updated Medication List - Protect others around you: Learn how to safely use, store and throw away your medicines at www.disposemymeds.org.          This list is accurate as of 6/18/18 11:59 PM.  Always use your most recent med list.                   Brand Name Dispense Instructions for use Diagnosis    ibuprofen 800 MG tablet    ADVIL/MOTRIN    60 tablet    Take 1 tablet (800 mg) by mouth every 8 hours as needed for moderate pain    Chest pain, unspecified type, Painful respiration       metoprolol succinate 25 MG 24 hr tablet    TOPROL-XL    90 tablet    Take 1 tablet (25 mg) by mouth daily    Palpitations       PARAGARD INTRAUTERINE COPPER IU      Reported on 4/14/2017        QUEtiapine 50 MG tablet    SEROquel    90 tablet    Take  1 tablet (50 mg) by mouth At Bedtime    Anxiety       TYLENOL PO      Take 1,000 mg by mouth

## 2018-06-18 NOTE — PROGRESS NOTES
OB/GYN   2018      NAME:  Erin Ashley  PCP:  RyderHussain Cristóbal  MRN:  [id number]      Impression / Plan     33 year old  with:      ICD-10-CM    1. Pelvic pain in female R10.2 US Pelvic Complete with Transvaginal   Body mass index is 16.94 kg/(m^2).     I will call her with the results.  If ultrasound is normal, consider optimizing her bowel regimen or discussing possible non-gyn etiologies for her symptoms with her PCP.      Chief Complaint     No chief complaint on file.      HPI     Erin Ashley is a  33 year old female who is seen for follow up.     I last saw the patient 2017 for pelvic pain and left ovarian cyst.  The 1.5 cm cyst was not thought to be causing the pelvic pain. Her complaints were not typical of endometriosis at that time but is still considered in the differential. Also considered musculoskeletal component to the pain. Management options were discussed and the patient elected to consider a trial of oral contraceptives to prevent ovulation. We also discussed possible diagnostic laparoscopy.    Patient's last menstrual period was 2018 (approximate).  She has a monthly period.  Lasts about 4 days.      Patient has a ParaGard in place for birth control.    She has been having pelvic pressure for the last month.  The fullness is what is bothering her the most.  She feels heavy in the pelvic area.   The pressure did not change with her recent menstrual cycle.  Symptoms are worse as the day goes on.  Pants are tighter.  She gets full faster, but no decreased appetite.  Urinary urgency, worsens throughout the day. Difficult to start urinating.  No symptoms of a bladder infection.  No abnormal discharge.  Some increase but no itching or burning.    She still has the right sided pain.  That is constant and goes down her leg.     She did the trial of the OCP for about 3 months but her symptoms did not change.      She also had the shaking chills for a couple of  hours this weekend.  Now resolved.     Date of Last Pap Smear:   Lab Results   Component Value Date    PAP NIL 05/23/2016       Problem List     Patient Active Problem List    Diagnosis Date Noted     Health Care Home 09/15/2017     Priority: Medium     Status:  Accepted  Care Coordinator:  Bassam Sesay RN    See Letters for [facility] Care Plan  Date:  September 15, 2017         Anxiety 07/26/2017     Priority: Medium     Palpitations 07/26/2017     Priority: Medium     Thyroid nodule 07/18/2017     Priority: Medium     Tachycardia 07/18/2017     Priority: Medium     Pelvic pain in female 03/28/2017     Priority: Medium     Elevated LFTs 01/30/2017     Priority: Medium     Insomnia due to medical condition 05/23/2016     Priority: Medium              Personal history of ovarian cyst 05/23/2016     Priority: Medium     Skin tags, anus or rectum 05/23/2016     Priority: Medium     Acne 11/01/2013     Priority: Medium     Paragard placed 10/15/13 10/15/2013     Priority: Medium     TALIB 3 - cervical intraepithelial neoplasia grade 3 06/04/2008     Priority: Medium     4/9/08 pap- ASCUS + high risk HPV  5/29/08 colposcopy- bx (TALIB 2/3) ECC (neg)  6/4/08 consult- [facility] recommended  6/13/08 [facility]- (pathology- TALIB 1/2) repeat pap 3 months  11/7/08 pap-NIL- repeat pap in 4 months  5/21/09 reminder letter sent  6/5/09 pap-ASCUS negative HPV- recommend repeat pap in 1 year  6/7/10 pap- NIL- repeat in 1 year (6/2011)  7/29/11 pap NIL. Plan--pap in 1 year  6/14/12 pap NIL  8/1/13 pap NIL/neg HPV. Plan-- pap in 3 years  05/23/16 Pap= NIL.   Problem list name updated by automated process. Provider to review and confirm           Medications     Current Outpatient Prescriptions   Medication     Acetaminophen (TYLENOL PO)     ibuprofen (ADVIL/MOTRIN) 800 MG tablet     metoprolol (TOPROL-XL) 25 MG 24 hr tablet     PARAGARD INTRAUTERINE COPPER IU     QUEtiapine (SEROQUEL) 50 MG tablet     No current facility-administered medications for this  visit.         Allergies     Allergies   Allergen Reactions     Hydrocodone Other (See Comments)     Migraines     Oxycodone Nausea and Vomiting       Past Medical/Surgical History     Past Medical History:   Diagnosis Date     Abnormal Pap smear      Appendicitis with perforation 08/04/08    Admit. DIscharged 08/07/08     Back pain      TALIB II (cervical intraepithelial neoplasia II) 2008 5/29/08 on coloposcopy bx TALIB 2/3.  [facility] on 6/13/08 showed TALIB 1/2 *yearly paps are indicated*     Insomnia      Renal calculus or stone        Past Surgical History:   Procedure Laterality Date     APPENDECTOMY  8/4/2008     EXAM UNDER ANESTHESIA RECTUM N/A 2/24/2017    Procedure: EXAM UNDER ANESTHESIA RECTUM;  Surgeon: Britton Palomo MD;  Location: [facility] OR     HC CONIZATION CERVIX,KNIFE/LASER  06/130/8    cervix.  TALIB 1/2     HEMORRHOIDECTOMY EXTERNAL N/A 2/24/2017    Procedure: HEMORRHOIDECTOMY EXTERNAL;  Surgeon: Britton Palomo MD;  Location: [facility] OR     TONSILLECTOMY  7/3/2007        Social History     Social History     Social History     Marital status:      Spouse name: N/A     Number of children: 0     Years of education: 16     Occupational History     RN Other     [employer]     Social History Main Topics     Smoking status: Never Smoker     Smokeless tobacco: Never Used      Comment: no smokers in the household     Alcohol use 0.0 oz/week     0 Standard drinks or equivalent per week      Comment: rare     Drug use: No     Sexual activity: Not Currently     Partners: Male     Birth control/ protection: IUD     Other Topics Concern      Service No     Blood Transfusions No     Caffeine Concern Yes     1-2 coffee a day     Occupational Exposure Yes     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     Hobby Hazards No     Sleep Concern No     Stress Concern No     Weight Concern No     Special Diet No     Back Care No     Exercise No     Bike Helmet No     Seat Belt Yes     Self-Exams Yes     know BSE      Parent/Sibling W/ Cabg, Mi Or Angioplasty Before 65f 55m? No     Social History Narrative     to [place] and lives in [place] with their daughter.  No indoor cats/kittens.  No concerns about domestic violence.  No smokers in the home.       Family History      Family History   Problem Relation Age of Onset     Hypertension Maternal Grandmother      borderline     Lipids Maternal Grandmother      Respiratory Maternal Grandmother      COPD     Lung Cancer Maternal Grandmother      Hypertension Maternal Grandfather      borderline     CANCER Maternal Grandfather      lung cancer, smoker     GASTROINTESTINAL DISEASE Mother      cholecystitis     Lipids Mother      Prostate Cancer Paternal Grandfather      Hearing Loss Paternal Grandfather      Other Cancer Paternal Grandmother      pancreatic cancer     Unknown/Adopted No family hx of      Asthma No family hx of      C.A.D. No family hx of      DIABETES No family hx of      CEREBROVASCULAR DISEASE No family hx of      Breast Cancer No family hx of      Cancer - colorectal No family hx of      Alcohol/Drug No family hx of      Allergies No family hx of      Alzheimer Disease No family hx of      Anesthesia Reaction No family hx of      Arthritis No family hx of      Blood Disease No family hx of      Cardiovascular No family hx of      Circulatory No family hx of      Congenital Anomalies No family hx of      Connective Tissue Disorder No family hx of      Depression No family hx of      Genetic Disorder No family hx of      Genitourinary Problems No family hx of      Gynecology No family hx of      HEART DISEASE No family hx of      Musculoskeletal Disorder No family hx of      Neurologic Disorder No family hx of      Obesity No family hx of      OSTEOPOROSIS No family hx of      Psychotic Disorder No family hx of        ROS     A 6 organ review of systems was asked and the pertinent positives and negatives are listed in the HPI. All other organ systems can be  considered negative.     Physical Exam   Vitals: /70 (BP Location: Right arm, Patient Position: Chair, Cuff Size: Adult Regular)  Pulse 72  Wt 98 lb 12 oz (44.8 kg)  LMP 06/14/2018 (Approximate)  BMI 16.94 kg/m2    General: Comfortable, no obvious distress  Psych: Alert and orientated x 3. Appropriate affect, good insight.   : Normal external genitalia.  No lesions.  Normal urethral meatus.  Normal speculum exam with normal Paragard strings, some menstrual blood.  Bimanual exam reveals normal mobile uterus. No palpable masses. Generally tender with exam.  No CMT.        Labs/Imaging       Labs were reviewed in [facility]     Imaging was reviewed in Epic.       20 minutes was spent with patient, more than 50% counseling and coordinating care      Amada Dallas MD

## 2018-06-18 NOTE — NURSING NOTE
"No chief complaint on file.      Initial /70 (BP Location: Right arm, Patient Position: Chair, Cuff Size: Adult Regular)  Pulse 72  Wt 98 lb 12 oz (44.8 kg)  LMP 2018 (Approximate)  BMI 16.94 kg/m2 Estimated body mass index is 16.94 kg/(m^2) as calculated from the following:    Height as of 18: 5' 4.02\" (1.626 m).    Weight as of this encounter: 98 lb 12 oz (44.8 kg).  BP completed using cuff size: regular        The following HM Due: NONE      The following patient reported/Care Every where data was sent to:  P ABSTRACT QUALITY INITIATIVES [[id number]]       N/a    Brittny Lyman CMA  2018             "

## 2018-06-19 ENCOUNTER — RADIANT APPOINTMENT (OUTPATIENT)
Dept: ULTRASOUND IMAGING | Facility: OTHER | Age: 33
End: 2018-06-19
Attending: OBSTETRICS & GYNECOLOGY
Payer: COMMERCIAL

## 2018-06-19 DIAGNOSIS — R10.2 PELVIC PAIN IN FEMALE: ICD-10-CM

## 2018-06-19 PROCEDURE — 76856 US EXAM PELVIC COMPLETE: CPT

## 2018-06-19 PROCEDURE — 76830 TRANSVAGINAL US NON-OB: CPT

## 2018-06-21 DIAGNOSIS — N83.209 CYST OF OVARY, UNSPECIFIED LATERALITY: Primary | ICD-10-CM

## 2018-06-25 ENCOUNTER — MYC MEDICAL ADVICE (OUTPATIENT)
Dept: OBGYN | Facility: OTHER | Age: 33
End: 2018-06-25

## 2018-06-26 ENCOUNTER — HOSPITAL ENCOUNTER (EMERGENCY)
Facility: CLINIC | Age: 33
Discharge: HOME | End: 2018-06-26
Attending: FAMILY MEDICINE | Admitting: FAMILY MEDICINE
Payer: COMMERCIAL

## 2018-06-26 VITALS
OXYGEN SATURATION: 98 % | TEMPERATURE: 98.9 F | SYSTOLIC BLOOD PRESSURE: 125 MMHG | RESPIRATION RATE: 14 BRPM | DIASTOLIC BLOOD PRESSURE: 86 MMHG

## 2018-06-26 DIAGNOSIS — N81.10 PROLAPSE OF VAGINAL WALL: ICD-10-CM

## 2018-06-26 LAB
SPECIMEN SOURCE: NORMAL
WET PREP SPEC: NORMAL

## 2018-06-26 PROCEDURE — 99283 EMERGENCY DEPT VISIT LOW MDM: CPT | Mod: Z6 | Performed by: FAMILY MEDICINE

## 2018-06-26 PROCEDURE — 87491 CHLMYD TRACH DNA AMP PROBE: CPT | Performed by: FAMILY MEDICINE

## 2018-06-26 PROCEDURE — 87529 HSV DNA AMP PROBE: CPT | Performed by: FAMILY MEDICINE

## 2018-06-26 PROCEDURE — 87529 HSV DNA AMP PROBE: CPT | Mod: 91 | Performed by: FAMILY MEDICINE

## 2018-06-26 PROCEDURE — 87591 N.GONORRHOEAE DNA AMP PROB: CPT | Performed by: FAMILY MEDICINE

## 2018-06-26 PROCEDURE — 99284 EMERGENCY DEPT VISIT MOD MDM: CPT | Performed by: FAMILY MEDICINE

## 2018-06-26 PROCEDURE — 87210 SMEAR WET MOUNT SALINE/INK: CPT | Performed by: FAMILY MEDICINE

## 2018-06-26 NOTE — ED AVS SNAPSHOT
[facility] Emergency Department    [address] [place] [address]    Phone:  [phone]    Fax:  [phone]                                       Erin Ashley   MRN: [id number]    Department:  [facility] Emergency Department   Date of Visit:  6/26/2018           Patient Information     Date Of Birth          1985        Your diagnoses for this visit were:     Prolapse of vaginal wall        You were seen by Raman Figueroa MD.      Follow-up Information     Follow up with Amada Dallas MD In 1 week.    Specialty:  OB/Gyn    Contact information:    [address] [place] [address] [phone]          Discharge Instructions       There is a minor prolapse of the vaginal wall.    There are a few vaginal lesions that we are waiting on culture results.    Please follow up with Dr. Dallas within 1 week for further recommendations.    Return if you have any new or worsening symptoms.    24 Hour Appointment Hotline       To make an appointment at any [facility] clinic, call [phone] ([phone]). If you don't have a family doctor or clinic, we will help you find one. [facility] clinics are conveniently located to serve the needs of you and your family.             Review of your medicines      Our records show that you are taking the medicines listed below. If these are incorrect, please call your family doctor or clinic.        Dose / Directions Last dose taken    ibuprofen 800 MG tablet   Commonly known as:  ADVIL/MOTRIN   Dose:  800 mg   Quantity:  60 tablet        Take 1 tablet (800 mg) by mouth every 8 hours as needed for moderate pain   Refills:  1        metoprolol succinate 25 MG 24 hr tablet   Commonly known as:  TOPROL-XL   Dose:  25 mg   Quantity:  90 tablet        Take 1 tablet (25 mg) by mouth daily   Refills:  3        PARAGARD INTRAUTERINE COPPER IU        Reported on 4/14/2017   Refills:  0        QUEtiapine 50 MG tablet   Commonly known as:   SEROquel   Dose:  50 mg   Quantity:  90 tablet        Take 1 tablet (50 mg) by mouth At Bedtime   Refills:  1        TYLENOL PO   Dose:  1000 mg        Take 1,000 mg by mouth   Refills:  0                Procedures and tests performed during your visit     CHLAMYDIA TRACHOMATIS PCR    NEISSERIA GONORRHOEA PCR    Wet prep      Orders Needing Specimen Collection     None      Pending Results     No orders found from 6/24/2018 to 6/27/2018.            Pending Culture Results     No orders found from 6/24/2018 to 6/27/2018.            Pending Results Instructions     If you had any lab results that were not finalized at the time of your Discharge, you can call the ED Lab Result RN at [phone]. You will be contacted by this team for any positive Lab results or changes in treatment. The nurses are available 7 days a week from 10A to 6:30P.  You can leave a message 24 hours per day and they will return your call.        Thank you for choosing [facility]       Thank you for choosing [facility] for your care. Our goal is always to provide you with excellent care. Hearing back from our patients is one way we can continue to improve our services. Please take a few minutes to complete the written survey that you may receive in the mail after you visit with us. Thank you!        [employer] Information     [employer] gives you secure access to your electronic health record. If you see a primary care provider, you can also send messages to your care team and make appointments. If you have questions, please call your primary care clinic.  If you do not have a primary care provider, please call [phone] and they will assist you.        Care EveryWhere ID     This is your Care EveryWhere ID. This could be used by other organizations to access your [facility] medical records  [id number]        Equal Access to Services     [facility] RAYNA AH: Benito Gomes, monica huitron, rush spicer  gustavo mcclain ah. So [facility] [phone].    ATENCIÓN: Si habla español, tiene a fischer disposición servicios gratuitos de asistencia lingüística. Llame al [phone].    We comply with applicable federal civil rights laws and [place] laws. We do not discriminate on the basis of race, color, national origin, age, disability, sex, sexual orientation, or gender identity.            After Visit Summary       This is your record. Keep this with you and show to your community pharmacist(s) and doctor(s) at your next visit.

## 2018-06-26 NOTE — ED TRIAGE NOTES
"Presents with pelvic pressure and vaginal bleeding. States she had her period time a week ago so it is not that. \"I feel like something is crawling around in my pelvic area-I know I sound crazy but I am not\"  "

## 2018-06-26 NOTE — ED AVS SNAPSHOT
[facility] Emergency Department    [address] [place] [address]    Phone:  [phone]    Fax:  [phone]                                       Erin Ashley   MRN: [id number]    Department:  [facility] Emergency Department   Date of Visit:  6/26/2018           After Visit Summary Signature Page     I have received my discharge instructions, and my questions have been answered. I have discussed any challenges I see with this plan with the nurse or doctor.    ..........................................................................................................................................  Patient/Patient Representative Signature      ..........................................................................................................................................  Patient Representative Print Name and Relationship to Patient    ..................................................               ................................................  Date                                            Time    ..........................................................................................................................................  Reviewed by Signature/Title    ...................................................              ..............................................  Date                                                            Time

## 2018-06-26 NOTE — ED NOTES
"Care assumed on patient at this time. Patient is obviously frustrated and keeps saying, \"I just want to leave\". When asked if she is having pain or bleeding at this time she stated, \"my pain is when I pee. No one has laid or stethoscope on me and no one has asked me for a urine sample. Dr Figueroa updated.   "

## 2018-06-26 NOTE — DISCHARGE INSTRUCTIONS
There is a minor prolapse of the vaginal wall.    There are a few vaginal lesions that we are waiting on culture results.    Please follow up with Dr. Dallas within 1 week for further recommendations.    Return if you have any new or worsening symptoms.

## 2018-06-26 NOTE — ED PROVIDER NOTES
"                                                            [facility] ED Provider Note   CC:     Chief Complaint   Patient presents with     Vaginal Bleeding     HPI:  Erin Ashley is a 33 year old female who presented to the emergency department with pelvic pressure that she has had for about a month.  She saw Dr. Dallas last week and had a point-of-care ultrasound in the clinic.  She was told that everything seemed fine.  Today she had an episode of vaginal bleeding, as she was sitting on the toilet trying to urinate.  She was pushing, and felt some tissue come out of the vagina.  Since then she has felt like something is \"crawling\" around in the pelvic area.  She has not had any further bleeding, but does have some discomfort.  She has had 2 normal vaginal deliveries with a third-degree tear with her first vaginal delivery.  She denies any vaginal discharge.  Patient has no prior history of cystocele or rectocele.  She states that she was trying to urinate.  She has a history of chronic right sided pelvic pressure and apparently has been in discussions with Dr. Dallas to have a laparoscopy.  Patient seems to have pain regardless of whether she is having her menses.  Patient has a current intrauterine device in place.  She has a history of TALIB neoplasia grade 3.  Patient denies any recent intercourse or trauma.    Problem List:  Patient Active Problem List    Diagnosis     Health Care Home     Anxiety     Palpitations     Thyroid nodule     Tachycardia     Pelvic pain in female     Elevated LFTs     Insomnia due to medical condition     Personal history of ovarian cyst     Skin tags, anus or rectum     Acne     Paragard placed 10/15/13     TALIB 3 - cervical intraepithelial neoplasia grade 3       MEDS:   Discharge Medication List as of 6/26/2018  4:11 PM      CONTINUE these medications which have NOT CHANGED    Details   Acetaminophen (TYLENOL PO) Take 1,000 mg by mouth, Historical      metoprolol " (TOPROL-XL) 25 MG 24 hr tablet Take 1 tablet (25 mg) by mouth daily, Disp-90 tablet, R-3, E-Prescribe      QUEtiapine (SEROQUEL) 50 MG tablet Take 1 tablet (50 mg) by mouth At Bedtime, Disp-90 tablet, R-1, E-Prescribe      ibuprofen (ADVIL/MOTRIN) 800 MG tablet Take 1 tablet (800 mg) by mouth every 8 hours as needed for moderate pain, Disp-60 tablet, R-1, E-Prescribe      PARAGARD INTRAUTERINE COPPER IU Reported on 4/14/2017, Historical             ALLERGIES:    Allergies   Allergen Reactions     Hydrocodone Other (See Comments)     Migraines     Oxycodone Nausea and Vomiting       Past Surgical History:   Procedure Laterality Date     APPENDECTOMY  8/4/2008     EXAM UNDER ANESTHESIA RECTUM N/A 2/24/2017    Procedure: EXAM UNDER ANESTHESIA RECTUM;  Surgeon: Britton Palomo MD;  Location: [facility] OR     HC CONIZATION CERVIX,KNIFE/LASER  06/130/8    cervix.  TALIB 1/2     HEMORRHOIDECTOMY EXTERNAL N/A 2/24/2017    Procedure: HEMORRHOIDECTOMY EXTERNAL;  Surgeon: Britton Palomo MD;  Location: [facility] OR     TONSILLECTOMY  7/3/2007       Social History   Substance Use Topics     Smoking status: Never Smoker     Smokeless tobacco: Never Used      Comment: no smokers in the household     Alcohol use No      Comment: rare         Review of Systems   Except as noted in HPI, all other systems were reviewed and are negative    Physical Exam     Vitals were reviewed  Patient Vitals for the past 8 hrs:   BP Temp Temp src Heart Rate Resp SpO2   06/26/18 1441 125/86 98.9  F (37.2  C) Temporal 101 14 98 %     GENERAL APPEARANCE: alert, no distress  FACE: normal facies  EYES: Pupils are equal  HENT: normal external exam  RESP: normal respiratory effort  ABD: soft, no masses  : exam performed with nurse chaperone; normal external genitalia; several punctate lesions more on the left vulva; some protrusion of vaginal tissue with bearing down; cervix is slightly patent with IUD string from the os.  SKIN: no worrisome rash        Available  Lab/Imaging Results     Results for orders placed or performed during the hospital encounter of 06/26/18 (from the past 24 hour(s))   Wet prep   Result Value Ref Range    Specimen Description Vagina     Wet Prep Few  PMNs seen       Wet Prep No Trichomonas seen     Wet Prep No clue cells seen     Wet Prep No yeast seen               Impression     Final diagnoses:   Prolapse of vaginal wall         ED Course & Medical Decision Making   Erin Ashley is a 33 year old female who presented to the emergency department with complaints of pelvic pressure that she has had for about a month, and has seen Dr. Dallas, OB/GYN a week ago.  She may be headed towards having a laparoscopy.  She developed some vaginal bleeding along with some pain and reported tissue coming out of the vagina earlier when she was trying to void.  She states that she was pushing to empty her bladder when she felt some tissue coming out of the vagina.  This was at the same time that she had a brief moderate amount of bleeding.  Since then the bleeding has subsided, but she has a crawling sensation.  Patient was seen after arrival.  Patient seemed anxious about the report of crawling sensation.  She was still complaining of moderate pain, and the pelvic exam was performed with a nurse chaperone.  Findings are noted above the knee exam.  Culture from the cervical Oz was obtained, and wet prep was unremarkable.  On bimanual exam, patient appeared to have slight vaginal wall prolapse, especially of the posterior wall.  There was tissue that protruded slightly from the vaginal vault.  There were several lesions with one area along the left lateral volar that appeared to have had a recent mild laceration.  Patient reported no trauma or intercourse.  A culture for herpes simplex virus was obtained.  The patient seemed upset just before discharge, and I tried to ask if there was anything else that she wanted tested before she left, and she declined.  She  states that she just wanted to be discharged.  There does not appear to be any acute urinary symptoms, and I tried to address what seemed to be of her primary concern.  I asked her to follow-up with Dr. Yu within the next week, and to return to the ED with any new or worsening symptoms.               Written after-visit summary and instructions were given at the time of discharge.      Discharge Medication List as of 6/26/2018  4:11 PM               This note was dictated using electronic voice recognition software and although reviewed, may contain grammatical and spelling errors.        Raman Figueroa MD  06/26/18 6596

## 2018-06-27 ENCOUNTER — HOSPITAL ENCOUNTER (EMERGENCY)
Facility: CLINIC | Age: 33
Discharge: HOME | End: 2018-06-27
Attending: FAMILY MEDICINE | Admitting: FAMILY MEDICINE
Payer: COMMERCIAL

## 2018-06-27 ENCOUNTER — PATIENT OUTREACH (OUTPATIENT)
Dept: CARE COORDINATION | Facility: CLINIC | Age: 33
End: 2018-06-27

## 2018-06-27 ENCOUNTER — TELEPHONE (OUTPATIENT)
Dept: FAMILY MEDICINE | Facility: OTHER | Age: 33
End: 2018-06-27

## 2018-06-27 ENCOUNTER — APPOINTMENT (OUTPATIENT)
Dept: CT IMAGING | Facility: CLINIC | Age: 33
End: 2018-06-27
Attending: FAMILY MEDICINE
Payer: COMMERCIAL

## 2018-06-27 VITALS
RESPIRATION RATE: 20 BRPM | DIASTOLIC BLOOD PRESSURE: 61 MMHG | WEIGHT: 98 LBS | HEIGHT: 64 IN | BODY MASS INDEX: 16.73 KG/M2 | OXYGEN SATURATION: 98 % | HEART RATE: 99 BPM | SYSTOLIC BLOOD PRESSURE: 94 MMHG

## 2018-06-27 DIAGNOSIS — R10.2 PELVIC PAIN: ICD-10-CM

## 2018-06-27 LAB
ALBUMIN UR-MCNC: 30 MG/DL
AMORPH CRY #/AREA URNS HPF: ABNORMAL /HPF
ANION GAP SERPL CALCULATED.3IONS-SCNC: 8 MMOL/L (ref 3–14)
APPEARANCE UR: ABNORMAL
BACTERIA #/AREA URNS HPF: ABNORMAL /HPF
BASOPHILS # BLD AUTO: 0 10E9/L (ref 0–0.2)
BASOPHILS NFR BLD AUTO: 0.7 %
BILIRUB UR QL STRIP: NEGATIVE
BUN SERPL-MCNC: 11 MG/DL (ref 7–30)
C TRACH DNA SPEC QL NAA+PROBE: NEGATIVE
CALCIUM SERPL-MCNC: 8.3 MG/DL (ref 8.5–10.1)
CHLORIDE SERPL-SCNC: 108 MMOL/L (ref 94–109)
CO2 SERPL-SCNC: 27 MMOL/L (ref 20–32)
COLOR UR AUTO: YELLOW
CREAT SERPL-MCNC: 0.8 MG/DL (ref 0.52–1.04)
DIFFERENTIAL METHOD BLD: NORMAL
EOSINOPHIL NFR BLD AUTO: 0.5 %
ERYTHROCYTE [DISTWIDTH] IN BLOOD BY AUTOMATED COUNT: 13.9 % (ref 10–15)
GFR SERPL CREATININE-BSD FRML MDRD: 84 ML/MIN/1.7M2
GLUCOSE SERPL-MCNC: 93 MG/DL (ref 70–99)
GLUCOSE UR STRIP-MCNC: NEGATIVE MG/DL
HCG UR QL: NEGATIVE
HCT VFR BLD AUTO: 35.4 % (ref 35–47)
HGB BLD-MCNC: 11.7 G/DL (ref 11.7–15.7)
HGB UR QL STRIP: ABNORMAL
HSV1 DNA SPEC QL NAA+PROBE: NEGATIVE
HSV2 DNA SPEC QL NAA+PROBE: NEGATIVE
IMM GRANULOCYTES # BLD: 0 10E9/L (ref 0–0.4)
IMM GRANULOCYTES NFR BLD: 0.2 %
KETONES UR STRIP-MCNC: NEGATIVE MG/DL
LEUKOCYTE ESTERASE UR QL STRIP: NEGATIVE
LYMPHOCYTES # BLD AUTO: 1.9 10E9/L (ref 0.8–5.3)
LYMPHOCYTES NFR BLD AUTO: 31 %
MCH RBC QN AUTO: 28.3 PG (ref 26.5–33)
MCHC RBC AUTO-ENTMCNC: 33.1 G/DL (ref 31.5–36.5)
MCV RBC AUTO: 86 FL (ref 78–100)
MONOCYTES # BLD AUTO: 0.4 10E9/L (ref 0–1.3)
MONOCYTES NFR BLD AUTO: 6 %
N GONORRHOEA DNA SPEC QL NAA+PROBE: NEGATIVE
NEUTROPHILS # BLD AUTO: 3.8 10E9/L (ref 1.6–8.3)
NEUTROPHILS NFR BLD AUTO: 61.6 %
NITRATE UR QL: NEGATIVE
NRBC # BLD AUTO: 0 10*3/UL
NRBC BLD AUTO-RTO: 0 /100
PH UR STRIP: 8 PH (ref 5–7)
PLATELET # BLD AUTO: 253 10E9/L (ref 150–450)
POTASSIUM SERPL-SCNC: 3.9 MMOL/L (ref 3.4–5.3)
RBC # BLD AUTO: 4.13 10E12/L (ref 3.8–5.2)
RBC #/AREA URNS AUTO: ABNORMAL /HPF (ref 0–2)
SODIUM SERPL-SCNC: 143 MMOL/L (ref 133–144)
SOURCE: ABNORMAL
SP GR UR STRIP: 1.01 (ref 1–1.03)
SPECIMEN SOURCE: NORMAL
SQUAMOUS #/AREA URNS AUTO: ABNORMAL /HPF (ref 0–1)
UROBILINOGEN UR STRIP-MCNC: NEGATIVE MG/DL (ref 0–2)
WBC # BLD AUTO: 6.1 10E9/L (ref 4–11)
WBC #/AREA URNS AUTO: <1 /HPF (ref 0–5)

## 2018-06-27 PROCEDURE — 74177 CT ABD & PELVIS W/CONTRAST: CPT

## 2018-06-27 PROCEDURE — 99285 EMERGENCY DEPT VISIT HI MDM: CPT | Mod: 25 | Performed by: FAMILY MEDICINE

## 2018-06-27 PROCEDURE — 80048 BASIC METABOLIC PNL TOTAL CA: CPT | Performed by: FAMILY MEDICINE

## 2018-06-27 PROCEDURE — 25000128 H RX IP 250 OP 636: Performed by: FAMILY MEDICINE

## 2018-06-27 PROCEDURE — 81025 URINE PREGNANCY TEST: CPT | Performed by: FAMILY MEDICINE

## 2018-06-27 PROCEDURE — 99284 EMERGENCY DEPT VISIT MOD MDM: CPT | Mod: Z6 | Performed by: FAMILY MEDICINE

## 2018-06-27 PROCEDURE — 81001 URINALYSIS AUTO W/SCOPE: CPT | Performed by: FAMILY MEDICINE

## 2018-06-27 PROCEDURE — 25000125 ZZHC RX 250: Performed by: FAMILY MEDICINE

## 2018-06-27 PROCEDURE — 96374 THER/PROPH/DIAG INJ IV PUSH: CPT | Mod: 59 | Performed by: FAMILY MEDICINE

## 2018-06-27 PROCEDURE — 85025 COMPLETE CBC W/AUTO DIFF WBC: CPT | Performed by: FAMILY MEDICINE

## 2018-06-27 RX ORDER — OXYCODONE HYDROCHLORIDE 5 MG/1
5 TABLET ORAL EVERY 6 HOURS PRN
Qty: 8 TABLET | Refills: 0 | Status: SHIPPED | OUTPATIENT
Start: 2018-06-27 | End: 2018-07-06

## 2018-06-27 RX ORDER — HYDROCODONE BITARTRATE AND ACETAMINOPHEN 5; 325 MG/1; MG/1
1 TABLET ORAL EVERY 4 HOURS PRN
Qty: 8 TABLET | Refills: 0 | Status: SHIPPED | OUTPATIENT
Start: 2018-06-27 | End: 2018-06-27

## 2018-06-27 RX ORDER — IOPAMIDOL 755 MG/ML
100 INJECTION, SOLUTION INTRAVASCULAR ONCE
Status: COMPLETED | OUTPATIENT
Start: 2018-06-27 | End: 2018-06-27

## 2018-06-27 RX ORDER — KETOROLAC TROMETHAMINE 15 MG/ML
15 INJECTION, SOLUTION INTRAMUSCULAR; INTRAVENOUS ONCE
Status: COMPLETED | OUTPATIENT
Start: 2018-06-27 | End: 2018-06-27

## 2018-06-27 RX ADMIN — KETOROLAC TROMETHAMINE 15 MG: 15 INJECTION, SOLUTION INTRAMUSCULAR; INTRAVENOUS at 02:17

## 2018-06-27 RX ADMIN — SODIUM CHLORIDE 60 ML: 9 INJECTION, SOLUTION INTRAVENOUS at 02:49

## 2018-06-27 RX ADMIN — IOPAMIDOL 55 ML: 755 INJECTION, SOLUTION INTRAVENOUS at 02:49

## 2018-06-27 NOTE — ED PROVIDER NOTES
History     Chief Complaint   Patient presents with     Vaginal Bleeding     Pt was seen in ED yesterday. Low abdominal pressure. Ultrasound done a week ago in [facility] for pelvic pressure.      HPI  Erin Ashley is a 33 year old female who presents with lower abdominal pressure and some vaginal bleeding.  She is concerned because she feels this pulling sensation in her vagina.  Patient was just seen 18 hours ago here in the emergency department and had a pelvic exam and vaginal swabs which was unremarkable.  The provider at that time to think the patient had a mild vaginal prolapse.  Patient states that she was doing okay and then tonight she started having some bleeding again and she got concerned especially with this constant pulling feeling, she called the ambulance and came for further evaluation.  She is feeling lightheaded at the time but is no longer feeling like that.  Patient denies any fevers or chills.  Patient continues to have trouble urinating and a lot of pressure with urination.    Problem List:    Patient Active Problem List    Diagnosis Date Noted     Health Care Home 09/15/2017     Priority: Medium     Status:  Accepted  Care Coordinator:  Basasm Sesay RN    See Letters for [facility] Care Plan  Date:  September 15, 2017         Anxiety 07/26/2017     Priority: Medium     Palpitations 07/26/2017     Priority: Medium     Thyroid nodule 07/18/2017     Priority: Medium     Tachycardia 07/18/2017     Priority: Medium     Pelvic pain in female 03/28/2017     Priority: Medium     Elevated LFTs 01/30/2017     Priority: Medium     Insomnia due to medical condition 05/23/2016     Priority: Medium              Personal history of ovarian cyst 05/23/2016     Priority: Medium     Skin tags, anus or rectum 05/23/2016     Priority: Medium     Acne 11/01/2013     Priority: Medium     Paragard placed 10/15/13 10/15/2013     Priority: Medium     TALIB 3 - cervical intraepithelial neoplasia grade 3 06/04/2008      Priority: Medium     4/9/08 pap- ASCUS + high risk HPV  5/29/08 colposcopy- bx (TALIB 2/3) ECC (neg)  6/4/08 consult- [facility] recommended  6/13/08 [facility]- (pathology- TALIB 1/2) repeat pap 3 months  11/7/08 pap-NIL- repeat pap in 4 months  5/21/09 reminder letter sent  6/5/09 pap-ASCUS negative HPV- recommend repeat pap in 1 year  6/7/10 pap- NIL- repeat in 1 year (6/2011)  7/29/11 pap NIL. Plan--pap in 1 year  6/14/12 pap NIL  8/1/13 pap NIL/neg HPV. Plan-- pap in 3 years  05/23/16 Pap= NIL.   Problem list name updated by automated process. Provider to review and confirm            Past Medical History:    Past Medical History:   Diagnosis Date     Abnormal Pap smear      Appendicitis with perforation 08/04/08     Back pain      TLAIB II (cervical intraepithelial neoplasia II) 2008     Insomnia      Renal calculus or stone        Past Surgical History:    Past Surgical History:   Procedure Laterality Date     APPENDECTOMY  8/4/2008     EXAM UNDER ANESTHESIA RECTUM N/A 2/24/2017    Procedure: EXAM UNDER ANESTHESIA RECTUM;  Surgeon: Britton Palomo MD;  Location: [facility] OR     HC CONIZATION CERVIX,KNIFE/LASER  06/130/8    cervix.  TALIB 1/2     HEMORRHOIDECTOMY EXTERNAL N/A 2/24/2017    Procedure: HEMORRHOIDECTOMY EXTERNAL;  Surgeon: Britton Palomo MD;  Location: [facility] OR     TONSILLECTOMY  7/3/2007       Family History:    Family History   Problem Relation Age of Onset     Hypertension Maternal Grandmother      borderline     Lipids Maternal Grandmother      Respiratory Maternal Grandmother      COPD     Lung Cancer Maternal Grandmother      Hypertension Maternal Grandfather      borderline     Cancer Maternal Grandfather      lung cancer, smoker     GASTROINTESTINAL DISEASE Mother      cholecystitis     Lipids Mother      Prostate Cancer Paternal Grandfather      Hearing Loss Paternal Grandfather      Other Cancer Paternal Grandmother      pancreatic cancer     Unknown/Adopted No family hx of      Asthma No family hx of      C.A.D.  "No family hx of      Diabetes No family hx of      Cerebrovascular Disease No family hx of      Breast Cancer No family hx of      Cancer - colorectal No family hx of      Alcohol/Drug No family hx of      Allergies No family hx of      Alzheimer Disease No family hx of      Anesthesia Reaction No family hx of      Arthritis No family hx of      Blood Disease No family hx of      Cardiovascular No family hx of      Circulatory No family hx of      Congenital Anomalies No family hx of      Connective Tissue Disorder No family hx of      Depression No family hx of      Genetic Disorder No family hx of      Genitourinary Problems No family hx of      Gynecology No family hx of      HEART DISEASE No family hx of      Musculoskeletal Disorder No family hx of      Neurologic Disorder No family hx of      Obesity No family hx of      Osteoperosis No family hx of      Psychotic Disorder No family hx of        Social History:  Marital Status:   [4]  Social History   Substance Use Topics     Smoking status: Never Smoker     Smokeless tobacco: Never Used      Comment: no smokers in the household     Alcohol use No      Comment: rare        Medications:      ibuprofen (ADVIL/MOTRIN) 800 MG tablet   metoprolol (TOPROL-XL) 25 MG 24 hr tablet   QUEtiapine (SEROQUEL) 50 MG tablet   Acetaminophen (TYLENOL PO)   PARAGARD INTRAUTERINE COPPER IU         Review of Systems   All other systems reviewed and are negative.      Physical Exam   BP: 105/67  Pulse: 99  Resp: 16  Height: 162.6 cm (5' 4\")  Weight: 44.5 kg (98 lb)  SpO2: 98 %      Physical Exam   Constitutional: She appears well-developed and well-nourished. No distress.   Pulmonary/Chest: Effort normal and breath sounds normal. No respiratory distress. She has no wheezes.   Abdominal: Soft. Bowel sounds are normal. She exhibits no distension. There is no tenderness. There is no rebound.   Genitourinary:   Genitourinary Comments: Deferred as she just had this 18 hours ago "   Skin: She is not diaphoretic.   Nursing note and vitals reviewed.      ED Course     ED Course     Procedures               Results for orders placed or performed during the hospital encounter of 06/27/18 (from the past 24 hour(s))   CBC with platelets differential   Result Value Ref Range    WBC 6.1 4.0 - 11.0 10e9/L    RBC Count 4.13 3.8 - 5.2 10e12/L    Hemoglobin 11.7 11.7 - 15.7 g/dL    Hematocrit 35.4 35.0 - 47.0 %    MCV 86 78 - 100 fl    MCH 28.3 26.5 - 33.0 pg    MCHC 33.1 31.5 - 36.5 g/dL    RDW 13.9 10.0 - 15.0 %    Platelet Count 253 150 - 450 10e9/L    Diff Method Automated Method     % Neutrophils 61.6 %    % Lymphocytes 31.0 %    % Monocytes 6.0 %    % Eosinophils 0.5 %    % Basophils 0.7 %    % Immature Granulocytes 0.2 %    Nucleated RBCs 0 0 /100    Absolute Neutrophil 3.8 1.6 - 8.3 10e9/L    Absolute Lymphocytes 1.9 0.8 - 5.3 10e9/L    Absolute Monocytes 0.4 0.0 - 1.3 10e9/L    Absolute Basophils 0.0 0.0 - 0.2 10e9/L    Abs Immature Granulocytes 0.0 0 - 0.4 10e9/L    Absolute Nucleated RBC 0.0    Basic metabolic panel   Result Value Ref Range    Sodium 143 133 - 144 mmol/L    Potassium 3.9 3.4 - 5.3 mmol/L    Chloride 108 94 - 109 mmol/L    Carbon Dioxide 27 20 - 32 mmol/L    Anion Gap 8 3 - 14 mmol/L    Glucose 93 70 - 99 mg/dL    Urea Nitrogen 11 7 - 30 mg/dL    Creatinine 0.80 0.52 - 1.04 mg/dL    GFR Estimate 84 >60 mL/min/1.7m2    GFR Estimate If Black >90 >60 mL/min/1.7m2    Calcium 8.3 (L) 8.5 - 10.1 mg/dL   UA with Microscopic   Result Value Ref Range    Color Urine Yellow     Appearance Urine Cloudy     Glucose Urine Negative NEG^Negative mg/dL    Bilirubin Urine Negative NEG^Negative    Ketones Urine Negative NEG^Negative mg/dL    Specific Gravity Urine 1.010 1.003 - 1.035    Blood Urine Large (A) NEG^Negative    pH Urine 8.0 (H) 5.0 - 7.0 pH    Protein Albumin Urine 30 (A) NEG^Negative mg/dL    Urobilinogen mg/dL Negative 0.0 - 2.0 mg/dL    Nitrite Urine Negative NEG^Negative     Leukocyte Esterase Urine Negative NEG^Negative    Source Unspecified Urine     WBC Urine <1 0 - 5 /HPF    RBC Urine 25 TO 50 0 - 2 /HPF    Bacteria Urine Moderate (A) NEG^Negative /HPF    Squamous Epithelial /HPF Urine FEW 0 - 1 /HPF    Amorphous Crystals Many (A) NEG^Negative /HPF   HCG qualitative urine (UPT)   Result Value Ref Range    HCG Qual Urine Negative NEG^Negative   CT Abdomen Pelvis w Contrast    Narrative    CT ABDOMEN PELVIS W CONTRAST  6/27/2018 2:58 AM     HISTORY: Lower abdominal pain, pelvic pain, hematuria.    TECHNIQUE: Volumetric acquisition through abdomen and pelvis with IV  contrast. 55 mL Isovue-370. Radiation dose for this scan was reduced  using automated exposure control, adjustment of the mA and/or kV  according to patient size, or iterative reconstruction technique.    COMPARISON: 8/4/2008.    FINDINGS: The liver, gallbladder, spleen, pancreas, adrenal glands and  kidneys demonstrate no worrisome findings. Low attenuation  subcentimeter renal lesion(s). These are compatible with small benign  cysts and no specific imaging evaluation or follow-up is recommended.  No hydronephrosis.    Uterus is present and there is an IUD in place. No suspicious pelvic  masses or significant ascites. Moderate stool in the colon. No bowel  obstruction, focal inflammatory changes or free air.       Impression    IMPRESSION: No acute abnormality.       Medications   ketorolac (TORADOL) injection 15 mg (15 mg Intravenous Given 6/27/18 0217)   iopamidol (ISOVUE-370) solution 100 mL (55 mLs Intravenous Given 6/27/18 0249)   sodium chloride 0.9 % bag 500mL for CT scan flush use (60 mLs Intravenous Given 6/27/18 0249)   sodium chloride (PF) 0.9% PF flush 3 mL (10 mLs Intracatheter Given 6/27/18 0247)     Labs are reviewed and patient had some blood in her urine but no infectious markers.  Her blood work was unremarkable.  I did doing a CT scan of the belly to further evaluate this pelvic pressure as her recent  ultrasound was unremarkable.  The CT was normal.  At this point I do not think we need to repeat her ultrasound as the pain has been constant this period of time, this would not go along with a torsion as the symptoms are not necessarily acute.  I recommend the patient follow-up with Dr. Dallas with OB later on this week for further evaluation.  I reassured the patient that does not appear to be anything substantially wrong.  I told her that this bleeding could certainly be from the vaginal prolapse that the doctor earlier had told her and she should follow-up with OB.  I will send her home with a few pain pills but she will continue to use ibuprofen.    Assessments & Plan (with Medical Decision Making)  Pelvic pain     I have reviewed the nursing notes.    I have reviewed the findings, diagnosis, plan and need for follow up with the patient.              6/27/2018   [facility] EMERGENCY DEPARTMENT     Fred Veloz MD  06/27/18 [id number]

## 2018-06-27 NOTE — ED NOTES
"Pt drank two glasses of water for CT. Tolerated well. Pt states low back pain and pelvic pressure before going to CT. Bleeding slowed \"way down.\"   "

## 2018-06-27 NOTE — ED AVS SNAPSHOT
[facility] Emergency Department    [address] [place] [address]    Phone:  [phone]    Fax:  [phone]                                       Erin Ashley   MRN: [id number]    Department:  [facility] Emergency Department   Date of Visit:  6/27/2018           Patient Information     Date Of Birth          1985        Your diagnoses for this visit were:     Pelvic pain        You were seen by Fred Veloz MD.      Follow-up Information     Schedule an appointment as soon as possible for a visit with Amada Dallas MD.    Specialty:  OB/Gyn    Why:  As Soon As Possible, For follow up on your ED stay    Contact information:    [address] [place] [address] [phone]        Discharge References/Attachments     PELVIC PAIN, UNKNOWN CAUSE (ENGLISH)      24 Hour Appointment Hotline       To make an appointment at any [place] clinic, call [phone] ([phone]). If you don't have a family doctor or clinic, we will help you find one. [place] clinics are conveniently located to serve the needs of you and your family.             Review of your medicines      START taking        Dose / Directions Last dose taken    oxyCODONE IR 5 MG tablet   Commonly known as:  ROXICODONE   Dose:  5 mg   Quantity:  8 tablet        Take 1 tablet (5 mg) by mouth every 6 hours as needed for pain   Refills:  0          Our records show that you are taking the medicines listed below. If these are incorrect, please call your family doctor or clinic.        Dose / Directions Last dose taken    ibuprofen 800 MG tablet   Commonly known as:  ADVIL/MOTRIN   Dose:  800 mg   Quantity:  60 tablet        Take 1 tablet (800 mg) by mouth every 8 hours as needed for moderate pain   Refills:  1        metoprolol succinate 25 MG 24 hr tablet   Commonly known as:  TOPROL-XL   Dose:  25 mg   Quantity:  90 tablet        Take 1 tablet (25 mg) by mouth daily   Refills:  3         PARAGARD INTRAUTERINE COPPER IU        Reported on 4/14/2017   Refills:  0        QUEtiapine 50 MG tablet   Commonly known as:  SEROquel   Dose:  50 mg   Quantity:  90 tablet        Take 1 tablet (50 mg) by mouth At Bedtime   Refills:  1        TYLENOL PO   Dose:  1000 mg        Take 1,000 mg by mouth   Refills:  0                Information about OPIOIDS     PRESCRIPTION OPIOIDS: WHAT YOU NEED TO KNOW   We gave you an opioid (narcotic) pain medicine. It is important to manage your pain, but opioids are not always the best choice. You should first try all the other options your care team gave you. Take this medicine for as short a time (and as few doses) as possible.     These medicines have risks:    DO NOT drive when on new or higher doses of pain medicine. These medicines can affect your alertness and reaction times, and you could be arrested for driving under the influence (DUI). If you need to use opioids long-term, talk to your care team about driving.    DO NOT operate heave machinery    DO NOT do any other dangerous activities while taking these medicines.     DO NOT drink any alcohol while taking these medicines.      If the opioid prescribed includes acetaminophen, DO NOT take with any other medicines that contain acetaminophen. Read all labels carefully. Look for the word  acetaminophen  or  Tylenol.  Ask your pharmacist if you have questions or are unsure.    You can get addicted to pain medicines, especially if you have a history of addiction (chemical, alcohol or substance dependence). Talk to your care team about ways to reduce this risk.    Store your pills in a secure place, locked if possible. We will not replace any lost or stolen medicine. If you don t finish your medicine, please throw away (dispose) as directed by your pharmacist. The [place] Pollution Control Agency has more information about safe disposal: https://[email or web address].[email or web address]/living-green/managing-unwanted-medications.     All  opioids tend to cause constipation. Drink plenty of water and eat foods that have a lot of fiber, such as fruits, vegetables, prune juice, apple juice and high-fiber cereal. Take a laxative (Miralax, milk of magnesia, Colace, Senna) if you don t move your bowels at least every other day.         Prescriptions were sent or printed at these locations (1 Prescription)                   Other Prescriptions                Printed at Department/Unit printer (1 of 1)         oxyCODONE IR (ROXICODONE) 5 MG tablet                Procedures and tests performed during your visit     Basic metabolic panel    CBC with platelets differential    CT Abdomen Pelvis w Contrast    HCG qualitative urine (UPT)    Peripheral IV catheter    UA with Microscopic      Orders Needing Specimen Collection     None      Pending Results     Date and Time Order Name Status Description    6/27/2018 0223 CT Abdomen Pelvis w Contrast Preliminary     6/26/2018 1617 HSV 1 and 2 DNA by PCR In process     6/26/2018 1604 CHLAMYDIA TRACHOMATIS PCR In process     6/26/2018 1604 NEISSERIA GONORRHOEA PCR In process             Pending Culture Results     Date and Time Order Name Status Description    6/26/2018 1617 HSV 1 and 2 DNA by PCR In process     6/26/2018 1604 CHLAMYDIA TRACHOMATIS PCR In process     6/26/2018 1604 NEISSERIA GONORRHOEA PCR In process             Pending Results Instructions     If you had any lab results that were not finalized at the time of your Discharge, you can call the ED Lab Result RN at [phone]. You will be contacted by this team for any positive Lab results or changes in treatment. The nurses are available 7 days a week from 10A to 6:30P.  You can leave a message 24 hours per day and they will return your call.        Thank you for choosing [facility]       Thank you for choosing [facility] for your care. Our goal is always to provide you with excellent care. Hearing back from our patients is one way we can continue to  improve our services. Please take a few minutes to complete the written survey that you may receive in the mail after you visit with us. Thank you!        Local [employer] Information     [employer] gives you secure access to your electronic health record. If you see a primary care provider, you can also send messages to your care team and make appointments. If you have questions, please call your primary care clinic.  If you do not have a primary care provider, please call [phone] and they will assist you.        Care EveryWhere ID     This is your Care EveryWhere ID. This could be used by other organizations to access your [facility] medical records  [id number]        Equal Access to Services     ALICIA WAY : Benito Gomes, monica huitron, rush spicer. So [facility] [phone].    ATENCIÓN: Si habla español, tiene a fischer disposición servicios gratuitos de asistencia lingüística. Llame al [phone].    We comply with applicable federal civil rights laws and [place] laws. We do not discriminate on the basis of race, color, national origin, age, disability, sex, sexual orientation, or gender identity.            After Visit Summary       This is your record. Keep this with you and show to your community pharmacist(s) and doctor(s) at your next visit.

## 2018-06-27 NOTE — ED TRIAGE NOTES
"Pt presents with vaginal bleeding and a \"pulling sensation in her vagina.\" Pt was seen in ED yesterday. Pt did have ultrasound one week ago in clinic. No ultrasound done yesterday.   "

## 2018-06-27 NOTE — ED AVS SNAPSHOT
[facility] Emergency Department    [address] [place] [address]    Phone:  [phone]    Fax:  [phone]                                       Erin Ashley   MRN: [id number]    Department:  [facility] Emergency Department   Date of Visit:  6/27/2018           After Visit Summary Signature Page     I have received my discharge instructions, and my questions have been answered. I have discussed any challenges I see with this plan with the nurse or doctor.    ..........................................................................................................................................  Patient/Patient Representative Signature      ..........................................................................................................................................  Patient Representative Print Name and Relationship to Patient    ..................................................               ................................................  Date                                            Time    ..........................................................................................................................................  Reviewed by Signature/Title    ...................................................              ..............................................  Date                                                            Time

## 2018-06-27 NOTE — TELEPHONE ENCOUNTER
Erin Ashley is a 33 year old female who calls with vaginal bleeding and pelvic pressure.    NURSING ASSESSMENT:  Description:  Pt was seen in the ED x2 since yesterday for vaginal bleeding and pelvic pressure.  Both times they sent her away and advised to f/u with OB/GYN.  Pt was also seen on 6/18/18 by Dr. Dallas for this as well. Pt doesn't want to go back to ED because she has already been there twice and they have sent her home after doing tests.  Onset/duration:  A week  Precip. factors:  unknown  Associated symptoms:  Pelvic pressure when bearing down to urinate, vaginal bleeding when sitting on the toilet (stops when done in the bathroom).  Denies dizziness, light headedness, fever, pregnancy.  Improves/worsens symptoms:  Bleeding stops when not bearing down to urinate.  Pain scale (0-10)   6/10    Allergies:   Allergies   Allergen Reactions     No Known Allergies          RECOMMENDED DISPOSITION:  Scheduled with [facility] tomorrow but advised pt to go to the ED if symptoms worsen from what they were when she was evaluated in the ED, pain gets worse, bleeding continues or gets worse, unable to urinate.  Advised to drink plenty of water.  Will comply with recommendation: Yes  If further questions/concerns or if symptoms do not improve, worsen or new symptoms develop, call your PCP or [facility] as soon as possible.      Guideline used: Vaginal Bleeding  Telephone Triage Protocols for Nurses, Fifth Edition, Bita Patton  ED discharge notes    Malika Holt RN

## 2018-06-28 ENCOUNTER — OFFICE VISIT (OUTPATIENT)
Dept: OBGYN | Facility: OTHER | Age: 33
End: 2018-06-28
Payer: COMMERCIAL

## 2018-06-28 VITALS
HEART RATE: 88 BPM | SYSTOLIC BLOOD PRESSURE: 102 MMHG | WEIGHT: 98.75 LBS | DIASTOLIC BLOOD PRESSURE: 64 MMHG | BODY MASS INDEX: 16.95 KG/M2

## 2018-06-28 DIAGNOSIS — R10.2 PELVIC PAIN IN FEMALE: Primary | ICD-10-CM

## 2018-06-28 PROCEDURE — 87209 SMEAR COMPLEX STAIN: CPT | Performed by: ADVANCED PRACTICE MIDWIFE

## 2018-06-28 PROCEDURE — 99213 OFFICE O/P EST LOW 20 MIN: CPT | Performed by: ADVANCED PRACTICE MIDWIFE

## 2018-06-28 PROCEDURE — 87177 OVA AND PARASITES SMEARS: CPT | Performed by: ADVANCED PRACTICE MIDWIFE

## 2018-06-28 NOTE — NURSING NOTE
"Chief Complaint   Patient presents with     Vaginal Problem     ED follow up for prolapsed vagina       Initial /64 (BP Location: Right arm, Patient Position: Chair, Cuff Size: Adult Regular)  Pulse 88  Wt 98 lb 12 oz (44.8 kg)  LMP 06/14/2018 (Approximate)  BMI 16.95 kg/m2 Estimated body mass index is 16.95 kg/(m^2) as calculated from the following:    Height as of 6/27/18: 5' 4\" (1.626 m).    Weight as of this encounter: 98 lb 12 oz (44.8 kg).  Medication Reconciliation: complete    Hannah Tobias CMA    "

## 2018-06-28 NOTE — PROGRESS NOTES
Clinic Care Coordination Contact    Situation: Patient chart reviewed by care coordinator.    Background: Pt was on the IP discharge list.     Assessment: pt was seen in the ED x 2 for vaginal bleeding.  Pt was instructed to follow up with obgyn. Pt has since contacted the clinic and has a follow up appt today.     Plan/Recommendations: no outreach by CC at this time.     Viviana SANTANA, RN, PHN  Care Coordination    [facility] [place] [facility]  [address] [place] [address]  Office: [phone]  Fax [phone]   [facility]  [address] [place] [address]  Office: [phone] Fax [phone]  [email or web address]   [email or web address]   Connect with [facility] on social media.

## 2018-06-28 NOTE — MR AVS SNAPSHOT
After Visit Summary   6/28/2018    Erin Ashley    MRN: [id number]           Patient Information     Date Of Birth          1985        Visit Information        Provider Department      6/28/2018 12:15 PM Ana Maria Fuentes APRN CNM [facility]        Today's Diagnoses     Pelvic pain in female    -  1       Follow-ups after your visit        Who to contact     If you have questions or need follow up information about today's clinic visit or your schedule please contact [facility] directly at [phone].  Normal or non-critical lab and imaging results will be communicated to you by [employer], letter or phone within 4 business days after the clinic has received the results. If you do not hear from us within 7 days, please contact the clinic through [employer] or phone. If you have a critical or abnormal lab result, we will notify you by phone as soon as possible.  Submit refill requests through [employer] or call your pharmacy and they will forward the refill request to us. Please allow 3 business days for your refill to be completed.          Additional Information About Your Visit        MyChart Information     [employer] gives you secure access to your electronic health record. If you see a primary care provider, you can also send messages to your care team and make appointments. If you have questions, please call your primary care clinic.  If you do not have a primary care provider, please call [phone] and they will assist you.        Care EveryWhere ID     This is your Care EveryWhere ID. This could be used by other organizations to access your [facility] medical records  [id number]        Your Vitals Were     Pulse Last Period BMI (Body Mass Index)             88 06/14/2018 (Approximate) 16.95 kg/m2          Blood Pressure from Last 3 Encounters:   06/28/18 102/64   06/27/18 94/61   06/26/18 125/86    Weight from Last 3 Encounters:   06/28/18 98 lb 12 oz (44.8  kg)   06/27/18 98 lb (44.5 kg)   06/18/18 98 lb 12 oz (44.8 kg)              We Performed the Following     Ova and Parasite Exam Routine        Primary Care Provider Office Phone # Fax #    Hussain Ryder PA-C [phone]       [address]  [place] [facility] [address]        Equal Access to Services     [facility] : Hadii aad ku hadasho Soomaali, waaxda luqadaha, qaybta kaalmada adeegyada, waxay idiin hayaan adeeg khclayton lasandeepn . So [facility] [phone].    ATENCIÓN: Si habla espninoska, tiene a fischer disposición servicios gratuitos de asistencia lingüística. Krissy al [phone].    We comply with applicable federal civil rights laws and [place] laws. We do not discriminate on the basis of race, color, national origin, age, disability, sex, sexual orientation, or gender identity.            Thank you!     Thank you for choosing [facility]  for your care. Our goal is always to provide you with excellent care. Hearing back from our patients is one way we can continue to improve our services. Please take a few minutes to complete the written survey that you may receive in the mail after your visit with us. Thank you!             Your Updated Medication List - Protect others around you: Learn how to safely use, store and throw away your medicines at www.disposemymeds.org.          This list is accurate as of 6/28/18  6:37 PM.  Always use your most recent med list.                   Brand Name Dispense Instructions for use Diagnosis    ibuprofen 800 MG tablet    ADVIL/MOTRIN    60 tablet    Take 1 tablet (800 mg) by mouth every 8 hours as needed for moderate pain    Chest pain, unspecified type, Painful respiration       metoprolol succinate 25 MG 24 hr tablet    TOPROL-XL    90 tablet    Take 1 tablet (25 mg) by mouth daily    Palpitations       oxyCODONE IR 5 MG tablet    ROXICODONE    8 tablet    Take 1 tablet (5 mg) by mouth every 6 hours as needed for pain        PARAGARD  INTRAUTERINE COPPER IU      Reported on 4/14/2017        QUEtiapine 50 MG tablet    SEROquel    90 tablet    Take 1 tablet (50 mg) by mouth At Bedtime    Anxiety       TYLENOL PO      Take 1,000 mg by mouth

## 2018-06-29 ENCOUNTER — TELEPHONE (OUTPATIENT)
Dept: FAMILY MEDICINE | Facility: OTHER | Age: 33
End: 2018-06-29

## 2018-06-29 ENCOUNTER — HOSPITAL ENCOUNTER (EMERGENCY)
Facility: CLINIC | Age: 33
Discharge: HOME | End: 2018-06-29
Attending: FAMILY MEDICINE | Admitting: FAMILY MEDICINE
Payer: COMMERCIAL

## 2018-06-29 VITALS
BODY MASS INDEX: 16.65 KG/M2 | TEMPERATURE: 98.5 F | WEIGHT: 97 LBS | OXYGEN SATURATION: 97 % | SYSTOLIC BLOOD PRESSURE: 122 MMHG | DIASTOLIC BLOOD PRESSURE: 86 MMHG | RESPIRATION RATE: 16 BRPM

## 2018-06-29 DIAGNOSIS — N94.9 VAGINAL DISCOMFORT: ICD-10-CM

## 2018-06-29 LAB
O+P STL MICRO: NORMAL
O+P STL MICRO: NORMAL
SPECIMEN SOURCE: NORMAL

## 2018-06-29 PROCEDURE — 99284 EMERGENCY DEPT VISIT MOD MDM: CPT | Performed by: FAMILY MEDICINE

## 2018-06-29 PROCEDURE — 99283 EMERGENCY DEPT VISIT LOW MDM: CPT | Mod: Z6 | Performed by: FAMILY MEDICINE

## 2018-06-29 NOTE — TELEPHONE ENCOUNTER
Erin Ashley is a 33 year old female who calls with pelvic pain.    NURSING ASSESSMENT:  Description:  I spoke with pt who states instead of pelvic pain she is having vaginal pain. Also bleeding. Feels like she sees something like a worm   Onset/duration:  ongoing  Precip. factors:  none  Associated symptoms:  See above  LMP:  2 weeks ago.  Last exam/Treatment:  Yesterday  Allergies:   Allergies   Allergen Reactions     No Known Allergies        NURSING PLAN: Huddle with provider, plan includes JKD recommends FP see her    RECOMMENDED DISPOSITION: Pt will go to ED, she want it taken care of right away  Will comply with recommendation: Yes  If further questions/concerns or if symptoms do not improve, worsen or new symptoms develop, call your PCP or [facility] as soon as possible.      Guideline used:  Telephone Triage Protocols for Nurses, Fifth Edition, Bita Moody RN

## 2018-06-29 NOTE — ED PROVIDER NOTES
History     Chief Complaint   Patient presents with     Foreign Body in Vagina     HPI  Erin Ashley is a 33 year old female who presents with concerns of a possible worm in her vagina.  She states she can feel it on the right part of her vagina.  This is the patient's third or fourth visit in the past week for some pelvic complaints.  Patient was seen initially and thought that she had a possible vaginal prolapse and some type of lesion inside her vagina.  She is told to follow-up with her OB doctor.  They did a pelvic ultrasound which was normal.  She came back to the emergency department couple days ago and had a CT scan of her belly and pelvis and this was normal.  She then followed up with her OB doctor yesterday and they did another vaginal exam and everything was normal.  She is supposed to follow-up with a different OB doctor.  She comes in today because she is sure that there is this warm in her vagina and she wants it looked at.  She continues to have the pelvic pressure symptoms.  She denies any fevers or chills.  She does think this foreign body is in her vagina and not coming from her anus.  She has not had any diarrhea.    Problem List:    Patient Active Problem List    Diagnosis Date Noted     Health Care Home 09/15/2017     Priority: Medium     Status:  Accepted  Care Coordinator:  Bassam Sesay RN    See Letters for [facility] Care Plan  Date:  September 15, 2017         Anxiety 07/26/2017     Priority: Medium     Palpitations 07/26/2017     Priority: Medium     Thyroid nodule 07/18/2017     Priority: Medium     Tachycardia 07/18/2017     Priority: Medium     Pelvic pain in female 03/28/2017     Priority: Medium     Elevated LFTs 01/30/2017     Priority: Medium     Insomnia due to medical condition 05/23/2016     Priority: Medium              Personal history of ovarian cyst 05/23/2016     Priority: Medium     Skin tags, anus or rectum 05/23/2016     Priority: Medium     Acne 11/01/2013      Priority: Medium     Paragard placed 10/15/13 10/15/2013     Priority: Medium     TALIB 3 - cervical intraepithelial neoplasia grade 3 06/04/2008     Priority: Medium     4/9/08 pap- ASCUS + high risk HPV  5/29/08 colposcopy- bx (TALIB 2/3) ECC (neg)  6/4/08 consult- [facility] recommended  6/13/08 [facility]- (pathology- TALIB 1/2) repeat pap 3 months  11/7/08 pap-NIL- repeat pap in 4 months  5/21/09 reminder letter sent  6/5/09 pap-ASCUS negative HPV- recommend repeat pap in 1 year  6/7/10 pap- NIL- repeat in 1 year (6/2011)  7/29/11 pap NIL. Plan--pap in 1 year  6/14/12 pap NIL  8/1/13 pap NIL/neg HPV. Plan-- pap in 3 years  05/23/16 Pap= NIL.   Problem list name updated by automated process. Provider to review and confirm            Past Medical History:    Past Medical History:   Diagnosis Date     Abnormal Pap smear      Appendicitis with perforation 08/04/08     Back pain      TALIB II (cervical intraepithelial neoplasia II) 2008     Insomnia      Renal calculus or stone        Past Surgical History:    Past Surgical History:   Procedure Laterality Date     APPENDECTOMY  8/4/2008     EXAM UNDER ANESTHESIA RECTUM N/A 2/24/2017    Procedure: EXAM UNDER ANESTHESIA RECTUM;  Surgeon: Britton Palomo MD;  Location: [facility] OR     HC CONIZATION CERVIX,KNIFE/LASER  06/130/8    cervix.  TALIB 1/2     HEMORRHOIDECTOMY EXTERNAL N/A 2/24/2017    Procedure: HEMORRHOIDECTOMY EXTERNAL;  Surgeon: Britton Palomo MD;  Location: [facility] OR     TONSILLECTOMY  7/3/2007       Family History:    Family History   Problem Relation Age of Onset     Hypertension Maternal Grandmother      borderline     Lipids Maternal Grandmother      Respiratory Maternal Grandmother      COPD     Lung Cancer Maternal Grandmother      Hypertension Maternal Grandfather      borderline     Cancer Maternal Grandfather      lung cancer, smoker     GASTROINTESTINAL DISEASE Mother      cholecystitis     Lipids Mother      Prostate Cancer Paternal Grandfather      Hearing Loss Paternal  Grandfather      Other Cancer Paternal Grandmother      pancreatic cancer     Unknown/Adopted No family hx of      Asthma No family hx of      C.A.D. No family hx of      Diabetes No family hx of      Cerebrovascular Disease No family hx of      Breast Cancer No family hx of      Cancer - colorectal No family hx of      Alcohol/Drug No family hx of      Allergies No family hx of      Alzheimer Disease No family hx of      Anesthesia Reaction No family hx of      Arthritis No family hx of      Blood Disease No family hx of      Cardiovascular No family hx of      Circulatory No family hx of      Congenital Anomalies No family hx of      Connective Tissue Disorder No family hx of      Depression No family hx of      Genetic Disorder No family hx of      Genitourinary Problems No family hx of      Gynecology No family hx of      HEART DISEASE No family hx of      Musculoskeletal Disorder No family hx of      Neurologic Disorder No family hx of      Obesity No family hx of      Osteoperosis No family hx of      Psychotic Disorder No family hx of        Social History:  Marital Status:   [4]  Social History   Substance Use Topics     Smoking status: Never Smoker     Smokeless tobacco: Never Used      Comment: no smokers in the household     Alcohol use No      Comment: rare        Medications:      Acetaminophen (TYLENOL PO)   ibuprofen (ADVIL/MOTRIN) 800 MG tablet   metoprolol (TOPROL-XL) 25 MG 24 hr tablet   oxyCODONE IR (ROXICODONE) 5 MG tablet   PARAGARD INTRAUTERINE COPPER IU   QUEtiapine (SEROQUEL) 50 MG tablet         Review of Systems   All other systems reviewed and are negative.      Physical Exam   BP: 122/86  Heart Rate: 129  Temp: 98.5  F (36.9  C)  Resp: 16  Weight: 44 kg (97 lb)  SpO2: 97 %      Physical Exam   Constitutional: She appears well-developed and well-nourished. No distress.   HENT:   Head: Normocephalic and atraumatic.   Mouth/Throat: Oropharynx is clear and moist. No oropharyngeal  exudate.   Cardiovascular: Normal rate, regular rhythm and normal heart sounds.    No murmur heard.  Pulmonary/Chest: Effort normal and breath sounds normal. No respiratory distress. She has no wheezes. She has no rales.   Abdominal: Soft. Bowel sounds are normal. She exhibits no distension. There is no tenderness. There is no rebound and no guarding.   Genitourinary: No labial fusion. There is no rash, tenderness, lesion or injury on the right labia. There is no rash, tenderness, lesion or injury on the left labia. No erythema, tenderness or bleeding in the vagina. No foreign body in the vagina. No signs of injury around the vagina. Vaginal discharge found.   Genitourinary Comments: Thorough exam of the external vaginal genitalia and vaginal introitus and vagina itself, showed no foreign bodies.  She did have some thick cervical whitish discharge that was cleaned with a cleaning swab.  Patient did have some areas of scabs from a some type of vaginal trauma.   Skin: She is not diaphoretic.   Nursing note and vitals reviewed.    With the patient's consent I did take the above picture to help facilitate showing the patient what I was seen and ask her what she was feeling.  The pelvic exam was done with a chaperone in place the entire time.  Chaperone was the patient's nurse.      ED Course     ED Course     Procedures           As noted above the patient had a normal vaginal exam that just showed some mild discharge and some areas of excoriation which I think the patient has caused to herself.  As noted above, I did take a picture and showed it to her to ask her what was the area that she saw that she was concerned about.  She points to the upper part of her vagina and I told her this is normal vaginal tissue.  There is nothing abnormal in her vagina.  I told her there is no signs of foreign bodies, parasites or worms.  At this point she still does not feel reassured but I told her she can follow-up with her OB doctor  here on Monday.  There is nothing else that I can offer her at this point.  She has had recent CAT scans and ultrasounds and they were normal.  Patient does not show any other signs that she is a threat to herself or threat to others.  She certainly could be having some bad anxiety that is causing some psychosis.  We did not get into the discussion about drug use but certainly the patient could be currently using some drugs that is causing these hallucinations.    Assessments & Plan (with Medical Decision Making)  Vaginal discomfort     I have reviewed the nursing notes.    I have reviewed the findings, diagnosis, plan and need for follow up with the patient.              6/29/2018   [facility] EMERGENCY DEPARTMENT     Fred Veloz MD  06/29/18 [id number]

## 2018-06-29 NOTE — ED AVS SNAPSHOT
[facility] Emergency Department    [address] [place] [address]    Phone:  [phone]    Fax:  [phone]                                       Erin Ashley   MRN: [id number]    Department:  [facility] Emergency Department   Date of Visit:  6/29/2018           Patient Information     Date Of Birth          1985        Your diagnoses for this visit were:     Vaginal discomfort        You were seen by Fred Veloz MD.      Follow-up Information     Schedule an appointment as soon as possible for a visit with Amada Dallas MD.    Specialty:  OB/Gyn    Why:  For follow up on your ED stay, As Soon As Possible    Contact information:    [address] [place] [address]  [phone]          Discharge Instructions       1.  Your exam was normal today.  I recommend that she follow-up with Dr. Dallas as scheduled early next week for further evaluations of any concerns that you have.    2.  If you have any further questions, I would call Dr. Dallas's office to talk to her or the on-call OB doctor.    24 Hour Appointment Hotline       To make an appointment at any [facility], call [phone] ([phone]). If you don't have a family doctor or clinic, we will help you find one. [facility] are conveniently located to serve the needs of you and your family.             Review of your medicines      Our records show that you are taking the medicines listed below. If these are incorrect, please call your family doctor or clinic.        Dose / Directions Last dose taken    ibuprofen 800 MG tablet   Commonly known as:  ADVIL/MOTRIN   Dose:  800 mg   Quantity:  60 tablet        Take 1 tablet (800 mg) by mouth every 8 hours as needed for moderate pain   Refills:  1        metoprolol succinate 25 MG 24 hr tablet   Commonly known as:  TOPROL-XL   Dose:  25 mg   Quantity:  90 tablet        Take 1 tablet (25 mg) by mouth daily   Refills:  3        oxyCODONE  IR 5 MG tablet   Commonly known as:  ROXICODONE   Dose:  5 mg   Quantity:  8 tablet        Take 1 tablet (5 mg) by mouth every 6 hours as needed for pain   Refills:  0        PARAGARD INTRAUTERINE COPPER IU        Reported on 4/14/2017   Refills:  0        QUEtiapine 50 MG tablet   Commonly known as:  SEROquel   Dose:  50 mg   Quantity:  90 tablet        Take 1 tablet (50 mg) by mouth At Bedtime   Refills:  1        TYLENOL PO   Dose:  1000 mg        Take 1,000 mg by mouth   Refills:  0                Orders Needing Specimen Collection     None      Pending Results     Date and Time Order Name Status Description    6/28/2018 1304 OVA AND PARASITE EXAM ROUTINE In process             Pending Culture Results     Date and Time Order Name Status Description    6/28/2018 1304 OVA AND PARASITE EXAM ROUTINE In process             Pending Results Instructions     If you had any lab results that were not finalized at the time of your Discharge, you can call the ED Lab Result RN at [phone]. You will be contacted by this team for any positive Lab results or changes in treatment. The nurses are available 7 days a week from 10A to 6:30P.  You can leave a message 24 hours per day and they will return your call.        Thank you for choosing [facility]       Thank you for choosing [facility] for your care. Our goal is always to provide you with excellent care. Hearing back from our patients is one way we can continue to improve our services. Please take a few minutes to complete the written survey that you may receive in the mail after you visit with us. Thank you!        [employer] Information     [employer] gives you secure access to your electronic health record. If you see a primary care provider, you can also send messages to your care team and make appointments. If you have questions, please call your primary care clinic.  If you do not have a primary care provider, please call [phone] and they will assist you.        Care  EveryWhere ID     This is your Care EveryWhere ID. This could be used by other organizations to access your [place] medical records  [id number]        Equal Access to Services     [address] : Benito Gomes, monica huitron, shahana ruiz, rush kraus. So [facility] [phone].    ATENCIÓN: Si habla español, tiene a fischer disposición servicios gratuitos de asistencia lingüística. Llame al [phone].    We comply with applicable federal civil rights laws and [place] laws. We do not discriminate on the basis of race, color, national origin, age, disability, sex, sexual orientation, or gender identity.            After Visit Summary       This is your record. Keep this with you and show to your community pharmacist(s) and doctor(s) at your next visit.

## 2018-06-29 NOTE — DISCHARGE INSTRUCTIONS
1.  Your exam was normal today.  I recommend that she follow-up with Dr. Dallas as scheduled early next week for further evaluations of any concerns that you have.    2.  If you have any further questions, I would call Dr. Dallas's office to talk to her or the on-call OB doctor.

## 2018-06-29 NOTE — ED AVS SNAPSHOT
[facility] Emergency Department    [address] [place] [address]    Phone:  [phone]    Fax:  [phone]                                       Erin Ashley   MRN: [id number]    Department:  [facility] Emergency Department   Date of Visit:  6/29/2018           After Visit Summary Signature Page     I have received my discharge instructions, and my questions have been answered. I have discussed any challenges I see with this plan with the nurse or doctor.    ..........................................................................................................................................  Patient/Patient Representative Signature      ..........................................................................................................................................  Patient Representative Print Name and Relationship to Patient    ..................................................               ................................................  Date                                            Time    ..........................................................................................................................................  Reviewed by Signature/Title    ...................................................              ..............................................  Date                                                            Time

## 2018-07-01 ENCOUNTER — TRANSFERRED RECORDS (OUTPATIENT)
Dept: HEALTH INFORMATION MANAGEMENT | Facility: CLINIC | Age: 33
End: 2018-07-01

## 2018-07-02 LAB
GLUCOSE SERPL-MCNC: 79 MG/DL (ref 74–106)
INR PPP: 1 (ref 1–1.2)
POTASSIUM SERPL-SCNC: 3.5 MMOL/L (ref 3.5–5.1)

## 2018-07-03 ENCOUNTER — TELEPHONE (OUTPATIENT)
Dept: FAMILY MEDICINE | Facility: OTHER | Age: 33
End: 2018-07-03

## 2018-07-03 NOTE — PROGRESS NOTES
"  SUBJECTIVE:   Erin Ashley is a 33 year old female who presents to clinic today for the following health issues:  Patient is currently on Keflex for a ear infection.    HPI     Erin Ashley is a 33 year old female who is here today with multiple concerns. She has been seen 4 times in the ED over the past 2 weeks due to multiple symptoms. The first 3 times (6/26, 6/27, 6/29) were due to pelvic and abdominal pain/pressure and vaginal bleeding with negative labs, pelvic ultrasound and CT abdomen pelvis. She also felt like she had a worm in her vagina and has felt like she has had worms crawling all over her body as well. The most recent visit was at [facility] on 7/1/18 due to right eye loss of vision and blurred vision with right sided headache and facial numbness. She had a normal brain MRI/MRA and carotid MRA. She was placed on Keflex for a right ear infection. She states she continues to experience intermittent right sided facial numbness along with constant numbness in the back of her scalp. Also, she states she developed left foot weakness and lower leg numbness on Wednesday and she has been unable to lift her foot since then. The entire side of her left calf and foot is numb. She denies any low back pain, leg pain, change in bowel/bladder control or saddle anasesthsia. She denies any upper extremity numbness or weakness. She continues to have abdominal fullness and pain, worse in the left upper quadrant. She has negative stool studies last week but states her stool have looked different over the past few days and appear to have small \"pieces of something\" in them and are \"quite malodorous\". She is also have more frequent stools over the past few days but denies diarrhea. She is wondering if she can have updated labs and stool testing. She also states she cannot gain weight even though \"I am always hungry and am eating 2 steaks per day.\" She does have a history of anxiety and states she has been more " anxious lately due to her recent health concerns. She is currently on Seroquel 50 mg nightly to help her sleep. She was on sertraline last year but states it was not beneficial.      NURSING ASSESSMENT from 7/3 and 7/5     Description 7/3/18:  I spoke with pt who states her left leg gets numb has headaches in right side of head, face feels decreased sensation. Back of head goes numb. She is not currently having any of these symptoms besides she can not feel her toes in left leg, feels very heavy. Muscles feel sore. She had this feeling when we was seen in ED 7/1/2018 and they did MRI. Balance is off, She states she was given Keflex for possible ear infection. She has gone to multiple EDs in the last couple of days.   Onset/duration:  ongoing  Precip. factors:  anxiety  Associated symptoms:  Recently also seen in ED for pelvic pain and pressure and possible worms. Has been seen in ED and with [facility] for this.  Improves/worsens symptoms:  Did not address  Pain scale (0-10)   Does not currently have a headache     Description 7/5/18:  I spoke with pt who states she is not able to feel her left foot. It has been like this since Wednesday evening. She is able to point her toes but not able to flex her foot. Unable to get her shoes on. States when she walks it drags  Onset/duration:  ongoing  Precip. factors:  none  Associated symptoms:    Improves/worsens symptoms:  Did not address  Pain scale (0-10)   0/10    RECOMMENDED DISPOSITION:  Keep appt on 7/6/2018 since pt is not currently having symptoms. If her symptoms return then she will go to ED immediately or call 911.  Will comply with recommendation: Yes  If further questions/concerns or if symptoms do not improve, worsen or new symptoms develop, call your PCP or [facility] as soon as possible.     Erin Moody RN    Problem list and histories reviewed & adjusted, as indicated.  Additional history: none    ROS:  GENERAL: +Fatigue. Denies fever,  weakness, weight gain, or weight loss.  CARDIOVASCULAR: Denies chest pain, shortness of breath, irregular heartbeats, palpitations, or edema.  RESPIRATORY: Denies cough, hemoptysis, and shortness of breath.  GASTROINTESTINAL: +Abdominal pain, left sided, bloating, frequent stools. Denies nausea, vomiting, change in appetite, or constipation.  GENITOURINARY: +Difficulty fully emptying bladder. Denies increased frequency, urgency, dysuria, hematuria, or incontinence.   MUSCULOSKELETAL: +Right foot weakness. Denies myalgias, arthralgias.  NEUROLOGIC: +Intermittent right sided headaches and right sided blurred vision. Intermittent right facial numbness with numbness of posterior scalp and left lower leg.  Denies dizziness, memory loss, or seizures.  PSYCHIATRIC: +Anxiety. Denies depression or thoughts of suicide.    OBJECTIVE:     /64  Pulse 117  Temp 98.5  F (36.9  C) (Temporal)  Wt 97 lb 12.8 oz (44.4 kg)  LMP 06/14/2018 (Approximate)  SpO2 100%  Breastfeeding? No  BMI 16.79 kg/m2  Body mass index is 16.79 kg/(m^2).  GENERAL: healthy, alert and no distress  EYES: Eyes grossly normal to inspection, PERRL and conjunctivae and sclerae normal  HENT: ear canals and TM's normal, nose and mouth without ulcers or lesions  NECK: no adenopathy, no asymmetry, masses, or scars and thyroid normal to palpation  RESP: lungs clear to auscultation - no rales, rhonchi or wheezes  CV: regular rate and rhythm, normal S1 S2, no S3 or S4, no murmur, click or rub, no peripheral edema and peripheral pulses strong  ABDOMEN: soft, mild left upper quadrant tenderness without rebound or guarding, no hepatosplenomegaly, no masses and bowel sounds normal  MS: no gross musculoskeletal defects noted. No spinal tenderness.   NEURO: Normal strength and tone with the exception of left sided dorsiflexion which is 3/5, mentation intact and speech normal. Cranial nerves II-XII are grossly intact. Decreased sensation to light touch over the  left lateral calf and foot and over the posterior scalp. Slightly decreased sensation over the left side of the face compared to the right. DTRs are 2+/4 throughout and symmetric. No clonus with negative Babinski. Normal Romber and finger to nose testing. Gait is stable.   PSYCH: mentation appears normal, affect normal/bright     ASSESSMENT/PLAN:       ICD-10-CM    1. Left foot drop M21.372 MR Lumbar Spine w/o Contrast   2. Facial numbness R20.0    3. Abdominal pain, left upper quadrant R10.12 Comprehensive metabolic panel (BMP + Alb, Alk Phos, ALT, AST, Total. Bili, TP)     Lipase     Ova and Parasite Exam Routine     Fecal Lactoferrin     Enteric Bacteria and Virus Panel by FABRICE Stool   4. Loss of weight R63.4 Comprehensive metabolic panel (BMP + Alb, Alk Phos, ALT, AST, Total. Bili, TP)     Lipase     TSH with free T4 reflex     JUST IN CASE   5. Anxiety F41.9 escitalopram (LEXAPRO) 10 MG tablet   6. Abnormal feces R19.5 Ova and Parasite Exam Routine     Fecal Lactoferrin     Enteric Bacteria and Virus Panel by FABRICE Stool         Multiple symptoms over the past few weeks with the newest being a left foot drop. There is noticeable left dorsiflexion weakness on exam but she is able to provide resistance against gravity. She does have left lateral calf and foot numbness. I recommend a hold and call MRI in [place] for further evaluation due to the sudden onset of her symptoms to rule out any significant lumbar nerve compression. This was ordered but after she left, she realized she didn't have a  for her child so is unable to go up to [place] today. She also states she has claustrophobia so wants something to take before the MRI. She will schedule the MRI for Monday and I will prescribe Valium to take 30 minutes prior to the study. She had a normal brain MRI/MRI earlier this week so no signs of an acute stroke/TIA although she continues to have intermittent right facial numbness (although left side on  exam was slightly less sensitive to touch) with scalp numbness and intermittent blurring of the right eye. I think she may be having atypical migraines so I recommend she continue with ibuprofen and Tylenol for now but if symptoms persist, we can consider an abortive medication like Imitrex. Another diagnosis low on the differential is Guillain-Arthur although this is typically symmetric, ascending numbness and weakness and her right leg and upper extremities are not affected. She will monitor her symptoms over the weekend and I want to keep her out of the ED but if she starts to experience any worsening numbness spreading up her legs or arms or it starts to become numb or weak on both sides, she should be seen in the ED. MS is unlikely since MRI brain was normal. If lumbar MRI is normal, will consider EMG and/or neurology referral.    Another possible diagnosis could be conversion disorder since multiple tests have been run on her multiple symptoms with no concrete diagnosis or abnormal findings. I will order a few more labs today including TSH, liver function studies, and lipase along with further stool studies but if negative and symptoms persist, she may need to see psychiatry. I do believe anxiety is playing a part in her symptoms and could explain her weight loss as well so would recommend she try another SSRI so will place her on Lexapro 10 mg daily to take in the mornings with food. Common side effects discussed and she was notified that this can take 4-6 weeks to take full effect.    I recommend plenty of fluids along with a bland diet, high fiber diet or metamucil supplement to help with her abdominal pain and frequent stools which is likely related to indigestion.     I recommend follow up in 4-6 weeks for a recheck.         Hussain Ryder PA-C  [facility]

## 2018-07-03 NOTE — TELEPHONE ENCOUNTER
"Next 5 appointments (look out 90 days)     Jul 06, 2018 10:00 AM CDT   Radha Espinoza with Hussain Ryder PA-C   [facility] ([facility]    [address] [place] [id number] [phone]                Appointment note reads, \"Intermittent Numbness/pain left leg with decreased strength, right facial numbness/vision changes/headaches. Muscle weakness/fatigue. Weight loss, unexplained, can t gain it back, down over 15lbs. Abd fullness.\" Renetta Petersen RN, BSN     Erin Ashley is a 33 year old female who scheduled an appointment via Radha regarding above symptoms.  Renetta Petersen RN, BSN     "

## 2018-07-03 NOTE — TELEPHONE ENCOUNTER
Erin Ashley is a 33 year old female who calls with multiple concerns.    NURSING ASSESSMENT:  Description:  I spoke with pt who states her left leg gets numb has headaches in right side of head, face feels decreased sensation. Back of head goes numb. She is not currently having any of these symptoms besides she can not feel her toes in left leg, feels very heavy. Muscles feel sore. She had this feeling when we was seen in ED 7/1/2018 and they did MRI. Balance is off, She states she was given Keflex for possible ear infection. She has gone to multiple EDs in the last couple of days.   Onset/duration:  ongoing  Precip. factors:  anxiety  Associated symptoms:  Recently also seen in ED for pelvic pain and pressure and possible worms. Has been seen in ED and [facility] for this.  Improves/worsens symptoms:  Did not address  Pain scale (0-10)   Does not currently have a headache    Allergies:   Allergies   Allergen Reactions     No Known Allergies        RECOMMENDED DISPOSITION:  Keep appt on 7/6/2018 since pt is not currently having symptoms. If her symptoms return then she will go to ED immediately or call 911.  Will comply with recommendation: Yes  If further questions/concerns or if symptoms do not improve, worsen or new symptoms develop, call your PCP or [facility] as soon as possible.      Guideline used:  Telephone Triage Protocols for Nurses, Fifth Edition, Bita Moody RN

## 2018-07-05 ENCOUNTER — TELEPHONE (OUTPATIENT)
Dept: FAMILY MEDICINE | Facility: OTHER | Age: 33
End: 2018-07-05

## 2018-07-05 NOTE — TELEPHONE ENCOUNTER
Erin Ahsley is a 33 year old female who calls with left leg/foot.    NURSING ASSESSMENT:  Description:  I spoke with pt who states she is not able to feel her left foot. It has been like this since Wednesday evening. She is able to point her toes but not able to flex her foot. Unable to get her shoes on. States when she walks it drags  Onset/duration:  ongoing  Precip. factors:  none  Associated symptoms:    Improves/worsens symptoms:  Did not address  Pain scale (0-10)   0/10    Allergies:   Allergies   Allergen Reactions     No Known Allergies        RECOMMENDED DISPOSITION:  To ED/UC for evaluation  Will comply with recommendation: Yes  If further questions/concerns or if symptoms do not improve, worsen or new symptoms develop, call your PCP or [facility] as soon as possible.      Guideline used: Foot Problems  Telephone Triage Protocols for Nurses, Fifth Edition, Bita Moody RN

## 2018-07-06 ENCOUNTER — MYC MEDICAL ADVICE (OUTPATIENT)
Dept: FAMILY MEDICINE | Facility: OTHER | Age: 33
End: 2018-07-06

## 2018-07-06 ENCOUNTER — OFFICE VISIT (OUTPATIENT)
Dept: FAMILY MEDICINE | Facility: OTHER | Age: 33
End: 2018-07-06
Payer: COMMERCIAL

## 2018-07-06 VITALS
SYSTOLIC BLOOD PRESSURE: 110 MMHG | WEIGHT: 97.8 LBS | DIASTOLIC BLOOD PRESSURE: 64 MMHG | BODY MASS INDEX: 16.79 KG/M2 | OXYGEN SATURATION: 100 % | TEMPERATURE: 98.5 F | HEART RATE: 117 BPM

## 2018-07-06 DIAGNOSIS — R10.12 ABDOMINAL PAIN, LEFT UPPER QUADRANT: ICD-10-CM

## 2018-07-06 DIAGNOSIS — F40.240 CLAUSTROPHOBIA: Primary | ICD-10-CM

## 2018-07-06 DIAGNOSIS — R63.4 LOSS OF WEIGHT: ICD-10-CM

## 2018-07-06 DIAGNOSIS — R19.5 ABNORMAL FECES: ICD-10-CM

## 2018-07-06 DIAGNOSIS — R20.0 FACIAL NUMBNESS: ICD-10-CM

## 2018-07-06 DIAGNOSIS — M21.372 LEFT FOOT DROP: Primary | ICD-10-CM

## 2018-07-06 DIAGNOSIS — F41.9 ANXIETY: ICD-10-CM

## 2018-07-06 LAB
ALBUMIN SERPL-MCNC: 3.8 G/DL (ref 3.4–5)
ALP SERPL-CCNC: 75 U/L (ref 40–150)
ALT SERPL W P-5'-P-CCNC: 31 U/L (ref 0–50)
ANION GAP SERPL CALCULATED.3IONS-SCNC: 11 MMOL/L (ref 3–14)
AST SERPL W P-5'-P-CCNC: 23 U/L (ref 0–45)
BILIRUB SERPL-MCNC: 0.3 MG/DL (ref 0.2–1.3)
BUN SERPL-MCNC: 15 MG/DL (ref 7–30)
CALCIUM SERPL-MCNC: 8.4 MG/DL (ref 8.5–10.1)
CHLORIDE SERPL-SCNC: 106 MMOL/L (ref 94–109)
CO2 SERPL-SCNC: 23 MMOL/L (ref 20–32)
CREAT SERPL-MCNC: 0.64 MG/DL (ref 0.52–1.04)
GFR SERPL CREATININE-BSD FRML MDRD: >90 ML/MIN/1.7M2
GLUCOSE SERPL-MCNC: 84 MG/DL (ref 70–99)
LACTOFERRIN STL QL IA: NEGATIVE
LIPASE SERPL-CCNC: 116 U/L (ref 73–393)
POTASSIUM SERPL-SCNC: 4.1 MMOL/L (ref 3.4–5.3)
PROT SERPL-MCNC: 7.7 G/DL (ref 6.8–8.8)
SODIUM SERPL-SCNC: 140 MMOL/L (ref 133–144)
TSH SERPL DL<=0.005 MIU/L-ACNC: 1.21 MU/L (ref 0.4–4)

## 2018-07-06 PROCEDURE — 80053 COMPREHEN METABOLIC PANEL: CPT | Performed by: PHYSICIAN ASSISTANT

## 2018-07-06 PROCEDURE — 99214 OFFICE O/P EST MOD 30 MIN: CPT | Performed by: PHYSICIAN ASSISTANT

## 2018-07-06 PROCEDURE — 83630 LACTOFERRIN FECAL (QUAL): CPT | Performed by: PHYSICIAN ASSISTANT

## 2018-07-06 PROCEDURE — 87177 OVA AND PARASITES SMEARS: CPT | Performed by: PHYSICIAN ASSISTANT

## 2018-07-06 PROCEDURE — 83690 ASSAY OF LIPASE: CPT | Performed by: PHYSICIAN ASSISTANT

## 2018-07-06 PROCEDURE — 36415 COLL VENOUS BLD VENIPUNCTURE: CPT | Performed by: PHYSICIAN ASSISTANT

## 2018-07-06 PROCEDURE — 84443 ASSAY THYROID STIM HORMONE: CPT | Performed by: PHYSICIAN ASSISTANT

## 2018-07-06 PROCEDURE — 87209 SMEAR COMPLEX STAIN: CPT | Performed by: PHYSICIAN ASSISTANT

## 2018-07-06 PROCEDURE — 87506 IADNA-DNA/RNA PROBE TQ 6-11: CPT | Performed by: PHYSICIAN ASSISTANT

## 2018-07-06 RX ORDER — DIAZEPAM 10 MG
TABLET ORAL
Qty: 1 TABLET | Refills: 0 | Status: SHIPPED | OUTPATIENT
Start: 2018-07-06 | End: 2019-03-27

## 2018-07-06 RX ORDER — ESCITALOPRAM OXALATE 10 MG/1
10 TABLET ORAL DAILY
Qty: 30 TABLET | Refills: 5 | Status: SHIPPED | OUTPATIENT
Start: 2018-07-06 | End: 2019-03-27

## 2018-07-06 NOTE — MR AVS SNAPSHOT
After Visit Summary   7/6/2018    Erin Ashley    MRN: [id number]           Patient Information     Date Of Birth          1985        Visit Information        Provider Department      7/6/2018 10:00 AM Hussain Ryder PA-C [facility]        Today's Diagnoses     Left foot drop    -  1    Facial numbness        Abdominal pain, left upper quadrant        Loss of weight        Anxiety          Care Instructions    Will order labs today along with a lumbar MRI for further evaluation of your symptoms to make sure there is no abnormal nerve compression.    I recommend plenty of fluids with a bland diet until the abdominal pain improves.  Consider a daily fiber supplement like metamucil.    Will start you on Lexapro 10 mg daily to help with your anxiety as I think this may be contributing to a lot of your symptoms.  Take in the mornings with food.  If you have any side effects, let me know.  This can take 4-6 weeks to take full effect.     If you have worsening weakness that extends up your leg or into the arms or the other leg, I would recommend being seen right away in an ER setting.     Follow up in 4-6 weeks for a recheck.                        Follow-ups after your visit        Follow-up notes from your care team     Return in about 6 weeks (around 8/17/2018).      Future tests that were ordered for you today     Open Future Orders        Priority Expected Expires Ordered    MR Lumbar Spine w/o Contrast STAT  7/6/2019 7/6/2018            Who to contact     If you have questions or need follow up information about today's clinic visit or your schedule please contact [facility] directly at [phone].  Normal or non-critical lab and imaging results will be communicated to you by MyChart, letter or phone within 4 business days after the clinic has received the results. If you do not hear from us within 7 days, please contact the clinic through [employer] or  phone. If you have a critical or abnormal lab result, we will notify you by phone as soon as possible.  Submit refill requests through [employer] or call your pharmacy and they will forward the refill request to us. Please allow 3 business days for your refill to be completed.          Additional Information About Your Visit        [employer] Information     [employer] gives you secure access to your electronic health record. If you see a primary care provider, you can also send messages to your care team and make appointments. If you have questions, please call your primary care clinic.  If you do not have a primary care provider, please call [phone] and they will assist you.        Care EveryWhere ID     This is your Care EveryWhere ID. This could be used by other organizations to access your [facility] medical records  [id number]        Your Vitals Were     Pulse Temperature Last Period Pulse Oximetry Breastfeeding? BMI (Body Mass Index)    117 98.5  F (36.9  C) (Temporal) 06/14/2018 (Approximate) 100% No 16.79 kg/m2       Blood Pressure from Last 3 Encounters:   07/06/18 110/64   06/29/18 122/86   06/28/18 102/64    Weight from Last 3 Encounters:   07/06/18 97 lb 12.8 oz (44.4 kg)   06/29/18 97 lb (44 kg)   06/28/18 98 lb 12 oz (44.8 kg)              We Performed the Following     Comprehensive metabolic panel (BMP + Alb, Alk Phos, ALT, AST, Total. Bili, TP)     JUST IN CASE     Lipase     TSH with free T4 reflex          Today's Medication Changes          These changes are accurate as of 7/6/18 10:36 AM.  If you have any questions, ask your nurse or doctor.               Start taking these medicines.        Dose/Directions    escitalopram 10 MG tablet   Commonly known as:  LEXAPRO   Used for:  Anxiety   Started by:  Hussain Ryder PA-C        Dose:  10 mg   Take 1 tablet (10 mg) by mouth daily   Quantity:  30 tablet   Refills:  5         Stop taking these medicines if you haven't already. Please contact  your care team if you have questions.     oxyCODONE IR 5 MG tablet   Commonly known as:  ROXICODONE   Stopped by:  Hussain Ryder PA-C                Where to get your medicines      These medications were sent to [place] [address] [facility] [place] - [address]  [address], [facility] [address]     Phone:  [phone]     escitalopram 10 MG tablet                Primary Care Provider Office Phone # Fax #    Hussain Ryder PA-C [phone]       [address] [facility] [address]  [facility] [address]        Equal Access to Services     [facility]: Hadii aad ku hadasho Soomaali, waaxda luqadaha, qaybta kaalmada adeegyada, waxshaun leivain hayaan benji mcclain . So [facility] [phone].    ATENCIÓN: Si habla español, tiene a fischer disposición servicios gratuitos de asistencia lingüística. [facility] [phone].    We comply with applicable federal civil rights laws and [place] laws. We do not discriminate on the basis of race, color, national origin, age, disability, sex, sexual orientation, or gender identity.            Thank you!     Thank you for choosing [facility]  for your care. Our goal is always to provide you with excellent care. Hearing back from our patients is one way we can continue to improve our services. Please take a few minutes to complete the written survey that you may receive in the mail after your visit with us. Thank you!             Your Updated Medication List - Protect others around you: Learn how to safely use, store and throw away your medicines at www.disposemymeds.org.          This list is accurate as of 7/6/18 10:36 AM.  Always use your most recent med list.                   Brand Name Dispense Instructions for use Diagnosis    escitalopram 10 MG tablet    LEXAPRO    30 tablet    Take 1 tablet (10 mg) by mouth daily    Anxiety       ibuprofen 800 MG tablet    ADVIL/MOTRIN    60 tablet    Take 1 tablet (800 mg) by mouth every 8 hours as needed for  moderate pain    Chest pain, unspecified type, Painful respiration       metoprolol succinate 25 MG 24 hr tablet    TOPROL-XL    90 tablet    Take 1 tablet (25 mg) by mouth daily    Palpitations       PARAGARD INTRAUTERINE COPPER IU      Reported on 4/14/2017        QUEtiapine 50 MG tablet    SEROquel    90 tablet    Take 1 tablet (50 mg) by mouth At Bedtime    Anxiety       TYLENOL PO      Take 1,000 mg by mouth

## 2018-07-06 NOTE — PATIENT INSTRUCTIONS
Will order labs today along with a lumbar MRI for further evaluation of your symptoms to make sure there is no abnormal nerve compression.    I recommend plenty of fluids with a bland diet until the abdominal pain improves.  Consider a daily fiber supplement like metamucil.    Will start you on Lexapro 10 mg daily to help with your anxiety as I think this may be contributing to a lot of your symptoms.  Take in the mornings with food.  If you have any side effects, let me know.  This can take 4-6 weeks to take full effect.     If you have worsening weakness that extends up your leg or into the arms or the other leg, I would recommend being seen right away in an ER setting.     Follow up in 4-6 weeks for a recheck.

## 2018-07-09 ENCOUNTER — MYC MEDICAL ADVICE (OUTPATIENT)
Dept: FAMILY MEDICINE | Facility: OTHER | Age: 33
End: 2018-07-09

## 2018-07-09 DIAGNOSIS — F41.9 ANXIETY: Primary | ICD-10-CM

## 2018-07-09 LAB
O+P STL MICRO: NORMAL
O+P STL MICRO: NORMAL
SPECIMEN SOURCE: NORMAL

## 2018-07-09 RX ORDER — HYDROXYZINE HYDROCHLORIDE 25 MG/1
25-50 TABLET, FILM COATED ORAL EVERY 6 HOURS PRN
Qty: 30 TABLET | Refills: 1 | Status: SHIPPED | OUTPATIENT
Start: 2018-07-09 | End: 2019-03-27

## 2018-07-09 NOTE — TELEPHONE ENCOUNTER
Will route to JM to review/advise. May need appointment to address - or could have RN triage.  Lyla Dukes, CMA    
Yes

## 2018-07-10 ENCOUNTER — PATIENT OUTREACH (OUTPATIENT)
Dept: CARE COORDINATION | Facility: CLINIC | Age: 33
End: 2018-07-10

## 2018-07-10 ENCOUNTER — MYC MEDICAL ADVICE (OUTPATIENT)
Dept: FAMILY MEDICINE | Facility: OTHER | Age: 33
End: 2018-07-10

## 2018-07-10 ENCOUNTER — TELEPHONE (OUTPATIENT)
Dept: FAMILY MEDICINE | Facility: OTHER | Age: 33
End: 2018-07-10

## 2018-07-10 NOTE — TELEPHONE ENCOUNTER
Erin Ashley is a 33 year old female who calls with abdominal pain and BRBPR.    NURSING ASSESSMENT:  Description:  Left sided abdominal pain - which she reports is new. States she has been having multiple episodes of bright red blood, but not blood clots. Denies tarry stools, endorses nausea but no vomiting. Also reports seeing movement in her stools.  Onset/duration:  Today  Associated symptoms:  Nausea, left sided abdominal pain, red blood she states drips into toilet.  Improves/worsens symptoms:  Denies  Pain scale (0-10)   6/10  Allergies:   Allergies   Allergen Reactions     Hydrocodone Other (See Comments)     vomitting  Migraines     Hydrocodone-Acetaminophen      headaches     No Known Allergies        NURSING PLAN: Nursing advice to patient be seen at UR or ED    RECOMMENDED DISPOSITION:  To ED/UC for evaluation  Will comply with recommendation: Yes  If further questions/concerns or if symptoms do not improve, worsen or new symptoms develop, call your PCP or [facility] as soon as possible.      Guideline used:  Telephone Triage Protocols for Nurses, Fifth Edition, Bita Garcia RN

## 2018-07-10 NOTE — LETTER
[place] CARE COORDINATION  ***  July 24, 2018    Erin Ashley  [address]  [place] [facility] [address]      Dear Erin,    I am a clinic care coordinator who works with Hussain Ryder PA-C at ***. {ccoutreach:[id number]} I wanted to introduce myself and provide you with my contact information so that you can call me with questions or concerns about your health care. Below is a description of clinic care coordination and how I can further assist you.     The clinic care coordinator is a registered nurse and/or  who understand the health care system. The goal of clinic care coordination is to help you manage your health and improve access to the [facility] system in the most efficient manner. The registered nurse can assist you in meeting your health care goals by providing education, coordinating services, and strengthening the communication among your providers. The  can assist you with financial, behavioral, psychosocial, chemical dependency, counseling, and/or psychiatric resources.    Please feel free to contact me at ***, with any questions or concerns. We at [facility] are focused on providing you with the highest-quality healthcare experience possible and that all starts with you.     Sincerely,     Lisa Salcedo    Enclosed: {ccenclosed:[id number]}

## 2018-07-10 NOTE — TELEPHONE ENCOUNTER
Reason for Call:  Other call back    Detailed comments: PT saw JM on 7/6 for stomach issues and she is wanting to talk to someone regarding some changes. Please advise.     Phone Number Patient can be reached at: Home number on file [phone] (home)    Best Time: any     Can we leave a detailed message on this number? YES    Call taken on 7/10/2018 at 1:16 PM by Jazmyn Simon

## 2018-07-11 ENCOUNTER — TELEPHONE (OUTPATIENT)
Dept: FAMILY MEDICINE | Facility: OTHER | Age: 33
End: 2018-07-11

## 2018-07-11 ENCOUNTER — MYC MEDICAL ADVICE (OUTPATIENT)
Dept: FAMILY MEDICINE | Facility: OTHER | Age: 33
End: 2018-07-11

## 2018-07-11 NOTE — TELEPHONE ENCOUNTER
Left message for patient to call back. Please see message below. MyChart also sent.  Lyla Dukes CMA

## 2018-07-11 NOTE — TELEPHONE ENCOUNTER
Please call and cancel her MRI if she is not going and I will MyChart her about her stool pictures.    Hussain Ryder PA-C

## 2018-07-11 NOTE — TELEPHONE ENCOUNTER
I would ideally like this in a 30 minute visit so if she can wait until tomorrow I can see her then. If not, I will see her today in the 15 but can only address her abdominal pain and bloody stools. If symptoms worsen significantly or she has higher fevers, she should be seen in the ED. Thanks.    Hussain Ryder PA-C

## 2018-07-11 NOTE — TELEPHONE ENCOUNTER
Called pt back. She states she is unable to get her phone to upload the pictures but will try her laptop.    Erin Moody, RN, BSN

## 2018-07-11 NOTE — TELEPHONE ENCOUNTER
Routing to  to review/advise - patient is on your schedule for 1:45pm today (15 minute appointment, unsure if you would like this rescheduled for a 30 minute appointment.)

## 2018-07-11 NOTE — PROGRESS NOTES
Clinic Care Coordination Contact  Care Team Conversations    Hello, I received a referral to reach out to this pt regarding her ED visits. However, It looks like you have been working with her to get her in to be seen.  I see she has an appt with you tomorrow.  Before Care Coordination reaches , would like to know your impression after meeting with her and how you feel CC can help.     Thank you,      Viviana SANTANA, RN, PHN  Care Coordination    [facility]  [address] [place] [address]  Office: [phone]  Fax [phone]   [facility]  [address] [place] [address]  Office: [phone] Fax [phone]  [email or web address]   [email or web address]   Connect with [facility] on social media.

## 2018-07-11 NOTE — PROGRESS NOTES
"  SUBJECTIVE:   Erin Ashley is a 33 year old female who presents to clinic today for the following health issues:      HPI     Patient states that she projectile vomited last night and \"it was moving.\" She said she ate dinner and couldn't make it to the bathroom. She states she is still having abdominal pain on the left side. She had a soft stool this morning, but no blood. She still feels like there are things moving/\"bugs\" in her stool so she brought in a stool sample and her vomit sample today. She denies any further rectal bleeding or any blood in her stool. She states there was a day last week where she noticed some rectal bleeding \"like a steady stream of blood\" but this has not occurred since. She thinks she has a parasite because she keeps seeing things moving in her stool and now in her vomit. She has had 2 recently normal ova and parasite tests. She states, \"I know I sound crazy but I honestly see things moving.\" She denies diarrhea but stool have been soft lately and she is having more frequent BMs.   She states that she had vaginal bleeding this morning but does not think it is her period. \"Steady stream of blood when I went to the bathroom this morning.\" LMP 06/15/2018.    She has a right sided headache today as well with moderate neck pain. She denies any fall or injury. She continues to have numbness over the posterior scalp but the facial numbness has resolved. She also has a continued left foot drop/weakness with left calf and foot numbness. She has reschedule her MRI twice because she did not think she could tolerate it. She continues to deny any back pain.    She has not noticed any difference with the Lexapro or the Atarax for her anxiety.       Triage Note 7/10: \"Left sided abdominal pain - which she reports is new. States she has been having multiple episodes of bright red blood, but not blood clots. Denies tarry stools, endorses nausea but no vomiting. Also reports seeing movement in her " "stools.\"  Shaka messages from 07/10: \"Thanks for letting me know about the results of my stool sample. I am surprised, however, because the last time I checked- stool isn't suppose to move however mine is today. Doesn't appear to be worm like, it is far larger and no I am not on drugs.. no I don't need a psych eval. Someone else has also verified that there is in fact something there. I have no idea what it could be- or if there's even anything else to test for. Please advise on what I should do. I would like to start away from the ER but clearly this is not normal and I'm not going to just wait for things to get worse. It is bright red and it has at times been almost a steady stream into the toilet but it's not black/tarry.  I would say this morning there was a fairly significant amount, this afternoon not as much. I've been dizzy only during those times b They don't look black to me at all and are sticky for the most part all shaped the same and certainly don't appear like they belong in my body. I haven't changed anything diet wise so it's very unlike me to go as frequently. Again, no diarrhea whatsoever- all formed and look the same.  I forgot to answer about the rest. I've been nauseated and really haven't eaten much today. My abdomen feels painful- mostly the LUQ and lower abdomen both sides, that comes and goes. My stomach is tender to touch throughout and I feel full although I haven't eaten much.  My temp has been up to 100.0 when I've checked, it's 99.5 now after Tylenol.\"      Answers for HPI/ROS submitted by the patient on 7/12/2018   PHQ9 TOTAL SCORE: 13  GABE 7 TOTAL SCORE: 14    Problem list and histories reviewed & adjusted, as indicated.  Additional history: none    ROS:  GENERAL: +Fatigue, weight loss. Denies fever, weakness, weight gain.  HEENT: Eyes-Denies pain, redness, loss of vision, double or blurred vision.     Ears/Nose-Denies tinnitus, loss of hearing, epistaxis, decreased sense of smell, " nasal congestion. Denies loss of sense of taste, dry mouth, or sore throat.   CARDIOVASCULAR: Denies chest pain, shortness of breath, irregular heartbeats,  palpitations, or edema.  RESPIRATORY: Denies cough, hemoptysis, and shortness of breath.  GASTROINTESTINAL: +Nausea, vomiting, abdominal pain, soft stools, recent rectal bleeding. Denies diarrhea or constipation.  GENITOURINARY: Denies increased frequency, urgency, dysuria, hematuria, or incontinence.   MUSCULOSKELETAL: +Right sided neck pain.   NEUROLOGIC: +Right sided headache. Denies fainting, dizziness, memory loss, numbness, tingling, or seizures.  PSYCHIATRIC: +Anxiety. Denies depression or houghts of suicide.    OBJECTIVE:     /66  Pulse 116  Temp 98.9  F (37.2  C) (Temporal)  Resp 16  Wt 95 lb (43.1 kg)  LMP 06/14/2018 (Approximate)  SpO2 99%  BMI 16.31 kg/m2  Body mass index is 16.31 kg/(m^2).  GENERAL: healthy, alert and no distress  EYES: Eyes grossly normal to inspection, PERRL and conjunctivae and sclerae normal  HENT: ear canals and TM's normal, nose and mouth without ulcers or lesions  NECK: no adenopathy  RESP: lungs clear to auscultation - no rales, rhonchi or wheezes  CV: regular rate and rhythm, normal S1 S2, no S3 or S4, no murmur, click or rub, no peripheral edema  ABDOMEN: soft, minimal discomfort over the left upper and lower quadrants, no hepatosplenomegaly, no masses and bowel sounds normal  NEURO: Normal strength and tone with the exception of left dorsiflexion which is 3/5. mentation intact and speech normal. Cranial nerves II-XII are grossly intact. DTRs are 2+/4 throughout and symmetric although left patellar reflex very slightly less brisk than the right. Gait is stable.   PSYCH: mentation appears normal, affect normal/bright    ASSESSMENT/PLAN:       ICD-10-CM    1. Somatic complaints, multiple R68.89 MENTAL HEALTH REFERRAL  - Adult; Psychiatry and Medication Management; Psychiatry; [facility]: Collaborative Care Psychiatry  "Service [phone].  Medication management & future refills will be returned to [facility] PCP upon completion of evaluation; We lorena...   2. Nonspecific abnormal finding in stool contents R19.5 GASTROENTEROLOGY ADULT REF PROCEDURE ONLY [facility] [phone]; [facility] Provider     Occult blood stool     CBC with platelets and differential     Occult blood stool     CANCELED: OCCULT BLOOD, STOOL (3 SPEC)   3. Abdominal pain, left lower quadrant R10.32 GASTROENTEROLOGY ADULT REF PROCEDURE ONLY [facility] [phone]; [facility] Provider     Occult blood stool     CBC with platelets and differential     Occult blood stool     CANCELED: OCCULT BLOOD, STOOL (3 SPEC)     CANCELED: CBC with platelets and differential   4. Left foot drop M21.372    5. Anxiety F41.9 MENTAL HEALTH REFERRAL  - Adult; Psychiatry and Medication Management; Psychiatry; [facility]: [facility] Psychiatry Service [phone].  Medication management & future refills will be returned to [facility] PCP upon completion of evaluation; We lorena...   6. Rectal bleeding K62.5 Occult blood stool     CBC with platelets and differential     Occult blood stool     CANCELED: CBC with platelets and differential         Patient comes in with multiple symptoms, but her highest priority is her continued abdominal pain with \"things moving\" in her vomit and stool. I personally evaluated both her vomit and stool samples today in the clinic with the patient and was able to appreciate undigested food in the vomit and stool but no moving organisms or blood with no black/tarry discoloration. The stool was soft and was a dark brown color. I also provided a sample for the lab to evaluate under a microscope and they also did not see anything abnormal. She was adamant that her chunks of vomit were moving as we both looked at the specimen and I saw absolutely no signs of movement. Due to her recent rectal bleeding, I recommended a rectal exam for further evaluation " "but she declined. I also ordered another CBC and an occult blood feces but as she left, she told the nursing staff she does not want to pay for any more labs. I have a strong suspicion that the majority of her symptoms are due to somatoform complaints versus psychosis. She has a history of anxiety which is likely contributing to her symptoms as all labs and imaging studies have come back normal over the past month and she has been to the ED 4 times in a 3 week period. She continues to state \"I am not crazy.\" I want to give her the benefit of the doubt and continue to test for potential abnormalities but there is no indication of any abnormal testing thus far. She had a normal abdominal CT on 6/27 so I do not think this needs to be repeated. I believe her abdominal symptoms may be related to anxiety induced IBS although I am unsure as to the cause of the recent rectal bleeding as she declined a rectal exam and occult blood testing. I have a very low suspicion of diverticulitis given her lack of fevers and recently normal CT but I recommend a colonoscopy for further evaluation. I advised her to continue with a bland diet with plenty of fluids to avoid any further weight loss. If she has any persistent, heavy rectal bleeding, significant lightheadedness, or fevers, I recommend she be seen in the ED. If colonoscopy is normal and abdominal symptoms are not improving, I recommend GI referral.     Her vaginal bleeding is likely due to her normal menstrual cycle that came a few days earlier than normal. She states she usually has cramping but denies any current cramping. If she has any heavier bleeding or lightheadedness, she will contact the clinic. She refused a CBC today.      I recommend a psychiatry consult to further evaluate her somatoform complaints and anxiety. In the meantime, she will continue with the Lexapro and as needed Atarax.    I also recommend she reschedule the lumbar MRI due to her continued left foot " weakness and leg numbness although it is difficult to determine if some of the dorsiflexion weakness is feigned during the exam but she states she has tripped because of her foot drop and continues to have left calf lateral calf and foot numbness.     Follow up as needed or contact the clinic with further questions.       Greater than 50% of 45 minute visit spent on counseling and plan of care regarding the above.     Hussain Ryder PA-C  [facility]

## 2018-07-11 NOTE — TELEPHONE ENCOUNTER
I received a call from pt who states she has had more frequent BMs recently and she has found 6 of the same things in her stool.  She states they are fairly large and have legs. Pt will try and send a MyChart picture, if she is not she will callback for other options. JM is aware.    Next 5 appointments (look out 90 days)     Jul 12, 2018 10:00 AM CDT   Office Visit with Hussain Ryder PA-C   [facility] ([facility])    [address] [place] [phone]   [phone]                  Erin Moody, RN, BSN

## 2018-07-11 NOTE — TELEPHONE ENCOUNTER
Giana review pictures and advise.    Pt called back at 11:35 stating she will not be going to her MRI appt because her neck is sore and she does not feel like she can lay down for the test.    Erin Moody, RN, BSN

## 2018-07-12 ENCOUNTER — OFFICE VISIT (OUTPATIENT)
Dept: FAMILY MEDICINE | Facility: OTHER | Age: 33
End: 2018-07-12
Payer: COMMERCIAL

## 2018-07-12 ENCOUNTER — TELEPHONE (OUTPATIENT)
Dept: FAMILY MEDICINE | Facility: OTHER | Age: 33
End: 2018-07-12

## 2018-07-12 VITALS
BODY MASS INDEX: 16.31 KG/M2 | SYSTOLIC BLOOD PRESSURE: 120 MMHG | TEMPERATURE: 98.9 F | DIASTOLIC BLOOD PRESSURE: 66 MMHG | RESPIRATION RATE: 16 BRPM | OXYGEN SATURATION: 99 % | HEART RATE: 116 BPM | WEIGHT: 95 LBS

## 2018-07-12 DIAGNOSIS — K62.5 RECTAL BLEEDING: ICD-10-CM

## 2018-07-12 DIAGNOSIS — M21.372 LEFT FOOT DROP: ICD-10-CM

## 2018-07-12 DIAGNOSIS — R10.32 ABDOMINAL PAIN, LEFT LOWER QUADRANT: ICD-10-CM

## 2018-07-12 DIAGNOSIS — R19.5 NONSPECIFIC ABNORMAL FINDING IN STOOL CONTENTS: ICD-10-CM

## 2018-07-12 DIAGNOSIS — R68.89 SOMATIC COMPLAINTS, MULTIPLE: Primary | ICD-10-CM

## 2018-07-12 DIAGNOSIS — F41.9 ANXIETY: ICD-10-CM

## 2018-07-12 LAB — HEMOCCULT STL QL: ABNORMAL

## 2018-07-12 PROCEDURE — 82272 OCCULT BLD FECES 1-3 TESTS: CPT | Performed by: PHYSICIAN ASSISTANT

## 2018-07-12 PROCEDURE — 99215 OFFICE O/P EST HI 40 MIN: CPT | Performed by: PHYSICIAN ASSISTANT

## 2018-07-12 ASSESSMENT — ANXIETY QUESTIONNAIRES
4. TROUBLE RELAXING: MORE THAN HALF THE DAYS
GAD7 TOTAL SCORE: 14
6. BECOMING EASILY ANNOYED OR IRRITABLE: NEARLY EVERY DAY
1. FEELING NERVOUS, ANXIOUS, OR ON EDGE: NEARLY EVERY DAY
7. FEELING AFRAID AS IF SOMETHING AWFUL MIGHT HAPPEN: SEVERAL DAYS
5. BEING SO RESTLESS THAT IT IS HARD TO SIT STILL: SEVERAL DAYS
7. FEELING AFRAID AS IF SOMETHING AWFUL MIGHT HAPPEN: SEVERAL DAYS
2. NOT BEING ABLE TO STOP OR CONTROL WORRYING: MORE THAN HALF THE DAYS
GAD7 TOTAL SCORE: 14
3. WORRYING TOO MUCH ABOUT DIFFERENT THINGS: MORE THAN HALF THE DAYS
GAD7 TOTAL SCORE: 14

## 2018-07-12 ASSESSMENT — PAIN SCALES - GENERAL: PAINLEVEL: SEVERE PAIN (7)

## 2018-07-12 ASSESSMENT — PATIENT HEALTH QUESTIONNAIRE - PHQ9
SUM OF ALL RESPONSES TO PHQ QUESTIONS 1-9: 13
SUM OF ALL RESPONSES TO PHQ QUESTIONS 1-9: 13

## 2018-07-12 NOTE — PATIENT INSTRUCTIONS
Continue with a bland diet.  Will test your stool for blood and I recommend a colonoscopy as well.    I also recommend rescheduling the MRI.    I would like to get you set up with psychiatry to further discuss your anxiety and symptoms in more detail as anxiety can cause a lot of physical symptoms.    If symptoms worsen significantly or you have any high fevers, I would recommend an ED visit.

## 2018-07-12 NOTE — MR AVS SNAPSHOT
After Visit Summary   7/12/2018    Erin Ashley    MRN: [id number]           Patient Information     Date Of Birth          1985        Visit Information        Provider Department      7/12/2018 10:00 AM Hussain Ryder PA-C [facility]        Today's Diagnoses     Nonspecific abnormal finding in stool contents    -  1    Abdominal pain, left lower quadrant        Left foot drop        Somatic complaints, multiple        Anxiety        Rectal bleeding          Care Instructions    Continue with a bland diet.  Will test your stool for blood and I recommend a colonoscopy as well.    I also recommend rescheduling the MRI.    I would like to get you set up with psychiatry to further discuss your anxiety and symptoms in more detail as anxiety can cause a lot of physical symptoms.    If symptoms worsen significantly or you have any high fevers, I would recommend an ED visit.           Follow-ups after your visit        Additional Services     GASTROENTEROLOGY ADULT REF PROCEDURE ONLY [facility] [phone]; [facility] Provider       Last Lab Result: Creatinine (mg/dL)       Date                     Value                 07/06/2018               0.64             ----------  Body mass index is 16.31 kg/(m^2).     Needed:  No  Language:  English    Patient will be contacted to schedule procedure.     Please be aware that coverage of these services is subject to the terms and limitations of your health insurance plan.  Call member services at your health plan with any benefit or coverage questions.  Any procedures must be performed at a [facility] facility OR coordinated by your clinic's referral office.    Please bring the following with you to your appointment:    (1) Any X-Rays, CTs or MRIs which have been performed.  Contact the facility where they were done to arrange for  prior to your scheduled appointment.    (2) List of current medications   (3) This  referral request   (4) Any documents/labs given to you for this referral            MENTAL HEALTH REFERRAL  - Adult; Psychiatry and Medication Management; Psychiatry; [facility]: [facility] Psychiatry Service [phone].  Medication management & future refills will be returned to [facility] PCP upon completion of evaluation; We lorena...       All scheduling is subject to the client's specific insurance plan & benefits, provider/location availability, and provider clinical specialities.  Please arrive 15 minutes early for your first appointment and bring your completed paperwork.    Please be aware that coverage of these services is subject to the terms and limitations of your health insurance plan.  Call member services at your health plan with any benefit or coverage questions.                            Your next 10 appointments already scheduled     Jul 17, 2018 11:45 AM CDT   MR LUMBAR SPINE W/O CONTRAST with PHMR1   [facility] ([facility])    [address] [place] [id number] [phone]           Take your medicines as usual, unless your doctor tells you not to. Bring a list of your current medicines to your exam (including vitamins, minerals and over-the-counter drugs). Also bring the results of similar scans you may have had.  Please remove any body piercings and hair extensions before you arrive.  Follow your doctor s orders. If you do not, we may have to postpone your exam.  You may or may not receive IV contrast for this exam pending the discretion of the Radiologist.  You do not need to do anything special to prepare.  The MRI machine uses a strong magnet. Please wear clothes without metal (snaps, zippers). A sweatsuit works well, or we may give you a hospital gown.   **IMPORTANT** THE INSTRUCTIONS BELOW ARE ONLY FOR THOSE PATIENTS WHO HAVE BEEN PRESCRIBED SEDATION OR GENERAL ANESTHESIA DURING THEIR MRI PROCEDURE:  IF YOUR DOCTOR PRESCRIBED ORAL SEDATION (take  medicine to help you relax during your exam):   You must get the medicine from your doctor (oral medication) before you arrive. Bring the medicine to the exam. Do not take it at home. You ll be told when to take it upon arriving for your exam.   Arrive one hour early. Bring someone who can take you home after the test. Your medicine will make you sleepy. After the exam, you may not drive, take a bus or take a taxi by yourself.  IF YOUR DOCTOR PRESCRIBED IV SEDATION:   Arrive one hour early. Bring someone who can take you home after the test. Your medicine will make you sleepy. After the exam, you may not drive, take a bus or take a taxi by yourself.   No eating 6 hours before your exam. You may have clear liquids up until 4 hours before your exam. (Clear liquids include water, clear tea, black coffee and fruit juice without pulp.)  IF YOUR DOCTOR PRESCRIBED ANESTHESIA (be asleep for your exam):   Arrive 1 1/2 hours early. Bring someone who can take you home after the test. You may not drive, take a bus or take a taxi by yourself.   No eating 8 hours before your exam. You may have clear liquids up until 4 hours before your exam. (Clear liquids include water, clear tea, black coffee and fruit juice without pulp.)   You will spend four to five hours in the recovery room.  Please call the Imaging Department at your exam site with any questions.              Who to contact     If you have questions or need follow up information about today's clinic visit or your schedule please contact [facility] directly at [phone].  Normal or non-critical lab and imaging results will be communicated to you by MyChart, letter or phone within 4 business days after the clinic has received the results. If you do not hear from us within 7 days, please contact the clinic through MyChart or phone. If you have a critical or abnormal lab result, we will notify you by phone as soon as possible.  Submit refill requests  through [employer] or call your pharmacy and they will forward the refill request to us. Please allow 3 business days for your refill to be completed.          Additional Information About Your Visit        [employer] Information     [employer] gives you secure access to your electronic health record. If you see a primary care provider, you can also send messages to your care team and make appointments. If you have questions, please call your primary care clinic.  If you do not have a primary care provider, please call [phone] and they will assist you.        Care EveryWhere ID     This is your Care EveryWhere ID. This could be used by other organizations to access your [facility] medical records  [id number]        Your Vitals Were     Pulse Temperature Respirations Last Period Pulse Oximetry BMI (Body Mass Index)    116 98.9  F (37.2  C) (Temporal) 16 06/14/2018 (Approximate) 99% 16.31 kg/m2       Blood Pressure from Last 3 Encounters:   07/12/18 120/66   07/06/18 110/64   06/29/18 122/86    Weight from Last 3 Encounters:   07/12/18 95 lb (43.1 kg)   07/06/18 97 lb 12.8 oz (44.4 kg)   06/29/18 97 lb (44 kg)              We Performed the Following     CBC with platelets and differential     GASTROENTEROLOGY ADULT REF PROCEDURE ONLY [facility] [phone]; [facility] Provider     MENTAL HEALTH REFERRAL  - Adult; Psychiatry and Medication Management; Psychiatry; [facility]: [facility] Psychiatry Service [phone].  Medication management & future refills will be returned to [facility] PCP upon completion of evaluation; We lorena...     Occult blood stool        Primary Care Provider Office Phone # Fax #    Hussain Ryder PA-C [phone]       [address] [facility] [address]  [facility] [address]        Equal Access to Services     [facility] RAYNA : Benito Gomes, monica huitron, shahana ruiz, rush kraus. So [facility] [phone].    ATENCIÓN: Jose Ramon gallardo  español, tiene a fischer disposición servicios gratuitos de asistencia lingüística. Krissy fountain [phone].    We comply with applicable federal civil rights laws and [place] laws. We do not discriminate on the basis of race, color, national origin, age, disability, sex, sexual orientation, or gender identity.            Thank you!     Thank you for choosing [facility]  for your care. Our goal is always to provide you with excellent care. Hearing back from our patients is one way we can continue to improve our services. Please take a few minutes to complete the written survey that you may receive in the mail after your visit with us. Thank you!             Your Updated Medication List - Protect others around you: Learn how to safely use, store and throw away your medicines at www.disposemymeds.org.          This list is accurate as of 7/12/18 11:11 AM.  Always use your most recent med list.                   Brand Name Dispense Instructions for use Diagnosis    diazepam 10 MG tablet    VALIUM    1 tablet    Take 30-60 minutes before procedure.  Do not operate a vehicle after taking this medication.    Claustrophobia       escitalopram 10 MG tablet    LEXAPRO    30 tablet    Take 1 tablet (10 mg) by mouth daily    Anxiety       hydrOXYzine 25 MG tablet    ATARAX    30 tablet    Take 1-2 tablets (25-50 mg) by mouth every 6 hours as needed for anxiety    Anxiety       ibuprofen 800 MG tablet    ADVIL/MOTRIN    60 tablet    Take 1 tablet (800 mg) by mouth every 8 hours as needed for moderate pain    Chest pain, unspecified type, Painful respiration       metoprolol succinate 25 MG 24 hr tablet    TOPROL-XL    90 tablet    Take 1 tablet (25 mg) by mouth daily    Palpitations       PARAGARD INTRAUTERINE COPPER IU      Reported on 4/14/2017        QUEtiapine 50 MG tablet    SEROquel    90 tablet    Take 1 tablet (50 mg) by mouth At Bedtime    Anxiety       TYLENOL PO      Take 1,000 mg by mouth

## 2018-07-12 NOTE — PROGRESS NOTES
She has a lot of symptoms with recently normal work up. I have ordered further testing and a psychiatry referral as I think a lot of her symptoms are psych related. I saw her today and psych should be contacting her to schedule but please make sure she is able to get this scheduled. Also, she is supposed to undergo an MRI which she has rescheduled a few times so need to make sure she goes to the scheduled MRI in [place].     Hussain Ryder PA-C

## 2018-07-12 NOTE — TELEPHONE ENCOUNTER
Left message for patient to return call to schedule colonoscopy or EGD. If Erin or June are unavailable, please transfer to the surgery center.

## 2018-07-12 NOTE — NURSING NOTE
"Chief Complaint   Patient presents with     Abdominal Pain     Panel Management     UTD       Initial /66  Pulse 116  Temp 98.9  F (37.2  C) (Temporal)  Resp 16  Wt 95 lb (43.1 kg)  LMP 06/14/2018 (Approximate)  SpO2 99%  BMI 16.31 kg/m2 Estimated body mass index is 16.31 kg/(m^2) as calculated from the following:    Height as of 6/27/18: 5' 4\" (1.626 m).    Weight as of this encounter: 95 lb (43.1 kg).  Medication Reconciliation: complete    Lyla Dukes CMA      "

## 2018-07-13 ASSESSMENT — ANXIETY QUESTIONNAIRES: GAD7 TOTAL SCORE: 14

## 2018-07-13 ASSESSMENT — PATIENT HEALTH QUESTIONNAIRE - PHQ9: SUM OF ALL RESPONSES TO PHQ QUESTIONS 1-9: 13

## 2018-07-13 NOTE — TELEPHONE ENCOUNTER
Left message for patient to return call to schedule EGD/colonoscopy. If June or Erin are not available, please transfer to same day surgery

## 2018-07-17 ENCOUNTER — HOSPITAL ENCOUNTER (OUTPATIENT)
Dept: MRI IMAGING | Facility: CLINIC | Age: 33
Discharge: HOME | End: 2018-07-17
Attending: PHYSICIAN ASSISTANT | Admitting: PHYSICIAN ASSISTANT
Payer: COMMERCIAL

## 2018-07-17 DIAGNOSIS — M21.372 LEFT FOOT DROP: ICD-10-CM

## 2018-07-17 PROCEDURE — 72148 MRI LUMBAR SPINE W/O DYE: CPT

## 2018-07-17 NOTE — PROGRESS NOTES
Clinic Care Coordination Contact  [facility]/Voicemail       Clinical Data: Care Coordinator Outreach  Outreach attempted x 1.  Left message on voicemail with call back information and requested return call.  Plan: Care Coordinator will try to reach patient again in 3-5 business days.      Viviana SANTANA, RN, PHN  Care Coordination    [facility]  [address] [place] [address]  Office: [phone]  Fax [phone]   [facility]  [address] [place] [address]  Office: [phone] Fax [phone]  [email or web address]   [email or web address]   Connect with [facility] on social media.

## 2018-07-24 ENCOUNTER — MYC MEDICAL ADVICE (OUTPATIENT)
Dept: CARE COORDINATION | Facility: CLINIC | Age: 33
End: 2018-07-24

## 2018-07-24 NOTE — PROGRESS NOTES
Clinic Care Coordination Contact  [facility]/Voicemail    Referral Source: Pro-Active Outreach  Clinical Data: Care Coordinator Outreach  Outreach attempted x 2.  Left message on voicemail with call back information and requested return call.  Plan: Care Coordinator will mail out care coordination introduction letter with care coordinator contact information and explanation of care coordination services. Care Coordinator will try to reach patient again in 3-5 business days.    Message sent via rachael SANTANA, RN, PHN  Care Coordination    [facility]  [address] [place] [address]  Office: [phone]  Fax [phone]   [facility]  [address] [place] [address]  Office: [phone] Fax [phone]  [email or web address]   [email or web address]   Connect with [facility] on social media.

## 2018-07-24 NOTE — LETTER
Here is some additional information on how digestion works.     https://www.niddk.nih.gov/health-information/digestive-diseases/digestive-system-how-it-works

## 2018-07-30 ENCOUNTER — MYC MEDICAL ADVICE (OUTPATIENT)
Dept: FAMILY MEDICINE | Facility: OTHER | Age: 33
End: 2018-07-30

## 2018-08-27 ENCOUNTER — RADIANT APPOINTMENT (OUTPATIENT)
Dept: ULTRASOUND IMAGING | Facility: OTHER | Age: 33
End: 2018-08-27
Attending: OBSTETRICS & GYNECOLOGY
Payer: COMMERCIAL

## 2018-08-27 DIAGNOSIS — N83.209 CYST OF OVARY, UNSPECIFIED LATERALITY: ICD-10-CM

## 2018-08-27 PROCEDURE — 76856 US EXAM PELVIC COMPLETE: CPT

## 2018-08-27 PROCEDURE — 76830 TRANSVAGINAL US NON-OB: CPT

## 2018-09-07 ENCOUNTER — OFFICE VISIT (OUTPATIENT)
Dept: OBGYN | Facility: CLINIC | Age: 33
End: 2018-09-07
Payer: COMMERCIAL

## 2018-09-07 VITALS
OXYGEN SATURATION: 99 % | BODY MASS INDEX: 16.62 KG/M2 | DIASTOLIC BLOOD PRESSURE: 72 MMHG | HEART RATE: 107 BPM | WEIGHT: 96.8 LBS | SYSTOLIC BLOOD PRESSURE: 118 MMHG

## 2018-09-07 DIAGNOSIS — R10.2 PELVIC PRESSURE IN FEMALE: Primary | ICD-10-CM

## 2018-09-07 DIAGNOSIS — Z30.432 ENCOUNTER FOR IUD REMOVAL: ICD-10-CM

## 2018-09-07 PROCEDURE — 99213 OFFICE O/P EST LOW 20 MIN: CPT | Mod: 25 | Performed by: OBSTETRICS & GYNECOLOGY

## 2018-09-07 PROCEDURE — 58301 REMOVE INTRAUTERINE DEVICE: CPT | Performed by: OBSTETRICS & GYNECOLOGY

## 2018-09-07 NOTE — MR AVS SNAPSHOT
After Visit Summary   9/7/2018    Erin Ashley    MRN: [id number]           Patient Information     Date Of Birth          1985        Visit Information        Provider Department      9/7/2018 1:15 PM Amada [place] [facility]        Today's Diagnoses     Pelvic pressure in female    -  1    Encounter for IUD removal           Follow-ups after your visit        Additional Services     UROLOGY ADULT REFERRAL       Your provider has referred you to: [facility]: [facility] [phone]   [email or web address]    Please be aware that coverage of these services is subject to the terms and limitations of your health insurance plan.  Call member services at your health plan with any benefit or coverage questions.      Please bring the following with you to your appointment:    (1) Any X-Rays, CTs or MRIs which have been performed.  Contact the facility where they were done to arrange for  prior to your scheduled appointment.    (2) List of current medications  (3) This referral request   (4) Any documents/labs given to you for this referral                  Follow-up notes from your care team     Return if symptoms worsen or fail to improve.      Who to contact     If you have questions or need follow up information about today's clinic visit or your schedule please contact [facility] directly at [phone].  Normal or non-critical lab and imaging results will be communicated to you by MyChart, letter or phone within 4 business days after the clinic has received the results. If you do not hear from us within 7 days, please contact the clinic through MyChart or phone. If you have a critical or abnormal lab result, we will notify you by phone as soon as possible.  Submit refill requests through [employer] or call your pharmacy and they will forward the  refill request to us. Please allow 3 business days for your refill to be completed.          Additional Information About Your Visit        [facility] Information     [employer] gives you secure access to your electronic health record. If you see a primary care provider, you can also send messages to your care team and make appointments. If you have questions, please call your primary care clinic.  If you do not have a primary care provider, please call [phone] and they will assist you.        Care EveryWhere ID     This is your Care EveryWhere ID. This could be used by other organizations to access your [place] medical records  [id number]        Your Vitals Were     Pulse Pulse Oximetry Breastfeeding? BMI (Body Mass Index)          107 99% No 16.62 kg/m2         Blood Pressure from Last 3 Encounters:   09/07/18 118/72   07/12/18 120/66   07/06/18 110/64    Weight from Last 3 Encounters:   09/07/18 96 lb 12.8 oz (43.9 kg)   07/12/18 95 lb (43.1 kg)   07/06/18 97 lb 12.8 oz (44.4 kg)              We Performed the Following     REMOVE INTRAUTERINE DEVICE     UROLOGY ADULT REFERRAL          Today's Medication Changes          These changes are accurate as of 9/7/18  1:57 PM.  If you have any questions, ask your nurse or doctor.               Stop taking these medicines if you haven't already. Please contact your care team if you have questions.     PARAGARD INTRAUTERINE COPPER IU   Stopped by:  Amada Dallas MD                    Primary Care Provider Office Phone # Fax #    Hussain Cristóbal Ryder PA-C [phone]       [address] [place] [address]        Equal Access to Services     [facility]: Hadii eran harris hadev Sosantosh, waaxda luqadaha, qaybta kaalmada rush ruiz . So [facility] [place] [phone].    ATENCIÓN: Si habla español, tiene a fischer disposición servicios gratuitos de asistencia lingüística. Llame al [phone].    We comply with applicable  federal civil rights laws and [place] laws. We do not discriminate on the basis of race, color, national origin, age, disability, sex, sexual orientation, or gender identity.            Thank you!     Thank you for choosing [facility]  for your care. Our goal is always to provide you with excellent care. Hearing back from our patients is one way we can continue to improve our services. Please take a few minutes to complete the written survey that you may receive in the mail after your visit with us. Thank you!             Your Updated Medication List - Protect others around you: Learn how to safely use, store and throw away your medicines at www.disposemymeds.org.          This list is accurate as of 9/7/18  1:57 PM.  Always use your most recent med list.                   Brand Name Dispense Instructions for use Diagnosis    diazepam 10 MG tablet    VALIUM    1 tablet    Take 30-60 minutes before procedure.  Do not operate a vehicle after taking this medication.    Claustrophobia       escitalopram 10 MG tablet    LEXAPRO    30 tablet    Take 1 tablet (10 mg) by mouth daily    Anxiety       hydrOXYzine 25 MG tablet    ATARAX    30 tablet    Take 1-2 tablets (25-50 mg) by mouth every 6 hours as needed for anxiety    Anxiety       ibuprofen 800 MG tablet    ADVIL/MOTRIN    60 tablet    Take 1 tablet (800 mg) by mouth every 8 hours as needed for moderate pain    Chest pain, unspecified type, Painful respiration       metoprolol succinate 25 MG 24 hr tablet    TOPROL-XL    90 tablet    Take 1 tablet (25 mg) by mouth daily    Palpitations       QUEtiapine 50 MG tablet    SEROquel    90 tablet    Take 1 tablet (50 mg) by mouth At Bedtime    Anxiety       TYLENOL PO      Take 1,000 mg by mouth

## 2018-09-07 NOTE — PROGRESS NOTES
OB/GYN  2018      NAME:  Erin Ashley  PCP:  Hussain Ryder  MRN:  [id number]    Impression / Plan     33 year old  with:      ICD-10-CM    1. Pelvic pressure in female R10.2 UROLOGY ADULT REFERRAL   2. Encounter for IUD removal Z30.432        Patient has not had a period since , but she does not quite meet criteria for secondary amenorrhea.  BMI 16 and she has had weight loss.  Endometrial stripe 2 mm.  Likely hypothalamic amenorrhea.  She will contact me if she does not get a period by December and we will consider laboratory evaluation.    Discussed pelvic exam.  I do not see any abnormalities.  Normal rugae.  No urethral carbuncle appreciated.  Urology referral placed per patient request.     Discussed pelvic pressure.  I do not think hysterectomy will help with patient's symptoms at this time.  Patient may consider IUD removal to see if that helps.  Patient would like this done, so this was done today.  See below.    Patient will let me know how she is doing in the next couple of weeks.  She will use condoms for birth control for now, but will contact me and let me know what she decides to use for birth control.      She will establish care with family medicine regarding unintentional weight loss.    Chief Complaint     Chief Complaint   Patient presents with     Results     Follow-up to discuss pelvic US done 18       HPI     Erin Ashley is a  33 year old female who is seen for follow up ultrasound.     Ultrasound done 2018 for history of ovarian cyst.    Uterus 6.6 x 4 x 5.1 cm    Endometrial stripe 2 mm    IUD seen in the expected position    Minimal fluid seen adjacent to the IUD    Follicle seen in left ovary.  Lesion seen on the prior study is no longer identified    Small paraovarian cyst on the right.  Otherwise normal ovaries.  No free fluid    Patient had a period in , but not in July or August.  She has had intermittent brown mucous like discharge.     My chart notes reviewed from 7/24 and 7/30.  The attached pictures of her urethra were also reviewed.    Patient feels her anatomy is abnormal.  She feels she has tissue blocking the urethral opening.  She feels like she has difficulty emptying her bladder because of this.  She continues to have pelvic pressure and feels like she is getting her period but it will not come..  She would like everything removed.    Date of Last Pap Smear:   Lab Results   Component Value Date    PAP NIL 05/23/2016       Problem List     Patient Active Problem List    Diagnosis Date Noted     Somatic complaints, multiple 07/12/2018     Priority: Medium     Health Care Home 09/15/2017     Priority: Medium     Status:  Accepted  Care Coordinator:  Bassam Sesay RN    See Letters for [facility] Care Plan  Date:  September 15, 2017         Anxiety 07/26/2017     Priority: Medium     Palpitations 07/26/2017     Priority: Medium     Thyroid nodule 07/18/2017     Priority: Medium     Tachycardia 07/18/2017     Priority: Medium     Pelvic pain in female 03/28/2017     Priority: Medium     Elevated LFTs 01/30/2017     Priority: Medium     Insomnia due to medical condition 05/23/2016     Priority: Medium              Personal history of ovarian cyst 05/23/2016     Priority: Medium     Skin tags, anus or rectum 05/23/2016     Priority: Medium     Acne 11/01/2013     Priority: Medium     Paragard placed 10/15/13 10/15/2013     Priority: Medium     TALIB 3 - cervical intraepithelial neoplasia grade 3 06/04/2008     Priority: Medium     4/9/08 pap- ASCUS + high risk HPV  5/29/08 colposcopy- bx (TALIB 2/3) ECC (neg)  6/4/08 consult- [facility] recommended  6/13/08 [facility]- (pathology- TALIB 1/2) repeat pap 3 months  11/7/08 pap-NIL- repeat pap in 4 months  5/21/09 reminder letter sent  6/5/09 pap-ASCUS negative HPV- recommend repeat pap in 1 year  6/7/10 pap- NIL- repeat in 1 year (6/2011)  7/29/11 pap NIL. Plan--pap in 1 year  6/14/12 pap NIL  8/1/13 pap NIL/neg HPV.  Plan-- pap in 3 years  05/23/16 Pap= NIL.   Problem list name updated by automated process. Provider to review and confirm           Medications     Current Outpatient Prescriptions   Medication     Acetaminophen (TYLENOL PO)     diazepam (VALIUM) 10 MG tablet     escitalopram (LEXAPRO) 10 MG tablet     hydrOXYzine (ATARAX) 25 MG tablet     ibuprofen (ADVIL/MOTRIN) 800 MG tablet     metoprolol (TOPROL-XL) 25 MG 24 hr tablet     PARAGARD INTRAUTERINE COPPER IU     QUEtiapine (SEROQUEL) 50 MG tablet     No current facility-administered medications for this visit.         Allergies     Allergies   Allergen Reactions     Hydrocodone Other (See Comments)     vomitting  Migraines     Hydrocodone-Acetaminophen      headaches     No Known Allergies        ROS     A 6 organ review of systems was asked and the pertinent positives and negatives are listed in the HPI. All other organ systems can be considered negative.     Physical Exam   Vitals: /72 (BP Location: Left arm, Patient Position: Chair, Cuff Size: Adult Regular)  Pulse 107  Wt 96 lb 12.8 oz (43.9 kg)  SpO2 99%  Breastfeeding? No  BMI 16.62 kg/m2     Wt Readings from Last 4 Encounters:   09/07/18 96 lb 12.8 oz (43.9 kg)   07/12/18 95 lb (43.1 kg)   07/06/18 97 lb 12.8 oz (44.4 kg)   06/29/18 97 lb (44 kg)       General: no acute distress. tearful  : Normal female external genitalia.  No lesions.  Urethral meatus normal.  Speculum exam reveals a normal vaginal vault, normal cervix.  Small amount of dark old blood mixed with cervical mucous.  Normal paragard strings.  Normal vaginal rugae. Hymenal tags, normal.        Labs/Imaging       Labs were reviewed in [facility]     Imaging was reviewed in Epic.       15 minutes was spent with patient, more than 50% counseling and coordinating care, exclusive of IUD removal.        Amada Dallas MD       IUD REMOVAL    After verifying consent, the cervix was visualized using a speculum.  The IUD strings are visible.    The IUD was removed without difficulty and was verified to be intact.  The patient tolerated the procedure well.

## 2018-11-02 ENCOUNTER — VIRTUAL VISIT (OUTPATIENT)
Dept: FAMILY MEDICINE | Facility: OTHER | Age: 33
End: 2018-11-02

## 2018-11-03 NOTE — PROGRESS NOTES
"Date:   Clinician: Melisa Granda  Clinician NPI: [id number]  Patient: Erin Ashley  Patient : 1985  Patient Address: [address] [place] [address], [place] [address]  Patient Phone: [phone]  Visit Protocol: General skin conditions  Patient Summary:  Erin is a 33 year old ( : 1985 ) female who initiated a Visit for evaluation of an unspecified skin condition. When asked the question \"Please sign me up to receive news, health information and promotions. \", Erin responded \"No\".    Images of her skin condition were uploaded.  Her symptoms started 4-6 days ago and affect both sides of her body. The skin condition is located on her hands, knees, feet, groin, buttocks, neck, back, arms, scalp, and hairline. The skin condition is gray and red in color.   The affected area has dry/flaky skin. It feels burning and itchy. The symptoms interfere with her sleep.   Symptom details   Redness: The redness has not rapidly increased in size.   The skin condition has changed since the symptoms started. Description of changes as reported by the patient (free text): Much more intense itching now at night keeps me awake. Localized more to back/low back and waist line, hands/wrists, feet, scalp. Small brown spots scattered on itchy areas with small bumps. Hands red and itchy at knuckles/webs of fingers. Skin on hands and soles of feet both dry/thickened, calloused/peeling palms.     Denied symptoms include crusts, hives, blisters, pain, numbness, warm to touch, drainage, scabs, pimples, sores, and tender to touch. Erin does not feel feverish. She does not have a rash in the shape of a bull's-eye.   Treatments or home remedies used to relieve the symptoms as reported by the patient (free text): Oral Benadryl, not helpful    Precipitating events  Just before the symptoms started, Erin came in contact with an other irritant. Other possible irritants as reported by the patient (free text):  Was exposed to " someone who had Scabies and was not yet aware    Erin has been in close contact with someone that has similar symptoms.   She also did not spend time in a wooded area, swim, travel, or spend excess time in the sun just before her symptoms started. Erin did not get bitten or stung by an insect.   Pertinent medical history  Erin has not experienced this skin condition before.   Erin has had chickenpox, but has not had shingles in the past.    Erin does not have a history or a family history of atopia. Ongoing medical conditions were denied.   Weight: 100 lbs   Erin does not smoke or use smokeless tobacco.   She denies pregnancy and denies breastfeeding. Her last period was over a month ago.   MEDICATIONS: metoprolol tartrate oral, Seroquel oral, ALLERGIES: NKDA  Clinician Response:  Dear Erin,   Your health is our priority. To determine the most appropriate care for you, I would like you to be seen in person to further discuss your health history and symptoms.  You will not be charged for this Visit. Thank you for trusting us with your care.   Diagnosis: Refer for additional evaluation  Diagnosis ICD: R69  Additional Clinician Notes: Some of your symptoms are suspicious for scabies but the photos do not support that diagnosis. You should be seen for definitive diagnosis and treatment plan.

## 2018-11-09 ENCOUNTER — OFFICE VISIT (OUTPATIENT)
Dept: URGENT CARE | Facility: RETAIL CLINIC | Age: 33
End: 2018-11-09
Payer: COMMERCIAL

## 2018-11-09 VITALS — DIASTOLIC BLOOD PRESSURE: 79 MMHG | HEART RATE: 85 BPM | SYSTOLIC BLOOD PRESSURE: 118 MMHG | TEMPERATURE: 97.9 F

## 2018-11-09 DIAGNOSIS — L85.3 DRY SKIN: ICD-10-CM

## 2018-11-09 DIAGNOSIS — L30.9 DERMATITIS: Primary | ICD-10-CM

## 2018-11-09 PROCEDURE — 99213 OFFICE O/P EST LOW 20 MIN: CPT | Performed by: FAMILY MEDICINE

## 2018-11-09 RX ORDER — PREDNISONE 10 MG/1
TABLET ORAL
Refills: 0 | COMMUNITY
Start: 2018-08-20 | End: 2019-03-27

## 2018-11-09 NOTE — PROGRESS NOTES
SUBJECTIVE:  Erin Ashley is a 33 year old female who presents to the clinic today for a rash.  Onset of rash was 2 week(s) ago.   Rash is gradual onset, still present and worsening. Hands and feet drying and cracking at times.  No new medications or skin applications.  Works in medical setting and sanitizes hands frequently. Hair also dry and itchy scalp.  Tested thyroid one year ago and was normal.  Associated symptoms include: nothing.    Past Medical History:   Diagnosis Date     Abnormal Pap smear      Appendicitis with perforation 08/04/08    Admit. DIscharged 08/07/08     Back pain      TALIB II (cervical intraepithelial neoplasia II) 2008 5/29/08 on coloposcopy bx TALIB 2/3.  [facility] on 6/13/08 showed TALIB 1/2 *yearly paps are indicated*     Insomnia      Renal calculus or stone      Current Outpatient Prescriptions   Medication Sig Dispense Refill     QUEtiapine (SEROQUEL) 50 MG tablet Take 1 tablet (50 mg) by mouth At Bedtime 90 tablet 1     Acetaminophen (TYLENOL PO) Take 1,000 mg by mouth       diazepam (VALIUM) 10 MG tablet Take 30-60 minutes before procedure.  Do not operate a vehicle after taking this medication. (Patient not taking: Reported on 11/9/2018) 1 tablet 0     escitalopram (LEXAPRO) 10 MG tablet Take 1 tablet (10 mg) by mouth daily (Patient not taking: Reported on 11/9/2018) 30 tablet 5     hydrOXYzine (ATARAX) 25 MG tablet Take 1-2 tablets (25-50 mg) by mouth every 6 hours as needed for anxiety (Patient not taking: Reported on 11/9/2018) 30 tablet 1     ibuprofen (ADVIL/MOTRIN) 800 MG tablet Take 1 tablet (800 mg) by mouth every 8 hours as needed for moderate pain (Patient not taking: Reported on 11/9/2018) 60 tablet 1     metoprolol (TOPROL-XL) 25 MG 24 hr tablet Take 1 tablet (25 mg) by mouth daily (Patient not taking: Reported on 11/9/2018) 90 tablet 3     predniSONE (DELTASONE) 10 MG tablet TAKE 4 TABS BY MOUTH ONCE DAILY X5 DAYS, 2 TABS X3 DAYS, 1 TAB X3 DAYS, 1/2 TAB X3 DAYS  0      History   Smoking Status     Never Smoker   Smokeless Tobacco     Never Used     Comment: no smokers in the household       ROS:  Review of systems negative except as stated above.    EXAM:   /79 (BP Location: Left arm)  Pulse 85  Temp 97.9  F (36.6  C) (Oral)  GENERAL: alert, no acute distress.  SKIN: Rash description:    Distribution: generalized itching and scaling.  Worst is hands, but feet and back also symptomatic. Hands red dry, tips shiny red with good peripheral circulation.  Some excoriation on back. Legs and arms also dry scaly.    ASSESSMENT:  Dermatitis etiology unclear.  Severe dry skin.    PLAN:  Soak, apply eucerin to hands and feet after soaking.  Cotton gloves and socks.  Oil to rest of body.  No orders of the defined types were placed in this encounter.  Encouraged to see dermatology.  Looks like may be systemic/chronic.  Follow up with primary care provider if no improvement.

## 2018-11-09 NOTE — MR AVS SNAPSHOT
After Visit Summary   11/9/2018    Erin Ashley    MRN: [id number]           Patient Information     Date Of Birth          1985        Visit Information        Provider Department      11/9/2018 2:40 PM Zay Sarah MD [facility]        Today's Diagnoses     Dermatitis    -  1    Dry skin           Follow-ups after your visit        Who to contact     You can reach your care team any time of the day by calling [phone].  Notification of test results:  If you have an abnormal lab result, we will notify you by phone as soon as possible.         Additional Information About Your Visit        MyChart Information     [employer] gives you secure access to your electronic health record. If you see a primary care provider, you can also send messages to your care team and make appointments. If you have questions, please call your primary care clinic.  If you do not have a primary care provider, please call [phone] and they will assist you.        Care EveryWhere ID     This is your Care EveryWhere ID. This could be used by other organizations to access your [place] medical records  [id number]        Your Vitals Were     Pulse Temperature                85 97.9  F (36.6  C) (Oral)           Blood Pressure from Last 3 Encounters:   11/09/18 118/79   09/07/18 118/72   07/12/18 120/66    Weight from Last 3 Encounters:   09/07/18 96 lb 12.8 oz (43.9 kg)   07/12/18 95 lb (43.1 kg)   07/06/18 97 lb 12.8 oz (44.4 kg)              Today, you had the following     No orders found for display       Primary Care Provider Office Phone # Fax #    Hussain Ryder PA-C [phone]       [address] [facility] [address]  [facility] [address]        Equal Access to Services     [facility]: Hadii eran Gomes, wanavid huitron, qaybta rush kilgore . So [facility] [phone].    ATENCIÓN: Si sagar jacobson fischer  disposición servicios gratuitos de asistencia lingüística. Krissy fountain [phone].    We comply with applicable federal civil rights laws and [place] laws. We do not discriminate on the basis of race, color, national origin, age, disability, sex, sexual orientation, or gender identity.            Thank you!     Thank you for choosing [facility]  for your care. Our goal is always to provide you with excellent care. Hearing back from our patients is one way we can continue to improve our services. Please take a few minutes to complete the written survey that you may receive in the mail after your visit with us. Thank you!             Your Updated Medication List - Protect others around you: Learn how to safely use, store and throw away your medicines at www.disposemymeds.org.          This list is accurate as of 11/9/18  3:43 PM.  Always use your most recent med list.                   Brand Name Dispense Instructions for use Diagnosis    diazepam 10 MG tablet    VALIUM    1 tablet    Take 30-60 minutes before procedure.  Do not operate a vehicle after taking this medication.    Claustrophobia       escitalopram 10 MG tablet    LEXAPRO    30 tablet    Take 1 tablet (10 mg) by mouth daily    Anxiety       hydrOXYzine 25 MG tablet    ATARAX    30 tablet    Take 1-2 tablets (25-50 mg) by mouth every 6 hours as needed for anxiety    Anxiety       ibuprofen 800 MG tablet    ADVIL/MOTRIN    60 tablet    Take 1 tablet (800 mg) by mouth every 8 hours as needed for moderate pain    Chest pain, unspecified type, Painful respiration       metoprolol succinate 25 MG 24 hr tablet    TOPROL-XL    90 tablet    Take 1 tablet (25 mg) by mouth daily    Palpitations       predniSONE 10 MG tablet    DELTASONE     TAKE 4 TABS BY MOUTH ONCE DAILY X5 DAYS, 2 TABS X3 DAYS, 1 TAB X3 DAYS, 1/2 TAB X3 DAYS        QUEtiapine 50 MG tablet    SEROquel    90 tablet    Take 1 tablet (50 mg) by mouth At Bedtime    Anxiety        TYLENOL PO      Take 1,000 mg by mouth

## 2018-11-12 ENCOUNTER — TELEPHONE (OUTPATIENT)
Dept: FAMILY MEDICINE | Facility: OTHER | Age: 33
End: 2018-11-12

## 2018-11-13 ENCOUNTER — MYC MEDICAL ADVICE (OUTPATIENT)
Dept: OBGYN | Facility: CLINIC | Age: 33
End: 2018-11-13

## 2018-11-13 NOTE — TELEPHONE ENCOUNTER
Please triage this [facility] appointment        Felipe Bocanegra   Female, 33 year old, 1985  Weight:   96 lb 12.8 oz (43.9 kg)  Phone:  *[phone]  PCP:   Hussain Ryder PA-C  Language:   English  Need Interp:   No  Allergies:  Hydrocodone  Hydrocodone-acetaminophen  No Known Allergies  Health Maintenance:   Due  FYI:  None  Primary Ins:   PO  MRN:   [id number]  MyChart:   Active  Next Appt w/Me:   None   More Detail >>     Appointment scheduled from [facility]     Felipe Bocanegra     Sent:   5:57 PM      Felipe TAYLOR Bocanegra     MRN: [id number] : 1985     Pt Home: [phone]       Entered: [phone]        Message      Appointment For: FELIPE BOCANEGRA ([id number])     Visit Type: [facility] OFFICE VISIT SHORT (910)          2018  11:00 AM  20 mins.  Olga Jacinto APRN CNP  FAMILY PRACTICE          Patient Comments:     Primary Care     Fatigue/mailase, abdominal and low back pain

## 2018-11-13 NOTE — TELEPHONE ENCOUNTER
Left message to return our call.    She is scheduled at 11am today with Nikky Zee.  Based on her appointment notes, Nikky and I reviewed her chart and we saw the recent mychart note sent to Dr. Dallas. Nikky did agree that she should see an OB provider. Please see if she's open to this and reschedule as appropriate. It looks like Dr. Caputo has openings at [facility] today.    Yaron Barnes, RN, BSN

## 2018-11-14 ENCOUNTER — MYC MEDICAL ADVICE (OUTPATIENT)
Dept: OBGYN | Facility: CLINIC | Age: 33
End: 2018-11-14

## 2018-11-14 ENCOUNTER — OFFICE VISIT (OUTPATIENT)
Dept: OBGYN | Facility: CLINIC | Age: 33
End: 2018-11-14
Payer: COMMERCIAL

## 2018-11-14 VITALS
WEIGHT: 98.13 LBS | SYSTOLIC BLOOD PRESSURE: 109 MMHG | HEART RATE: 94 BPM | DIASTOLIC BLOOD PRESSURE: 70 MMHG | BODY MASS INDEX: 16.84 KG/M2

## 2018-11-14 DIAGNOSIS — N89.8 VAGINAL DISCHARGE: Primary | ICD-10-CM

## 2018-11-14 DIAGNOSIS — R10.2 PELVIC PAIN IN FEMALE: ICD-10-CM

## 2018-11-14 DIAGNOSIS — R10.2 PELVIC PRESSURE IN FEMALE: ICD-10-CM

## 2018-11-14 DIAGNOSIS — F50.89 PICA: ICD-10-CM

## 2018-11-14 PROBLEM — D50.9 ANEMIA, IRON DEFICIENCY: Status: ACTIVE | Noted: 2018-11-14

## 2018-11-14 LAB
ERYTHROCYTE [DISTWIDTH] IN BLOOD BY AUTOMATED COUNT: 18.7 % (ref 10–15)
HCT VFR BLD AUTO: 28.9 % (ref 35–47)
HGB BLD-MCNC: 8.1 G/DL (ref 11.7–15.7)
MCH RBC QN AUTO: 17.8 PG (ref 26.5–33)
MCHC RBC AUTO-ENTMCNC: 28 G/DL (ref 31.5–36.5)
MCV RBC AUTO: 63 FL (ref 78–100)
PLATELET # BLD AUTO: 332 10E9/L (ref 150–450)
RBC # BLD AUTO: 4.56 10E12/L (ref 3.8–5.2)
SPECIMEN SOURCE: NORMAL
WBC # BLD AUTO: 6 10E9/L (ref 4–11)
WET PREP SPEC: NORMAL

## 2018-11-14 PROCEDURE — 99213 OFFICE O/P EST LOW 20 MIN: CPT | Performed by: ADVANCED PRACTICE MIDWIFE

## 2018-11-14 PROCEDURE — 36415 COLL VENOUS BLD VENIPUNCTURE: CPT | Performed by: ADVANCED PRACTICE MIDWIFE

## 2018-11-14 PROCEDURE — 87210 SMEAR WET MOUNT SALINE/INK: CPT | Performed by: ADVANCED PRACTICE MIDWIFE

## 2018-11-14 PROCEDURE — 85027 COMPLETE CBC AUTOMATED: CPT | Performed by: ADVANCED PRACTICE MIDWIFE

## 2018-11-14 NOTE — NURSING NOTE
"Chief Complaint   Patient presents with     Pelvic Pain     pelvic pressure along with fatigue and weight loss       Initial /70 (BP Location: Right arm, Patient Position: Sitting, Cuff Size: Adult Regular)  Pulse 94  Wt 98 lb 2 oz (44.5 kg)  BMI 16.84 kg/m2 Estimated body mass index is 16.84 kg/(m^2) as calculated from the following:    Height as of 6/27/18: 5' 4\" (1.626 m).    Weight as of this encounter: 98 lb 2 oz (44.5 kg).  Medication Reconciliation: complete    Hannah Tobias CMA    "

## 2018-11-14 NOTE — MR AVS SNAPSHOT
After Visit Summary   11/14/2018    Erin Ashley    MRN: [id number]           Patient Information     Date Of Birth          1985        Visit Information        Provider Department      11/14/2018 11:45 AM Ana Maria Fuentes APRN [facility]        Today's Diagnoses     Vaginal discharge    -  1    Pelvic pain in female        Pelvic pressure in female        Pica           Follow-ups after your visit        Additional Services     [facility] PT, HAND, AND CHIROPRACTIC REFERRAL       Physical Therapy, Hand Therapy and Chiropractic Care are available through:  *[facility]  *Hand Therapy (Occupational Therapy or Physical Therapy)  *[facility]    Call one number to schedule at any of the above locations: [phone].    Physical therapy, Hand therapy and/or Chiropractic care has been recommended by your physician as an excellent treatment option to reduce pain and help people return to normal activities, including sports.  Therapy and/or chiropractic care services are a great complement or alternative to expensive and invasive surgery, injections, or long-term use of prescription medications. The primary goal is to identify the underlying problem and provide you the tools to manage your condition on your own.     Please be aware that coverage of these services is subject to the terms and limitations of your health insurance plan.  Call member services at your health plan with any benefit or coverage questions.      Please bring the following to your appointment:  *Your personal calendar for scheduling future appointments  *Comfortable clothing                  Future tests that were ordered for you today     Open Future Orders        Priority Expected Expires Ordered    [facility] PT, HAND, AND CHIROPRACTIC REFERRAL Routine  11/14/2019 11/14/2018            Who to contact     If you have questions or need follow up information about today's  clinic visit or your schedule please contact [facility] directly at [phone].  Normal or non-critical lab and imaging results will be communicated to you by MyChart, letter or phone within 4 business days after the clinic has received the results. If you do not hear from us within 7 days, please contact the clinic through Gamador[facility] or phone. If you have a critical or abnormal lab result, we will notify you by phone as soon as possible.  Submit refill requests through SETVI or call your pharmacy and they will forward the refill request to us. Please allow 3 business days for your refill to be completed.          Additional Information About Your Visit        [facility] Well[facility] Information     SETVI gives you secure access to your electronic health record. If you see a primary care provider, you can also send messages to your care team and make appointments. If you have questions, please call your primary care clinic.  If you do not have a primary care provider, please call [phone] and they will assist you.        Care EveryWhere ID     This is your Care EveryWhere ID. This could be used by other organizations to access your [place] medical records  [id number]        Your Vitals Were     Pulse Last Period BMI (Body Mass Index)             94 06/14/2018 (Approximate) 16.84 kg/m2          Blood Pressure from Last 3 Encounters:   11/14/18 109/70   11/09/18 118/79   09/07/18 118/72    Weight from Last 3 Encounters:   11/14/18 98 lb 2 oz (44.5 kg)   09/07/18 96 lb 12.8 oz (43.9 kg)   07/12/18 95 lb (43.1 kg)              We Performed the Following     CBC with platelets     Wet prep        Primary Care Provider Office Phone # Fax #    Hussain Ryder PA-C [phone]       [address] [facility] [address]  [facility] [address]        Equal Access to Services     ALICIA [address] : Benito Gomes, monica huitron, shahana ruiz, rush kraus. So  [facility] [phone].    ATENCIÓN: Si sami rodriguez, tiene a fischer disposición servicios gratuitos de asistencia lingüística. Krissy fountain [phone].    We comply with applicable federal civil rights laws and [place] laws. We do not discriminate on the basis of race, color, national origin, age, disability, sex, sexual orientation, or gender identity.            Thank you!     [facility]  [facility]. Our goal is always to provide you with excellent care. Hearing back from our patients is one way we can continue to improve our services. Please take a few minutes to complete the written survey that you may receive in the mail after your visit with us. Thank you!             Your Updated Medication List - Protect others around you: Learn how to safely use, store and throw away your medicines at www.disposemymeds.org.          This list is accurate as of 11/14/18  2:23 PM.  Always use your most recent med list.                   Brand Name Dispense Instructions for use Diagnosis    diazepam 10 MG tablet    VALIUM    1 tablet    Take 30-60 minutes before procedure.  Do not operate a vehicle after taking this medication.    Claustrophobia       escitalopram 10 MG tablet    LEXAPRO    30 tablet    Take 1 tablet (10 mg) by mouth daily    Anxiety       hydrOXYzine 25 MG tablet    ATARAX    30 tablet    Take 1-2 tablets (25-50 mg) by mouth every 6 hours as needed for anxiety    Anxiety       ibuprofen 800 MG tablet    ADVIL/MOTRIN    60 tablet    Take 1 tablet (800 mg) by mouth every 8 hours as needed for moderate pain    Chest pain, unspecified type, Painful respiration       metoprolol succinate 25 MG 24 hr tablet    TOPROL-XL    90 tablet    Take 1 tablet (25 mg) by mouth daily    Palpitations       predniSONE 10 MG tablet    DELTASONE     TAKE 4 TABS BY MOUTH ONCE DAILY X5 DAYS, 2 TABS X3 DAYS, 1 TAB X3 DAYS, 1/2 TAB X3 DAYS        QUEtiapine 50 MG tablet    SEROquel    90 tablet    Take  1 tablet (50 mg) by mouth At Bedtime    Anxiety       TYLENOL PO      Take 1,000 mg by mouth

## 2018-11-14 NOTE — TELEPHONE ENCOUNTER
Looks like patients appointment with Nikky was cancelled and she is schedule with Ana Maria for 11/15.     Hans Sutherland,

## 2018-11-14 NOTE — PROGRESS NOTES
"Erin Ashley is a 33 year old who presents to the clinic for evaluation of fatigue, pelvic pressure, pelvic pain and vaginal discharge.   Erin feels that her problems began roughly six months ago with pelvic pain primarily on the right side but now across her entire pelvic area.   She also complains of upper abdominal discomfort and cramping.  This has impacted her life to the point where she can't exercise.  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 She reports sleeping poorly even with sleep aides (seraquel) and doesn't wake rested.   Her pain is constant and unrelenting.  Nothing makes it worse or better.  For a time had bladder/urination issues but that has now resolved.  Feels that her bowels are different since this all begin, but has a hard time specifying how.  Goes 2x per week without evidence of constipation. Feels that the pressure has worsened in the last week and has begun to have a brownish vaginal discharge that she says is not blood.  Had her IUD pulled in June with no resulting menses. Denies odor and itching.  No intercourse since her IUD was removed. Also feels that her the anatomy of her perineum/labia has changed but not explicit about how. Thinks that the solution to her problem is a hyst.  Has not scheduled an appointment to discuss this.   States that she eats regularly and is made fun of at work for eating so much.  Has no real explanation for her weight loss in the past year.  Body mass index is 16.84 kg/(m^2). notes that she eats a lot of ice.    Notes that she doesn't often feel listened to by providers and resents being told to see psych which she declined to do.   Denies being sad/depressed is frustrated at feeling \"horrible\". Cries through at least half of our visit.   Asked what she thinks is the cause of her problems and is either unwilling or unable to say.    Has no tests or studies in mind.         Histories reviewed and updated  Past Medical History:   Diagnosis " Date     Abnormal Pap smear      Appendicitis with perforation 08/04/08    Admit. DIscharged 08/07/08     Back pain      TALIB II (cervical intraepithelial neoplasia II) 2008 5/29/08 on coloposcopy bx TALIB 2/3.  CKC on 6/13/08 showed TALIB 1/2 *yearly paps are indicated*     Insomnia      Renal calculus or stone      Past Surgical History:   Procedure Laterality Date     APPENDECTOMY  8/4/2008     EXAM UNDER ANESTHESIA RECTUM N/A 2/24/2017    Procedure: EXAM UNDER ANESTHESIA RECTUM;  Surgeon: Britton Palomo MD;  Location: [facility] OR     HC CONIZATION CERVIX,KNIFE/LASER  06/130/8    cervix.  TALIB 1/2     HEMORRHOIDECTOMY EXTERNAL N/A 2/24/2017    Procedure: HEMORRHOIDECTOMY EXTERNAL;  Surgeon: Britton Palomo MD;  Location: [facility] OR     TONSILLECTOMY  7/3/2007     Social History     Social History     Marital status:      Spouse name: N/A     Number of children: 0     Years of education: 16     Occupational History     RN Other     [employer]     Social History Main Topics     Smoking status: Never Smoker     Smokeless tobacco: Never Used      Comment: no smokers in the household     Alcohol use No      Comment: rare     Drug use: No     Sexual activity: Not Currently     Partners: Male     Other Topics Concern      Service No     Blood Transfusions No     Caffeine Concern Yes     1-2 coffee a day     Occupational Exposure Yes     R.N.     Hobby Hazards No     Sleep Concern No     Stress Concern No     Weight Concern No     Special Diet No     Back Care No     Exercise No     Bike Helmet No     Seat Belt Yes     Self-Exams Yes     know BSE     Parent/Sibling W/ Cabg, Mi Or Angioplasty Before 65f 55m? No     Social History Narrative     to [place] and lives in [place] with their daughter.  No indoor cats/kittens.  No concerns about domestic violence.  No smokers in the home.     Family History   Problem Relation Age of Onset     Hypertension Maternal Grandmother       borderline     Lipids Maternal Grandmother      Respiratory Maternal Grandmother      COPD     Lung Cancer Maternal Grandmother      Hypertension Maternal Grandfather      borderline     Cancer Maternal Grandfather      lung cancer, smoker     GASTROINTESTINAL DISEASE Mother      cholecystitis     Lipids Mother      Prostate Cancer Paternal Grandfather      Hearing Loss Paternal Grandfather      Other Cancer Paternal Grandmother      pancreatic cancer     Unknown/Adopted No family hx of      Asthma No family hx of      C.A.D. No family hx of      Diabetes No family hx of      Cerebrovascular Disease No family hx of      Breast Cancer No family hx of      Cancer - colorectal No family hx of      Alcohol/Drug No family hx of      Allergies No family hx of      Alzheimer Disease No family hx of      Anesthesia Reaction No family hx of      Arthritis No family hx of      Blood Disease No family hx of      Cardiovascular No family hx of      Circulatory No family hx of      Congenital Anomalies No family hx of      Connective Tissue Disorder No family hx of      Depression No family hx of      Genetic Disorder No family hx of      Genitourinary Problems No family hx of      Gynecology No family hx of      HEART DISEASE No family hx of      Musculoskeletal Disorder No family hx of      Neurologic Disorder No family hx of      Obesity No family hx of      Osteoporosis No family hx of      Psychotic Disorder No family hx of             ROS: 10 point ROS neg other than the symptoms noted above in the HPI.    EXAM:  /70 (BP Location: Right arm, Patient Position: Sitting, Cuff Size: Adult Regular)  Pulse 94  Wt 98 lb 2 oz (44.5 kg)  LMP 06/14/2018 (Approximate)  BMI 16.84 kg/m2  ABD:  Tender to deep palpation on the right.  On the left the descending colon is easily palpable due to the hard lumpy stool it contains.   : PELVIC EXAM:  Vulva: No external lesions, normal hair distribution, no adenopathy, BUS  WNL  Vagina: Moist, pink, no abnormal discharge, well rugated, no lesions.  Hymenal tags appear edematous. There are what appear to be excoriated areas on the right side of the vagina just above the hymenal ring.  There is also a moderate amount of tan very thick mucous like discharge.    Cervix: smooth, pink, no visible lesions, neg CMT.  No drainage from the cx  Rectal exam: deferred    HGB 8.1    ASSESSMENT/PLAN:    (N89.8) Vaginal discharge  (primary encounter diagnosis)  Comment:   Plan: Wet prep            (R10.2) Pelvic pain in female  Comment:   Plan: [facility] PT, HAND, AND CHIROPRACTIC REFERRAL            (R10.2) Pelvic pressure in female  Comment:   Plan: [facility]Y PT, HAND, AND CHIROPRACTIC REFERRAL            (F50.89) Pica  Comment:   Plan: CBC with platelets            Denies putting anything in her vagina.   Reassured that outside of the hymenal edema and the discharge there are no other abnormalinties of architecture/structure.  Wet prep is negative.   Called and LM regarding anemia and recommendation to follow up with PCP to find cause which may be dietary but is clearly not GYN.    Also no apparent GYN cause for her discomfort.    Agreed to consider alternative therapies like acupuncture/pressure or hypnosis.   Agrees to PT with Kristina Luis.     Visit length 30 minutes was spent face to face with the patient today discussing her history, diagnosis, and follow-up plan as noted above.  Over 50% of the visit was spent in counseling and coordination of care.    Time noted does not include time required to complete procedures.

## 2018-11-15 ENCOUNTER — TELEPHONE (OUTPATIENT)
Dept: FAMILY MEDICINE | Facility: OTHER | Age: 33
End: 2018-11-15

## 2018-11-15 NOTE — TELEPHONE ENCOUNTER
I saw patient's hemoglobin was quite low at her gyn visit yesterday so recommend she schedule a visit to evaluate this further.    Hussain Ryder PA-C

## 2018-11-23 ENCOUNTER — MYC MEDICAL ADVICE (OUTPATIENT)
Dept: FAMILY MEDICINE | Facility: OTHER | Age: 33
End: 2018-11-23

## 2018-11-23 DIAGNOSIS — D64.9 ANEMIA, UNSPECIFIED TYPE: Primary | ICD-10-CM

## 2018-11-23 NOTE — TELEPHONE ENCOUNTER
Routing to covering provider pool to review and advise if able to complete labs prior to specialist appointment. Renetta Petersen RN, BSN

## 2018-11-30 ENCOUNTER — OFFICE VISIT (OUTPATIENT)
Dept: FAMILY MEDICINE | Facility: OTHER | Age: 33
End: 2018-11-30
Payer: COMMERCIAL

## 2018-11-30 VITALS
OXYGEN SATURATION: 100 % | RESPIRATION RATE: 16 BRPM | HEART RATE: 108 BPM | WEIGHT: 101 LBS | DIASTOLIC BLOOD PRESSURE: 68 MMHG | BODY MASS INDEX: 17.34 KG/M2 | TEMPERATURE: 98.6 F | SYSTOLIC BLOOD PRESSURE: 112 MMHG

## 2018-11-30 DIAGNOSIS — L30.1 DYSHIDROTIC ECZEMA: Primary | ICD-10-CM

## 2018-11-30 DIAGNOSIS — D50.9 IRON DEFICIENCY ANEMIA, UNSPECIFIED IRON DEFICIENCY ANEMIA TYPE: ICD-10-CM

## 2018-11-30 PROCEDURE — 99213 OFFICE O/P EST LOW 20 MIN: CPT | Performed by: PHYSICIAN ASSISTANT

## 2018-11-30 RX ORDER — TRIAMCINOLONE ACETONIDE 5 MG/G
CREAM TOPICAL
Qty: 30 G | Refills: 1 | Status: SHIPPED | OUTPATIENT
Start: 2018-11-30 | End: 2019-07-08

## 2018-11-30 ASSESSMENT — PAIN SCALES - GENERAL: PAINLEVEL: MILD PAIN (3)

## 2018-11-30 NOTE — MR AVS SNAPSHOT
After Visit Summary   11/30/2018    Erin Ashley    MRN: [id number]           Patient Information     Date Of Birth          1985        Visit Information        Provider Department      11/30/2018 10:45 AM Hussain Ryder PA-C [facility]        Today's Diagnoses     Dyshidrotic eczema    -  1      Care Instructions    Your dry skin and peeling is consistent with dyshidrotic eczema.  Will prescribe a steroid cream to use 3 times daily as needed along with Aquaphor or Vaseline.  Avoid alcohol based soaps and hand sanitizers along with hot water.    If not improving, let me know.              Follow-ups after your visit        Who to contact     If you have questions or need follow up information about today's clinic visit or your schedule please contact [facility] directly at [phone].  Normal or non-critical lab and imaging results will be communicated to you by Cherry Bugshart, letter or phone within 4 business days after the clinic has received the results. If you do not hear from us within 7 days, please contact the clinic through Cherry Bugshart or phone. If you have a critical or abnormal lab result, we will notify you by phone as soon as possible.  Submit refill requests through [facility] or call your pharmacy and they will forward the refill request to us. Please allow 3 business days for your refill to be completed.          Additional Information About Your Visit        MyChart Information     [facility] gives you secure access to your electronic health record. If you see a primary care provider, you can also send messages to your care team and make appointments. If you have questions, please call your primary care clinic.  If you do not have a primary care provider, please call [phone] and they will assist you.        Care EveryWhere ID     This is your Care EveryWhere ID. This could be used by other organizations to access your [facility]  records  [id number]        Your Vitals Were     Pulse Temperature Respirations Pulse Oximetry BMI (Body Mass Index)       108 98.6  F (37  C) (Temporal) 16 100% 17.34 kg/m2        Blood Pressure from Last 3 Encounters:   11/30/18 112/68   11/14/18 109/70   11/09/18 118/79    Weight from Last 3 Encounters:   11/30/18 101 lb (45.8 kg)   11/14/18 98 lb 2 oz (44.5 kg)   09/07/18 96 lb 12.8 oz (43.9 kg)              Today, you had the following     No orders found for display         Today's Medication Changes          These changes are accurate as of 11/30/18 11:23 AM.  If you have any questions, ask your nurse or doctor.               Start taking these medicines.        Dose/Directions    triamcinolone 0.5 % external cream   Commonly known as:  KENALOG   Used for:  Dyshidrotic eczema   Started by:  Hussain Ryder PA-C        Apply sparingly to affected area three times daily.   Quantity:  30 g   Refills:  1            Where to get your medicines      These medications were sent to [place] [address] [place] [facility] [place] [facility] [address] [address] [facility]  [address] [facility] [address]     Phone:  [phone]     triamcinolone 0.5 % external cream                Primary Care Provider Office Phone # Fax #    Hussain Ryder PA-C [phone]       [address] [facility] [address]  [facility] [address]        Equal Access to Services     [address] : Benito mcbrideo Sosantosh, waaxda luqadaha, qaybta kaalmada adeegyada, waxay tomas kraus. So [facility] [phone].    ATENCIÓN: Si habla español, tiene a fischer disposición servicios gratuitos de asistencia lingüística. Llame al [phone].    We comply with applicable federal civil rights laws and [place] laws. We do not discriminate on the basis of race, color, national origin, age, disability, sex, sexual orientation, or gender identity.            Thank you!     Thank you for choosing [facility]  for your care. Our goal is always  to provide you with excellent care. Hearing back from our patients is one way we can continue to improve our services. Please take a few minutes to complete the written survey that you may receive in the mail after your visit with us. Thank you!             Your Updated Medication List - Protect others around you: Learn how to safely use, store and throw away your medicines at www.disposemymeds.org.          This list is accurate as of 11/30/18 11:23 AM.  Always use your most recent med list.                   Brand Name Dispense Instructions for use Diagnosis    diazepam 10 MG tablet    VALIUM    1 tablet    Take 30-60 minutes before procedure.  Do not operate a vehicle after taking this medication.    Claustrophobia       escitalopram 10 MG tablet    LEXAPRO    30 tablet    Take 1 tablet (10 mg) by mouth daily    Anxiety       hydrOXYzine 25 MG tablet    ATARAX    30 tablet    Take 1-2 tablets (25-50 mg) by mouth every 6 hours as needed for anxiety    Anxiety       ibuprofen 800 MG tablet    ADVIL/MOTRIN    60 tablet    Take 1 tablet (800 mg) by mouth every 8 hours as needed for moderate pain    Chest pain, unspecified type, Painful respiration       metoprolol succinate ER 25 MG 24 hr tablet    TOPROL-XL    90 tablet    Take 1 tablet (25 mg) by mouth daily    Palpitations       predniSONE 10 MG tablet    DELTASONE     TAKE 4 TABS BY MOUTH ONCE DAILY X5 DAYS, 2 TABS X3 DAYS, 1 TAB X3 DAYS, 1/2 TAB X3 DAYS        QUEtiapine 50 MG tablet    SEROquel    90 tablet    Take 1 tablet (50 mg) by mouth At Bedtime    Anxiety       triamcinolone 0.5 % external cream    KENALOG    30 g    Apply sparingly to affected area three times daily.    Dyshidrotic eczema       TYLENOL PO      Take 1,000 mg by mouth

## 2018-11-30 NOTE — PROGRESS NOTES
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 She has noticed it on her heels as well and initially had some patches on her back and abdomen. She was seen in urgent care recently and was told it could maybe be scabies but was told to keep on eye on it. She no longer has any spots on her back or abdomen but her hands are very dry and keep cracking, causes pain and small sores. She has been using Eucerin with gloves without benefit.    She also was found to have significant anemia recently and saw hematology a few days ago. She will be undergoing iron infusions next week and had a plethora of labs drawn. She has been fatigued and short of breath at times.     Problem list and histories reviewed & adjusted, as indicated.  Additional history: none    ROS:  GENERAL: Denies fever, fatigue, weakness, weight gain, or weight loss.  CARDIOVASCULAR: +Mild shortness of breath. Denies chest pain, irregular heartbeats,  palpitations, or edema.  RESPIRATORY: Denies cough, hemoptysis, or wheezing.   SKIN: +Dry, cracked skin of hands and heels.     OBJECTIVE:     /68  Pulse 108  Temp 98.6  F (37  C) (Temporal)  Resp 16  Wt 101 lb (45.8 kg)  SpO2 100%  BMI 17.34 kg/m2  Body  mass index is 17.34 kg/(m^2).  GENERAL: healthy, alert and no distress  RESP: lungs clear to auscultation - no rales, rhonchi or wheezes  CV: regular rate and rhythm, normal S1 S2, no S3 or S4, no murmur, click or rub  SKIN: Dry, cracked skin with scabbing areas over all fingers bilaterally and distal palms. Cracked skin over bilateral heels. No rashes on dried skin on abdomen or back     ASSESSMENT/PLAN:       ICD-10-CM    1. Dyshidrotic eczema L30.1 triamcinolone (KENALOG) 0.5 % external cream   2. Iron deficiency anemia, unspecified iron deficiency anemia type D50.9        1. Symptoms and findings are consistent with dyshidrotic eczema. Will prescribe Kenalog cream to use 3 times daily as needed along with Aquaphor or Vaseline.  Encouraged to avoid alcohol based soaps and hand sanitizers and to use luke warm water.  If not improving, she will contact the clinic and will refer to dermatology.    2. Being followed by hematology with recent lab work up. Has IV iron infusion scheduled for next week.      Hussain Ryder PA-C  [facility]

## 2018-11-30 NOTE — PATIENT INSTRUCTIONS
Your dry skin and peeling is consistent with dyshidrotic eczema.  Will prescribe a steroid cream to use 3 times daily as needed along with Aquaphor or Vaseline.  Avoid alcohol based soaps and hand sanitizers along with hot water.    If not improving, let me know.

## 2018-12-10 ENCOUNTER — TRANSFERRED RECORDS (OUTPATIENT)
Dept: HEALTH INFORMATION MANAGEMENT | Facility: CLINIC | Age: 33
End: 2018-12-10

## 2018-12-14 ENCOUNTER — TRANSFERRED RECORDS (OUTPATIENT)
Dept: HEALTH INFORMATION MANAGEMENT | Facility: CLINIC | Age: 33
End: 2018-12-14

## 2019-01-29 ENCOUNTER — TELEPHONE (OUTPATIENT)
Dept: FAMILY MEDICINE | Facility: OTHER | Age: 34
End: 2019-01-29

## 2019-01-29 NOTE — TELEPHONE ENCOUNTER
LM for patient to return call.     Next 5 appointments (look out 90 days)    Jan 29, 2019  1:20 PM CST  Radha Blum with Vivien Dallas MD  [facility] [address] [place] [id number]  [phone]        Cathy Medel RN, BSN

## 2019-01-29 NOTE — TELEPHONE ENCOUNTER
Scheduling called but patient was not there when transferred. Attempted to call, went straight to .     Cathy Medel, RN, BSN

## 2019-01-29 NOTE — TELEPHONE ENCOUNTER
Patient scheduled for abdominal cramping and abnormal stool. Will flag for RN to triage. PLEASE ALSO RESCHEDULE. TC has a closed practice

## 2019-03-25 NOTE — PROGRESS NOTES
"  SUBJECTIVE:   Erin Ashley is a 34 year old female who presents to clinic today for the following health issues:      HPI     Patient in clinic today to discuss headaches. She states she has had facial pain with headaches for a few weeks, \"Like intense pressure. Upper jaw up to the back of head and neck hurt and behind both ears.\" - She states this has been happening consistently every day for at least two weeks. She states she does not notice anything that makes it better or worse. She has been taking ibuprofen and Tylenol without much benefit. She states she does not exactly have facial numbness but her facial sensation feels slightly dulled. No pain, numbness or tingling in her arms. She has been experiencing a lot of clear nasal discharge.     She also states that she is no longer losing weight but cannot seem to put any weight on no matter how much she eats. She would like to see a dietician.     Problem list and histories reviewed & adjusted, as indicated.  Additional history: none    ROS:  GENERAL: +Difficulty gaining weight. Denies fever, fatigue, weakness, weight gain.   HEENT: Eyes-Denies pain, redness, loss of vision, double or blurred vision.     Ears/Nose- +Nasal congestion, facial/sinus pain and pressure. Denies tinnitus, loss of hearing, epistaxis, decreased sense of smell. Denies loss of sense of taste, dry mouth, or sore throat.   CARDIOVASCULAR: Denies chest pain, shortness of breath, irregular heartbeats, palpitations, or edema.  RESPIRATORY: Denies cough, hemoptysis, and shortness of breath.  NEUROLOGIC: +Headache. Denies fainting, dizziness, memory loss, numbness, tingling, or seizures.  MUSCULOSKELETAL: +Pain in posterior neck, worse on the left.     OBJECTIVE:     /70   Pulse 103   Temp 98.5  F (36.9  C) (Temporal)   Resp 16   Wt 47.6 kg (105 lb)   SpO2 99%   BMI 18.02 kg/m    Body mass index is 18.02 kg/m .  GENERAL: healthy, alert and no distress  EYES: Eyes grossly normal " to inspection, PERRL and conjunctivae and sclerae normal  HENT: ear canals and TM's normal. Nasal mucosa is mildly erythematous bilaterally. Tenderness over the bilateral maxillary sinuses.   NECK: Bilateral non-tender anterior cervical chain adenopathy.   RESP: lungs clear to auscultation - no rales, rhonchi or wheezes  CV: regular rate and rhythm, normal S1 S2, no S3 or S4, no murmur, click or rub  MS: no gross musculoskeletal defects noted. Tenderness over the cervical spinous processes and left cervical paraspinal musculature.   NEURO: Normal strength and tone, mentation intact and speech normal. Gait is stable. Normal facial sensation.   PSYCH: mentation appears normal, affect normal/bright    ASSESSMENT/PLAN:       ICD-10-CM    1. Acute sinusitis with symptoms > 10 days J01.90 amoxicillin-clavulanate (AUGMENTIN) 500-125 MG tablet   2. Tension headache G44.209    3. Insomnia, unspecified type G47.00 QUEtiapine (SEROQUEL) 50 MG tablet   4. Cervicalgia M54.2 cyclobenzaprine (FLEXERIL) 5 MG tablet   5. Weight loss R63.4 NUTRITION REFERRAL   6. Malnutrition, unspecified type (H) E46 NUTRITION REFERRAL       1-2, 4. Facial pain and pressure and nasal congestion consistent with acute sinusitis and could be contributing to her headache and neck pain as well but I think this is more a musculoskeletal strain.  Will prescribe Augmentin to take twice daily for 10 days. I recommend she take a daily probiotic or Activia yogurt while on this.  Encouraged to drink plenty of fluids.  Can use an over the counter Nettipot or sinus rinse to help with nasal congestion. Mucinex can also be helpful.   I also recommend Flonase to help with nasal swelling.  Follow up if symptoms are not improving.    I recommend icing, heat, stretching and Flexeril as needed for the neck pain and headaches and she will continue with ibuprofen and Tylenol as needed. If not improving will refer to PT in [place].    3. Will refill Seroquel. She uses  this sparingly as needed for insomnia.    5-6. I recommend she see a nutritionist due to her inability to gain weight.    Follow up as needed. Due for updated PAP in May.     Hussain Ryder PA-C  [facility]

## 2019-03-27 ENCOUNTER — OFFICE VISIT (OUTPATIENT)
Dept: FAMILY MEDICINE | Facility: OTHER | Age: 34
End: 2019-03-27
Payer: COMMERCIAL

## 2019-03-27 VITALS
OXYGEN SATURATION: 99 % | DIASTOLIC BLOOD PRESSURE: 70 MMHG | RESPIRATION RATE: 16 BRPM | BODY MASS INDEX: 18.02 KG/M2 | TEMPERATURE: 98.5 F | WEIGHT: 105 LBS | HEART RATE: 103 BPM | SYSTOLIC BLOOD PRESSURE: 104 MMHG

## 2019-03-27 DIAGNOSIS — G47.00 INSOMNIA, UNSPECIFIED TYPE: ICD-10-CM

## 2019-03-27 DIAGNOSIS — M54.2 CERVICALGIA: ICD-10-CM

## 2019-03-27 DIAGNOSIS — J01.90 ACUTE SINUSITIS WITH SYMPTOMS > 10 DAYS: Primary | ICD-10-CM

## 2019-03-27 DIAGNOSIS — R63.4 WEIGHT LOSS: ICD-10-CM

## 2019-03-27 DIAGNOSIS — G44.209 TENSION HEADACHE: ICD-10-CM

## 2019-03-27 DIAGNOSIS — E46 MALNUTRITION, UNSPECIFIED TYPE (H): ICD-10-CM

## 2019-03-27 PROCEDURE — 99214 OFFICE O/P EST MOD 30 MIN: CPT | Performed by: PHYSICIAN ASSISTANT

## 2019-03-27 RX ORDER — QUETIAPINE FUMARATE 50 MG/1
50 TABLET, FILM COATED ORAL
Qty: 30 TABLET | Refills: 2 | Status: SHIPPED | OUTPATIENT
Start: 2019-03-27 | End: 2019-11-25

## 2019-03-27 RX ORDER — CYCLOBENZAPRINE HCL 5 MG
5 TABLET ORAL 3 TIMES DAILY PRN
Qty: 20 TABLET | Refills: 0 | Status: SHIPPED | OUTPATIENT
Start: 2019-03-27 | End: 2019-07-08

## 2019-03-27 RX ORDER — AMOXICILLIN AND CLAVULANATE POTASSIUM 500; 125 MG/1; MG/1
1 TABLET, FILM COATED ORAL 2 TIMES DAILY
Qty: 20 TABLET | Refills: 0 | Status: SHIPPED | OUTPATIENT
Start: 2019-03-27 | End: 2019-05-29

## 2019-03-27 NOTE — PATIENT INSTRUCTIONS
Will prescribe an antibiotic called Augmentin to take twice daily for 10 days. Take a daily probiotic or Activia yogurt while you are on this.  Drink plenty of fluids.  Can use an over the counter Nettipot or sinus rinse to help with nasal congestion. Mucinex can also be helpful.   I also recommend Flonase to help with nasal swelling.  Follow up if symptoms are not improving.    I recommend heat, stretching and flexeril as needed for the headaches and neck pain.  If not improving, will send you for physical therapy.    Nutrition referral placed. They will call you.

## 2019-05-13 ENCOUNTER — TRANSFERRED RECORDS (OUTPATIENT)
Dept: HEALTH INFORMATION MANAGEMENT | Facility: CLINIC | Age: 34
End: 2019-05-13

## 2019-05-13 LAB
ALT SERPL-CCNC: 52 IU/L (ref 7–52)
AST SERPL-CCNC: 25 U/L (ref 13–39)
CREAT SERPL-MCNC: 0.62 MG/DL (ref 0.6–1.3)
GFR SERPL CREATININE-BSD FRML MDRD: 126.3 ML/MIN/1.73M2
GLUCOSE SERPL-MCNC: 69 MG/DL (ref 70–110)
POTASSIUM SERPL-SCNC: 3.9 MMOL/L (ref 3.5–5.1)

## 2019-05-17 ENCOUNTER — TRANSFERRED RECORDS (OUTPATIENT)
Dept: HEALTH INFORMATION MANAGEMENT | Facility: CLINIC | Age: 34
End: 2019-05-17

## 2019-05-21 ENCOUNTER — TRANSFERRED RECORDS (OUTPATIENT)
Dept: HEALTH INFORMATION MANAGEMENT | Facility: CLINIC | Age: 34
End: 2019-05-21

## 2019-05-23 ENCOUNTER — MYC MEDICAL ADVICE (OUTPATIENT)
Dept: FAMILY MEDICINE | Facility: OTHER | Age: 34
End: 2019-05-23

## 2019-05-25 ENCOUNTER — OFFICE VISIT (OUTPATIENT)
Dept: URGENT CARE | Facility: URGENT CARE | Age: 34
End: 2019-05-25
Payer: COMMERCIAL

## 2019-05-25 VITALS
TEMPERATURE: 97.2 F | HEART RATE: 104 BPM | DIASTOLIC BLOOD PRESSURE: 85 MMHG | WEIGHT: 98.8 LBS | SYSTOLIC BLOOD PRESSURE: 117 MMHG | BODY MASS INDEX: 16.96 KG/M2 | OXYGEN SATURATION: 99 %

## 2019-05-25 DIAGNOSIS — R11.0 NAUSEA: ICD-10-CM

## 2019-05-25 DIAGNOSIS — R10.13 EPIGASTRIC PAIN: Primary | ICD-10-CM

## 2019-05-25 PROCEDURE — 99213 OFFICE O/P EST LOW 20 MIN: CPT | Performed by: PHYSICIAN ASSISTANT

## 2019-05-25 RX ORDER — OMEPRAZOLE 20 MG/1
20 TABLET, DELAYED RELEASE ORAL DAILY
Qty: 30 TABLET | Refills: 0 | Status: SHIPPED | OUTPATIENT
Start: 2019-05-25 | End: 2019-07-08

## 2019-05-25 RX ORDER — ONDANSETRON 8 MG/1
8 TABLET, ORALLY DISINTEGRATING ORAL EVERY 8 HOURS PRN
Qty: 12 TABLET | Refills: 0 | Status: SHIPPED | OUTPATIENT
Start: 2019-05-25 | End: 2019-07-22

## 2019-05-25 NOTE — PROGRESS NOTES
"SUBJECTIVE  HPI:  Erin Ashley is a 34 year old female who presents with the CC of epigastric pain.    Pain is located in the epigastric area, with radiation to None.  The pain is characterized as dull and aching, and at worst is a level 10 on a scale of 1-10.  Pain has been present for 4 day(s) and is rapidly progressive. She says she has had these symptoms off and on over the past year. Recent CT of abdomen with unknown results. Does see specialist at [facility]. Weight loss of 10 lbs over the past year. \"everybody thinks I am crazy\".   EXACERBATING FACTORS: eating/drinking will cause pain in 1 hour which gets better over a few hours.   RELIEVING FACTORS: tylenol.  ASSOCIATED SX: anorexia due to pain with eating and nausea.    Past Medical History:   Diagnosis Date     Abnormal Pap smear      Appendicitis with perforation 08/04/08    Admit. DIscharged 08/07/08     Back pain      TALIB II (cervical intraepithelial neoplasia II) 2008 5/29/08 on coloposcopy bx TALIB 2/3.  [facility] on 6/13/08 showed TALIB 1/2 *yearly paps are indicated*     Insomnia      Renal calculus or stone      Current Outpatient Medications   Medication Sig Dispense Refill     Acetaminophen (TYLENOL PO) Take 1,000 mg by mouth       QUEtiapine (SEROQUEL) 50 MG tablet Take 1 tablet (50 mg) by mouth nightly as needed (insomnia) 30 tablet 2     amoxicillin-clavulanate (AUGMENTIN) 500-125 MG tablet Take 1 tablet by mouth 2 times daily (Patient not taking: Reported on 5/25/2019) 20 tablet 0     cyclobenzaprine (FLEXERIL) 5 MG tablet Take 1 tablet (5 mg) by mouth 3 times daily as needed for muscle spasms (Patient not taking: Reported on 5/25/2019) 20 tablet 0     ibuprofen (ADVIL/MOTRIN) 800 MG tablet Take 1 tablet (800 mg) by mouth every 8 hours as needed for moderate pain (Patient not taking: Reported on 11/9/2018) 60 tablet 1     triamcinolone (KENALOG) 0.5 % external cream Apply sparingly to affected area three times daily. (Patient not taking: " Reported on 5/25/2019) 30 g 1     Social History     Tobacco Use     Smoking status: Never Smoker     Smokeless tobacco: Never Used     Tobacco comment: no smokers in the household   Substance Use Topics     Alcohol use: No     Alcohol/week: 0.0 oz     Comment: rare       ROS:  CONSTITUTIONAL:NEGATIVE for fever, chills, change in weight  GI: abdominal pain epigastric and nausea    OBJECTIVE:  /85   Pulse 104   Temp 97.2  F (36.2  C) (Tympanic)   Wt 44.8 kg (98 lb 12.8 oz)   SpO2 99%   BMI 16.96 kg/m    GENERAL APPEARANCE: healthy, alert and no distress  ABDOMEN: tenderness moderate epigastric and LUQ  NEURO: Normal strength and tone, sensory exam grossly normal,  normal speech and mentation  SKIN: no suspicious lesions or rashes    ASSESSMENT:    1. Epigastric pain  start  - omeprazole (PRILOSEC OTC) 20 MG EC tablet; Take 1 tablet (20 mg) by mouth daily  Dispense: 30 tablet; Refill: 0    2. Nausea  start  - ondansetron (ZOFRAN-ODT) 8 MG ODT tab; Take 1 tablet (8 mg) by mouth every 8 hours as needed for nausea  Dispense: 12 tablet; Refill: 0    PLAN: she will follow up with [facility] next Tuesday.   See Orders in Epic

## 2019-05-28 ENCOUNTER — MYC MEDICAL ADVICE (OUTPATIENT)
Dept: FAMILY MEDICINE | Facility: OTHER | Age: 34
End: 2019-05-28

## 2019-05-28 ENCOUNTER — TELEPHONE (OUTPATIENT)
Dept: FAMILY MEDICINE | Facility: OTHER | Age: 34
End: 2019-05-28

## 2019-05-28 NOTE — TELEPHONE ENCOUNTER
15 minutes is not appropriate for this concern. She needs to reschedule for 30 minute appt but would recommend being seen within the next 1-2 days so may need to see a different provider.    Hussain Ryder PA-C

## 2019-05-28 NOTE — TELEPHONE ENCOUNTER
"Erin Ashley is a 34 year old female who calls with symptoms.    NURSING ASSESSMENT:  Description:  Patient is having left upper abdominal pain. It is severe when she eats. The pain lasts 1.5 hours and then becomes dull. She has also had blood in her stool three times. It is \"dark, like black flecks\".   Onset/duration:  1 week for abdominal pain.   Precip. factors: Unknown  Associated symptoms:  Not eating. Dizziness when the pain occurs.  Improves/worsens symptoms: Not improving  Last exam/Treatment:  On file  Allergies:   Allergies   Allergen Reactions     Hydrocodone Other (See Comments)     vomitting  Migraines     Hydrocodone-Acetaminophen      headaches     No Known Allergies        RECOMMENDED DISPOSITION:  To ED - patient would like to see . I advised her that the appointment for tomorrow is not long enough and the next opening would not be until Friday. She would like him to advise if scheduled appointment is ok or if she can be seen on Thursday. She will also contact [facility] for their recommendations.   Will comply with recommendation: no -    If further questions/concerns or if Sx do not improve, worsen or new Sx develop, call your PCP or [facility] as soon as possible.    NOTES:  Disposition was determined by the first positive assessment question, therefore all previous assessment questions were negative.     Guideline used:  Telephone Triage Protocols for Nurses, Fifth Edition, Bita Patton  Abdominal Pain  Stools, Abnormal    Cathy Medel, RN, BSN      "

## 2019-05-28 NOTE — TELEPHONE ENCOUNTER
Reason for call:   is calling and would like to speak to Brendan Ryder in regards to the patient and her history. Erin wants  to feel out some disability paperwork and he is not familiar with her so he would like to chat with ASHIA a little about her. Please advise.

## 2019-05-28 NOTE — TELEPHONE ENCOUNTER
Left message. Please triage bloody stool and abd pain.    Next 5 appointments (look out 90 days)    May 29, 2019  3:00 PM CDT  Radha Blum with Hussain Ryder PA-C  [facility] [address] [place] [id number]  [phone]            Leona Azar RN on 5/28/2019 at 11:06 AM

## 2019-05-29 ENCOUNTER — OFFICE VISIT (OUTPATIENT)
Dept: FAMILY MEDICINE | Facility: CLINIC | Age: 34
End: 2019-05-29
Payer: COMMERCIAL

## 2019-05-29 VITALS
TEMPERATURE: 98.7 F | WEIGHT: 101 LBS | OXYGEN SATURATION: 96 % | SYSTOLIC BLOOD PRESSURE: 110 MMHG | DIASTOLIC BLOOD PRESSURE: 80 MMHG | RESPIRATION RATE: 20 BRPM | BODY MASS INDEX: 17.24 KG/M2 | HEIGHT: 64 IN | HEART RATE: 96 BPM

## 2019-05-29 DIAGNOSIS — R10.12 LEFT UPPER QUADRANT PAIN: Primary | ICD-10-CM

## 2019-05-29 DIAGNOSIS — F41.9 MODERATE ANXIETY: ICD-10-CM

## 2019-05-29 DIAGNOSIS — R63.6 UNDERWEIGHT: ICD-10-CM

## 2019-05-29 DIAGNOSIS — K92.1 BLACK TARRY STOOLS: ICD-10-CM

## 2019-05-29 DIAGNOSIS — F32.1 MODERATE MAJOR DEPRESSION (H): ICD-10-CM

## 2019-05-29 DIAGNOSIS — D50.9 IRON DEFICIENCY ANEMIA, UNSPECIFIED IRON DEFICIENCY ANEMIA TYPE: ICD-10-CM

## 2019-05-29 LAB
ANION GAP SERPL CALCULATED.3IONS-SCNC: 5 MMOL/L (ref 3–14)
BASOPHILS # BLD AUTO: 0 10E9/L (ref 0–0.2)
BASOPHILS NFR BLD AUTO: 0.4 %
BUN SERPL-MCNC: 13 MG/DL (ref 7–30)
CALCIUM SERPL-MCNC: 8.6 MG/DL (ref 8.5–10.1)
CHLORIDE SERPL-SCNC: 109 MMOL/L (ref 94–109)
CO2 SERPL-SCNC: 27 MMOL/L (ref 20–32)
CREAT SERPL-MCNC: 0.62 MG/DL (ref 0.52–1.04)
DIFFERENTIAL METHOD BLD: NORMAL
EOSINOPHIL # BLD AUTO: 0.1 10E9/L (ref 0–0.7)
EOSINOPHIL NFR BLD AUTO: 1 %
ERYTHROCYTE [DISTWIDTH] IN BLOOD BY AUTOMATED COUNT: 12.7 % (ref 10–15)
GFR SERPL CREATININE-BSD FRML MDRD: >90 ML/MIN/{1.73_M2}
GLUCOSE SERPL-MCNC: 95 MG/DL (ref 70–99)
HCT VFR BLD AUTO: 40.5 % (ref 35–47)
HGB BLD-MCNC: 14 G/DL (ref 11.7–15.7)
LYMPHOCYTES # BLD AUTO: 2.2 10E9/L (ref 0.8–5.3)
LYMPHOCYTES NFR BLD AUTO: 41.9 %
MCH RBC QN AUTO: 31.3 PG (ref 26.5–33)
MCHC RBC AUTO-ENTMCNC: 34.6 G/DL (ref 31.5–36.5)
MCV RBC AUTO: 90 FL (ref 78–100)
MONOCYTES # BLD AUTO: 0.5 10E9/L (ref 0–1.3)
MONOCYTES NFR BLD AUTO: 8.7 %
NEUTROPHILS # BLD AUTO: 2.5 10E9/L (ref 1.6–8.3)
NEUTROPHILS NFR BLD AUTO: 48 %
PLATELET # BLD AUTO: 244 10E9/L (ref 150–450)
POTASSIUM SERPL-SCNC: 4 MMOL/L (ref 3.4–5.3)
RBC # BLD AUTO: 4.48 10E12/L (ref 3.8–5.2)
SODIUM SERPL-SCNC: 141 MMOL/L (ref 133–144)
WBC # BLD AUTO: 5.2 10E9/L (ref 4–11)

## 2019-05-29 PROCEDURE — 99214 OFFICE O/P EST MOD 30 MIN: CPT | Performed by: NURSE PRACTITIONER

## 2019-05-29 PROCEDURE — 80048 BASIC METABOLIC PNL TOTAL CA: CPT | Performed by: NURSE PRACTITIONER

## 2019-05-29 PROCEDURE — 85025 COMPLETE CBC W/AUTO DIFF WBC: CPT | Performed by: NURSE PRACTITIONER

## 2019-05-29 PROCEDURE — 36415 COLL VENOUS BLD VENIPUNCTURE: CPT | Performed by: NURSE PRACTITIONER

## 2019-05-29 ASSESSMENT — ANXIETY QUESTIONNAIRES
1. FEELING NERVOUS, ANXIOUS, OR ON EDGE: NEARLY EVERY DAY
2. NOT BEING ABLE TO STOP OR CONTROL WORRYING: MORE THAN HALF THE DAYS
7. FEELING AFRAID AS IF SOMETHING AWFUL MIGHT HAPPEN: SEVERAL DAYS
GAD7 TOTAL SCORE: 13
GAD7 TOTAL SCORE: 13
5. BEING SO RESTLESS THAT IT IS HARD TO SIT STILL: NOT AT ALL
GAD7 TOTAL SCORE: 13
4. TROUBLE RELAXING: MORE THAN HALF THE DAYS
7. FEELING AFRAID AS IF SOMETHING AWFUL MIGHT HAPPEN: SEVERAL DAYS
3. WORRYING TOO MUCH ABOUT DIFFERENT THINGS: MORE THAN HALF THE DAYS
6. BECOMING EASILY ANNOYED OR IRRITABLE: NEARLY EVERY DAY

## 2019-05-29 ASSESSMENT — PATIENT HEALTH QUESTIONNAIRE - PHQ9
SUM OF ALL RESPONSES TO PHQ QUESTIONS 1-9: 12
10. IF YOU CHECKED OFF ANY PROBLEMS, HOW DIFFICULT HAVE THESE PROBLEMS MADE IT FOR YOU TO DO YOUR WORK, TAKE CARE OF THINGS AT HOME, OR GET ALONG WITH OTHER PEOPLE: VERY DIFFICULT
SUM OF ALL RESPONSES TO PHQ QUESTIONS 1-9: 12

## 2019-05-29 ASSESSMENT — PAIN SCALES - GENERAL: PAINLEVEL: MODERATE PAIN (5)

## 2019-05-29 ASSESSMENT — MIFFLIN-ST. JEOR: SCORE: 1155.88

## 2019-05-29 NOTE — TELEPHONE ENCOUNTER
I called and spoke with Dr. Dey, patient's hematologist, who states that he saw patient approximately 1 week ago and she came in crying stating that she may lose her house and her child has to get imaging for his neck and is just dealing with a lot. She also was experiencing abdominal pain, worse with food so he ordered an abdominal CT and states everything was normal. He then came in to work a few days later and short term disability paperwork for her was on his desk. He states he does not feel comfortable completing this because he does not know her very well as he only treated her for her iron deficiency anemia. He wanted more information about her since I am her PCP as he is concerned she may have an eating disorder such as anorexia or another mental illness. I told her that I have also wondered this in the past as she has lost weight without a clear cause and has also had multiple work ups for abdominal pain, also without a clear cause. I have wondered about a possible conversion disorder and have recommended patient see psychiatry in the past. He states he will not be filling out this paperwork and I agree that it is not appropriate for him to complete this. She is seeing another provider today for her abdominal pain as I was unable to work her in but I will likely be following up with her soon so will discuss this with her then.    Hussain Ryder PA-C

## 2019-05-29 NOTE — PATIENT INSTRUCTIONS
Labs today with stool cultures.  Consent for [facility] - [facility] - Dr. Dey  Consent for [facility] - [place] - Dr. Austin Saenz      Please call or return to clinic for any questions, concerns, or symptoms that are not improving or becoming worse.          Shantel Montoya, CNP

## 2019-05-29 NOTE — PROGRESS NOTES
"Subjective  Answers for HPI/ROS submitted by the patient on 5/29/2019   If you checked off any problems, how difficult have these problems made it for you to do your work, take care of things at home, or get along with other people?: Very difficult  PHQ9 TOTAL SCORE: 12  GABE 7 TOTAL SCORE: 13      Erin Ashley is a 34 year old female who presents to clinic today for the following health issues:    HPI   ABDOMINAL and FLANK   PAIN     Onset: 1 week, noticed \"dark black blood in the stools\" , cramping after eating  left upper quadrant     Description:   Character: Sharp, Dull ache and Cramping  Location: left upper quadrant  Radiation: None    Intensity: moderate    Progression of Symptoms:  same    Accompanying Signs & Symptoms:  Fever/Chills?: no   Gas/Bloating: no   Nausea: YES  Vomitting: no   Diarrhea?: no   Constipation:no  Dysuria or Hematuria: no    History:   Trauma: no   Previous similar pain: no    Previous tests done: none    Precipitating factors:   Does the pain change with:     Food: YES- shortly after eating it hurt      BM: YES    Urination: no     Alleviating factors:  none    Therapies Tried and outcome: Zofra, tylenol      LMP:  not applicable- 6/12/18     Additional HPI:  Patient presents to the clinic with chief complaint of left upper quadrant pain, 10/10, onset Wednesday, 22 May.  She ate a bagel, and within 5 minutes, felt intense cramping.  She had a bowel movement, cramping subsided, but then started feeling aching sensation.  Cramping happens every time she eats regardless of food item (fatty, non-fried).  She went to urgent care on 5/25/19 with this complaint along with nausea and prescribed Prilosec for reflux and Zofran.  She states she has not taken Prilosec because \"she knows she does not have reflux\".  On Monday, she noticed her stool was black with a \"distinct smell\"; however, reports that she has been having these symptoms for almost a year now.  She is being followed by " "Hematology, Dr. Dey, and [facility].  She was receiving Iron infusions when HGB dropped to \"7.2\".  Last seen by Dr. Dey 3 weeks ago.  She states the cause of her anemia is still unclear after having an endoscopy, colonoscopy, bone marrow biopsy, MRI and multiple CT scans.  LMP 6/12/18 - IUD removed around that time.  Recent pap-results pending at this time.  Denies urinary symptoms - hematuria, urgency, dysuria.                      Patient Active Problem List   Diagnosis     TALIB 3 - cervical intraepithelial neoplasia grade 3     Acne     Insomnia due to medical condition     Personal history of ovarian cyst     Skin tags, anus or rectum     Elevated LFTs     Pelvic pain in female     Thyroid nodule     Tachycardia     Anxiety     Palpitations     Health Care Home     Somatic complaints, multiple     Anemia     Iron deficiency anemia, unspecified iron deficiency anemia type     Past Surgical History:   Procedure Laterality Date     APPENDECTOMY  8/4/2008     EXAM UNDER ANESTHESIA RECTUM N/A 2/24/2017    Procedure: EXAM UNDER ANESTHESIA RECTUM;  Surgeon: Britton Palomo MD;  Location: [facility]     HC CONIZATION CERVIX,KNIFE/LASER  06/130/8    cervix.  TALIB 1/2     HEMORRHOIDECTOMY EXTERNAL N/A 2/24/2017    Procedure: HEMORRHOIDECTOMY EXTERNAL;  Surgeon: Britton Palomo MD;  Location: [facility]     TONSILLECTOMY  7/3/2007       Social History     Tobacco Use     Smoking status: Never Smoker     Smokeless tobacco: Never Used     Tobacco comment: no smokers in the household   Substance Use Topics     Alcohol use: No     Alcohol/week: 0.0 oz     Comment: rare     Family History   Problem Relation Age of Onset     Hypertension Maternal Grandmother         borderline     Lipids Maternal Grandmother      Respiratory Maternal Grandmother         COPD     Lung Cancer Maternal Grandmother      Hypertension Maternal Grandfather         borderline     Cancer Maternal Grandfather         lung cancer, smoker     Gastrointestinal " Disease Mother         cholecystitis     Lipids Mother      Prostate Cancer Paternal Grandfather      Hearing Loss Paternal Grandfather      Other Cancer Paternal Grandmother         pancreatic cancer     Unknown/Adopted No family hx of      Asthma No family hx of      C.A.D. No family hx of      Diabetes No family hx of      Cerebrovascular Disease No family hx of      Breast Cancer No family hx of      Cancer - colorectal No family hx of      Alcohol/Drug No family hx of      Allergies No family hx of      Alzheimer Disease No family hx of      Anesthesia Reaction No family hx of      Arthritis No family hx of      Blood Disease No family hx of      Cardiovascular No family hx of      Circulatory No family hx of      Congenital Anomalies No family hx of      Connective Tissue Disorder No family hx of      Depression No family hx of      Genetic Disorder No family hx of      Genitourinary Problems No family hx of      Gynecology No family hx of      Heart Disease No family hx of      Musculoskeletal Disorder No family hx of      Neurologic Disorder No family hx of      Obesity No family hx of      Osteoporosis No family hx of      Psychotic Disorder No family hx of          Current Outpatient Medications   Medication Sig Dispense Refill     Acetaminophen (TYLENOL PO) Take 1,000 mg by mouth       ondansetron (ZOFRAN-ODT) 8 MG ODT tab Take 1 tablet (8 mg) by mouth every 8 hours as needed for nausea 12 tablet 0     QUEtiapine (SEROQUEL) 50 MG tablet Take 1 tablet (50 mg) by mouth nightly as needed (insomnia) 30 tablet 2     cyclobenzaprine (FLEXERIL) 5 MG tablet Take 1 tablet (5 mg) by mouth 3 times daily as needed for muscle spasms (Patient not taking: Reported on 5/25/2019) 20 tablet 0     ibuprofen (ADVIL/MOTRIN) 800 MG tablet Take 1 tablet (800 mg) by mouth every 8 hours as needed for moderate pain (Patient not taking: Reported on 11/9/2018) 60 tablet 1     omeprazole (PRILOSEC OTC) 20 MG EC tablet Take 1 tablet  "(20 mg) by mouth daily (Patient not taking: Reported on 5/29/2019) 30 tablet 0     triamcinolone (KENALOG) 0.5 % external cream Apply sparingly to affected area three times daily. (Patient not taking: Reported on 5/25/2019) 30 g 1     Allergies   Allergen Reactions     Hydrocodone Other (See Comments)     vomitting  Migraines     Hydrocodone-Acetaminophen      headaches     No Known Allergies      BP Readings from Last 3 Encounters:   05/29/19 110/80   05/25/19 117/85   03/27/19 104/70    Wt Readings from Last 3 Encounters:   05/29/19 45.8 kg (101 lb)   05/25/19 44.8 kg (98 lb 12.8 oz)   03/27/19 47.6 kg (105 lb)                    Reviewed and updated as needed this visit by Provider  Tobacco  Allergies  Meds  Problems  Med Hx  Surg Hx  Fam Hx         Review of Systems   ROS COMP: Constitutional, HEENT, cardiovascular, pulmonary, and gu systems are negative, except as otherwise noted.  GI:  Left upper quadrant pain with blood in stool.  Denies nausea, vomiting, diarrhea or constipation.        Objective    /80   Pulse 96   Temp 98.7  F (37.1  C) (Oral)   Resp 20   Ht 1.63 m (5' 4.17\")   Wt 45.8 kg (101 lb)   LMP 06/12/2018   SpO2 96%   BMI 17.24 kg/m    Body mass index is 17.24 kg/m .  Physical Exam   GENERAL: alert, appears anxious, and underweight  EYES: Eyes grossly normal to inspection, PERRL and conjunctivae and sclerae normal  HENT: ear canals and TM's normal, nose and mouth without ulcers or lesions  NECK: no adenopathy, no asymmetry, masses, or scars and thyroid normal to palpation  RESP: lungs clear to auscultation - no rales, rhonchi or wheezes  CV: regular rate and rhythm, normal S1 S2, no S3 or S4, no murmur, click or rub, no peripheral edema and peripheral pulses strong  ABDOMEN: soft, nontender, no hepatosplenomegaly, no masses and bowel sounds normal  MS: no gross musculoskeletal defects noted, no edema  SKIN: no suspicious lesions or rashes  NEURO: Normal strength and tone, " "mentation intact and speech normal  PSYCH: mentation appears normal, affect normal/bright, anxious    Diagnostic Test Results:  Labs reviewed in Epic  Results for orders placed or performed in visit on 05/29/19   Basic metabolic panel  (Ca, Cl, CO2, Creat, Gluc, K, Na, BUN)   Result Value Ref Range    Sodium 141 133 - 144 mmol/L    Potassium 4.0 3.4 - 5.3 mmol/L    Chloride 109 94 - 109 mmol/L    Carbon Dioxide 27 20 - 32 mmol/L    Anion Gap 5 3 - 14 mmol/L    Glucose 95 70 - 99 mg/dL    Urea Nitrogen 13 7 - 30 mg/dL    Creatinine 0.62 0.52 - 1.04 mg/dL    GFR Estimate >90 >60 mL/min/[1.73_m2]    GFR Estimate If Black >90 >60 mL/min/[1.73_m2]    Calcium 8.6 8.5 - 10.1 mg/dL   CBC with platelets and differential   Result Value Ref Range    WBC 5.2 4.0 - 11.0 10e9/L    RBC Count 4.48 3.8 - 5.2 10e12/L    Hemoglobin 14.0 11.7 - 15.7 g/dL    Hematocrit 40.5 35.0 - 47.0 %    MCV 90 78 - 100 fl    MCH 31.3 26.5 - 33.0 pg    MCHC 34.6 31.5 - 36.5 g/dL    RDW 12.7 10.0 - 15.0 %    Platelet Count 244 150 - 450 10e9/L    % Neutrophils 48.0 %    % Lymphocytes 41.9 %    % Monocytes 8.7 %    % Eosinophils 1.0 %    % Basophils 0.4 %    Absolute Neutrophil 2.5 1.6 - 8.3 10e9/L    Absolute Lymphocytes 2.2 0.8 - 5.3 10e9/L    Absolute Monocytes 0.5 0.0 - 1.3 10e9/L    Absolute Eosinophils 0.1 0.0 - 0.7 10e9/L    Absolute Basophils 0.0 0.0 - 0.2 10e9/L    Diff Method Automated Method            Assessment & Plan     1. Left upper quadrant pain  Patient has had extensive and thorough work-up over the last year regarding this pain to include endoscopy, colonoscopy, MRI, and CT Scans.  She currently is being followed by both [facility].  She states she is frustrated because \"noone is listening to her\".  She is adamant that her stool has not been checked.  I discussed with Erin that her PCP did check her stool on 7/6/18 and reviewed those results with her today.  Her CBC is normal today and order placed to bring in stool " sample for analysis.  Clinical exam findings unremarkable. No clear etiology for pain.  - Basic metabolic panel  (Ca, Cl, CO2, Creat, Gluc, K, Na, BUN)  - CBC with platelets and differential  - Ova and Parasite Exam Routine; Future  - Occult blood fecal HGB immuno; Future  - Fecal Lactoferrin; Future  - Enteric Bacteria and Virus Panel by [facility] Stool; Future    2. Iron deficiency anemia, unspecified iron deficiency anemia type  Stable.  Patient is being followed by Dr. Dey, Hematology. CBC normal today.    -CBC with platelets and differential  Occult blood fecal HGB immuno; Future    3. Black tarry stools  See Plan under #1 above.  -Occult blood fecal HGB immuno; Future    4. Moderate major depression (H)  PHQ9 total score 12 today, indicating moderate depression.  Unable to discuss this at visit today due to 100% of visit time spent on acute illness complaint.  I think she would benefit from medication and/or CBT and will suggest this when I follow-up with her regarding lab results.    5. Moderate anxiety  - GAD7 total score 13 today, indicating moderate anxiety.  Unable to discuss this at visit today due to 100% of visit time spent on acute illness complaint.  I think she would benefit from CBT and will suggest this when I follow-up with her regarding lab results.    6. Underweight  - BMI 17.24.  Patient was last seen by her PCP on 3/27/19, who expressed concern about her weight/inability to gain weight.  A nutrition referral was placed for her at that visit.   I do not see in chart review (Referrals) where she has followed- up for this.  Will reiterate the importance of making this appt when I have received her lab results.      Patient Instructions   Labs today with stool cultures.  Consent for [facility] - [facility] - Dr. Dey  Consent for [facility] - [place] - Dr. Austin Saenz      Please call or return to clinic for any questions, concerns, or symptoms that are not improving or becoming  worse.          Shantel Montoya CNP        Return in 2 weeks (on 6/12/2019) for Symptoms Not Improving/Getting Worse.    Follow Up:   The patient was instructed to contact clinic for worsening symptoms, non-improvement as expected/discussed, and for questions regarding medications or treatment plan. The patient understood the rational for the diagnosis and treatment plan. All questions were answered to best of my ability and the patient's satisfaction.    Shantel Montoya CNP  [facility]

## 2019-05-30 ASSESSMENT — ANXIETY QUESTIONNAIRES: GAD7 TOTAL SCORE: 13

## 2019-05-31 ENCOUNTER — MYC MEDICAL ADVICE (OUTPATIENT)
Dept: FAMILY MEDICINE | Facility: OTHER | Age: 34
End: 2019-05-31

## 2019-05-31 NOTE — TELEPHONE ENCOUNTER
Left message for patient to return call. Please relay JM message and schedule appointment appropriately   Raeann Ramon CMA

## 2019-05-31 NOTE — TELEPHONE ENCOUNTER
Please call patient and let her know I need to see her and discuss things in more detail further before I can complete this paperwork accurately. Please scheduled this as a 45 min visit.    Hussain Ryder PA-C

## 2019-06-03 ENCOUNTER — MYC MEDICAL ADVICE (OUTPATIENT)
Dept: FAMILY MEDICINE | Facility: CLINIC | Age: 34
End: 2019-06-03

## 2019-06-03 DIAGNOSIS — R10.12 LEFT UPPER QUADRANT PAIN: ICD-10-CM

## 2019-06-03 LAB — LACTOFERRIN STL QL IA: NEGATIVE

## 2019-06-03 PROCEDURE — 83630 LACTOFERRIN FECAL (QUAL): CPT | Performed by: NURSE PRACTITIONER

## 2019-06-03 PROCEDURE — 87177 OVA AND PARASITES SMEARS: CPT | Performed by: NURSE PRACTITIONER

## 2019-06-03 PROCEDURE — 87209 SMEAR COMPLEX STAIN: CPT | Performed by: NURSE PRACTITIONER

## 2019-06-03 NOTE — TELEPHONE ENCOUNTER
Next 5 appointments (look out 90 days)    Jun 06, 2019  3:30 PM CDT  Office Visit with Hussain Ryder PA-C  [facility] ([facility] [address]  [employer] [id number]  [phone]

## 2019-06-03 NOTE — PROGRESS NOTES
"Subjective     Erin Ashley is a 34 year old female who presents to clinic today for the following health issues:    HPI   ABDOMINAL PAIN     Onset: x 2 weeks    Description:   Character: Cramping  Location: left upper quadrant  Radiation: Back    Intensity: severe, 5/10    Progression of Symptoms:  same    Accompanying Signs & Symptoms:  Fever/Chills?: no   Gas/Bloating: no   Nausea: YES  Vomitting: YES  Diarrhea: No  Constipation:YES  Dysuria or Hematuria: no    History:   Trauma: no   Previous similar pain: no    Previous tests done: Stool Sample    Precipitating factors:   Does the pain change with:     Food: YES     BM: YES- gets little better    Urination: no     Alleviating factors:  None    Therapies Tried and outcome: Tylenol    LMP:  not applicable       She has a history of chronic left upper quadrant abdominal pain over the past few years, but worse over the past 2 weeks. She states the cramping is so bad at time that it brings her to her knees. The pain is worse with eating and drinking so she has not been able to eat too much over the past few weeks. She states the her left mid-upper abdomen will get red and she will see \"something moving\" in this area. She had a recent abdominal CT ordered by her hematologist which was normal through [facility]. She has also had darker stools than normal but denies any blood in her stools. She was seen in [place] by Shantel Montoya last week and had a normal lab work up and stool studies but she still needs to leave an occult blood stool sample. She denies any nausea, vomiting, or diarrhea but does have constipation as she does not have a bowel movement every day but often goes a few days between bowel movements. She has been taking a stool softener without benefit. She has had extensive work up over the past few years with labs and imaging without any abnormal findings to explain her symptoms.     She also has continued right sided headaches that can be severe at " times and usually occur a few days per week over the past 3-4 months. She has taken ibuprofen and Tylenol without benefit but took an Imitrex from a friend which helps. She denies any preceding aura or any sensitivity to light or sound during these episodes but she does experience nausea and dizziness at times. She continues to have a numb feeling throughout her face and scalp for the past year as well. She denies any upper or lower extremity numbness, tingling or weakness. She denies any facial droop or speech changes.     She had severe iron deficiency anemia last year but was treated with IV iron and responded to this very well and hemoglobin has been back to normal for the past 5 months. She has had trouble maintaining a healthy weight over the past few years and has gone below 100 pounds multiple times. She states she eats at least 3 meals daily and denies any problems with anorexia or an eating disorder. She does have a history of anxiety and depression which have worsened over the past year given all of her medical issues. She has tried and failed Zoloft, Lexapro and Vistaril in the past. She has never seen psychiatry and refuses any counseling.    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 She is needing a letter completed to justify her leave of absence.     Reviewed and updated as needed this visit by Provider  Tobacco  Allergies  Meds  Problems  Med Hx  Surg Hx  Fam Hx         Review of Systems   GENERAL: +Weight loss. Denies fever, fatigue, weakness, weight gain.  HEENT: Eyes-Denies pain, redness, loss of vision, double or blurred vision.     Ears/Nose-Denies tinnitus, loss of hearing, epistaxis, decreased sense of smell, nasal congestion. Denies loss of sense of taste, dry mouth, or sore throat.   CARDIOVASCULAR: Denies chest pain, shortness of  "breath, irregular heartbeats,  palpitations, or edema.  RESPIRATORY: Denies cough, hemoptysis, and shortness of breath.  GASTROINTESTINAL: +Left upper quadrant abdominal pain, intermittent nausea, dark stools, decreased appetite, constipation. Denies vomiting or diarrhea.  GENITOURINARY: Denies increased frequency, urgency, dysuria, hematuria, or incontinence.   MUSCULOSKELETAL: +Left mid back pain.   SKIN: +Small bumps that come and go all over body.   NEUROLOGIC: +Frequent right sided headaches, bilateral facial/scalp numbness, intermittent dizziness. Denies fainting, memory loss, or seizures.  PSYCHIATRIC: +Depression and anxiety. Denies thoughts of suicide.  ENDOCRINE: Denies heat and cold intolerance, polydipsia, or polyuria.         Objective    /72 (BP Location: Right arm, Patient Position: Chair, Cuff Size: Adult Small)   Pulse 98   Temp 97.6  F (36.4  C) (Temporal)   Resp 16   Ht 1.63 m (5' 4.17\")   Wt 44.9 kg (99 lb)   SpO2 100%   BMI 16.90 kg/m    Body mass index is 16.9 kg/m .  Physical Exam   GENERAL: healthy, alert and no distress  EYES: Eyes grossly normal to inspection, PERRL and conjunctivae and sclerae normal  NECK: no adenopathy, no asymmetry, masses, or scars and thyroid normal to palpation  RESP: lungs clear to auscultation - no rales, rhonchi or wheezes  CV: regular rate and rhythm, normal S1 S2, no S3 or S4, no murmur, click or rub, no peripheral edema and peripheral pulses strong  ABDOMEN: soft, mild left upper and lower quadrant tenderness without rebound or guarding, no hepatosplenomegaly, no masses and bowel sounds normal  MS: no gross musculoskeletal defects noted. Mild left mid thoracic paraspinal musculature.   SKIN: A few small, erythematous, scabbing papules noted (1 over right wrist and one over left ankle). Other scabbing areas over knees and left foot.   NEURO: Normal strength and tone, mentation intact and speech normal. Cranial nerves II-XII are grossly intact. DTRs " are 2+/4 throughout and symmetric. Gait is stable.   PSYCH: mentation appears normal, affect normal/bright    Results for orders placed or performed in visit on 06/03/19   Fecal Lactoferrin   Result Value Ref Range    Fecal Lactoferrin Negative NEG^Negative   Ova and Parasite Exam Routine   Result Value Ref Range    Specimen Description Feces     Ova and Parasite Exam Routine parasitology exam negative     Ova and Parasite Exam       Cryptosporidium, Cyclospora, and Microsporidia are not readily detected by this method. A   single negative specimen does not rule out parasitic infection.       Lab Results   Component Value Date    WBC 5.2 05/29/2019     Lab Results   Component Value Date    RBC 4.48 05/29/2019     Lab Results   Component Value Date    HGB 14.0 05/29/2019     Lab Results   Component Value Date    HCT 40.5 05/29/2019     No components found for: MCT  Lab Results   Component Value Date    MCV 90 05/29/2019     Lab Results   Component Value Date    MCH 31.3 05/29/2019     Lab Results   Component Value Date    MCHC 34.6 05/29/2019     Lab Results   Component Value Date    RDW 12.7 05/29/2019     Lab Results   Component Value Date     05/29/2019     Last Comprehensive Metabolic Panel:  Sodium   Date Value Ref Range Status   05/29/2019 141 133 - 144 mmol/L Final     Potassium   Date Value Ref Range Status   05/29/2019 4.0 3.4 - 5.3 mmol/L Final     Chloride   Date Value Ref Range Status   05/29/2019 109 94 - 109 mmol/L Final     Carbon Dioxide   Date Value Ref Range Status   05/29/2019 27 20 - 32 mmol/L Final     Anion Gap   Date Value Ref Range Status   05/29/2019 5 3 - 14 mmol/L Final     Glucose   Date Value Ref Range Status   05/29/2019 95 70 - 99 mg/dL Final     Urea Nitrogen   Date Value Ref Range Status   05/29/2019 13 7 - 30 mg/dL Final     Creatinine   Date Value Ref Range Status   05/29/2019 0.62 0.52 - 1.04 mg/dL Final     GFR Estimate   Date Value Ref Range Status   05/29/2019 >90 >60  mL/min/[1.73_m2] Final     Comment:     Non  GFR Calc  Starting 12/18/2018, serum creatinine based estimated GFR (eGFR) will be   calculated using the Chronic Kidney Disease Epidemiology Collaboration   (CKD-EPI) equation.       Calcium   Date Value Ref Range Status   05/29/2019 8.6 8.5 - 10.1 mg/dL Final           Assessment & Plan       ICD-10-CM    1. Multiple somatic complaints R68.89 MENTAL HEALTH REFERRAL  - Adult; Psychiatry and Medication Management; Psychiatry; [facility]: Psychiatry [facility] [phone]; We will contact you to schedule the appointment or please call with any questions   2. Migraine without aura and without status migrainosus, not intractable G43.009 SUMAtriptan (IMITREX) 25 MG tablet     mirtazapine (REMERON) 15 MG tablet   3. Left upper quadrant pain R10.12 CANCELED: Occult blood fecal HGB immuno   4. Moderate anxiety F41.9 mirtazapine (REMERON) 15 MG tablet     MENTAL HEALTH REFERRAL  - Adult; Psychiatry and Medication Management; Psychiatry; [facility]: Psychiatry [facility] [phone]; We will contact you to schedule the appointment or please call with any questions   5. Moderate major depression (H) F32.1 mirtazapine (REMERON) 15 MG tablet     MENTAL HEALTH REFERRAL  - Adult; Psychiatry and Medication Management; Psychiatry; [facility]: Psychiatry [facility] [phone]; We will contact you to schedule the appointment or please call with any questions   6. Facial numbness R20.0    7. Dark stools R19.5 Fecal colorectal cancer screen (FIT)   8. Underweight R63.6 mirtazapine (REMERON) 15 MG tablet     MENTAL HEALTH REFERRAL  - Adult; Psychiatry and Medication Management; Psychiatry; [facility]: Psychiatry [facility] [phone]; We will contact you to schedule the appointment or please call with any questions          I have been following with patient for the past 2 years and during that time, she has had very similar complaints including thoracic spine pain/rib pain, right upper quadrant abdominal  pain, numbness and tingling in her face and scalp, headaches, and anxiety/depression. She has had extensive works up including multiple MRIs, CTs, ultrasounds, x-rays and numerous labs which have all come back normal without an obvious source of her symptoms. She did have severe iron deficiency anemia last fall but this was successfully treated with Dr. Dey from [facility]. I have discussed my concerns for her mental health in the past as I think this is playing a large role in her somatic complaints. I explained that the brain is a very powerful organ and can cause someone to think they are having symptoms/pain when there is really no explanation for that pain. I am concerned about conversion disorder along with her anxiety and depression. I am also concerned about an eating disorder given her low, fluctuating weights but she vehemently denies skipping meals, binging and purging or any other abnormal eating behavior. I recommend she see psychiatry to further evaluate her mental health in more detail as I think this is a big part of the story we are missing. I have referred her to [facility] psychiatry who will call to schedule an appointment. In the meantime, I recommend she try Remeron 15 mg nightly to not only hopefully help with her anxiety and depression but also help with her chronic migraines and increase her appetite.    She had a recent abdominal CT per [facility] and I do not have these results but spoke with Dr. Dey over the phone last week and he states the results were normal. I recommend she continue with a stool softener and try Miralax for a few days since she has noticed constipation which could certainly be causing her pain. She will also complete occult blood stool/FIT testing given her dark stools but her hemoglobin is stable. I recommend she add twice daily Ensure to obtain the calories she needs since solid food has made her abdominal pain worse recently. I also encouraged adequate hydration.  She will follow up with [facility] for further evaluation as she has seen them in the past.     Her headaches are consistent with migraines so I will prescribe Imitrex to take as needed along with the Remeron for migraines prevention.    I am unsure as to the cause of her facial numbness as her neurological exam is completely normal and she had a normal brain MRI and MRA last July. This may be a somatization caused by a psychiatric disorder so hopefully her psychiatry appointment will elucidate more information.    I have completed a letter to keep her off work for at least the next 4 weeks while she undergoes further evaluation with psych and GI and to see how her new medications work. Will evaluate again in 1 month.    Greater than 50% of 50 minute visit spent on counseling and plan of care regarding the above.       Return in about 1 month (around 7/6/2019).    Hussain Ryder PA-C  [facility]

## 2019-06-04 LAB
O+P STL MICRO: NORMAL
O+P STL MICRO: NORMAL
SPECIMEN SOURCE: NORMAL

## 2019-06-06 ENCOUNTER — OFFICE VISIT (OUTPATIENT)
Dept: FAMILY MEDICINE | Facility: OTHER | Age: 34
End: 2019-06-06
Payer: COMMERCIAL

## 2019-06-06 VITALS
OXYGEN SATURATION: 100 % | HEART RATE: 98 BPM | SYSTOLIC BLOOD PRESSURE: 112 MMHG | HEIGHT: 64 IN | WEIGHT: 99 LBS | TEMPERATURE: 97.6 F | DIASTOLIC BLOOD PRESSURE: 72 MMHG | RESPIRATION RATE: 16 BRPM | BODY MASS INDEX: 16.9 KG/M2

## 2019-06-06 DIAGNOSIS — F32.1 MODERATE MAJOR DEPRESSION (H): ICD-10-CM

## 2019-06-06 DIAGNOSIS — R19.5 DARK STOOLS: ICD-10-CM

## 2019-06-06 DIAGNOSIS — F41.9 MODERATE ANXIETY: ICD-10-CM

## 2019-06-06 DIAGNOSIS — R20.0 FACIAL NUMBNESS: ICD-10-CM

## 2019-06-06 DIAGNOSIS — G43.009 MIGRAINE WITHOUT AURA AND WITHOUT STATUS MIGRAINOSUS, NOT INTRACTABLE: ICD-10-CM

## 2019-06-06 DIAGNOSIS — R10.12 LEFT UPPER QUADRANT PAIN: ICD-10-CM

## 2019-06-06 DIAGNOSIS — R68.89 MULTIPLE SOMATIC COMPLAINTS: Primary | ICD-10-CM

## 2019-06-06 DIAGNOSIS — R63.6 UNDERWEIGHT: ICD-10-CM

## 2019-06-06 PROCEDURE — 99215 OFFICE O/P EST HI 40 MIN: CPT | Performed by: PHYSICIAN ASSISTANT

## 2019-06-06 RX ORDER — SUMATRIPTAN 25 MG/1
25-50 TABLET, FILM COATED ORAL
Qty: 12 TABLET | Refills: 2 | Status: SHIPPED | OUTPATIENT
Start: 2019-06-06 | End: 2020-03-06

## 2019-06-06 RX ORDER — MIRTAZAPINE 15 MG/1
15 TABLET, FILM COATED ORAL AT BEDTIME
Qty: 30 TABLET | Refills: 1 | Status: SHIPPED | OUTPATIENT
Start: 2019-06-06 | End: 2019-07-08

## 2019-06-06 ASSESSMENT — MIFFLIN-ST. JEOR: SCORE: 1146.76

## 2019-06-06 ASSESSMENT — PAIN SCALES - GENERAL: PAINLEVEL: MODERATE PAIN (5)

## 2019-06-06 NOTE — PATIENT INSTRUCTIONS
I recommend you see psychiatry to further evaluate your mental health given all that you have gone through over the past few years.   They will call to schedule but if you do not hear from them within the next week, you can call this number: [phone].    Along with the stool softener, try Miralax daily for the next few days as constipation can cause abdominal pain.  I also recommend Ensure twice daily to make sure you are getting the calories you need.  Follow up with [facility] for further evaluation.    Will start you on Imitrex to take as needed for migraines.  Will also try Remeron nightly before bed to help with anxiety, depression, appetite and to prevent headaches.    Follow up in 4 weeks for a recheck.

## 2019-06-06 NOTE — LETTER
[address]  [facility] [address]  Phone: [phone]    June 6, 2019        Erin Ashley  [address]  [place] [address]          To whom it may concern:    RE: Erin Ashley    I am the primary care provider for Erin Ashley. She has missed multiple days of work over the past year due to severe anxiety, depression, migraines and abdominal pain. She is currently on a leave of absence from work and being evaluated and treated by myself and multiple specialists to improve her symptoms. This leave of absence is medically necessary and is expected to last at least 4 more weeks from today's date. I will be seeing patient again in 1 month to recheck her symptoms and determine her return to work status. If you have any questions, please let me know. Thanks you.      Sincerely,        Hussain Ryder PA-C

## 2019-06-07 ENCOUNTER — MYC MEDICAL ADVICE (OUTPATIENT)
Dept: FAMILY MEDICINE | Facility: OTHER | Age: 34
End: 2019-06-07

## 2019-06-10 ENCOUNTER — TELEPHONE (OUTPATIENT)
Dept: PSYCHIATRY | Facility: CLINIC | Age: 34
End: 2019-06-10

## 2019-06-24 NOTE — PROGRESS NOTES
"Subjective     Erin Ashley is a 34 year old female who presents to clinic today for the following health issues:    Patient in clinic today for a 4 week recheck on multiple issues. She was last seen on 06/06 for recurrent abdominal pain and headaches.    History of Present Illness        Mental Health Follow-up:  Patient presents to follow-up on Depression & Anxiety.Patient's depression since last visit has been:  No change  The patient is having other symptoms associated with depression. (\"mood swings\")  Patient's anxiety since last visit has been:  No change  The patient is not having other symptoms associated with anxiety.  Any significant life events: No  Patient is not feeling anxious or having panic attacks.  Patient has no concerns about alcohol or drug use.     Social History  Tobacco Use    Smoking status: Never Smoker    Smokeless tobacco: Never Used    Tobacco comment: no smokers in the household  Alcohol use: No    Alcohol/week: 0.0 oz    Comment: rare  Drug use: No      Today's PHQ-9         PHQ-9 Total Score:         PHQ-9 Q9 Thoughts of better off dead/self-harm past 2 weeks :       Thoughts of suicide or self harm:      Self-harm Plan:        Self-harm Action:          Safety concerns for self or others:           Migraines:   Since the patient's last clinic visit, headaches are: No change  The patient is getting headaches:  \"every other day or so\"  : \"it depends - not always\"  The patient is taking the following rescue/relief medications:  Sumatriptan (Imitrex)   Patient states \"I get no relief\" from the rescue/relief medications.     In the past 4 weeks, the patient has gone to an Urgent Care or Emergency Room 0 times times due to headaches.    She eats 2-3 servings of fruits and vegetables daily.She consumes 2 sweetened beverage(s) daily.  She is taking medications regularly.     She was seen last month for follow up of multiple concerns including left upper quadrant abdominal pain, daily " "headaches/migraines, and anxiety/depression. She was unable to be seen at the [facility] until December so she is seeing [facility] next month. She also has a follow up with [facility] next week due to her continued left upper quadrant pain. She denies any recent dark, tarry stools. She was started on Remeron last month to help with her appetite, anxiety, depression and migraines headaches but has not noticed an improvement in any of these symptoms. She has noticed some relief with 2 tablets of Imitrex when headaches are severe. The headaches continue to occur usually every other day behind her right eye and into the right cheek and neck.     Reviewed and updated as needed this visit by Provider  Tobacco  Allergies  Meds  Problems  Med Hx  Surg Hx  Fam Hx      Review of Systems   GENERAL: Denies fever, fatigue, weakness, weight gain, or weight loss.  CARDIOVASCULAR: Denies chest pain, shortness of breath, irregular heartbeats, palpitations, or edema.  RESPIRATORY: Denies cough, hemoptysis, and shortness of breath.  GASTROINTESTINAL: +Left upper quadrant pain, intermittent nausea and constipation, decreased appetite. Denies vomiting or diarrhea.  GENITOURINARY: Denies increased frequency, urgency, dysuria, hematuria, or incontinence.   NEUROLOGIC: +Frequent right sided headaches. Denies fainting, dizziness, memory loss, or seizures.  PSYCHIATRIC: +Depression and anxiety, irritability. Denies thoughts of suicide.    Objective    /72   Pulse 89   Temp 98.5  F (36.9  C) (Temporal)   Resp 16   Ht 1.63 m (5' 4.17\")   Wt 43.5 kg (96 lb)   SpO2 100%   BMI 16.39 kg/m    Body mass index is 16.39 kg/m .  Physical Exam   GENERAL: healthy, alert and no distress  RESP: lungs clear to auscultation - no rales, rhonchi or wheezes  CV: regular rate and rhythm, normal S1 S2, no S3 or S4, no murmur, click or rub, no peripheral edema  NEURO: Normal strength and tone, mentation intact and speech normal. Gait is stable.   PSYCH: " mentation appears normal, affect normal/bright        Assessment & Plan       ICD-10-CM    1. Moderate anxiety F41.9 amitriptyline (ELAVIL) 10 MG tablet   2. Moderate major depression (H) F32.1 amitriptyline (ELAVIL) 10 MG tablet   3. Migraine without aura and without status migrainosus, not intractable G43.009 amitriptyline (ELAVIL) 10 MG tablet   4. Underweight R63.6    5. Left upper quadrant pain R10.12         1-3. The Remeron has not helped with her anxiety, depression or daily headaches so will discontinue this and will try amitriptyline instead which I am hoping well help with each of these issues. I discussed common side effects and instructed her to take this at night. If she notices any side effects or weight loss, she should stop the medication right away. She has an appointment with [facility] next month to discuss her mental health in more detail and to evaluate her further for possible somatization disorder given her multiple medical concerns over the past year without an obvious cause, especially her abdominal pain. See visit note from last month for further details.     4-5. She remains underweight and has lost a few pounds since last month. She states she feels like she is eating normally but has a decreased appetite frequently because of her abdominal pain. She has a follow up with [facility] next week for further evaluation.    She will remain out of work for the next 2 weeks and I will evaluate her again on 7/22. If her short term disability is not granted, she may need to return to work sooner so I notified her that one of my colleagues can complete a return to work note for her if needed as I will be out of town the next 2 weeks.       Hussain Ryder PA-C  [facility]

## 2019-07-02 ENCOUNTER — MYC MEDICAL ADVICE (OUTPATIENT)
Dept: FAMILY MEDICINE | Facility: OTHER | Age: 34
End: 2019-07-02

## 2019-07-02 DIAGNOSIS — F32.1 MODERATE MAJOR DEPRESSION (H): ICD-10-CM

## 2019-07-02 DIAGNOSIS — R63.6 UNDERWEIGHT: ICD-10-CM

## 2019-07-02 DIAGNOSIS — F41.9 MODERATE ANXIETY: ICD-10-CM

## 2019-07-02 DIAGNOSIS — R68.89 MULTIPLE SOMATIC COMPLAINTS: Primary | ICD-10-CM

## 2019-07-02 NOTE — TELEPHONE ENCOUNTER
Yes, need this faxed again as we never received it. New referral placed for [facility] in [place]. Please help her set this up.    Hussain Ryder PA-C

## 2019-07-02 NOTE — TELEPHONE ENCOUNTER
rachael reply sent, we have not received forms yet. ASHIA RAMOS as she is requesting a new mental health clinic.   Katherin Noonan MA

## 2019-07-02 NOTE — TELEPHONE ENCOUNTER
patient and informed her that Salvador placed a referral to [facility] in [place] and that we will fax the referral and someone from that office will call her to schedule.  Spoke toAddress: [address], [place] [address]   Phone: [phone]  Fax number [phone]    Regarding forms- She is calling them to fax the forms to the pod fax number [phone]

## 2019-07-03 NOTE — TELEPHONE ENCOUNTER
Completed and placed in bin. Please obtain patient's SSN and employer name to complete front page then scan and fax.    Hussain Ryder PA-C

## 2019-07-08 ENCOUNTER — MYC MEDICAL ADVICE (OUTPATIENT)
Dept: FAMILY MEDICINE | Facility: OTHER | Age: 34
End: 2019-07-08

## 2019-07-08 ENCOUNTER — OFFICE VISIT (OUTPATIENT)
Dept: FAMILY MEDICINE | Facility: OTHER | Age: 34
End: 2019-07-08
Payer: COMMERCIAL

## 2019-07-08 VITALS
TEMPERATURE: 98.5 F | SYSTOLIC BLOOD PRESSURE: 110 MMHG | HEIGHT: 64 IN | WEIGHT: 96 LBS | DIASTOLIC BLOOD PRESSURE: 72 MMHG | RESPIRATION RATE: 16 BRPM | BODY MASS INDEX: 16.39 KG/M2 | HEART RATE: 89 BPM | OXYGEN SATURATION: 100 %

## 2019-07-08 DIAGNOSIS — F41.9 MODERATE ANXIETY: Primary | ICD-10-CM

## 2019-07-08 DIAGNOSIS — R63.6 UNDERWEIGHT: ICD-10-CM

## 2019-07-08 DIAGNOSIS — F32.1 MODERATE MAJOR DEPRESSION (H): ICD-10-CM

## 2019-07-08 DIAGNOSIS — G43.009 MIGRAINE WITHOUT AURA AND WITHOUT STATUS MIGRAINOSUS, NOT INTRACTABLE: ICD-10-CM

## 2019-07-08 DIAGNOSIS — R10.12 LEFT UPPER QUADRANT PAIN: ICD-10-CM

## 2019-07-08 PROCEDURE — 99214 OFFICE O/P EST MOD 30 MIN: CPT | Performed by: PHYSICIAN ASSISTANT

## 2019-07-08 RX ORDER — AMITRIPTYLINE HYDROCHLORIDE 10 MG/1
10 TABLET ORAL AT BEDTIME
Qty: 30 TABLET | Refills: 2 | Status: SHIPPED | OUTPATIENT
Start: 2019-07-08 | End: 2019-08-19

## 2019-07-08 ASSESSMENT — MIFFLIN-ST. JEOR: SCORE: 1133.15

## 2019-07-08 NOTE — LETTER
[address]  [facility] [address]  Phone: [phone]    July 8, 2019        Erin Ashley  [address] [address]          To whom it may concern:    RE: Erin Ashley    Patient was seen and treated today at our clinic. She should remain off work from today through 7/22 due to multiple medical illnesses that continue to be evaluated and treated. Thanks.     Please contact me for questions or concerns.      Sincerely,        Hussain Ryder PA-C

## 2019-07-08 NOTE — PATIENT INSTRUCTIONS
Will have you stop the Remeron and will start amitriptyline 10 mg nightly to help with anxiety, depression and headaches.  This can also help you sleep.  If you have any side effects, contact the clinic.    Will have you remains off work for 2 weeks unless your short term disability gets denied.    Follow up in 2 weeks.

## 2019-07-10 NOTE — PROGRESS NOTES
"Subjective     Erin Ashley is a 34 year old female who presents to clinic today for the following health issues:    History of Present Illness        Mental Health Follow-up:  Patient presents to follow-up on Depression & Anxiety.Patient's depression since last visit has been:  No change  The patient is not having other symptoms associated with depression.  Patient's anxiety since last visit has been:  Bad  The patient is having other symptoms associated with anxiety.  Any significant life events: job concerns, financial concerns, grief or loss and health concerns  Patient is feeling anxious or having panic attacks.  Patient has no concerns about alcohol or drug use.     Social History  Tobacco Use    Smoking status: Never Smoker    Smokeless tobacco: Never Used    Tobacco comment: no smokers in the household  Alcohol use: No    Alcohol/week: 0.0 oz    Comment: rare  Drug use: No      Today's PHQ-9         PHQ-9 Total Score:     (P) 11   PHQ-9 Q9 Thoughts of better off dead/self-harm past 2 weeks :   (P) Not at all   Thoughts of suicide or self harm:      Self-harm Plan:        Self-harm Action:          Safety concerns for self or others:           Migraines:   Since the patient's last clinic visit, headaches are: no change  The patient is getting headaches:  Every ither day  She is not able to do normal daily activities when she has a migraine.  The patient is taking the following rescue/relief medications:  Tylenol, Excedrin and sumatriptan (Imitrex)   Patient states \"I get only a small amount of relief\" from the rescue/relief medications.   The patient is taking the following medications to prevent migraines:  Amitriptyline  In the past 4 weeks, the patient has gone to an Urgent Care or Emergency Room 0 times times due to headaches.    She eats 2-3 servings of fruits and vegetables daily.She consumes 2 sweetened beverage(s) daily.  She is taking medications regularly.    She was started on amitriptyline 2 " weeks ago for her migraines and anxiety/depression. She has not noticed much of a difference since starting this although does state the severity of her headaches is slightly improved. She has an appointment at [facility] on August 9. She remains off work but states her short term disability is up on July 27.     Answers for HPI/ROS submitted by the patient on 7/21/2019   Chronic problems general questions HPI Form  If you checked off any problems, how difficult have these problems made it for you to do your work, take care of things at home, or get along with other people?: Very difficult  PHQ9 TOTAL SCORE: 11  GABE 7 TOTAL SCORE: 11    ABDOMINAL   PAIN   Follow up      Onset: one month     Description:   Character: Cramping  Location: left upper quadrant  Radiation: None    Intensity: moderate, severe    Progression of Symptoms:  worsening, same and intermittent    Accompanying Signs & Symptoms:  Fever/Chills?: no   Gas/Bloating: no   Nausea: YES  Vomitting: YES  Diarrhea?: no   Constipation:no   Dysuria or Hematuria: no    History:   Trauma: no   Previous similar pain: YES has been going on for about one year  Previous tests done: CT, Colonoscopy, Upper Endoscopy and Pill CAM     Precipitating factors:   Does the pain change with:     Food: YES pain starts after she eats     BM: no     Urination: no     Alleviating factors:      Therapies Tried and outcome: Ibuprofen and tylenol, nothing is helping.     LMP:  6-       She continues to experience daily abdominal pain and nausea. She states she had a capsule endoscopy last week but is still awaiting the results. Her weight has been stable.     Reviewed and updated as needed this visit by Provider  Tobacco  Allergies  Meds  Problems  Med Hx  Surg Hx  Fam Hx         Review of Systems   GENERAL: Denies fever, fatigue, weakness, weight gain, or weight loss.  CARDIOVASCULAR: Denies chest pain, shortness of breath, irregular heartbeats, palpitations, or  "edema.  RESPIRATORY: Denies cough, hemoptysis, and shortness of breath.  GASTROINTESTINAL: +Left upper quadrant abdominal pain, nausea. Denies vomiting, change in appetite, diarrhea, or constipation.  NEUROLOGIC: +Headaches. Denies fainting, dizziness, memory loss, numbness, tingling, or seizures.  PSYCHIATRIC: +Depression and anxiety. Denies thoughts of suicide.      Objective    /54 (BP Location: Right arm, Patient Position: Chair, Cuff Size: Adult Regular)   Pulse 100   Temp 99.4  F (37.4  C) (Temporal)   Resp 16   Ht 1.638 m (5' 4.5\")   Wt 43.5 kg (96 lb)   LMP 06/13/2018 (Approximate)   SpO2 99%   Breastfeeding? No   BMI 16.22 kg/m    Body mass index is 16.22 kg/m .  Physical Exam   GENERAL: appears undernourished, alert and no distress  RESP: lungs clear to auscultation - no rales, rhonchi or wheezes  CV: regular rate and rhythm, normal S1 S2, no S3 or S4, no murmur, click or rub  NEURO: Normal strength and tone, mentation intact and speech normal. Gait is stable.   PSYCH: mentation appears normal, affect normal/bright    Assessment & Plan       ICD-10-CM    1. Moderate major depression (H) F32.1 amitriptyline (ELAVIL) 25 MG tablet   2. Anxiety F41.9    3. Migraine without aura and without status migrainosus, not intractable G43.009 amitriptyline (ELAVIL) 25 MG tablet   4. Nausea R11.0 ondansetron (ZOFRAN-ODT) 8 MG ODT tab   5. Left upper quadrant pain R10.12         1-3. Will increase amitriptyline to 25 mg daily to see if this allows for further improvement in her daily headaches and her anxiety/depression since she has tolerated the 10 mg dose without any issues.     4-5. Will refill Zofran to use as needed for nausea. She had an appointment at [facility] last week and still awaiting results of capsule endoscopy which will hopefully elucidate a cause to her continued abdominal pain.    Addendum: She will remain off work until 8/12 and then may return without restrictions.    Follow up in 1 month " for a recheck.     Hussain Ryder PA-C  [facility]

## 2019-07-19 DIAGNOSIS — F41.9 ANXIETY: ICD-10-CM

## 2019-07-19 RX ORDER — ESCITALOPRAM OXALATE 10 MG/1
TABLET ORAL
Qty: 30 TABLET | Refills: 5 | OUTPATIENT
Start: 2019-07-19

## 2019-07-19 NOTE — TELEPHONE ENCOUNTER
Lexapro   This was discontinued March 2019    Next 5 appointments (look out 90 days)    Jul 22, 2019 12:30 PM CDT  Office Visit with Hussain Ryder PA-C  [facility] ([facility] [address] [place] [id number]  [phone]        Cathy Medel, RN, BSN

## 2019-07-21 ASSESSMENT — ANXIETY QUESTIONNAIRES
GAD7 TOTAL SCORE: 11
GAD7 TOTAL SCORE: 11
4. TROUBLE RELAXING: SEVERAL DAYS
3. WORRYING TOO MUCH ABOUT DIFFERENT THINGS: SEVERAL DAYS
GAD7 TOTAL SCORE: 11
5. BEING SO RESTLESS THAT IT IS HARD TO SIT STILL: NOT AT ALL
6. BECOMING EASILY ANNOYED OR IRRITABLE: NEARLY EVERY DAY
1. FEELING NERVOUS, ANXIOUS, OR ON EDGE: NEARLY EVERY DAY
7. FEELING AFRAID AS IF SOMETHING AWFUL MIGHT HAPPEN: MORE THAN HALF THE DAYS
2. NOT BEING ABLE TO STOP OR CONTROL WORRYING: SEVERAL DAYS
7. FEELING AFRAID AS IF SOMETHING AWFUL MIGHT HAPPEN: MORE THAN HALF THE DAYS

## 2019-07-21 ASSESSMENT — PATIENT HEALTH QUESTIONNAIRE - PHQ9
SUM OF ALL RESPONSES TO PHQ QUESTIONS 1-9: 11
10. IF YOU CHECKED OFF ANY PROBLEMS, HOW DIFFICULT HAVE THESE PROBLEMS MADE IT FOR YOU TO DO YOUR WORK, TAKE CARE OF THINGS AT HOME, OR GET ALONG WITH OTHER PEOPLE: VERY DIFFICULT
SUM OF ALL RESPONSES TO PHQ QUESTIONS 1-9: 11

## 2019-07-22 ENCOUNTER — TELEPHONE (OUTPATIENT)
Dept: FAMILY MEDICINE | Facility: OTHER | Age: 34
End: 2019-07-22

## 2019-07-22 ENCOUNTER — OFFICE VISIT (OUTPATIENT)
Dept: FAMILY MEDICINE | Facility: OTHER | Age: 34
End: 2019-07-22
Payer: COMMERCIAL

## 2019-07-22 VITALS
HEART RATE: 100 BPM | BODY MASS INDEX: 15.99 KG/M2 | RESPIRATION RATE: 16 BRPM | TEMPERATURE: 99.4 F | HEIGHT: 65 IN | DIASTOLIC BLOOD PRESSURE: 54 MMHG | OXYGEN SATURATION: 99 % | WEIGHT: 96 LBS | SYSTOLIC BLOOD PRESSURE: 102 MMHG

## 2019-07-22 DIAGNOSIS — F32.1 MODERATE MAJOR DEPRESSION (H): Primary | ICD-10-CM

## 2019-07-22 DIAGNOSIS — G43.009 MIGRAINE WITHOUT AURA AND WITHOUT STATUS MIGRAINOSUS, NOT INTRACTABLE: ICD-10-CM

## 2019-07-22 DIAGNOSIS — R10.12 LEFT UPPER QUADRANT PAIN: ICD-10-CM

## 2019-07-22 DIAGNOSIS — R11.0 NAUSEA: ICD-10-CM

## 2019-07-22 DIAGNOSIS — F41.9 ANXIETY: ICD-10-CM

## 2019-07-22 PROCEDURE — 99214 OFFICE O/P EST MOD 30 MIN: CPT | Performed by: PHYSICIAN ASSISTANT

## 2019-07-22 RX ORDER — ONDANSETRON 8 MG/1
8 TABLET, ORALLY DISINTEGRATING ORAL EVERY 8 HOURS PRN
Qty: 12 TABLET | Refills: 1 | Status: SHIPPED | OUTPATIENT
Start: 2019-07-22 | End: 2019-11-25

## 2019-07-22 ASSESSMENT — MIFFLIN-ST. JEOR: SCORE: 1138.39

## 2019-07-22 ASSESSMENT — PATIENT HEALTH QUESTIONNAIRE - PHQ9: SUM OF ALL RESPONSES TO PHQ QUESTIONS 1-9: 11

## 2019-07-22 ASSESSMENT — ANXIETY QUESTIONNAIRES: GAD7 TOTAL SCORE: 11

## 2019-07-22 NOTE — TELEPHONE ENCOUNTER
Per patient request, faxed last OV from 7-8 to [facility]. Attn: Mandie. [phone]

## 2019-07-22 NOTE — PATIENT INSTRUCTIONS
Will increase amitriptyline to 25 mg to see if this further helps with depression and migraines.    When you know the dates you would like to return to work, let me know.    Follow up in 1 month.

## 2019-07-24 ENCOUNTER — MYC MEDICAL ADVICE (OUTPATIENT)
Dept: FAMILY MEDICINE | Facility: OTHER | Age: 34
End: 2019-07-24

## 2019-07-24 NOTE — TELEPHONE ENCOUNTER
Deep and sent my chart message  for patient to call us back- Letter on Arabella's desk- find out where she wants this to go and Salvador was not able to open/print forms so if she can drop them off for him to fill out.

## 2019-07-24 NOTE — LETTER
[address]  [facility] [address]  Phone: [phone]    July 24, 2019        Erin Ashley  [address] [facility] [address]          To whom it may concern:    RE: Erin Ashley    Erin may return to work on 8/12 without restrictions. Thank you.     Please contact me for questions or concerns.      Sincerely,        Hussain Ryder PA-C

## 2019-07-24 NOTE — TELEPHONE ENCOUNTER
Letter placed in bin. See [employer] message on where to send. Please have her drop off forms to complete as I could not open the attachment.     Hussain Ryder PA-C

## 2019-08-01 ENCOUNTER — MYC MEDICAL ADVICE (OUTPATIENT)
Dept: FAMILY MEDICINE | Facility: OTHER | Age: 34
End: 2019-08-01

## 2019-08-01 NOTE — PROGRESS NOTES
"Subjective     Erin Ashley is a 34 year old female who presents to clinic today for the following health issues:    Patient in clinic today for a 1 month follow up. She was last seen on 07/22.    History of Present Illness        Mental Health Follow-up:  Patient presents to follow-up on Depression & Anxiety.Patient's depression since last visit has been:  No change  The patient is having other symptoms associated with depression.  Patient's anxiety since last visit has been:  Worse  The patient is having other symptoms associated with anxiety.  Any significant life events: job concerns, financial concerns, grief or loss and health concerns  Patient is feeling anxious or having panic attacks.  Patient has no concerns about alcohol or drug use.     Social History  Tobacco Use    Smoking status: Never Smoker    Smokeless tobacco: Never Used    Tobacco comment: no smokers in the household  Alcohol use: No    Alcohol/week: 0.0 oz    Comment: rare  Drug use: No      Today's PHQ-9         PHQ-9 Total Score:     (P) 11   PHQ-9 Q9 Thoughts of better off dead/self-harm past 2 weeks :   (P) Not at all   Thoughts of suicide or self harm:      Self-harm Plan:        Self-harm Action:          Safety concerns for self or others:           Migraines:   Since the patient's last clinic visit, headaches are: worsened  The patient is getting headaches:  Daily  She is not able to do normal daily activities when she has a migraine.  The patient is taking the following rescue/relief medications:  Ibuprofen (Advil, Motrin), Naproxyn (Aleve), Tylenol and sumatriptan (Imitrex)   Patient states \"The relief is inconsistent\" from the rescue/relief medications.   The patient is taking the following medications to prevent migraines:  Amitriptyline  In the past 4 weeks, the patient has gone to an Urgent Care or Emergency Room 0 times times due to headaches.    She eats 0-1 servings of fruits and vegetables daily.She consumes 2 sweetened " beverage(s) daily.She is missing 1 dose(s) of medications per week.  She is not taking prescribed medications regularly due to remembering to take.       She continues to experience almost daily headaches but always on the right side of her scalp, lower jaw and lateral neck. The amitriptyline has not been helpful. The Imitrex helps at times but usually not until the second dose. She was recently started on Cymbalta at [facility] for depression and has 3 more appointment for continued work up of her possible conversion disorder.     She continues to have frequent left upper quadrant abdominal pain and states the recent capsule endoscopy performed at [facility] was normal. She does not currently have a GI follow up scheduled.     She tried to work this past Friday but had a terrible headache so was off all weekend and was granted long-term disability since it has been over 14 weeks since she has worked.     Answers for HPI/ROS submitted by the patient on 8/19/2019   Chronic problems general questions HPI Form  If you checked off any problems, how difficult have these problems made it for you to do your work, take care of things at home, or get along with other people?: Extremely difficult  PHQ9 TOTAL SCORE: 11  GABE 7 TOTAL SCORE: 14    Reviewed and updated as needed this visit by Provider  Tobacco  Allergies  Meds  Problems  Med Hx  Surg Hx  Fam Hx      Review of Systems   GENERAL: +Fatigue. Denies fever, weakness, weight gain, or weight loss.   CARDIOVASCULAR: Denies chest pain, shortness of breath, irregular heartbeats,  palpitations, or edema.  RESPIRATORY: Denies cough, hemoptysis, and shortness of breath.  GASTROINTESTINAL: +Left upper quadrant pain, nausea. Denies vomiting, change in appetite, diarrhea, or constipation.  NEUROLOGIC: +Daily right sided headaches. Denies fainting, dizziness, memory loss, numbness, tingling, or seizures.  PSYCHIATRIC: +Depression and anxiety. Denies thoughts of suicide.    Objective     /60   Pulse 108   Temp 98.2  F (36.8  C) (Temporal)   Resp 16   Wt 43.5 kg (96 lb)   SpO2 99%   BMI 16.22 kg/m    Body mass index is 16.22 kg/m .  Physical Exam   GENERAL: healthy, alert and no distress  RESP: lungs clear to auscultation - no rales, rhonchi or wheezes  CV: regular rate and rhythm, normal S1 S2, no S3 or S4, no murmur, click or rub, no peripheral edema  NEURO: Normal strength and tone, mentation intact and speech normal. Gait is stable.  PSYCH: mentation appears normal, affect slightly blunted        Assessment & Plan       ICD-10-CM    1. Migraine without aura and without status migrainosus, not intractable G43.009 gabapentin (NEURONTIN) 300 MG capsule     PHYSICAL THERAPY REFERRAL     Lyme Disease Annemarie with reflex to WB Serum     CANCELED: Lyme Disease Annemarie with reflex to WB Serum   2. Left upper quadrant pain R10.12 Lyme Disease Annemarie with reflex to WB Serum     CANCELED: Lyme Disease Annemarie with reflex to WB Serum   3. Moderate major depression (H) F32.1 Lyme Disease Annemarie with reflex to WB Serum     CANCELED: Lyme Disease Annemarie with reflex to WB Serum   4. Anxiety F41.9 Lyme Disease Annemarie with reflex to WB Serum     CANCELED: Lyme Disease Annemarie with reflex to WB Serum   5. Multiple somatic complaints R68.89 Lyme Disease Annemarie with reflex to WB Serum     CANCELED: Lyme Disease Annemarie with reflex to WB Serum        1. The increased dose of amitriptyline did not help with headache/migraine prevention so will stop this and will instead try gabapentin 300 mg nightly for 1 week then 300 mg twice daily thereafter. Common side effects discussed. She will continue with Imitrex but was instructed to try 50 mg instead of 25 and can repeat in 2 hours.    2. Continued frequent abdominal pain without an obvious cause. I recommend she schedule a follow up with [facility] to discuss this further.     3-5. She was recently started on Cymbalta which she thinks may be increasing her headache frequency but she has only been  on it 1 week. I recommend she remain on this and she will continue to follow with [facility] for her anxiety, depression and further work up of her continued somatic complains/possible conversion disorder.    She will remain off work now as her symptoms continue to be inadequately controlled and she states she is now on disability through work. New work note given and can complete further paperwork when needed.    Follow up in 6 weeks for a recheck.       Hussain Ryder PA-C  [facility]

## 2019-08-01 NOTE — TELEPHONE ENCOUNTER
NOt sure if you need to addend the short term disability in your note until 8/12/19 when she returns to work or just send the 7/22/19 office note as is... Please advise    7/22/19 visit   She will remain off work for now and will let me know for sure when her short term disability ends as I will have her returns with reduced hours at least until we figure out a further treatment plan for her multiple issues.      Follow up in 1 month for a recheck.      Hussain Ryder PA-C  [facility]

## 2019-08-19 ENCOUNTER — OFFICE VISIT (OUTPATIENT)
Dept: FAMILY MEDICINE | Facility: OTHER | Age: 34
End: 2019-08-19
Payer: COMMERCIAL

## 2019-08-19 VITALS
DIASTOLIC BLOOD PRESSURE: 60 MMHG | HEART RATE: 108 BPM | BODY MASS INDEX: 16.22 KG/M2 | TEMPERATURE: 98.2 F | OXYGEN SATURATION: 99 % | WEIGHT: 96 LBS | SYSTOLIC BLOOD PRESSURE: 108 MMHG | RESPIRATION RATE: 16 BRPM

## 2019-08-19 DIAGNOSIS — R68.89 MULTIPLE SOMATIC COMPLAINTS: ICD-10-CM

## 2019-08-19 DIAGNOSIS — F32.1 MODERATE MAJOR DEPRESSION (H): ICD-10-CM

## 2019-08-19 DIAGNOSIS — F41.9 ANXIETY: ICD-10-CM

## 2019-08-19 DIAGNOSIS — G43.009 MIGRAINE WITHOUT AURA AND WITHOUT STATUS MIGRAINOSUS, NOT INTRACTABLE: Primary | ICD-10-CM

## 2019-08-19 DIAGNOSIS — R10.12 LEFT UPPER QUADRANT PAIN: ICD-10-CM

## 2019-08-19 PROCEDURE — 99214 OFFICE O/P EST MOD 30 MIN: CPT | Performed by: PHYSICIAN ASSISTANT

## 2019-08-19 RX ORDER — DULOXETIN HYDROCHLORIDE 20 MG/1
20 CAPSULE, DELAYED RELEASE ORAL 2 TIMES DAILY
COMMUNITY
Start: 2019-08-13 | End: 2020-01-27

## 2019-08-19 RX ORDER — GABAPENTIN 300 MG/1
CAPSULE ORAL
Qty: 60 CAPSULE | Refills: 1 | Status: SHIPPED | OUTPATIENT
Start: 2019-08-19 | End: 2019-08-28

## 2019-08-19 ASSESSMENT — ANXIETY QUESTIONNAIRES
1. FEELING NERVOUS, ANXIOUS, OR ON EDGE: NEARLY EVERY DAY
2. NOT BEING ABLE TO STOP OR CONTROL WORRYING: MORE THAN HALF THE DAYS
7. FEELING AFRAID AS IF SOMETHING AWFUL MIGHT HAPPEN: MORE THAN HALF THE DAYS
5. BEING SO RESTLESS THAT IT IS HARD TO SIT STILL: NOT AT ALL
GAD7 TOTAL SCORE: 14
3. WORRYING TOO MUCH ABOUT DIFFERENT THINGS: MORE THAN HALF THE DAYS
7. FEELING AFRAID AS IF SOMETHING AWFUL MIGHT HAPPEN: MORE THAN HALF THE DAYS
GAD7 TOTAL SCORE: 14
4. TROUBLE RELAXING: MORE THAN HALF THE DAYS
GAD7 TOTAL SCORE: 14
6. BECOMING EASILY ANNOYED OR IRRITABLE: NEARLY EVERY DAY

## 2019-08-19 ASSESSMENT — PATIENT HEALTH QUESTIONNAIRE - PHQ9
SUM OF ALL RESPONSES TO PHQ QUESTIONS 1-9: 11
SUM OF ALL RESPONSES TO PHQ QUESTIONS 1-9: 11
10. IF YOU CHECKED OFF ANY PROBLEMS, HOW DIFFICULT HAVE THESE PROBLEMS MADE IT FOR YOU TO DO YOUR WORK, TAKE CARE OF THINGS AT HOME, OR GET ALONG WITH OTHER PEOPLE: EXTREMELY DIFFICULT

## 2019-08-19 NOTE — PATIENT INSTRUCTIONS
Will have you stop the amitriptyline and will start gabapentin 300 mg nightly for 1 week then twice daily thereafter to help with the headaches.    Continue with Cymbalta for now.    Will send you to physical therapy in [place] for your headaches.    Which check Lyme disease testing.    Follow up in 6 weeks.

## 2019-08-19 NOTE — LETTER
[address]  [facility] [address]  Phone: [phone]    August 19, 2019        Erin Ashley  [address] [facility] [address]          To whom it may concern:    RE: Erin Ashley    Erin is to remain off work until further notice as we continue to evaluate her headaches, abdominal pain and mental health. Thanks.     Please contact me for questions or concerns.      Sincerely,        Hussain Ryder PA-C

## 2019-08-20 ASSESSMENT — ANXIETY QUESTIONNAIRES: GAD7 TOTAL SCORE: 14

## 2019-08-20 ASSESSMENT — PATIENT HEALTH QUESTIONNAIRE - PHQ9: SUM OF ALL RESPONSES TO PHQ QUESTIONS 1-9: 11

## 2019-08-21 ENCOUNTER — MYC MEDICAL ADVICE (OUTPATIENT)
Dept: FAMILY MEDICINE | Facility: OTHER | Age: 34
End: 2019-08-21

## 2019-08-27 ENCOUNTER — MYC MEDICAL ADVICE (OUTPATIENT)
Dept: FAMILY MEDICINE | Facility: OTHER | Age: 34
End: 2019-08-27

## 2019-08-27 DIAGNOSIS — G43.009 MIGRAINE WITHOUT AURA AND WITHOUT STATUS MIGRAINOSUS, NOT INTRACTABLE: Primary | ICD-10-CM

## 2019-08-28 RX ORDER — GABAPENTIN 100 MG/1
CAPSULE ORAL
Qty: 60 CAPSULE | Refills: 2 | Status: SHIPPED | OUTPATIENT
Start: 2019-08-28 | End: 2020-01-24

## 2019-09-05 ENCOUNTER — TRANSFERRED RECORDS (OUTPATIENT)
Dept: HEALTH INFORMATION MANAGEMENT | Facility: CLINIC | Age: 34
End: 2019-09-05

## 2019-09-11 ENCOUNTER — MYC MEDICAL ADVICE (OUTPATIENT)
Dept: FAMILY MEDICINE | Facility: OTHER | Age: 34
End: 2019-09-11

## 2019-09-11 NOTE — TELEPHONE ENCOUNTER
Pt is calling on the status of this message. She needs a letter to return to work 9/30th with no restrictions. Please fax to [phone].

## 2019-09-11 NOTE — LETTER
[address]  [facility] [address]  Phone: [phone]    September 11, 2019        Erin Ashley  [address] [facility] [address]          To whom it may concern:    RE: Erin Ashley may return to work on 9/30 without restrictions. Thank you.     Please contact me for questions or concerns.      Sincerely,        Hussain Ryder PA-C

## 2019-09-24 ENCOUNTER — MYC MEDICAL ADVICE (OUTPATIENT)
Dept: FAMILY MEDICINE | Facility: OTHER | Age: 34
End: 2019-09-24

## 2019-09-28 ENCOUNTER — HEALTH MAINTENANCE LETTER (OUTPATIENT)
Age: 34
End: 2019-09-28

## 2019-10-11 ENCOUNTER — MYC MEDICAL ADVICE (OUTPATIENT)
Dept: FAMILY MEDICINE | Facility: OTHER | Age: 34
End: 2019-10-11

## 2019-10-11 NOTE — LETTER
[address]  [facility] [address]  Phone: [phone]    October 11, 2019        Erin Ashley  [address] [facility] [address]          To whom it may concern:    RE: Erin Ashley    I am the primary care provider for Erin Ashley. Due to her multiple health conditions, she is unable to work night shifts. This has been shown to significantly worsen her mental health and her migraines over the years and it is my professional medical opinion that she work day shift. Thank you.     Please contact me for questions or concerns.      Sincerely,        Hussain Ryder PA-C

## 2019-10-11 NOTE — TELEPHONE ENCOUNTER
Please call and notify patient that the note is completed and ready for . Placed in the bin.     Hussain Ryder PA-C

## 2019-10-14 ENCOUNTER — TELEPHONE (OUTPATIENT)
Dept: FAMILY MEDICINE | Facility: OTHER | Age: 34
End: 2019-10-14

## 2019-10-14 NOTE — TELEPHONE ENCOUNTER
Panel Management Review      Patient has the following on her problem list: None      Composite cancer screening  Chart review shows that this patient is due/due soon for the following Pap Smear  Summary:    Patient is due/failing the following:   PAP and PHYSICAL    Action needed:   Patient needs office visit for Physical and PAP.    Type of outreach:    Sent [employer] message.    Questions for provider review:    None                                                                                                                                    Farhana Hancock CMA       Chart routed to Care Team .

## 2019-10-21 ENCOUNTER — MYC MEDICAL ADVICE (OUTPATIENT)
Dept: FAMILY MEDICINE | Facility: OTHER | Age: 34
End: 2019-10-21

## 2019-10-21 NOTE — TELEPHONE ENCOUNTER
Please help schedule patient on 11/18 for a follow up if this date works for her. I have placed a new letter in the bin with this date in the letter.    Hussain Ryder PA-C

## 2019-10-21 NOTE — LETTER
[address]   [facility] [address]  Phone: [phone]    October 21, 2019        Erin Ashley  [address] [facility] [address]          To whom it may concern:    RE: Erin Ashley    I am the primary care provider for Erin Ashley. Due to her multiple health conditions, she is unable to work night shifts. This has been shown to significantly worsen her mental health and her migraines over the years and it is my professional medical opinion that she work day shift. This will be further evaluated at a follow up visit on 11/18/19. Thank you.     Please contact me for questions or concerns.      Sincerely,        Hussain Ryder PA-C

## 2019-10-30 NOTE — PROGRESS NOTES
"Subjective     Erin Ashley is a 34 year old female who presents to clinic today for the following health issues:    HPI     Acute Illness   Acute illness concerns: ear ache/pain  Onset: a few days    Fever: no    Chills/Sweats: no    Headache (location?): YES- right sided    Sinus Pressure:no    Conjunctivitis:  no    Ear Pain: YES: right sided, pressure/pain    Rhinorrhea: YES    Congestion: no    Sore Throat: no - right sided jaw/neck pain     Cough: no    Wheeze: no    Decreased Appetite: no    Nausea: no    Vomiting: no    Diarrhea:  no    Dysuria/Freq.: no    Fatigue/Achiness: YES    Sick/Strep Exposure: no     Therapies Tried and outcome: Tylenol, Imitrex for headache but didn't help       She has noticed right neck and right sided ear pain for the past few days. She also has a right sided headache as her headaches usually seems to be right sided. She thinks she slept wrong which caused the neck pain but is unsure why she is having ear pain. She denies any nasal/sinus congestion, sore throat, itchy/watery eyes, or cough.     Reviewed and updated as needed this visit by Provider  Allergies  Meds  Problems    Review of Systems   GENERAL: Denies fever, fatigue, weakness, weight gain, or weight loss.  HEENT: Eyes-Denies pain, redness, loss of vision, double or blurred vision.     Ears/Nose- +Right ear pain. Denies tinnitus, loss of hearing, epistaxis, decreased sense of smell, nasal congestion. Denies loss of sense of taste, dry mouth, or sore throat.   CARDIOVASCULAR: Denies chest pain, shortness of breath, irregular heartbeats,  palpitations, or edema.  RESPIRATORY: Denies cough, hemoptysis, and shortness of breath.  NEUROLOGIC: +Right sided headache. Denies fainting, dizziness, memory loss, numbness, tingling, or seizures.      Objective    /62   Pulse 102   Temp 98.1  F (36.7  C) (Temporal)   Resp 16   Ht 1.638 m (5' 4.5\")   Wt 46.3 kg (102 lb)   SpO2 100%   BMI 17.24 kg/m    Body mass index " is 17.24 kg/m .  Physical Exam   GENERAL: healthy, alert and no distress  EYES: Eyes grossly normal to inspection, PERRL and conjunctivae and sclerae normal  HENT: ear canals and TM's normal although right TM slightly bulging without erythema or effusion. Nasal mucosa is clear. Pharynx is clear. Mild right maxillary and frontal sinus tenderness.   NECK: no adenopathy, no asymmetry  MUSCULOSKELETAL: Mild/moderate right cervical paraspinal tenderness.   RESP: lungs clear to auscultation - no rales, rhonchi or wheezes  CV: regular rate and rhythm, normal S1 S2, no S3 or S4, no murmur, click or rub  NEURO: Normal strength and tone, mentation intact and speech normal. Gait is stable.         Assessment & Plan       ICD-10-CM    1. Dysfunction of right eustachian tube H69.81    2. Cervicalgia M54.2 tiZANidine (ZANAFLEX) 2 MG tablet        1. Right ear pain consistent with eustachian tube dysfunction. No evidence for infection. I recommend Claritin D along with staying well hydrated.    2. Right neck strain, likely contributing to her headache. She has not noticed any relief from Tylenol and has also tried Flexeril in the past without benefit. Will try low dose Zanaflex to take 3 times daily as needed to help with the muscle tightness. Instructed not to take before driving.       Hussain Ryder PA-C  [facility]

## 2019-10-31 ENCOUNTER — OFFICE VISIT (OUTPATIENT)
Dept: FAMILY MEDICINE | Facility: OTHER | Age: 34
End: 2019-10-31
Payer: MEDICAID

## 2019-10-31 VITALS
DIASTOLIC BLOOD PRESSURE: 62 MMHG | TEMPERATURE: 98.1 F | BODY MASS INDEX: 17 KG/M2 | RESPIRATION RATE: 16 BRPM | HEART RATE: 102 BPM | SYSTOLIC BLOOD PRESSURE: 116 MMHG | OXYGEN SATURATION: 100 % | WEIGHT: 102 LBS | HEIGHT: 65 IN

## 2019-10-31 DIAGNOSIS — H69.91 DYSFUNCTION OF RIGHT EUSTACHIAN TUBE: Primary | ICD-10-CM

## 2019-10-31 DIAGNOSIS — M54.2 CERVICALGIA: ICD-10-CM

## 2019-10-31 PROCEDURE — 99213 OFFICE O/P EST LOW 20 MIN: CPT | Performed by: PHYSICIAN ASSISTANT

## 2019-10-31 RX ORDER — TIZANIDINE 2 MG/1
2-4 TABLET ORAL 3 TIMES DAILY PRN
Qty: 30 TABLET | Refills: 1 | Status: SHIPPED | OUTPATIENT
Start: 2019-10-31 | End: 2019-11-25

## 2019-10-31 ASSESSMENT — MIFFLIN-ST. JEOR: SCORE: 1165.61

## 2019-10-31 NOTE — PATIENT INSTRUCTIONS
I recommend Claritin or Claritin D to help with the ear pressure.  Stay well hydrated.    Will try Zanaflex to 1-2 tablets 3 times daily as needed for the neck pain/tightness.  Do not take before driving.

## 2019-11-07 ENCOUNTER — MYC MEDICAL ADVICE (OUTPATIENT)
Dept: FAMILY MEDICINE | Facility: OTHER | Age: 34
End: 2019-11-07

## 2019-11-25 ENCOUNTER — MEDICAL CORRESPONDENCE (OUTPATIENT)
Dept: HEALTH INFORMATION MANAGEMENT | Facility: CLINIC | Age: 34
End: 2019-11-25

## 2019-11-25 ENCOUNTER — OFFICE VISIT (OUTPATIENT)
Dept: FAMILY MEDICINE | Facility: OTHER | Age: 34
End: 2019-11-25
Payer: MEDICAID

## 2019-11-25 VITALS
DIASTOLIC BLOOD PRESSURE: 64 MMHG | WEIGHT: 99.4 LBS | BODY MASS INDEX: 16.97 KG/M2 | TEMPERATURE: 99.9 F | RESPIRATION RATE: 16 BRPM | HEIGHT: 64 IN | HEART RATE: 101 BPM | OXYGEN SATURATION: 99 % | SYSTOLIC BLOOD PRESSURE: 112 MMHG

## 2019-11-25 DIAGNOSIS — R20.0 FACIAL NUMBNESS: Primary | ICD-10-CM

## 2019-11-25 DIAGNOSIS — R29.898 RIGHT ARM WEAKNESS: ICD-10-CM

## 2019-11-25 DIAGNOSIS — G43.009 MIGRAINE WITHOUT AURA AND WITHOUT STATUS MIGRAINOSUS, NOT INTRACTABLE: ICD-10-CM

## 2019-11-25 DIAGNOSIS — R68.89 MULTIPLE SOMATIC COMPLAINTS: ICD-10-CM

## 2019-11-25 DIAGNOSIS — F32.1 MODERATE MAJOR DEPRESSION (H): ICD-10-CM

## 2019-11-25 DIAGNOSIS — J01.01 ACUTE RECURRENT MAXILLARY SINUSITIS: ICD-10-CM

## 2019-11-25 PROCEDURE — 99214 OFFICE O/P EST MOD 30 MIN: CPT | Performed by: PHYSICIAN ASSISTANT

## 2019-11-25 ASSESSMENT — MIFFLIN-ST. JEOR: SCORE: 1144.26

## 2019-11-25 ASSESSMENT — PATIENT HEALTH QUESTIONNAIRE - PHQ9
10. IF YOU CHECKED OFF ANY PROBLEMS, HOW DIFFICULT HAVE THESE PROBLEMS MADE IT FOR YOU TO DO YOUR WORK, TAKE CARE OF THINGS AT HOME, OR GET ALONG WITH OTHER PEOPLE: VERY DIFFICULT
SUM OF ALL RESPONSES TO PHQ QUESTIONS 1-9: 17
SUM OF ALL RESPONSES TO PHQ QUESTIONS 1-9: 17

## 2019-11-25 ASSESSMENT — ANXIETY QUESTIONNAIRES
7. FEELING AFRAID AS IF SOMETHING AWFUL MIGHT HAPPEN: SEVERAL DAYS
7. FEELING AFRAID AS IF SOMETHING AWFUL MIGHT HAPPEN: SEVERAL DAYS
GAD7 TOTAL SCORE: 16
1. FEELING NERVOUS, ANXIOUS, OR ON EDGE: NEARLY EVERY DAY
GAD7 TOTAL SCORE: 16
GAD7 TOTAL SCORE: 16
3. WORRYING TOO MUCH ABOUT DIFFERENT THINGS: MORE THAN HALF THE DAYS
2. NOT BEING ABLE TO STOP OR CONTROL WORRYING: NEARLY EVERY DAY
5. BEING SO RESTLESS THAT IT IS HARD TO SIT STILL: SEVERAL DAYS
6. BECOMING EASILY ANNOYED OR IRRITABLE: NEARLY EVERY DAY
4. TROUBLE RELAXING: NEARLY EVERY DAY

## 2019-11-25 ASSESSMENT — PAIN SCALES - GENERAL: PAINLEVEL: SEVERE PAIN (6)

## 2019-11-25 NOTE — PROGRESS NOTES
"Subjective     Erin Ashley is a 34 year old female who presents to clinic today for the following health issues:  Answers for HPI/ROS submitted by the patient on 11/25/2019   Chronic problems general questions HPI Form  If you checked off any problems, how difficult have these problems made it for you to do your work, take care of things at home, or get along with other people?: Very difficult  PHQ9 TOTAL SCORE: 17  GABE 7 TOTAL SCORE: 16    History of Present Illness        Mental Health Follow-up:  Patient presents to follow-up on Depression & Anxiety.Patient's depression since last visit has been:  Worse  The patient is having other symptoms associated with depression.  Patient's anxiety since last visit has been:  Worse  The patient is having other symptoms associated with anxiety.  Any significant life events: job concerns and financial concerns  Patient is feeling anxious or having panic attacks.  Patient has no concerns about alcohol or drug use.     Social History  Tobacco Use    Smoking status: Never Smoker    Smokeless tobacco: Never Used    Tobacco comment: no smokers in the household  Alcohol use: No    Alcohol/week: 0.0 standard drinks  Drug use: No      Today's PHQ-9         PHQ-9 Total Score:     (P) 17   PHQ-9 Q9 Thoughts of better off dead/self-harm past 2 weeks :   (P) Not at all   Thoughts of suicide or self harm:      Self-harm Plan:        Self-harm Action:          Safety concerns for self or others:           Migraines:   Since the patient's last clinic visit, headaches are: worsened  The patient is getting headaches:  Consistently since thursday before that daily  She is not able to do normal daily activities when she has a migraine.  The patient is taking the following rescue/relief medications:  Ibuprofen (Advil, Motrin), Tylenol, Excedrin and sumatriptan (Imitrex)   Patient states \"I get no relief\" from the rescue/relief medications.   The patient is taking the following medications to " prevent migraines:  Neurontin  In the past 4 weeks, the patient has gone to an Urgent Care or Emergency Room 0 times times due to headaches.    She eats 0-1 servings of fruits and vegetables daily.She consumes 2 sweetened beverage(s) daily.She is missing 2 dose(s) of medications per week.  She is not taking prescribed medications regularly due to cost of medication.     She continues to experience frequent headaches, mostly only right sided although they have slightly decreased in frequency since starting the gabapentin. However, since last week, she has noticed increased pressure and pain in her right forehead and maxillary sinus area. She has also noticed increased numbness and tingling in the right cheek, lateral neck and scalp area. There have also been intermittent episodes of numbness on the right side of her tongue and down her right arm with occasional right arm weakness. Symptoms began on Thursday and she thought she may have been having a stroke but she never went in. Symptoms are not constant but she is unsure what is happening. She denies any significant visual changes, speech changes, or facial droops. No difficulties with balance although she does feel dizzy fairly frequently.    She continues to follow with [facility] and states she was supposed to have more in depth psychological testing recently but had to reschedule due to a headache. She remains on Cymbalta and recently asked for a refill of Seroquel with psychiatry. Her anxiety and depression symptoms have been worse recently due to her continued physical symptoms and recent job loss causing financial difficulties.     Reviewed and updated as needed this visit by Provider   Allergies  Meds  Problems  Med Hx     Review of Systems   GENERAL: Denies fever, fatigue, weakness, weight gain, or weight loss.  HEENT: Eyes-Denies pain, redness, loss of vision, double or blurred vision.     Ears/Nose- +Mild nasal congestion, right sinus pain/pressure.  "Denies tinnitus, loss of hearing, epistaxis, decreased sense of smell. Denies loss of sense of taste, dry mouth, or sore throat.   CARDIOVASCULAR: Denies chest pain, shortness of breath, irregular heartbeats, palpitations, or edema.  RESPIRATORY: Denies cough, hemoptysis, and shortness of breath.  MUSCULOSKELETAL: +Intermittent right neck pain.  NEUROLOGIC: +Right sided headache, dizziness, right facial numbness/tingling with intermittent right arm weakness and tingling. Denies fainting, memory loss or seizures.  PSYCHIATRIC: +Depression and anxiety. Denies thoughts of suicide.      Objective    /64 (BP Location: Right arm, Patient Position: Sitting, Cuff Size: Adult Regular)   Pulse 101   Temp 99.9  F (37.7  C) (Temporal)   Resp 16   Ht 1.623 m (5' 3.9\")   Wt 45.1 kg (99 lb 6.4 oz)   LMP  (LMP Unknown)   SpO2 99%   BMI 17.12 kg/m    Body mass index is 17.12 kg/m .  Physical Exam   GENERAL: healthy, alert and no distress  EYES: Eyes grossly normal to inspection, PERRL and conjunctivae and sclerae normal  HENT: ear canals and and left TM normal. Right TM with serous effusion. Mild right mastoid tenderness without erythema, warmth or swelling. Nasal mucosa is erythematous and edematous bilaterally. Pharynx is clear.   NECK: bilateral anterior cervical chain adenopathy with right sided tenderness.   RESP: lungs clear to auscultation - no rales, rhonchi or wheezes  CV: regular rate and rhythm, normal S1 S2, no S3 or S4, no murmur, click or rub, no peripheral edema   NEURO: Normal strength and tone, mentation intact and speech normal. Cranial nerves II-XII are grossly intact. DTRs are 2+/4 throughout and symmetric. Negative Vang sign bilaterally. Gait is stable.   PSYCH: mentation appears normal, affect normal/bright        Assessment & Plan       ICD-10-CM    1. Facial numbness R20.0 CT Head w/o Contrast     NEUROLOGY ADULT REFERRAL   2. Right arm weakness R29.898 CT Head w/o Contrast     NEUROLOGY " ADULT REFERRAL   3. Migraine without aura and without status migrainosus, not intractable G43.009 NEUROLOGY ADULT REFERRAL   4. Multiple somatic complaints R68.89    5. Acute recurrent maxillary sinusitis J01.01 amoxicillin-clavulanate (AUGMENTIN) 875-125 MG tablet   6. Moderate major depression (H) F32.1         1-4. Multiple fairly vague symptoms again with a benign neurological exam. However, given her worsening right facial numbness with intermittent right arm numbness and weakness, I recommend an updated head CT to rule out a recent stroke/TIA although again, I think this is unlikely. I recommend she begin an 81 mg aspirin daily until CT results are back. I also recommend she see neurology due to her continued headaches and right facial/scalp numbness for further evaluation to determine if any further testing is needed. She had a normal brain MRI in 2018. If she has any persistent right arm weakness, new facial droop or visual loss in the meantime, she was instructed to be seen in the ED. She states she rescheduled her neurospsych testing with [facility] as she his still being evaluated for a somatization disorder given her multiple medical concerns over the past few years without obvious diagnostic abnormalities to explain most of them.     5. Given her worsening right facial pain and ear effusion along with nasal mucosal erythema, andeopathy and low grade fever, will treat her for sinusitis with a 10 day course of Augmentin. No evidence of mastoiditis.  I recommend she take a probiotic while on this and consider adding Mucinex and Flonase as well. If not improving or worsening, she should be seen again.     6. She continues to follow with [facility]. Will not make any medications changed at this time.    Follow up in 6 weeks for a recheck.       Hussain Ryder PA-C  [facility]

## 2019-11-26 ENCOUNTER — TELEPHONE (OUTPATIENT)
Dept: FAMILY MEDICINE | Facility: OTHER | Age: 34
End: 2019-11-26

## 2019-11-26 ENCOUNTER — HOSPITAL ENCOUNTER (OUTPATIENT)
Dept: MRI IMAGING | Facility: CLINIC | Age: 34
Discharge: HOME | End: 2019-11-26
Attending: FAMILY MEDICINE | Admitting: FAMILY MEDICINE
Payer: MEDICAID

## 2019-11-26 DIAGNOSIS — R20.0 FACIAL NUMBNESS: Primary | ICD-10-CM

## 2019-11-26 DIAGNOSIS — R29.898 RIGHT ARM WEAKNESS: ICD-10-CM

## 2019-11-26 DIAGNOSIS — R20.0 FACIAL NUMBNESS: ICD-10-CM

## 2019-11-26 PROCEDURE — 70553 MRI BRAIN STEM W/O & W/DYE: CPT

## 2019-11-26 PROCEDURE — A9585 GADOBUTROL INJECTION: HCPCS | Performed by: FAMILY MEDICINE

## 2019-11-26 PROCEDURE — 25500064 ZZH RX 255 OP 636: Performed by: FAMILY MEDICINE

## 2019-11-26 RX ORDER — GADOBUTROL 604.72 MG/ML
7.5 INJECTION INTRAVENOUS ONCE
Status: COMPLETED | OUTPATIENT
Start: 2019-11-26 | End: 2019-11-26

## 2019-11-26 RX ORDER — DIAZEPAM 10 MG
10 TABLET ORAL ONCE
Qty: 1 TABLET | Refills: 0 | Status: SHIPPED | OUTPATIENT
Start: 2019-11-26 | End: 2020-04-03

## 2019-11-26 RX ADMIN — GADOBUTROL 4.5 ML: 604.72 INJECTION INTRAVENOUS at 17:07

## 2019-11-26 ASSESSMENT — ANXIETY QUESTIONNAIRES: GAD7 TOTAL SCORE: 16

## 2019-11-26 ASSESSMENT — PATIENT HEALTH QUESTIONNAIRE - PHQ9: SUM OF ALL RESPONSES TO PHQ QUESTIONS 1-9: 17

## 2019-11-26 NOTE — TELEPHONE ENCOUNTER
Lm for patient to call us back- Please transfer back to Arabella Or Asha when she calls.  Hot line number [phone]

## 2019-11-26 NOTE — PATIENT INSTRUCTIONS
Will order an updated head CT to further evaluate your recent symptoms of facial numbness and weakness. I think it is unlikely a stroke but recommend starting an 81 mg aspirin until CT is done.  I will also have you see neurology for further evaluation of your symptoms and your headaches.  If you have prolonged weakness or facial droop, you should be seen right away.    Will prescribe an antibiotic called Augmentin to take twice daily for 10 days. Take a daily probiotic or Activia yogurt while you are on this.  Drink plenty of fluids.  Can use an over the counter Nettipot or sinus rinse to help with nasal congestion. Mucinex can also be helpful.   I also recommend Flonase to help with nasal swelling.  Follow up if symptoms are not improving.    Continue your other medications as scheduled.     Follow up in 6 weeks.

## 2019-11-26 NOTE — TELEPHONE ENCOUNTER
Spoke to patient and she is going to have an MRI today at [facility] patient to arrive at 3:15 for a 4:15 appointment will have a  and will need valium or something sent to [facility] in [place]- pharmacy pended.

## 2019-11-26 NOTE — TELEPHONE ENCOUNTER
JM patient.  Dr. Grossman, Radiology, was reviewing orders for tomorrow and felt MRI would be more appropriate for this patient.  Also discussed moving the study up to today.  Order placed, please help with rescheduling, I don't know if she has claustrophobia or not.

## 2019-12-06 ENCOUNTER — MYC MEDICAL ADVICE (OUTPATIENT)
Dept: FAMILY MEDICINE | Facility: OTHER | Age: 34
End: 2019-12-06

## 2019-12-17 ENCOUNTER — TRANSFERRED RECORDS (OUTPATIENT)
Dept: HEALTH INFORMATION MANAGEMENT | Facility: CLINIC | Age: 34
End: 2019-12-17

## 2020-01-08 ENCOUNTER — TELEPHONE (OUTPATIENT)
Dept: FAMILY MEDICINE | Facility: OTHER | Age: 35
End: 2020-01-08

## 2020-01-08 NOTE — TELEPHONE ENCOUNTER
You placed a referral to [facility] on 11/25/19.    It is unclear if the patient has scheduled yet, not finding a report showing they were seen.     Please forward to your team if further follow up is needed to see if they have made this appointment.      Thank you!   Rosina/Referral Representative for [facility]

## 2020-01-16 NOTE — PROGRESS NOTES
"Subjective     Erin Ashley is a 34 year old female who presents to clinic today for the following health issues:    History of Present Illness        Mental Health Follow-up:  Patient presents to follow-up on Depression & Anxiety.Patient's depression since last visit has been:  Worse  The patient is having other symptoms associated with depression.  Patient's anxiety since last visit has been:  Worse  The patient is having other symptoms associated with anxiety.  Any significant life events: job concerns, financial concerns and other  Patient is feeling anxious or having panic attacks.  Patient has no concerns about alcohol or drug use.     Social History  Tobacco Use    Smoking status: Never Smoker    Smokeless tobacco: Never Used    Tobacco comment: no smokers in the household  Alcohol use: No    Alcohol/week: 0.0 standard drinks  Drug use: No      Today's PHQ-9         PHQ-9 Total Score:     (P) 22   PHQ-9 Q9 Thoughts of better off dead/self-harm past 2 weeks :   (P) Not at all   Thoughts of suicide or self harm:      Self-harm Plan:        Self-harm Action:          Safety concerns for self or others:           Migraines:   Since the patient's last clinic visit, headaches are: no change  The patient is getting headaches:  Daily  She is not able to do normal daily activities when she has a migraine.  The patient is taking the following rescue/relief medications:  Ibuprofen (Advil, Motrin), Naproxyn (Aleve), Tylenol and sumatriptan (Imitrex)   Patient states \"I get no relief\" from the rescue/relief medications.   The patient is taking the following medications to prevent migraines:  Neurontin  In the past 4 weeks, the patient has gone to an Urgent Care or Emergency Room 0 times times due to headaches.    She eats 0-1 servings of fruits and vegetables daily.She consumes 2 sweetened beverage(s) daily.She exercises with enough effort to increase her heart rate 9 or less minutes per day.  She exercises with enough " "effort to increase her heart rate 3 or less days per week. She is missing 2 dose(s) of medications per week.  She is not taking prescribed medications regularly due to remembering to take.     She has continued daily headaches, mostly right sided without any benefit with the twice daily gabapentin or the as needed Imitirex. She still has numbness over the right side of her head and face frequently and states it now feels like \"something is moving\" in her head at times. She has not been able to schedule with the [facility] yet as she keeps calling and they do not get back to her.    She continues to have chronic, intermittent moderate/severe left upper abdominal pain and states it was \"really bad\" yesterday. She is on Bentyl as prescribed by [facility] but this has not been beneficial. No diagnostic studies have been able to find a source of her pain.     She continues to experience anxiety and depression and is following closely with [facility]. Her Cymbalta was recently doubled to 60 mg daily. She continues to describe a lot of fatigue throughout the day and states her hair is \"very thin\". She was recently evaluated by a psychologist at [facility] for possible conversion disorder and she was found not to have this diagnosis.     Answers for HPI/ROS submitted by the patient on 1/24/2020   Chronic problems general questions HPI Form  If you checked off any problems, how difficult have these problems made it for you to do your work, take care of things at home, or get along with other people?: Extremely difficult  PHQ9 TOTAL SCORE: 22  GABE 7 TOTAL SCORE: 20    Reviewed and updated as needed this visit by Provider  Allergies  Meds  Problems  Med Hx    Review of Systems   GENERAL: +Fatigue. Denies fever, weakness, weight gain, or weight loss.  CARDIOVASCULAR: Denies chest pain, shortness of breath, irregular heartbeats,  palpitations, or edema.  RESPIRATORY: Denies cough, hemoptysis, and shortness of " "breath.  GASTROINTESTINAL: +Frequent left upper quadrant abdominal pain. Denies nausea, vomiting, change in appetite, abdominal pain, diarrhea, or constipation.  GENITOURINARY: Denies increased frequency, urgency, dysuria, hematuria, or incontinence.   NEUROLOGIC: +Daily headaches/frequent migraines, right facial and scalp numbness and tingling. Denies fainting, dizziness, memory loss, or seizures.  PSYCHIATRIC: +Anxiety and depression. Denies thoughts of suicide.      Objective    /64 (BP Location: Right arm, Patient Position: Chair, Cuff Size: Adult Large)   Pulse 98   Temp 98.1  F (36.7  C) (Temporal)   Resp 16   Ht 1.626 m (5' 4\")   Wt 45.8 kg (101 lb)   SpO2 100%   BMI 17.34 kg/m    Body mass index is 17.34 kg/m .  Physical Exam   GENERAL: healthy, alert and no distress  EYES: Eyes grossly normal to inspection, PERRL and conjunctivae and sclerae normal  RESP: lungs clear to auscultation - no rales, rhonchi or wheezes  CV: regular rate and rhythm, normal S1 S2, no S3 or S4, no murmur, click or rub, no peripheral edema  ABDOMEN: soft, mild/moderate epigastric and left upper quadrant tenderness, no hepatosplenomegaly, no masses and bowel sounds normal  NEURO: Normal strength and tone, mentation intact and speech normal. Gait is stable.  PSYCH: mentation appears normal, affect is blunted    Results for orders placed or performed in visit on 01/24/20   Comprehensive metabolic panel (BMP + Alb, Alk Phos, ALT, AST, Total. Bili, TP)     Status: Abnormal   Result Value Ref Range    Sodium 139 133 - 144 mmol/L    Potassium 3.8 3.4 - 5.3 mmol/L    Chloride 108 94 - 109 mmol/L    Carbon Dioxide 26 20 - 32 mmol/L    Anion Gap 5 3 - 14 mmol/L    Glucose 106 (H) 70 - 99 mg/dL    Urea Nitrogen 9 7 - 30 mg/dL    Creatinine 0.65 0.52 - 1.04 mg/dL    GFR Estimate >90 >60 mL/min/[1.73_m2]    GFR Estimate If Black >90 >60 mL/min/[1.73_m2]    Calcium 8.4 (L) 8.5 - 10.1 mg/dL    Bilirubin Total 0.3 0.2 - 1.3 mg/dL    " Albumin 3.8 3.4 - 5.0 g/dL    Protein Total 7.1 6.8 - 8.8 g/dL    Alkaline Phosphatase 68 40 - 150 U/L    ALT 43 0 - 50 U/L    AST 23 0 - 45 U/L   CBC with platelets     Status: None   Result Value Ref Range    WBC 4.7 4.0 - 11.0 10e9/L    RBC Count 4.53 3.8 - 5.2 10e12/L    Hemoglobin 14.1 11.7 - 15.7 g/dL    Hematocrit 40.3 35.0 - 47.0 %    MCV 89 78 - 100 fl    MCH 31.1 26.5 - 33.0 pg    MCHC 35.0 31.5 - 36.5 g/dL    RDW 12.5 10.0 - 15.0 %    Platelet Count 212 150 - 450 10e9/L   TSH with free T4 reflex     Status: None   Result Value Ref Range    TSH 0.46 0.40 - 4.00 mU/L       Assessment & Plan       ICD-10-CM    1. Migraine without aura and without status migrainosus, not intractable G43.009 gabapentin (NEURONTIN) 100 MG capsule   2. Facial numbness R20.0    3. Moderate major depression (H) F32.1    4. GABE (generalized anxiety disorder) F41.1    5. Left upper quadrant pain R10.12    6. Fatigue, unspecified type R53.83 Comprehensive metabolic panel (BMP + Alb, Alk Phos, ALT, AST, Total. Bili, TP)     CBC with platelets     TSH with free T4 reflex     Vitamin D Deficiency   7. Iron deficiency anemia, unspecified iron deficiency anemia type D50.9 CBC with platelets        1-2. She continues to experience daily headaches and frequent migraines, mostly right sided along with right sided facial and scalp numbness. She has had multiple negative brain imaging studies including most recently a brain MRI in November. She has not tolerated amitriptyline and gabapentin has not yet been beneficial to help with headache/migraine prevention. Will increase the dose to 200 mg in the evening and 100 mg in the mornings and will make sure she gets set up with the [facility] or Neurology before she leaves today.     3-4. She recently underwent evaluation by psychology at [facility] and underwent the MMPI assessment. She was diagnosed with major depressive disorder and generalized anxiety disorder without any evidence for  conversion disorder. She continues to follow closely with [facility] and remains on Cymbalta which was recently increased. She still has various symptoms that have been unexplained medically but hopefully neurology can elucidate a cause of some of these symptoms.     5. Continued left upper quadrant pain. She has been following with [facility] but all testing has been normal thus far including abdominal CTs, colonoscopy and upper endoscopy along with capsule endoscopy. She is currently taking Bentyl without any improvement of her pain. I recommend she add a probiotic and increase the fiber in her diet. I also recommend she follow up with GI.    6-7. Continued significant fatigue which is likely related to her mental health but given her history of iron deficiency anemia, updated CBC ordered along with other labs and so far CBC, TSH, and CMP have come back normal. Still awaiting vitamin D results.    She will continue to follow with psychiatry as noted above and follow up with neurology and GI as recommended. Will plan on recheck in 3 months.    Hsusain Ryder PA-C  [facility]

## 2020-01-17 ENCOUNTER — MYC MEDICAL ADVICE (OUTPATIENT)
Dept: FAMILY MEDICINE | Facility: OTHER | Age: 35
End: 2020-01-17

## 2020-01-24 ENCOUNTER — OFFICE VISIT (OUTPATIENT)
Dept: FAMILY MEDICINE | Facility: OTHER | Age: 35
End: 2020-01-24
Payer: MEDICAID

## 2020-01-24 ENCOUNTER — TELEPHONE (OUTPATIENT)
Dept: FAMILY MEDICINE | Facility: OTHER | Age: 35
End: 2020-01-24

## 2020-01-24 VITALS
RESPIRATION RATE: 16 BRPM | WEIGHT: 101 LBS | OXYGEN SATURATION: 100 % | DIASTOLIC BLOOD PRESSURE: 64 MMHG | TEMPERATURE: 98.1 F | BODY MASS INDEX: 17.24 KG/M2 | HEIGHT: 64 IN | HEART RATE: 98 BPM | SYSTOLIC BLOOD PRESSURE: 112 MMHG

## 2020-01-24 DIAGNOSIS — R68.89 MULTIPLE SOMATIC COMPLAINTS: ICD-10-CM

## 2020-01-24 DIAGNOSIS — F32.1 MODERATE MAJOR DEPRESSION (H): ICD-10-CM

## 2020-01-24 DIAGNOSIS — G43.009 MIGRAINE WITHOUT AURA AND WITHOUT STATUS MIGRAINOSUS, NOT INTRACTABLE: Primary | ICD-10-CM

## 2020-01-24 DIAGNOSIS — R10.12 LEFT UPPER QUADRANT PAIN: ICD-10-CM

## 2020-01-24 DIAGNOSIS — D50.9 IRON DEFICIENCY ANEMIA, UNSPECIFIED IRON DEFICIENCY ANEMIA TYPE: ICD-10-CM

## 2020-01-24 DIAGNOSIS — R53.83 FATIGUE, UNSPECIFIED TYPE: ICD-10-CM

## 2020-01-24 DIAGNOSIS — R20.0 FACIAL NUMBNESS: ICD-10-CM

## 2020-01-24 DIAGNOSIS — F41.1 GAD (GENERALIZED ANXIETY DISORDER): ICD-10-CM

## 2020-01-24 LAB
ALBUMIN SERPL-MCNC: 3.8 G/DL (ref 3.4–5)
ALP SERPL-CCNC: 68 U/L (ref 40–150)
ALT SERPL W P-5'-P-CCNC: 43 U/L (ref 0–50)
ANION GAP SERPL CALCULATED.3IONS-SCNC: 5 MMOL/L (ref 3–14)
AST SERPL W P-5'-P-CCNC: 23 U/L (ref 0–45)
BILIRUB SERPL-MCNC: 0.3 MG/DL (ref 0.2–1.3)
BUN SERPL-MCNC: 9 MG/DL (ref 7–30)
CALCIUM SERPL-MCNC: 8.4 MG/DL (ref 8.5–10.1)
CHLORIDE SERPL-SCNC: 108 MMOL/L (ref 94–109)
CO2 SERPL-SCNC: 26 MMOL/L (ref 20–32)
CREAT SERPL-MCNC: 0.65 MG/DL (ref 0.52–1.04)
GFR SERPL CREATININE-BSD FRML MDRD: >90 ML/MIN/{1.73_M2}
GLUCOSE SERPL-MCNC: 106 MG/DL (ref 70–99)
POTASSIUM SERPL-SCNC: 3.8 MMOL/L (ref 3.4–5.3)
PROT SERPL-MCNC: 7.1 G/DL (ref 6.8–8.8)
SODIUM SERPL-SCNC: 139 MMOL/L (ref 133–144)
TSH SERPL DL<=0.005 MIU/L-ACNC: 0.46 MU/L (ref 0.4–4)

## 2020-01-24 PROCEDURE — 99214 OFFICE O/P EST MOD 30 MIN: CPT | Performed by: PHYSICIAN ASSISTANT

## 2020-01-24 PROCEDURE — 80053 COMPREHEN METABOLIC PANEL: CPT | Performed by: PHYSICIAN ASSISTANT

## 2020-01-24 PROCEDURE — 85027 COMPLETE CBC AUTOMATED: CPT | Performed by: PHYSICIAN ASSISTANT

## 2020-01-24 PROCEDURE — 84443 ASSAY THYROID STIM HORMONE: CPT | Performed by: PHYSICIAN ASSISTANT

## 2020-01-24 PROCEDURE — 82306 VITAMIN D 25 HYDROXY: CPT | Performed by: PHYSICIAN ASSISTANT

## 2020-01-24 PROCEDURE — 36415 COLL VENOUS BLD VENIPUNCTURE: CPT | Performed by: PHYSICIAN ASSISTANT

## 2020-01-24 RX ORDER — LORAZEPAM 0.5 MG/1
0.5 TABLET ORAL DAILY PRN
COMMUNITY
Start: 2019-12-03 | End: 2020-06-16

## 2020-01-24 RX ORDER — GABAPENTIN 100 MG/1
CAPSULE ORAL
Qty: 90 CAPSULE | Refills: 2 | Status: SHIPPED | OUTPATIENT
Start: 2020-01-24 | End: 2020-03-04

## 2020-01-24 RX ORDER — DULOXETIN HYDROCHLORIDE 60 MG/1
60 CAPSULE, DELAYED RELEASE ORAL DAILY
COMMUNITY
Start: 2020-01-20 | End: 2020-06-01

## 2020-01-24 RX ORDER — DICYCLOMINE HYDROCHLORIDE 10 MG/1
10 CAPSULE ORAL 3 TIMES DAILY
COMMUNITY
Start: 2020-01-24 | End: 2021-01-20

## 2020-01-24 ASSESSMENT — PATIENT HEALTH QUESTIONNAIRE - PHQ9
10. IF YOU CHECKED OFF ANY PROBLEMS, HOW DIFFICULT HAVE THESE PROBLEMS MADE IT FOR YOU TO DO YOUR WORK, TAKE CARE OF THINGS AT HOME, OR GET ALONG WITH OTHER PEOPLE: EXTREMELY DIFFICULT
SUM OF ALL RESPONSES TO PHQ QUESTIONS 1-9: 22
SUM OF ALL RESPONSES TO PHQ QUESTIONS 1-9: 22

## 2020-01-24 ASSESSMENT — ANXIETY QUESTIONNAIRES
2. NOT BEING ABLE TO STOP OR CONTROL WORRYING: NEARLY EVERY DAY
4. TROUBLE RELAXING: NEARLY EVERY DAY
GAD7 TOTAL SCORE: 20
7. FEELING AFRAID AS IF SOMETHING AWFUL MIGHT HAPPEN: MORE THAN HALF THE DAYS
5. BEING SO RESTLESS THAT IT IS HARD TO SIT STILL: NEARLY EVERY DAY
GAD7 TOTAL SCORE: 20
GAD7 TOTAL SCORE: 20
6. BECOMING EASILY ANNOYED OR IRRITABLE: NEARLY EVERY DAY
3. WORRYING TOO MUCH ABOUT DIFFERENT THINGS: NEARLY EVERY DAY
7. FEELING AFRAID AS IF SOMETHING AWFUL MIGHT HAPPEN: MORE THAN HALF THE DAYS
1. FEELING NERVOUS, ANXIOUS, OR ON EDGE: NEARLY EVERY DAY

## 2020-01-24 ASSESSMENT — MIFFLIN-ST. JEOR: SCORE: 1153.13

## 2020-01-24 NOTE — TELEPHONE ENCOUNTER
Informed patient that I will fax again the referral to [facility]  and informed on cover sheet to reach out to Asha or Arabella with what day and time they scheduled for patient.   Due to we have faxed multiple times the referral and still no appointment for patient.

## 2020-01-24 NOTE — PATIENT INSTRUCTIONS
Will increase gabapentin to 200 mg nightly and continue with 100 mg in the mornings.    Add a daily Probiotic and consider a fiber supplement.    Will check updated labs.    Will get you set up with neurology- I'll call you with date/time     .Address: [address] [place] [address] [address], [place] [address]     Phone: [phone]

## 2020-01-25 LAB
ERYTHROCYTE [DISTWIDTH] IN BLOOD BY AUTOMATED COUNT: 12.5 % (ref 10–15)
HCT VFR BLD AUTO: 40.3 % (ref 35–47)
HGB BLD-MCNC: 14.1 G/DL (ref 11.7–15.7)
MCH RBC QN AUTO: 31.1 PG (ref 26.5–33)
MCHC RBC AUTO-ENTMCNC: 35 G/DL (ref 31.5–36.5)
MCV RBC AUTO: 89 FL (ref 78–100)
PLATELET # BLD AUTO: 212 10E9/L (ref 150–450)
RBC # BLD AUTO: 4.53 10E12/L (ref 3.8–5.2)
WBC # BLD AUTO: 4.7 10E9/L (ref 4–11)

## 2020-01-25 ASSESSMENT — PATIENT HEALTH QUESTIONNAIRE - PHQ9: SUM OF ALL RESPONSES TO PHQ QUESTIONS 1-9: 22

## 2020-01-25 ASSESSMENT — ANXIETY QUESTIONNAIRES: GAD7 TOTAL SCORE: 20

## 2020-01-26 LAB — DEPRECATED CALCIDIOL+CALCIFEROL SERPL-MC: 18 UG/L (ref 20–75)

## 2020-01-27 ENCOUNTER — OFFICE VISIT (OUTPATIENT)
Dept: OBGYN | Facility: OTHER | Age: 35
End: 2020-01-27
Payer: MEDICAID

## 2020-01-27 VITALS
BODY MASS INDEX: 17.47 KG/M2 | DIASTOLIC BLOOD PRESSURE: 68 MMHG | HEART RATE: 78 BPM | WEIGHT: 101.75 LBS | SYSTOLIC BLOOD PRESSURE: 112 MMHG

## 2020-01-27 DIAGNOSIS — E55.9 VITAMIN D DEFICIENCY: Primary | ICD-10-CM

## 2020-01-27 DIAGNOSIS — N91.4 SECONDARY OLIGOMENORRHEA: ICD-10-CM

## 2020-01-27 DIAGNOSIS — R39.15 URINARY URGENCY: ICD-10-CM

## 2020-01-27 DIAGNOSIS — Z12.4 ENCOUNTER FOR SCREENING FOR CERVICAL CANCER: ICD-10-CM

## 2020-01-27 DIAGNOSIS — R10.2 PELVIC PRESSURE IN FEMALE: Primary | ICD-10-CM

## 2020-01-27 LAB
ESTRADIOL SERPL-MCNC: 39 PG/ML
FSH SERPL-ACNC: 13.9 IU/L
PROLACTIN SERPL-MCNC: 14 UG/L (ref 3–27)

## 2020-01-27 PROCEDURE — G0145 SCR C/V CYTO,THINLAYER,RESCR: HCPCS | Performed by: OBSTETRICS & GYNECOLOGY

## 2020-01-27 PROCEDURE — G0476 HPV COMBO ASSAY CA SCREEN: HCPCS | Performed by: OBSTETRICS & GYNECOLOGY

## 2020-01-27 PROCEDURE — 82670 ASSAY OF TOTAL ESTRADIOL: CPT | Performed by: OBSTETRICS & GYNECOLOGY

## 2020-01-27 PROCEDURE — 99214 OFFICE O/P EST MOD 30 MIN: CPT | Performed by: OBSTETRICS & GYNECOLOGY

## 2020-01-27 PROCEDURE — 36415 COLL VENOUS BLD VENIPUNCTURE: CPT | Performed by: OBSTETRICS & GYNECOLOGY

## 2020-01-27 PROCEDURE — 84146 ASSAY OF PROLACTIN: CPT | Performed by: OBSTETRICS & GYNECOLOGY

## 2020-01-27 PROCEDURE — 84403 ASSAY OF TOTAL TESTOSTERONE: CPT | Performed by: OBSTETRICS & GYNECOLOGY

## 2020-01-27 PROCEDURE — 87624 HPV HI-RISK TYP POOLED RSLT: CPT | Performed by: OBSTETRICS & GYNECOLOGY

## 2020-01-27 PROCEDURE — 83001 ASSAY OF GONADOTROPIN (FSH): CPT | Performed by: OBSTETRICS & GYNECOLOGY

## 2020-01-27 PROCEDURE — 84270 ASSAY OF SEX HORMONE GLOBUL: CPT | Performed by: OBSTETRICS & GYNECOLOGY

## 2020-01-27 RX ORDER — ERGOCALCIFEROL 1.25 MG/1
50000 CAPSULE, LIQUID FILLED ORAL WEEKLY
Qty: 8 CAPSULE | Refills: 0 | Status: SHIPPED | OUTPATIENT
Start: 2020-01-27 | End: 2020-04-23

## 2020-01-27 ASSESSMENT — PATIENT HEALTH QUESTIONNAIRE - PHQ9
10. IF YOU CHECKED OFF ANY PROBLEMS, HOW DIFFICULT HAVE THESE PROBLEMS MADE IT FOR YOU TO DO YOUR WORK, TAKE CARE OF THINGS AT HOME, OR GET ALONG WITH OTHER PEOPLE: EXTREMELY DIFFICULT
SUM OF ALL RESPONSES TO PHQ QUESTIONS 1-9: 23
SUM OF ALL RESPONSES TO PHQ QUESTIONS 1-9: 23

## 2020-01-27 NOTE — NURSING NOTE
"Chief Complaint   Patient presents with     Pelvic Pain       Initial /68 (BP Location: Left arm, Cuff Size: Adult Regular)   Pulse 78   Wt 46.2 kg (101 lb 12 oz)   LMP 2020   BMI 17.47 kg/m   Estimated body mass index is 17.47 kg/m  as calculated from the following:    Height as of 20: 1.626 m (5' 4\").    Weight as of this encounter: 46.2 kg (101 lb 12 oz).  BP completed using cuff size: regular        The following HM Due: pap smear      The following patient reported/Care Every where data was sent to:  P ABSTRACT QUALITY INITIATIVES [[id number]]       Amada Mtz MA on 2020 at 1:54 PM           "

## 2020-01-27 NOTE — TELEPHONE ENCOUNTER
Contacted patient, she has not heard from [facility]. She asked if she is can be seen somewhere else. I offered to call again and check on status. Patient was agreeable. Joanne at [facility] has not received referral. Verified fax number [phone] which was the correct number. Do you want to refer  her somewhere else..please advise.   Vannessa Rosario MA on 1/27/2020 at 5:07 PM

## 2020-01-27 NOTE — PROGRESS NOTES
[facility]      NAME:  Erin Ashley  PCP:  Hussain Ryder [place]  MRN:  [id number]    Impression / Plan     34 year old  with:      ICD-10-CM    1. Pelvic pressure in female R10.2 US Pelvic Complete with Transvaginal   2. Encounter for screening for cervical cancer  Z12.4 Pap imaged thin layer screen with HPV - recommended age 30 - 65 years (select HPV order below)     HPV High Risk Types DNA Cervical   3. Urinary urgency R39.15    4. Secondary oligomenorrhea N91.4 Follicle stimulating hormone     Estradiol     Prolactin     Testosterone Free and Total     CANCELED: TSH with free T4 reflex     Body mass index is 17.47 kg/m .       Await lab and ultrasound reports.  If labs are normal, consider progesterone withdrawal test.  Lack of regular menses may be related to low BMI.   If ultrasound is normal, she may consider pelvic floor physical therapy for management of pelvic pressure.  She may consider urology referral for bladder pressure.      Chief Complaint     Chief Complaint   Patient presents with     Pelvic Pain       HPI     Erin Ashley is a  34 year old female who is seen for gyn concerns.      I last saw the patient 2018 for pelvic pressure.  She requested IUD removal at that time, which was done.  Patient had missed a couple of periods at that time.  At that time her BMI was 16 and she did not quite meet criteria for secondary amenorrhea.  I recommended she follow-up for laboratory evaluation if she continued to not get a period after a couple of months.    Patient states the IUD removal did not help her pelvic pressure.  It has been constant on the right side for years.  Last ultrasound was done 2018 and normal.  Endometrial stripe 2 mm and IUD was in the correct position.    Patient used to have pain with intercourse, more so with deep penetration.  She felt like it was painful when her cervix was touched.  In the last month or so, she no longer has that pain and she is  concerned.  She also has more difficulty with arousal.  She is concerned about the change in sensation.  She states her partner will feel pain when he hits something deep in her vagina, but she does not feel pain.      Patient has felt her anatomy has been abnormal in the past.  She continues to feel like she has extra tissue that is abnormal.  Exam in 2018 was normal.    Urinary urgency and frequency. Urology referral was placed when I saw her about a year and a half ago, but the patient did not not go through with that.    She has been seeing family medicine regarding other concerns.    Patient's last menstrual period was 01/26/2020.  She has only had 4 days of bleeding since the IUD was removed a year and a half ago.           Problem List     Patient Active Problem List    Diagnosis Date Noted     Pelvic pressure in female 01/27/2020     Priority: Medium     GABE (generalized anxiety disorder) 01/24/2020     Priority: Medium     Iron deficiency anemia, unspecified iron deficiency anemia type 11/30/2018     Priority: Medium     Anemia 11/14/2018     Priority: Medium     Somatic complaints, multiple 07/12/2018     Priority: Medium     Health Care Home 09/15/2017     Priority: Medium     Status:  Accepted  Care Coordinator:  Bassam Sesay RN    See Letters for [facility] Care Plan  Date:  September 15, 2017         Anxiety 07/26/2017     Priority: Medium     Palpitations 07/26/2017     Priority: Medium     Thyroid nodule 07/18/2017     Priority: Medium     Tachycardia 07/18/2017     Priority: Medium     Pelvic pain in female 03/28/2017     Priority: Medium     Elevated LFTs 01/30/2017     Priority: Medium     Insomnia due to medical condition 05/23/2016     Priority: Medium              Personal history of ovarian cyst 05/23/2016     Priority: Medium     Skin tags, anus or rectum 05/23/2016     Priority: Medium     Acne 11/01/2013     Priority: Medium     TALIB 3 - cervical intraepithelial neoplasia grade 3 06/04/2008      Priority: Medium     4/9/08 pap- ASCUS + high risk HPV  5/29/08 colposcopy- bx (TALIB 2/3) ECC (neg)  6/4/08 consult- [facility] recommended  6/13/08 [facility]- (pathology- TALIB 1/2) repeat pap 3 months  11/7/08 pap-NIL- repeat pap in 4 months  5/21/09 reminder letter sent  6/5/09 pap-ASCUS negative HPV- recommend repeat pap in 1 year  6/7/10 pap- NIL- repeat in 1 year (6/2011)  7/29/11 pap NIL. Plan--pap in 1 year  6/14/12 pap NIL  8/1/13 pap NIL/neg HPV. Plan-- pap in 3 years  05/23/16 Pap= NIL.   Problem list name updated by automated process. Provider to review and confirm           Medications     Current Outpatient Medications   Medication     Acetaminophen (TYLENOL PO)     dicyclomine (BENTYL) 10 MG capsule     DULoxetine (CYMBALTA) 60 MG capsule     gabapentin (NEURONTIN) 100 MG capsule     LORazepam (ATIVAN) 0.5 MG tablet     SUMAtriptan (IMITREX) 25 MG tablet     vitamin D2 (ERGOCALCIFEROL) 96946 units (1250 mcg) capsule     No current facility-administered medications for this visit.         Allergies     Allergies   Allergen Reactions     Hydrocodone-Acetaminophen      headaches     No Known Allergies        ROS     A 6 organ review of systems was asked and the pertinent positives and negatives are listed in the HPI. All other organ systems can be considered negative.     Physical Exam   Vitals: /68 (BP Location: Left arm, Cuff Size: Adult Regular)   Pulse 78   Wt 46.2 kg (101 lb 12 oz)   LMP 01/26/2020   BMI 17.47 kg/m      General: Comfortable, no obvious distress  Psych: Alert and orientated x 3. Appropriate affect, good insight.   : Normal female external genitalia.  No lesions.  Urethral meatus normal.  Speculum exam reveals a normal vaginal vault, normal cervix.  No abnormal discharge.  Bimanual exam reveals a normal, mobile, nontender uterus.  No cervical motion tenderness.  Adnexa nontender with no palpable masses.     Extremities: No peripheral edema, nontender     Pap was done in the usual  fashion    Labs/Imaging       Labs were reviewed in [facility]     Imaging was reviewed in Epic.       25 minutes was spent with patient, more than 50% counseling and coordinating care as noted above in the A/P    Nursing notes read and reviewed    Amada Dallas MD

## 2020-01-28 ASSESSMENT — PATIENT HEALTH QUESTIONNAIRE - PHQ9: SUM OF ALL RESPONSES TO PHQ QUESTIONS 1-9: 23

## 2020-01-28 NOTE — TELEPHONE ENCOUNTER
Deep for patient to call us back, We have her scheduled on Feb. 27th at 9:00 am at [facility] [phone]-Dr. [facility].

## 2020-01-29 LAB
SHBG SERPL-SCNC: 78 NMOL/L (ref 30–135)
TESTOST FREE SERPL-MCNC: 0.13 NG/DL (ref 0.13–0.92)
TESTOST SERPL-MCNC: 15 NG/DL (ref 8–60)

## 2020-01-29 NOTE — RESULT ENCOUNTER NOTE
Hi,    Your testosterone and prolactin levels were normal. The FSH may be slightly elevated depending on where you are at in your cycle.  I know you were spotting when I saw you, but we were not completely sure if this was the start of your cycle.  The estrogen level would be appropriate for the first part of the cycle.  I will let you know when I get the results of the ultrasound and we will come up with a plan.  I am out of the office Thursday and Friday this week, so I will contact you Monday when I am in the office.      Thanks,  Dr. Dallas

## 2020-01-30 ENCOUNTER — ANCILLARY PROCEDURE (OUTPATIENT)
Dept: ULTRASOUND IMAGING | Facility: OTHER | Age: 35
End: 2020-01-30
Attending: OBSTETRICS & GYNECOLOGY
Payer: MEDICAID

## 2020-01-30 DIAGNOSIS — R10.2 PELVIC PRESSURE IN FEMALE: ICD-10-CM

## 2020-01-30 LAB
COPATH REPORT: NORMAL
PAP: NORMAL

## 2020-01-30 PROCEDURE — 76856 US EXAM PELVIC COMPLETE: CPT

## 2020-01-30 PROCEDURE — 76830 TRANSVAGINAL US NON-OB: CPT

## 2020-01-30 ASSESSMENT — PATIENT HEALTH QUESTIONNAIRE - PHQ9
SUM OF ALL RESPONSES TO PHQ QUESTIONS 1-9: 23
SUM OF ALL RESPONSES TO PHQ QUESTIONS 1-9: 23
10. IF YOU CHECKED OFF ANY PROBLEMS, HOW DIFFICULT HAVE THESE PROBLEMS MADE IT FOR YOU TO DO YOUR WORK, TAKE CARE OF THINGS AT HOME, OR GET ALONG WITH OTHER PEOPLE: EXTREMELY DIFFICULT

## 2020-01-31 ASSESSMENT — PATIENT HEALTH QUESTIONNAIRE - PHQ9: SUM OF ALL RESPONSES TO PHQ QUESTIONS 1-9: 23

## 2020-02-03 ENCOUNTER — TELEPHONE (OUTPATIENT)
Dept: OBGYN | Facility: OTHER | Age: 35
End: 2020-02-03

## 2020-02-03 LAB
FINAL DIAGNOSIS: NORMAL
HPV HR 12 DNA CVX QL NAA+PROBE: NEGATIVE
HPV16 DNA SPEC QL NAA+PROBE: NEGATIVE
HPV18 DNA SPEC QL NAA+PROBE: NEGATIVE
SPECIMEN DESCRIPTION: NORMAL
SPECIMEN SOURCE CVX/VAG CYTO: NORMAL

## 2020-02-03 NOTE — TELEPHONE ENCOUNTER
Discussed US findings, which were suggestive of pelvic congestion.  Also endometrial stripe is thin, so lack of menstrual cycle is likely due to low BMI.  Discussed BABAK use and embolization.  She will think about it and we will discuss it in more detail when I see her Thursday.    She has migraines, so has generally avoided estrogen.

## 2020-02-03 NOTE — TELEPHONE ENCOUNTER
LM asking patient to return our call.  When she calls back, I would like to discuss her ultrasound findings.

## 2020-02-06 ENCOUNTER — MYC MEDICAL ADVICE (OUTPATIENT)
Dept: FAMILY MEDICINE | Facility: OTHER | Age: 35
End: 2020-02-06

## 2020-02-06 ENCOUNTER — OFFICE VISIT (OUTPATIENT)
Dept: OBGYN | Facility: OTHER | Age: 35
End: 2020-02-06
Payer: COMMERCIAL

## 2020-02-06 VITALS
HEART RATE: 82 BPM | BODY MASS INDEX: 17.03 KG/M2 | SYSTOLIC BLOOD PRESSURE: 118 MMHG | DIASTOLIC BLOOD PRESSURE: 70 MMHG | HEIGHT: 64 IN | WEIGHT: 99.75 LBS

## 2020-02-06 DIAGNOSIS — Z01.419 WELL FEMALE EXAM WITH ROUTINE GYNECOLOGICAL EXAM: Primary | ICD-10-CM

## 2020-02-06 DIAGNOSIS — Z87.42 HISTORY OF ABNORMAL CERVICAL PAP SMEAR: ICD-10-CM

## 2020-02-06 DIAGNOSIS — N94.89 PELVIC CONGESTION SYNDROME: ICD-10-CM

## 2020-02-06 PROCEDURE — 99395 PREV VISIT EST AGE 18-39: CPT | Performed by: OBSTETRICS & GYNECOLOGY

## 2020-02-06 ASSESSMENT — MIFFLIN-ST. JEOR: SCORE: 1147.46

## 2020-02-06 NOTE — NURSING NOTE
"Chief Complaint   Patient presents with     Physical       Initial /70 (BP Location: Right arm, Cuff Size: Adult Regular)   Pulse 82   Ht 1.626 m (5' 4\")   Wt 45.2 kg (99 lb 12 oz)   LMP 2020   BMI 17.12 kg/m   Estimated body mass index is 17.12 kg/m  as calculated from the following:    Height as of this encounter: 1.626 m (5' 4\").    Weight as of this encounter: 45.2 kg (99 lb 12 oz).  BP completed using cuff size: regular        The following  Due: Vaccinations: Flu      The following patient reported/Care Every where data was sent to:  P ABSTRACT QUALITY INITIATIVES [[id number]]       Amada Mtz MA on 2020 at 1:43 PM           "

## 2020-02-06 NOTE — PROGRESS NOTES
SUBJECTIVE:   CC: Erin Ashley is an 34 year old woman who presents for preventive health visit.     Healthy Habits:     Getting at least 3 servings of Calcium per day:  NO    Bi-annual eye exam:  NO    Dental care twice a year:  Yes    Sleep apnea or symptoms of sleep apnea:  None    Diet:  Regular (no restrictions)    Frequency of exercise:  None    Taking medications regularly:  Yes    Barriers to taking medications:  Problems remembering to take them    Medication side effects:  None    PHQ-2 Total Score: 6    Additional concerns today:  No      I saw her last week for pelvic pressure and secondary amenorrhea.  US demonstrated pelvic congestion syndrome.  Labs and clinical assessment suggest hypothalamic amenorrhea, likely from underweight.    She continues to have the same pelvic pressure and achy-ness.   Continues to have abdominal pain. She sees Brendan Ryder for this.   Palpitations for the past couple of weeks.    Difficulty gaining weight.  She was keeping track of calories but has a new phone and lost the data. Notes weakness, hair loss, skin changes      Today's PHQ-2 Score:   PHQ-2 ( 1999 Pfizer) 1/30/2020   Q1: Little interest or pleasure in doing things 3   Q2: Feeling down, depressed or hopeless 3   PHQ-2 Score 6   Q1: Little interest or pleasure in doing things Nearly every day   Q2: Feeling down, depressed or hopeless Nearly every day   PHQ-2 Score 6       Abuse: Current or Past(Physical, Sexual or Emotional)- No  Do you feel safe in your environment? Yes        Social History     Tobacco Use     Smoking status: Never Smoker     Smokeless tobacco: Never Used     Tobacco comment: no smokers in the household   Substance Use Topics     Alcohol use: No     Alcohol/week: 0.0 standard drinks     If you drink alcohol do you typically have >3 drinks per day or >7 drinks per week? No    BP Readings from Last 3 Encounters:   02/06/20 118/70   01/27/20 112/68   01/24/20 112/64    Wt Readings from Last 3  Encounters:   02/06/20 45.2 kg (99 lb 12 oz)   01/27/20 46.2 kg (101 lb 12 oz)   01/24/20 45.8 kg (101 lb)                  Patient Active Problem List   Diagnosis     TALIB 3 - cervical intraepithelial neoplasia grade 3     Acne     Insomnia due to medical condition     Personal history of ovarian cyst     Skin tags, anus or rectum     Elevated LFTs     Pelvic pain in female     Thyroid nodule     Tachycardia     Anxiety     Palpitations     Health Care Home     Somatic complaints, multiple     Anemia     Iron deficiency anemia, unspecified iron deficiency anemia type     GABE (generalized anxiety disorder)     Pelvic pressure in female     Past Surgical History:   Procedure Laterality Date     APPENDECTOMY  8/4/2008     EXAM UNDER ANESTHESIA RECTUM N/A 2/24/2017    Procedure: EXAM UNDER ANESTHESIA RECTUM;  Surgeon: Britton Palomo MD;  Location: [facility]     HC CONIZATION CERVIX,KNIFE/LASER  06/130/8    cervix.  TALIB 1/2     HEMORRHOIDECTOMY EXTERNAL N/A 2/24/2017    Procedure: HEMORRHOIDECTOMY EXTERNAL;  Surgeon: Britton Palomo MD;  Location: [facility]     TONSILLECTOMY  7/3/2007       Social History     Tobacco Use     Smoking status: Never Smoker     Smokeless tobacco: Never Used     Tobacco comment: no smokers in the household   Substance Use Topics     Alcohol use: No     Alcohol/week: 0.0 standard drinks     Family History   Problem Relation Age of Onset     Hypertension Maternal Grandmother         borderline     Lipids Maternal Grandmother      Respiratory Maternal Grandmother         COPD     Lung Cancer Maternal Grandmother      Hypertension Maternal Grandfather         borderline     Cancer Maternal Grandfather         lung cancer, smoker     Gastrointestinal Disease Mother         cholecystitis     Lipids Mother      Prostate Cancer Paternal Grandfather      Hearing Loss Paternal Grandfather      Other Cancer Paternal Grandmother         pancreatic cancer     Unknown/Adopted No family hx of      Asthma No  family hx of      C.A.D. No family hx of      Diabetes No family hx of      Cerebrovascular Disease No family hx of      Breast Cancer No family hx of      Cancer - colorectal No family hx of      Alcohol/Drug No family hx of      Allergies No family hx of      Alzheimer Disease No family hx of      Anesthesia Reaction No family hx of      Arthritis No family hx of      Blood Disease No family hx of      Cardiovascular No family hx of      Circulatory No family hx of      Congenital Anomalies No family hx of      Connective Tissue Disorder No family hx of      Depression No family hx of      Genetic Disorder No family hx of      Genitourinary Problems No family hx of      Gynecology No family hx of      Heart Disease No family hx of      Musculoskeletal Disorder No family hx of      Neurologic Disorder No family hx of      Obesity No family hx of      Osteoporosis No family hx of      Psychotic Disorder No family hx of          Current Outpatient Medications   Medication Sig Dispense Refill     Acetaminophen (TYLENOL PO) Take 1,000 mg by mouth       dicyclomine (BENTYL) 10 MG capsule Take 1 capsule (10 mg) by mouth 3 times daily       DULoxetine (CYMBALTA) 60 MG capsule        gabapentin (NEURONTIN) 100 MG capsule Take 1 capsule nightly and 2 in the morning 90 capsule 2     LORazepam (ATIVAN) 0.5 MG tablet        SUMAtriptan (IMITREX) 25 MG tablet Take 1-2 tablets (25-50 mg) by mouth at onset of headache for migraine Can repeat in 2 hours if headache not improved 12 tablet 2     vitamin D2 (ERGOCALCIFEROL) 40147 units (1250 mcg) capsule Take 1 capsule (50,000 Units) by mouth once a week 8 capsule 0     Allergies   Allergen Reactions     Hydrocodone-Acetaminophen      headaches     No Known Allergies      Recent Labs   Lab Test 01/24/20  1412 05/29/19  1546 05/13/19 07/06/18  1039  06/13/16  1014 05/14/15  0933 04/09/15  1108   LDL  --   --   --   --   --  91 120 118   HDL  --   --   --   --   --  52 46* 49*   TRIG   "--   --   --   --   --  72 103 87   ALT 43  --  52 31   < > 26 21 27   CR 0.65 0.62 0.62 0.64   < > 0.61 0.71 0.58   GFRESTIMATED >90 >90 126.3 >90   < > >90  Non  GFR Calc   >90  Non  GFR Calc   >90  Non  GFR Calc     GFRESTBLACK >90 >90  --  >90   < > >90   GFR Calc   >90   GFR Calc   >90   GFR Calc     POTASSIUM 3.8 4.0 3.9 4.1   < > 4.4 4.3 3.9   TSH 0.46  --   --  1.21   < >  --   --   --     < > = values in this interval not displayed.        Mammogram not appropriate for this patient based on age.    History of abnormal Pap smear: YES - updated in Problem List and Health Maintenance accordingly  PAP / HPV Latest Ref Rng & Units 1/27/2020 5/23/2016 8/1/2013   PAP - NIL NIL NIL   HPV 16 DNA NEG:Negative Negative - -   HPV 18 DNA NEG:Negative Negative - -   OTHER HR HPV NEG:Negative Negative - -       Review of Systems  CONSTITUTIONAL: NEGATIVE for fever, chills, change in weight.  Difficulty gaining weight.   INTEGUMENTARU/SKIN: NEGATIVE for worrisome rashes, moles or lesions  EYES: NEGATIVE for vision changes or irritation  ENT: NEGATIVE for ear, mouth and throat problems  RESP: NEGATIVE for significant cough or SOB  BREAST: NEGATIVE for masses, tenderness or discharge  CV: NEGATIVE for chest pain, or peripheral edema  GI: abdominal pain, as previously noted  : NEGATIVE for unusual urinary or vaginal symptoms. Positive findings As above.   MUSCULOSKELETAL: NEGATIVE for significant arthralgias or myalgia  NEURO: NEGATIVE for weakness, dizziness or paresthesias  PSYCHIATRIC: NEGATIVE for changes in mood or affect     OBJECTIVE:   /70 (BP Location: Right arm, Cuff Size: Adult Regular)   Pulse 82   Ht 1.626 m (5' 4\")   Wt 45.2 kg (99 lb 12 oz)   LMP 01/26/2020   BMI 17.12 kg/m    Physical Exam  Gen: Alert and oriented times 3, no acute distress.  Well developed, well nourished, pleasant.    Neck: Supple, no " "masses.  No thyromegaly.  Breast: declined  Chest:  Non labored.  Clear to auscultation bilaterally.    Heart: Regular, normal S1, S2.  No murmurs.   Abdomen: Soft, nontender, nondistended.  No hepatosplenomegaly.    :  Done a week ago.  No significant findings.   Extremities:  Nontender, no edema.      ASSESSMENT/PLAN:       ICD-10-CM    1. Well female exam with routine gynecological exam Z01.419    2. History of abnormal cervical Pap smear Z87.42    3. Pelvic congestion syndrome N94.89 IR REFERRAL     Discussed management options for pelvic congestion syndrome.  She would like to try uterine artery embolization.  Discussed management options for hypothalamic amenorrhea.  Recommend weight gain.  E2 on the low side compared to her FSH level and endometrial stripe 2mm, so I don't think a progesterone challenge will help us.  Discussed BABAK use.  She would like to hold off on that for now.     Difficulty gaining weight.  Will start Boost and monitor caloric intake.  Declines nutrition referral.   Palpitations - she will see Brendan Ryder.  She will also see him for all other non-gyn concerns.   Flu vaccine declined.     COUNSELING:  Reviewed preventive health counseling, as reflected in patient instructions    Estimated body mass index is 17.12 kg/m  as calculated from the following:    Height as of this encounter: 1.626 m (5' 4\").    Weight as of this encounter: 45.2 kg (99 lb 12 oz).    Weight management plan management per PCP     reports that she has never smoked. She has never used smokeless tobacco.      Counseling Resources:  ATP IV Guidelines  Pooled Cohorts Equation Calculator  Breast Cancer Risk Calculator  FRAX Risk Assessment  ICSI Preventive Guidelines  Dietary Guidelines for Americans, 2010  USDA's MyPlate  ASA Prophylaxis  Lung CA Screening    Amada Dallas MD  [facility]  Answers for HPI/ROS submitted by the patient on 1/30/2020   If you checked off any problems, how difficult have " these problems made it for you to do your work, take care of things at home, or get along with other people?: Extremely difficult  PHQ9 TOTAL SCORE: 23

## 2020-02-10 NOTE — TELEPHONE ENCOUNTER
Patient scheduled for a 15 minute appointment on Friday at 4:30 PM - can we see if this can be rescheduled? Or if it's okay to wait?

## 2020-02-11 NOTE — TELEPHONE ENCOUNTER
DIAGNOSIS: Pelvic congestion syndrome // per pt // Dr. Amada Dallas referring // recds internal   DATE RECEIVED: 2.19.20   NOTES STATUS DETAILS   OFFICE NOTE from referring provider Internal 2.6.20, 1.27.20 Dr. Amada Dallas   OFFICE NOTE from other specialist N/A    OPERATIVE REPORT N/A    MEDICATION LIST Internal    PERTINENT LABS Internal    CTA (CT ANGIOGRAPHY) N/A    CT N/A    MRI N/A    ULTRASOUND Internal 1.30.20, 8.27.18

## 2020-02-19 ENCOUNTER — PRE VISIT (OUTPATIENT)
Dept: VASCULAR SURGERY | Facility: CLINIC | Age: 35
End: 2020-02-19

## 2020-02-26 ENCOUNTER — ALLIED HEALTH/NURSE VISIT (OUTPATIENT)
Dept: FAMILY MEDICINE | Facility: OTHER | Age: 35
End: 2020-02-26
Payer: COMMERCIAL

## 2020-02-26 DIAGNOSIS — Z23 NEED FOR PROPHYLACTIC VACCINATION AND INOCULATION AGAINST INFLUENZA: Primary | ICD-10-CM

## 2020-02-26 PROCEDURE — 86580 TB INTRADERMAL TEST: CPT

## 2020-02-26 PROCEDURE — 99207 ZZC NO CHARGE NURSE ONLY: CPT

## 2020-02-26 NOTE — PROGRESS NOTES
Prior to immunization administration, verified patients identity using patient s name and date of birth. Please see Immunization Activity for additional information.     Screening Questionnaire for Adult Immunization    Are you sick today?   No   Do you have allergies to medications, food, a vaccine component or latex?   No   Have you ever had a serious reaction after receiving a vaccination?   No   Do you have a long-term health problem with heart, lung, kidney, or metabolic disease (e.g., diabetes), asthma, a blood disorder, no spleen, complement component deficiency, a cochlear implant, or a spinal fluid leak?  Are you on long-term aspirin therapy?   No   Do you have cancer, leukemia, HIV/AIDS, or any other immune system problem?   No   Do you have a parent, brother, or sister with an immune system problem?   No   In the past 3 months, have you taken medications that affect  your immune system, such as prednisone, other steroids, or anticancer drugs; drugs for the treatment of rheumatoid arthritis, Crohn s disease, or psoriasis; or have you had radiation treatments?   No   Have you had a seizure, or a brain or other nervous system problem?   No   During the past year, have you received a transfusion of blood or blood    products, or been given immune (gamma) globulin or antiviral drug?   No   For women: Are you pregnant or is there a chance you could become       pregnant during the next month?   No   Have you received any vaccinations in the past 4 weeks?   No     Immunization questionnaire answers were all negative.        Per orders of  , injection of Mantoux given by Shirley Guzman MA. Patient instructed to remain in clinic for 15 minutes afterwards, and to report any adverse reaction to me immediately.       Screening performed by Shirley Guzman MA on 2/26/2020 at 3:15 PM.

## 2020-02-28 ENCOUNTER — ALLIED HEALTH/NURSE VISIT (OUTPATIENT)
Dept: FAMILY MEDICINE | Facility: OTHER | Age: 35
End: 2020-02-28
Payer: COMMERCIAL

## 2020-02-28 DIAGNOSIS — Z11.1 SCREENING EXAMINATION FOR PULMONARY TUBERCULOSIS: Primary | ICD-10-CM

## 2020-02-28 LAB
PPDINDURATION: 0 MM (ref 0–5)
PPDREDNESS: 0 MM

## 2020-02-28 NOTE — PROGRESS NOTES
Erin Ashley is here for Mantoux reading.   It was placed on her/his right forearm on 02/26/2020 at 1450 time.    They are in the 48-72 hour window.    Mantoux was read by Lucien Garcia RN, BSN    Mantoux results: No induration. No swelling. No redness.    Form filled out as needed    If two step mantoux is needed, patient reminded to schedule float visit in at least one week.    No further questions at this time.    Lucien Garcia RN, BSN

## 2020-02-28 NOTE — LETTER
[address]  [facility] [address]  [phone]          2/28/2020          To Whom it May Concern:     Erin Ashley, female, 1985 had a mantoux placed on 02/26/2020 and read on 02/28/2020.      Mantoux result is NEGATIVE:  Lab Results   Component Value Date    PPDREDNESS 0 02/28/2020    PPDINDURATIO 0 02/28/2020         Please contact me for questions or concerns.        Sincerely,    Lucien Garcia RN

## 2020-03-04 ENCOUNTER — OFFICE VISIT (OUTPATIENT)
Dept: NEUROLOGY | Facility: CLINIC | Age: 35
End: 2020-03-04
Attending: PHYSICIAN ASSISTANT
Payer: COMMERCIAL

## 2020-03-04 VITALS
OXYGEN SATURATION: 100 % | BODY MASS INDEX: 17.85 KG/M2 | WEIGHT: 104 LBS | HEART RATE: 101 BPM | SYSTOLIC BLOOD PRESSURE: 108 MMHG | DIASTOLIC BLOOD PRESSURE: 74 MMHG

## 2020-03-04 DIAGNOSIS — G43.019 INTRACTABLE MIGRAINE WITHOUT AURA AND WITHOUT STATUS MIGRAINOSUS: Primary | ICD-10-CM

## 2020-03-04 DIAGNOSIS — G43.009 MIGRAINE WITHOUT AURA AND WITHOUT STATUS MIGRAINOSUS, NOT INTRACTABLE: ICD-10-CM

## 2020-03-04 DIAGNOSIS — R11.0 NAUSEA: ICD-10-CM

## 2020-03-04 PROCEDURE — 99204 OFFICE O/P NEW MOD 45 MIN: CPT | Performed by: PSYCHIATRY & NEUROLOGY

## 2020-03-04 RX ORDER — DIVALPROEX SODIUM 500 MG/1
500 TABLET, DELAYED RELEASE ORAL DAILY
Qty: 30 TABLET | Refills: 1 | Status: SHIPPED | OUTPATIENT
Start: 2020-03-04 | End: 2020-04-08

## 2020-03-04 ASSESSMENT — PAIN SCALES - GENERAL: PAINLEVEL: MODERATE PAIN (5)

## 2020-03-04 NOTE — NURSING NOTE
Erin Ashley's goals for this visit include:   Chief Complaint   Patient presents with     Consult     migraine without aura and without status migrainosus, not intractable; facial numbness; Multiple somatic complaints       She requests these members of her care team be copied on today's visit information: no    PCP: Hussain Ryder    Referring Provider:  Hussain Ryder PA-C  [address] [place] [id number]    /74 (BP Location: Left arm, Patient Position: Sitting, Cuff Size: Adult Regular)   Pulse 101   Wt 47.2 kg (104 lb)   SpO2 100%   BMI 17.85 kg/m      Do you need any medication refills at today's visit? no

## 2020-03-04 NOTE — PROGRESS NOTES
Visit Date:   03/04/2020      HISTORY OF PRESENT ILLNESS:  Erin Ashley is a 34-year-old female here for evaluation of intractable headaches.      She tells me she began experiencing frequent headaches about 2 years ago.  Prior to that, she never really had headaches very often.      She tells me the headaches are on the right side about 90% of the time.  They are in the orbital frontal area, but will extend posteriorly back to her neck.  She will get some facial pain on the left side with the headaches.  There is some associated nausea and phonophobia with the headaches.  She had 1 headache where she vomited.  She also had 1 episode where she gradually lost vision from her right eye.      She does indicate that her nose might run when she has one of the headaches.  There is no ptosis with those.      Currently, she is having headaches daily.  The headaches can last anywhere from hours to days at a time.  She is taking sumatriptan on a daily basis and it has not helped her.      Previously, she was on amitriptyline at a dose of 25 mg, which was ineffective.  She is now on duloxetine for psychiatric reasons.  She thinks she may have been on Lamictal in the past, but this may have been for depression and anxiety rather than headache management.      She is currently on gabapentin 100 mg in morning and 200 mg at night, which has not helped.  She cannot tolerate higher doses of gabapentin.      Since all of this has begun, she has had a sense of numbness on the right side of her face and in the back of her head.  She has had brain imaging studies done with the most recent being a head MRI scan with and without contrast in 11/2019.  I personally reviewed the images with her and the study is essentially normal.      Laboratory work done in January included a normal CBC, comprehensive metabolic panel, and TSH.      In 07/2018 she woke up with a left foot drop.  She did have a lumbar MRI scan done at that time.  It was  essentially normal.  The foot drop eventually resolved.  She is a thin female and I, at this time, cannot explain the left foot drop, but it may have been a peroneal neuropathy.      PAST MEDICAL HISTORY:  Notable for 2 bouts of kidney stones.  She has depression and anxiety.  She was treated for iron deficiency anemia in the past.  She has chronic abdominal cramping and pelvic congestion.      CURRENT MEDICATIONS:  Bentyl, duloxetine, lorazepam, sumatriptan and ergocalciferol.      ALLERGIES:  SHE IS INTOLERANT OF HYDROCODONE AND ACETAMINOPHEN, BUT OTHERWISE HAS NO MEDICAL ALLERGIES.      FAMILY HISTORY:  Notable for her mother and maternal grandmother having migraine.  Her father has a tremor.  She has a brother who is healthy.      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  She is not planning pregnancy and indicates she is currently not sexually active.  She does not smoke or use alcohol.      PHYSICAL EXAMINATION:   GENERAL:  Reveals a thin female.  Heart rate 101.  Blood pressure 108/74.  Weight 104 pounds.   NECK:  Cervical bruits are not appreciated.   CRANIAL NERVES:  Her pupils are equal, both in bright and dim light.  Both react well.  Funduscopic examination reveals sharp disc margins.  She has full extraocular motility.  She reports a relative decreased touch over the right side of her face.  She reports decreased pinprick at the base of her skull on the right.  Otherwise, cranial nerves II-XII are intact.  Motor, sensory, cerebellar and gait testing are normal.  Reflexes are 2+.  Plantar responses are flexor.      IMPRESSION:  Chronic headaches.      I suspect this represents migraine.      PLAN:  I discussed the above with her.  She needs to have some treatment to help reduce the frequency of her headaches.  Sumatriptan at this point that she is taking on a daily basis is probably counterproductive and I explained this to her as well.      Her  treatment options are a bit limited.  She has a history of kidney stones and she is also thin and I would not want to use Topamax in her case.  She is already on Cymbalta and I would not add another antidepressant drug.  A beta blocker could be problematic because of her depression and also somewhat low normal blood pressure.  The gabapentin is not helpful.      While I typically do not use Depakote in younger women, it would be a treatment option for her.  She is not sexually active currently and I stressed the importance that if she is on Depakote that she should continue with precautions.  I also discussed other potential side effects including weight gain and tremor.  I also mentioned that we would have to monitor blood counts and liver functions.      She is willing to give the Depakote a try.  She will start on a dose of 500 mg.  She can stop it if she experiences any untoward side effects.      The gabapentin has not been helpful and she is going to taper off the drug.      I will see her back in a month.  If she is not improving, the next step would be a referral to the Headache Clinic at the [facility] as she might be a candidate for other treatment options such as Botox or CGRP antagonist.  I did briefly touch upon this today with her.      Total visit time 25 minutes.  More than 50% of this time was spent in counseling and coordination of care.         KRYSTA ROBISON MD             D: 2020   T: 2020   MT: GLENYS      Name:     FELIPE BOCANEGRA   MRN:      [id number]        Account:      [id number]   :      1985           Visit Date:   2020      Document: [id number]

## 2020-03-04 NOTE — LETTER
3/4/2020         RE: Erin Ashley  [address]  [facility] [address]        Dear Colleague,    Thank you for referring your patient, Erin Ashley, to the [facility]. Please see a copy of my visit note below.    Visit Date:   03/04/2020      HISTORY OF PRESENT ILLNESS:  Erin Ashley is a 34-year-old female here for evaluation of intractable headaches.      She tells me she began experiencing frequent headaches about 2 years ago.  Prior to that, she never really had headaches very often.      She tells me the headaches are on the right side about 90% of the time.  They are in the orbital frontal area, but will extend posteriorly back to her neck.  She will get some facial pain on the left side with the headaches.  There is some associated nausea and phonophobia with the headaches.  She had 1 headache where she vomited.  She also had 1 episode where she gradually lost vision from her right eye.      She does indicate that her nose might run when she has one of the headaches.  There is no ptosis with those.      Currently, she is having headaches daily.  The headaches can last anywhere from hours to days at a time.  She is taking sumatriptan on a daily basis and it has not helped her.      Previously, she was on amitriptyline at a dose of 25 mg, which was ineffective.  She is now on duloxetine for psychiatric reasons.  She thinks she may have been on Lamictal in the past, but this may have been for depression and anxiety rather than headache management.      She is currently on gabapentin 100 mg in morning and 200 mg at night, which has not helped.  She cannot tolerate higher doses of gabapentin.      Since all of this has begun, she has had a sense of numbness on the right side of her face and in the back of her head.  She has had brain imaging studies done with the most recent being a head MRI scan with and without contrast in 11/2019.  I personally reviewed the images with her and the  study is essentially normal.      Laboratory work done in January included a normal CBC, comprehensive metabolic panel, and TSH.      In 07/2018 she woke up with a left foot drop.  She did have a lumbar MRI scan done at that time.  It was essentially normal.  The foot drop eventually resolved.  She is a thin female and I, at this time, cannot explain the left foot drop, but it may have been a peroneal neuropathy.      PAST MEDICAL HISTORY:  Notable for 2 bouts of kidney stones.  She has depression and anxiety.  She was treated for iron deficiency anemia in the past.  She has chronic abdominal cramping and pelvic congestion.      CURRENT MEDICATIONS:  Bentyl, duloxetine, lorazepam, sumatriptan and ergocalciferol.      ALLERGIES:  SHE IS INTOLERANT OF HYDROCODONE AND ACETAMINOPHEN, BUT OTHERWISE HAS NO MEDICAL ALLERGIES.      FAMILY HISTORY:  Notable for her mother and maternal grandmother having migraine.  Her father has a tremor.  She has a brother who is healthy.      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  She is not planning pregnancy and indicates she is currently not sexually active.  She does not smoke or use alcohol.      PHYSICAL EXAMINATION:   GENERAL:  Reveals a thin female.  Heart rate 101.  Blood pressure 108/74.  Weight 104 pounds.   NECK:  Cervical bruits are not appreciated.   CRANIAL NERVES:  Her pupils are equal, both in bright and dim light.  Both react well.  Funduscopic examination reveals sharp disc margins.  She has full extraocular motility.  She reports a relative decreased touch over the right side of her face.  She reports decreased pinprick at the base of her skull on the right.  Otherwise, cranial nerves II-XII are intact.  Motor, sensory, cerebellar and gait testing are normal.  Reflexes are 2+.  Plantar responses are flexor.      IMPRESSION:  Chronic headaches.      I suspect this represents migraine.      PLAN:  I discussed the  above with her.  She needs to have some treatment to help reduce the frequency of her headaches.  Sumatriptan at this point that she is taking on a daily basis is probably counterproductive and I explained this to her as well.      Her treatment options are a bit limited.  She has a history of kidney stones and she is also thin and I would not want to use Topamax in her case.  She is already on Cymbalta and I would not add another antidepressant drug.  A beta blocker could be problematic because of her depression and also somewhat low normal blood pressure.  The gabapentin is not helpful.      While I typically do not use Depakote in younger women, it would be a treatment option for her.  She is not sexually active currently and I stressed the importance that if she is on Depakote that she should continue with precautions.  I also discussed other potential side effects including weight gain and tremor.  I also mentioned that we would have to monitor blood counts and liver functions.      She is willing to give the Depakote a try.  She will start on a dose of 500 mg.  She can stop it if she experiences any untoward side effects.      The gabapentin has not been helpful and she is going to taper off the drug.      I will see her back in a month.  If she is not improving, the next step would be a referral to the Headache Clinic at the [facility] as she might be a candidate for other treatment options such as Botox or CGRP antagonist.  I did briefly touch upon this today with her.      Total visit time 25 minutes.  More than 50% of this time was spent in counseling and coordination of care.         KRYSTA ROBISON MD             D: 2020   T: 2020   MT: GLENYS      Name:     FELIPE BOCANEGRA   MRN:      [id number]        Account:      [id number]   :      1985           Visit Date:   2020      Document: [id number]        Again, thank you for allowing me to participate in the care of  your patient.        Sincerely,        Todd Hammond MD

## 2020-03-06 RX ORDER — SUMATRIPTAN 25 MG/1
TABLET, FILM COATED ORAL
Qty: 9 TABLET | Refills: 2 | Status: SHIPPED | OUTPATIENT
Start: 2020-03-06 | End: 2020-04-23

## 2020-03-06 RX ORDER — ONDANSETRON 8 MG/1
TABLET, ORALLY DISINTEGRATING ORAL
Qty: 12 TABLET | Refills: 1 | Status: SHIPPED | OUTPATIENT
Start: 2020-03-06 | End: 2020-04-24

## 2020-03-06 NOTE — TELEPHONE ENCOUNTER
Pending Prescriptions:                       Disp   Refills    SUMAtriptan (IMITREX) 25 MG tablet [Pharma*9 tabl*2        Sig: TAKE 1-2 TABLETS BY MOUTH AT ONSET OF HEADACHE,CAN           REPEAT IN 2 HOURS IF HEADACHE NOT IMPROVED    ondansetron (ZOFRAN-ODT) 8 MG ODT tab [Pha*12 tab*1        Sig: TAKE ONE TABLET BY MOUTH EVERY 8 HOURS AS NEEDED FOR           NAUSEA    Routing refill request to provider for review/approval because:  Drug not active on patient's medication list      Serotonin Agonists Failed3/4 1:11 PM   Serotonin Agonist request needs review.

## 2020-03-08 ENCOUNTER — ANCILLARY PROCEDURE (OUTPATIENT)
Dept: GENERAL RADIOLOGY | Facility: CLINIC | Age: 35
End: 2020-03-08
Attending: INTERNAL MEDICINE
Payer: COMMERCIAL

## 2020-03-08 ENCOUNTER — OFFICE VISIT (OUTPATIENT)
Dept: URGENT CARE | Facility: URGENT CARE | Age: 35
End: 2020-03-08
Payer: COMMERCIAL

## 2020-03-08 VITALS
SYSTOLIC BLOOD PRESSURE: 120 MMHG | WEIGHT: 106.38 LBS | BODY MASS INDEX: 18.16 KG/M2 | HEART RATE: 102 BPM | RESPIRATION RATE: 20 BRPM | HEIGHT: 64 IN | TEMPERATURE: 97.6 F | DIASTOLIC BLOOD PRESSURE: 83 MMHG | OXYGEN SATURATION: 100 %

## 2020-03-08 DIAGNOSIS — R10.9 CHRONIC ABDOMINAL PAIN: Primary | ICD-10-CM

## 2020-03-08 DIAGNOSIS — R10.9 CHRONIC ABDOMINAL PAIN: ICD-10-CM

## 2020-03-08 DIAGNOSIS — R11.0 NAUSEA: ICD-10-CM

## 2020-03-08 DIAGNOSIS — R10.13 EPIGASTRIC PAIN: ICD-10-CM

## 2020-03-08 DIAGNOSIS — G89.29 CHRONIC ABDOMINAL PAIN: ICD-10-CM

## 2020-03-08 DIAGNOSIS — G89.29 CHRONIC ABDOMINAL PAIN: Primary | ICD-10-CM

## 2020-03-08 DIAGNOSIS — R19.5 ABNORMAL FECES: ICD-10-CM

## 2020-03-08 DIAGNOSIS — R10.12 LEFT UPPER QUADRANT PAIN: ICD-10-CM

## 2020-03-08 LAB
BASOPHILS # BLD AUTO: 0 10E9/L (ref 0–0.2)
BASOPHILS NFR BLD AUTO: 0.4 %
DIFFERENTIAL METHOD BLD: NORMAL
EOSINOPHIL # BLD AUTO: 0 10E9/L (ref 0–0.7)
EOSINOPHIL NFR BLD AUTO: 0.5 %
ERYTHROCYTE [DISTWIDTH] IN BLOOD BY AUTOMATED COUNT: 12.3 % (ref 10–15)
HCT VFR BLD AUTO: 43.7 % (ref 35–47)
HGB BLD-MCNC: 14.6 G/DL (ref 11.7–15.7)
LYMPHOCYTES # BLD AUTO: 1.9 10E9/L (ref 0.8–5.3)
LYMPHOCYTES NFR BLD AUTO: 33 %
MCH RBC QN AUTO: 30 PG (ref 26.5–33)
MCHC RBC AUTO-ENTMCNC: 33.4 G/DL (ref 31.5–36.5)
MCV RBC AUTO: 90 FL (ref 78–100)
MONOCYTES # BLD AUTO: 0.5 10E9/L (ref 0–1.3)
MONOCYTES NFR BLD AUTO: 8 %
NEUTROPHILS # BLD AUTO: 3.3 10E9/L (ref 1.6–8.3)
NEUTROPHILS NFR BLD AUTO: 58.1 %
PLATELET # BLD AUTO: 255 10E9/L (ref 150–450)
RBC # BLD AUTO: 4.87 10E12/L (ref 3.8–5.2)
WBC # BLD AUTO: 5.6 10E9/L (ref 4–11)

## 2020-03-08 PROCEDURE — 85025 COMPLETE CBC W/AUTO DIFF WBC: CPT | Performed by: INTERNAL MEDICINE

## 2020-03-08 PROCEDURE — 87177 OVA AND PARASITES SMEARS: CPT | Performed by: INTERNAL MEDICINE

## 2020-03-08 PROCEDURE — 80053 COMPREHEN METABOLIC PANEL: CPT | Performed by: INTERNAL MEDICINE

## 2020-03-08 PROCEDURE — 83690 ASSAY OF LIPASE: CPT | Performed by: INTERNAL MEDICINE

## 2020-03-08 PROCEDURE — 87209 SMEAR COMPLEX STAIN: CPT | Performed by: INTERNAL MEDICINE

## 2020-03-08 PROCEDURE — 36415 COLL VENOUS BLD VENIPUNCTURE: CPT | Performed by: INTERNAL MEDICINE

## 2020-03-08 PROCEDURE — 99214 OFFICE O/P EST MOD 30 MIN: CPT | Performed by: INTERNAL MEDICINE

## 2020-03-08 PROCEDURE — 74019 RADEX ABDOMEN 2 VIEWS: CPT

## 2020-03-08 ASSESSMENT — ENCOUNTER SYMPTOMS
DYSURIA: 0
COUGH: 0
CHILLS: 1
SORE THROAT: 0
FEVER: 1
RHINORRHEA: 0

## 2020-03-08 ASSESSMENT — MIFFLIN-ST. JEOR: SCORE: 1177.51

## 2020-03-08 NOTE — PATIENT INSTRUCTIONS
Pain distribution is in area of large gas bubble noted on xray  With large amount stool    Try gas X & miralax daily    Try diet elimination to see if helps symptoms   Dairy, wheat gluten.    Fluids    normal blood counts  Metabolic panel, kidney, liver function tests & lipase pancreas normal     Stool test for parasite/ worm sent

## 2020-03-08 NOTE — PROGRESS NOTES
SUBJECTIVE:   Erin Ashley is a 34 year old female presenting with a chief complaint of   Chief Complaint   Patient presents with     Rectal Problem     Abdominal Pain     X 2 days       She is an established patient of [facility].    Abdominal Pain  Being evaluated for abd pain for 1-2  years  Location: epigastric and LUQ to back     Current symptoms for 2 days with change in stool    Soft,  Long white strands, worried about worm infection   Spots of blood  4 bowel movement / day  Nausea without vomiting  Taking zofran - not helpful  Abd pain 6/10  Pain usually 4/10.  Feel awful, would rather die than deal with pain    NL CT abd/pelvis    U/S pelvis - pelvic congestion syndrome    Review of Systems   Constitutional: Positive for chills and fever (100.3).   HENT: Negative for rhinorrhea and sore throat.    Respiratory: Negative for cough.    Genitourinary: Negative for dysuria and vaginal discharge.       Past Medical History:   Diagnosis Date     Abnormal Pap smear      Appendicitis with perforation 08/04/08    Admit. DIscharged 08/07/08     Back pain      TALIB II (cervical intraepithelial neoplasia II) 2008 5/29/08 on coloposcopy bx TALIB 2/3.  CKC on 6/13/08 showed TALIB 1/2 *yearly paps are indicated*     Insomnia      Renal calculus or stone      Family History   Problem Relation Age of Onset     Hypertension Maternal Grandmother         borderline     Lipids Maternal Grandmother      Respiratory Maternal Grandmother         COPD     Lung Cancer Maternal Grandmother      Hypertension Maternal Grandfather         borderline     Cancer Maternal Grandfather         lung cancer, smoker     Gastrointestinal Disease Mother         cholecystitis     Lipids Mother      Prostate Cancer Paternal Grandfather      Hearing Loss Paternal Grandfather      Other Cancer Paternal Grandmother         pancreatic cancer     Unknown/Adopted No family hx of      Asthma No family hx of      C.A.D. No family hx of      Diabetes No  family hx of      Cerebrovascular Disease No family hx of      Breast Cancer No family hx of      Cancer - colorectal No family hx of      Alcohol/Drug No family hx of      Allergies No family hx of      Alzheimer Disease No family hx of      Anesthesia Reaction No family hx of      Arthritis No family hx of      Blood Disease No family hx of      Cardiovascular No family hx of      Circulatory No family hx of      Congenital Anomalies No family hx of      Connective Tissue Disorder No family hx of      Depression No family hx of      Genetic Disorder No family hx of      Genitourinary Problems No family hx of      Gynecology No family hx of      Heart Disease No family hx of      Musculoskeletal Disorder No family hx of      Neurologic Disorder No family hx of      Obesity No family hx of      Osteoporosis No family hx of      Psychotic Disorder No family hx of      Current Outpatient Medications   Medication Sig Dispense Refill     Acetaminophen (TYLENOL PO) Take 1,000 mg by mouth       dicyclomine (BENTYL) 10 MG capsule Take 1 capsule (10 mg) by mouth 3 times daily       divalproex sodium delayed-release (DEPAKOTE) 500 MG DR tablet Take 1 tablet (500 mg) by mouth daily 30 tablet 1     DULoxetine (CYMBALTA) 60 MG capsule        LORazepam (ATIVAN) 0.5 MG tablet        ondansetron (ZOFRAN-ODT) 8 MG ODT tab TAKE ONE TABLET BY MOUTH EVERY 8 HOURS AS NEEDED FOR NAUSEA 12 tablet 1     SUMAtriptan (IMITREX) 25 MG tablet TAKE 1-2 TABLETS BY MOUTH AT ONSET OF HEADACHE,CAN REPEAT IN 2 HOURS IF HEADACHE NOT IMPROVED 9 tablet 2     vitamin D2 (ERGOCALCIFEROL) 59412 units (1250 mcg) capsule Take 1 capsule (50,000 Units) by mouth once a week 8 capsule 0     Social History     Tobacco Use     Smoking status: Never Smoker     Smokeless tobacco: Never Used     Tobacco comment: no smokers in the household   Substance Use Topics     Alcohol use: No     Alcohol/week: 0.0 standard drinks       OBJECTIVE  /83   Pulse 102   Temp  "97.6  F (36.4  C) (Oral)   Resp 20   Ht 1.626 m (5' 4\")   Wt 48.3 kg (106 lb 6 oz)   LMP  (LMP Unknown)   SpO2 100%   Breastfeeding No   BMI 18.26 kg/m      Physical Exam  Vitals signs reviewed.   Constitutional:       Appearance: Normal appearance. She is ill-appearing.   HENT:      Mouth/Throat:      Mouth: Mucous membranes are moist.   Cardiovascular:      Rate and Rhythm: Normal rate and regular rhythm.      Pulses: Normal pulses.      Heart sounds: Normal heart sounds.   Pulmonary:      Effort: Pulmonary effort is normal.      Breath sounds: Normal breath sounds.   Abdominal:      General: Bowel sounds are normal. There is no distension.      Palpations: Abdomen is soft.      Tenderness: There is abdominal tenderness. There is no guarding or rebound.      Comments: Epigastric and left upper quadrant area   Skin:     Coloration: Skin is not jaundiced.   Neurological:      Mental Status: She is alert.   Psychiatric:         Behavior: Behavior normal.         Thought Content: Thought content normal.         Judgment: Judgment normal.      Comments: Tearful at 1 point         Labs:  Results for orders placed or performed in visit on 03/08/20 (from the past 24 hour(s))   CBC with platelets and differential   Result Value Ref Range    WBC 5.6 4.0 - 11.0 10e9/L    RBC Count 4.87 3.8 - 5.2 10e12/L    Hemoglobin 14.6 11.7 - 15.7 g/dL    Hematocrit 43.7 35.0 - 47.0 %    MCV 90 78 - 100 fl    MCH 30.0 26.5 - 33.0 pg    MCHC 33.4 31.5 - 36.5 g/dL    RDW 12.3 10.0 - 15.0 %    Platelet Count 255 150 - 450 10e9/L    Diff Method Automated Method     % Neutrophils 58.1 %    % Lymphocytes 33.0 %    % Monocytes 8.0 %    % Eosinophils 0.5 %    % Basophils 0.4 %    Absolute Neutrophil 3.3 1.6 - 8.3 10e9/L    Absolute Lymphocytes 1.9 0.8 - 5.3 10e9/L    Absolute Monocytes 0.5 0.0 - 1.3 10e9/L    Absolute Eosinophils 0.0 0.0 - 0.7 10e9/L    Absolute Basophils 0.0 0.0 - 0.2 10e9/L       X-Ray was done, my findings are: Upright " film shows large collection of gas left upper quadrant where patient is having her pain.  Flat film reveals large amount of gas collection bilateral upper quadrants with large amount of stool.      ASSESSMENT:      ICD-10-CM    1. Chronic abdominal pain  R10.9 CBC with platelets and differential    G89.29 Comprehensive metabolic panel (BMP + Alb, Alk Phos, ALT, AST, Total. Bili, TP)     XR Abdomen 2 Views     Lipase     Parasite direct exam     Ova and Parasite Exam Routine     Ova and Parasite Exam Routine   2. Nausea  R11.0 CBC with platelets and differential     Comprehensive metabolic panel (BMP + Alb, Alk Phos, ALT, AST, Total. Bili, TP)     XR Abdomen 2 Views     Lipase     Parasite direct exam     Ova and Parasite Exam Routine     Ova and Parasite Exam Routine   3. Abnormal feces  R19.5 CBC with platelets and differential     Comprehensive metabolic panel (BMP + Alb, Alk Phos, ALT, AST, Total. Bili, TP)     XR Abdomen 2 Views     Lipase     Parasite direct exam     Ova and Parasite Exam Routine     Ova and Parasite Exam Routine   4. Left upper quadrant pain  R10.12 CBC with platelets and differential     Comprehensive metabolic panel (BMP + Alb, Alk Phos, ALT, AST, Total. Bili, TP)     XR Abdomen 2 Views     Lipase     Parasite direct exam     Ova and Parasite Exam Routine     Ova and Parasite Exam Routine   5. Epigastric pain  R10.13 CBC with platelets and differential     Comprehensive metabolic panel (BMP + Alb, Alk Phos, ALT, AST, Total. Bili, TP)     XR Abdomen 2 Views     Lipase     Parasite direct exam     Ova and Parasite Exam Routine     Ova and Parasite Exam Routine        Medical Decision Making:    Differential Diagnosis:  Abdominal Pain: Constipation, GB/Cholelithiasis, GERD/Ulcer, IBS, Diverticular Disease, Kidney Stone, Pancreatitis, Hepatitis and Non Specific    Patiently specifically worried about worms today in her stool    Serious Comorbid Conditions:  Adult:  Elevated liver function tests,  chronic pelvic pain,    PLAN:  After evaluation of x-ray with patient discuss chronic abdominal pain most likely is related to gas commonly collecting in this area as noted on x-ray.    Patient was in agreement with plan as outlined below and under stands stool tests are pending/other labs may come back tomorrow evening and she will receive the results by my chart.        Patient Instructions     Pain distribution is in area of large gas bubble noted on xray  With large amount stool    Try gas X & miralax daily    Try diet elimination to see if helps symptoms   Dairy, wheat gluten.    Fluids    normal blood counts  Metabolic panel, kidney, liver function tests & lipase pancreas normal     Stool test for parasite/ worm sent

## 2020-03-09 LAB
ALBUMIN SERPL-MCNC: 3.7 G/DL (ref 3.4–5)
ALP SERPL-CCNC: 75 U/L (ref 40–150)
ALT SERPL W P-5'-P-CCNC: 172 U/L (ref 0–50)
ANION GAP SERPL CALCULATED.3IONS-SCNC: 7 MMOL/L (ref 3–14)
AST SERPL W P-5'-P-CCNC: 73 U/L (ref 0–45)
BILIRUB SERPL-MCNC: 0.2 MG/DL (ref 0.2–1.3)
BUN SERPL-MCNC: 5 MG/DL (ref 7–30)
CALCIUM SERPL-MCNC: 8.8 MG/DL (ref 8.5–10.1)
CHLORIDE SERPL-SCNC: 108 MMOL/L (ref 94–109)
CO2 SERPL-SCNC: 24 MMOL/L (ref 20–32)
CREAT SERPL-MCNC: 0.4 MG/DL (ref 0.52–1.04)
GFR SERPL CREATININE-BSD FRML MDRD: >90 ML/MIN/{1.73_M2}
GLUCOSE SERPL-MCNC: 76 MG/DL (ref 70–99)
LIPASE SERPL-CCNC: 128 U/L (ref 73–393)
POTASSIUM SERPL-SCNC: 4 MMOL/L (ref 3.4–5.3)
PROT SERPL-MCNC: 7.3 G/DL (ref 6.8–8.8)
SODIUM SERPL-SCNC: 139 MMOL/L (ref 133–144)

## 2020-03-10 ENCOUNTER — MYC MEDICAL ADVICE (OUTPATIENT)
Dept: FAMILY MEDICINE | Facility: OTHER | Age: 35
End: 2020-03-10

## 2020-03-10 LAB
O+P STL MICRO: NORMAL
O+P STL MICRO: NORMAL
PARASITE SPEC INSPECT: NORMAL
PARASITE SPEC INSPECT: NORMAL
SPECIMEN SOURCE: NORMAL
SPECIMEN SOURCE: NORMAL

## 2020-03-11 ENCOUNTER — OFFICE VISIT (OUTPATIENT)
Dept: VASCULAR SURGERY | Facility: CLINIC | Age: 35
End: 2020-03-11
Attending: OBSTETRICS & GYNECOLOGY
Payer: COMMERCIAL

## 2020-03-11 VITALS — HEART RATE: 116 BPM | OXYGEN SATURATION: 100 % | SYSTOLIC BLOOD PRESSURE: 120 MMHG | DIASTOLIC BLOOD PRESSURE: 77 MMHG

## 2020-03-11 DIAGNOSIS — N94.89 PELVIC CONGESTION: Primary | ICD-10-CM

## 2020-03-11 ASSESSMENT — ENCOUNTER SYMPTOMS
HOARSE VOICE: 0
EYE WATERING: 1
NECK PAIN: 1
VOMITING: 1
PANIC: 1
ORTHOPNEA: 0
DIFFICULTY URINATING: 1
BLOOD IN STOOL: 1
DISTURBANCES IN COORDINATION: 1
TROUBLE SWALLOWING: 0
SEIZURES: 0
SINUS CONGESTION: 1
HEMATURIA: 0
NECK MASS: 0
PARALYSIS: 0
STIFFNESS: 0
CONSTIPATION: 1
NUMBNESS: 1
BRUISES/BLEEDS EASILY: 1
NAUSEA: 1
INCREASED ENERGY: 1
INSOMNIA: 1
SYNCOPE: 0
PALPITATIONS: 1
DEPRESSION: 1
TASTE DISTURBANCE: 1
MEMORY LOSS: 0
SORE THROAT: 0
NIGHT SWEATS: 0
POOR WOUND HEALING: 0
HEADACHES: 1
DIARRHEA: 0
WEIGHT LOSS: 1
NERVOUS/ANXIOUS: 1
DECREASED LIBIDO: 1
LEG PAIN: 0
HEARTBURN: 0
HOT FLASHES: 0
MYALGIAS: 1
ARTHRALGIAS: 0
TREMORS: 0
MUSCLE WEAKNESS: 1
EXERCISE INTOLERANCE: 1
SLEEP DISTURBANCES DUE TO BREATHING: 0
SWOLLEN GLANDS: 1
SPEECH CHANGE: 0
SMELL DISTURBANCE: 1
DYSURIA: 0
DECREASED APPETITE: 1
TINGLING: 1
NAIL CHANGES: 0
BOWEL INCONTINENCE: 0
FEVER: 1
RECTAL PAIN: 0
ABDOMINAL PAIN: 1
JAUNDICE: 0
DECREASED CONCENTRATION: 1
FLANK PAIN: 0
POLYPHAGIA: 0
WEIGHT GAIN: 0
WEAKNESS: 1
LIGHT-HEADEDNESS: 1
EYE REDNESS: 1
SKIN CHANGES: 0
BACK PAIN: 1
LOSS OF CONSCIOUSNESS: 0
EYE IRRITATION: 0
MUSCLE CRAMPS: 1
DIZZINESS: 1
BLOATING: 0
EYE PAIN: 1
JOINT SWELLING: 0
HYPERTENSION: 1
HALLUCINATIONS: 0
FATIGUE: 1
ALTERED TEMPERATURE REGULATION: 1
SINUS PAIN: 1
DOUBLE VISION: 0
HYPOTENSION: 0
POLYDIPSIA: 0
CHILLS: 1

## 2020-03-11 ASSESSMENT — PAIN SCALES - GENERAL: PAINLEVEL: MILD PAIN (3)

## 2020-03-11 NOTE — PATIENT INSTRUCTIONS
Please check into the Gold waiting room, at [facility] located at [address] [place] [address].    You will check in 1.5 hours early to your scheduled procedure time.     Reminders:    -No solids or milk products 6 hours prior to    -No clear liquids 2 hours prior to     -Please have a  as you will be going home afterwards.    -Please do take your morning medications as indicated with enough water to swallow.       1. Please make sure to do no heavy lifting/pushing/pulling over 10lbs until Monday 10/23. You should be able to return back to work at that time. If unable to return back to workt, then please contact me and we will further assess.     2.You should not have any bleeding at the puncture site. You may have some light bruising, however please monitor the area carefully. The bruising should not get worse    3 You may get some very light cramping symptoms. We would recommend that you take 600mg TID afterwards to help with this. You should not have increasing pelvic pain or cramping.     4. You may get fevers afterwards. You can treat this with over the counter Tylenol.     5. Please refrain from sexual intercourse for 3 days.     6. You should not have any swelling in your lower extremities after the procedure. If so, please call me.     7. You may get some nausea, but should not become so nauseaus that you are excessively vomiting. If so, please call us.         Should you have any further questions, please call us anytime. It has been a pleasure to see you today.    Margret JAUREGUI RN  Interventional Radiology Nurse Coordinator   Phone: [phone]

## 2020-03-11 NOTE — PROGRESS NOTES
Subjective     Erin Ashley is a 34 year old female who presents to clinic today for the following health issues:    HPI   ED/UC Followup:  Facility:  [facility]  Date of visit: 3/8/2020  Reason for visit: Abdominal pain  Current Status: Constipation      He continues to experience chronic, left upper quadrant pain, worse over the past few weeks. She is now experiencing right upper quadrant pain which started early this morning, waking her up. She right sided pain is still present but better than it was previously. She has eaten a smoothie and pizza today without any worsening pain. She has noticed fevers and chills on and off since last week and continues to experience nausea and constipation but no vomiting or change in stool color. She is using Miralax with intermittent relief of constipation. She was told she had elevated liver function tests after her urgent care visit last week so wants to know what this means. She continues to experience daily headaches and was prescribed Lamictal recently by neurology but has not started this yet due to being nauseated.     Urgent Care Note 3/8/2020    Medical Decision Making:     Differential Diagnosis:  Abdominal Pain: Constipation, GB/Cholelithiasis, GERD/Ulcer, IBS, Diverticular Disease, Kidney Stone, Pancreatitis, Hepatitis and Non Specific     Patiently specifically worried about worms today in her stool     Serious Comorbid Conditions:  Adult:  Elevated liver function tests, chronic pelvic pain,     PLAN:  After evaluation of x-ray with patient discuss chronic abdominal pain most likely is related to gas commonly collecting in this area as noted on x-ray.     Patient was in agreement with plan as outlined below and under stands stool tests are pending/other labs may come back tomorrow evening and she will receive the results by my chart.    Reviewed and updated as needed this visit by Provider  Allergies  Meds  Problems  Med Hx    Review of Systems  "  GENERAL: +Fever and chills. Denies fatigue, weakness, weight gain, or weight loss.  CARDIOVASCULAR: Denies chest pain, shortness of breath, irregular heartbeats, palpitations, or edema.  RESPIRATORY: Denies cough, hemoptysis, and shortness of breath.  GASTROINTESTINAL: +Nausea, right and left upper quadrant pain, constipation. Denies vomiting, change in appetite, diarrhea.  GENITOURINARY: Denies increased frequency, urgency, dysuria, hematuria, or incontinence.   NEUROLOGIC: +Daily headaches. Denies fainting, dizziness, memory loss, numbness, tingling, or seizures.        Objective    /70   Pulse 96   Temp 99.5  F (37.5  C)   Resp 12   Ht 1.664 m (5' 5.5\")   Wt 47.9 kg (105 lb 9.6 oz)   LMP 01/26/2020 (Approximate)   SpO2 100%   Breastfeeding No   BMI 17.31 kg/m    Body mass index is 17.31 kg/m .  Physical Exam   GENERAL: healthy, alert and no distress  RESP: lungs clear to auscultation - no rales, rhonchi or wheezes  CV: regular rate and rhythm, normal S1 S2, no S3 or S4, no murmur, click or rub  ABDOMEN: soft, mild/moderate right upper quadrant tenderness, left upper quadrant tenderness, left lower quadrant palpations caused right upper quadrant pain, no rebound or guarding, no hepatosplenomegaly, no masses and bowel sounds normal  NEURO: Normal strength and tone, mentation intact and speech normal. Gait is stable.  SKIN: No jaundice or icterus.   PSYCH: mentation appears normal, affect normal/bright      Results for orders placed or performed in visit on 03/12/20   CBC with platelets and differential     Status: None   Result Value Ref Range    WBC 6.5 4.0 - 11.0 10e9/L    RBC Count 4.49 3.8 - 5.2 10e12/L    Hemoglobin 13.7 11.7 - 15.7 g/dL    Hematocrit 41.1 35.0 - 47.0 %    MCV 92 78 - 100 fl    MCH 30.5 26.5 - 33.0 pg    MCHC 33.3 31.5 - 36.5 g/dL    RDW 12.5 10.0 - 15.0 %    Platelet Count 243 150 - 450 10e9/L    % Neutrophils 68.4 %    % Lymphocytes 23.1 %    % Monocytes 7.7 %    % " Eosinophils 0.5 %    % Basophils 0.3 %    Absolute Neutrophil 4.4 1.6 - 8.3 10e9/L    Absolute Lymphocytes 1.5 0.8 - 5.3 10e9/L    Absolute Monocytes 0.5 0.0 - 1.3 10e9/L    Absolute Eosinophils 0.0 0.0 - 0.7 10e9/L    Absolute Basophils 0.0 0.0 - 0.2 10e9/L    Diff Method Automated Method        Last Comprehensive Metabolic Panel:  Sodium   Date Value Ref Range Status   03/08/2020 139 133 - 144 mmol/L Final     Potassium   Date Value Ref Range Status   03/08/2020 4.0 3.4 - 5.3 mmol/L Final     Chloride   Date Value Ref Range Status   03/08/2020 108 94 - 109 mmol/L Final     Carbon Dioxide   Date Value Ref Range Status   03/08/2020 24 20 - 32 mmol/L Final     Anion Gap   Date Value Ref Range Status   03/08/2020 7 3 - 14 mmol/L Final     Glucose   Date Value Ref Range Status   03/08/2020 76 70 - 99 mg/dL Final     Urea Nitrogen   Date Value Ref Range Status   03/08/2020 5 (L) 7 - 30 mg/dL Final     Creatinine   Date Value Ref Range Status   03/08/2020 0.40 (L) 0.52 - 1.04 mg/dL Final     GFR Estimate   Date Value Ref Range Status   03/08/2020 >90 >60 mL/min/[1.73_m2] Final     Comment:     Non  GFR Calc  Starting 12/18/2018, serum creatinine based estimated GFR (eGFR) will be   calculated using the Chronic Kidney Disease Epidemiology Collaboration   (CKD-EPI) equation.       Calcium   Date Value Ref Range Status   03/08/2020 8.8 8.5 - 10.1 mg/dL Final     Bilirubin Total   Date Value Ref Range Status   03/08/2020 0.2 0.2 - 1.3 mg/dL Final     Alkaline Phosphatase   Date Value Ref Range Status   03/08/2020 75 40 - 150 U/L Final     ALT   Date Value Ref Range Status   03/08/2020 172 (H) 0 - 50 U/L Final     AST   Date Value Ref Range Status   03/08/2020 73 (H) 0 - 45 U/L Final               Assessment & Plan       ICD-10-CM    1. RUQ abdominal pain  R10.11 Comprehensive metabolic panel (BMP + Alb, Alk Phos, ALT, AST, Total. Bili, TP)     CBC with platelets and differential     Lipase     Lipase     CBC  with platelets and differential     Comprehensive metabolic panel (BMP + Alb, Alk Phos, ALT, AST, Total. Bili, TP)     US Abdomen Limited   2. Elevated LFTs  R94.5 Comprehensive metabolic panel (BMP + Alb, Alk Phos, ALT, AST, Total. Bili, TP)     Lipase     Lipase     Comprehensive metabolic panel (BMP + Alb, Alk Phos, ALT, AST, Total. Bili, TP)     US Abdomen Limited   3. Fever, unspecified fever cause  R50.9 US Abdomen Limited   4. Constipation, unspecified constipation type  K59.00    5. Migraine without aura and without status migrainosus, not intractable  G43.009    6. Vitamin D deficiency  E55.9 **Vitamin D Deficiency FUTURE 2mo        1-4. Continued chronic left upper quadrant pain, nausea, and constipation, now with right upper quadrant pain since this morning along with elevated LFTs noted on labs on 3/8. She has a low grade fever today but CBC is normal. She has mild/moderate right upper quadrant tenderness but it is tolerable without rebound or guarding. This certainly could be related to a gallbladder etiology such as cholelithiasis or cholecystitis so I recommend a right upper quadrant ultrasound. Also, cannot rule out a liver etiology such as viral hepatitis. She denies any alcohol use. She will complete this first thing tomorrow morning when she has been fasting for 8 hours. I will also check CMP and lipase. I do not believe emergent intervention is necessary as she does not appear toxic but recommend she be seen in the ED right away if she experiencing fevers above 101, vomiting, or significantly worsening right upper quadrant pain. I recommend she continue to stay well hydrated, eat a bland diet and continue with Miralax as needed for constipation.    5. She continues to experience daily headaches and saw neurology recently. She was started on Lamictal but has yet to start this due to nausea. I recommend she hold off on starting this until we know what is causing her elevated liver function  testing.    6. She has been on vitamin D 50,000 units weekly for 6-7 weeks. Will recheck labs today.        Hussain Ryder PA-C  [facility]

## 2020-03-11 NOTE — TELEPHONE ENCOUNTER
[facility]   3/10/2020  3:52 PM by Erin Ashley     Next 5 appointments (look out 90 days)    Mar 12, 2020  1:30 PM CDT  [facility] Hussain [facility]-C  [facility] [address] [place] [id number]  [phone]   Apr 08, 2020 11:30 AM CDT  Return Visit with Todd Hammond MD  [facility] [address] [place] [id number]  [phone]   Apr 24, 2020  1:00 PM CDT  [facility] Visit with Hussain Ryder PA-C  [facility] [address] [place] [phone]  [phone]

## 2020-03-11 NOTE — NURSING NOTE
Vascular Rooming Note     Erin Ashley's goals for this visit include:   Chief Complaint   Patient presents with     Consult     Erin, is being seen today for a consult regarding pelvic congestion syndrome, feeling ok, abdomen pressure/ discomfort, no concerns at this time,as reported by patient.     Na Vang LPN

## 2020-03-12 ENCOUNTER — TELEPHONE (OUTPATIENT)
Dept: VASCULAR SURGERY | Facility: CLINIC | Age: 35
End: 2020-03-12

## 2020-03-12 ENCOUNTER — TEAM CONFERENCE (OUTPATIENT)
Dept: VASCULAR SURGERY | Facility: CLINIC | Age: 35
End: 2020-03-12

## 2020-03-12 ENCOUNTER — OFFICE VISIT (OUTPATIENT)
Dept: FAMILY MEDICINE | Facility: OTHER | Age: 35
End: 2020-03-12
Payer: COMMERCIAL

## 2020-03-12 ENCOUNTER — HOSPITAL ENCOUNTER (OUTPATIENT)
Facility: CLINIC | Age: 35
End: 2020-03-12
Admitting: RADIOLOGY
Payer: COMMERCIAL

## 2020-03-12 VITALS
TEMPERATURE: 99.5 F | HEIGHT: 66 IN | DIASTOLIC BLOOD PRESSURE: 70 MMHG | RESPIRATION RATE: 12 BRPM | WEIGHT: 105.6 LBS | OXYGEN SATURATION: 100 % | BODY MASS INDEX: 16.97 KG/M2 | SYSTOLIC BLOOD PRESSURE: 108 MMHG | HEART RATE: 96 BPM

## 2020-03-12 DIAGNOSIS — K59.00 CONSTIPATION, UNSPECIFIED CONSTIPATION TYPE: ICD-10-CM

## 2020-03-12 DIAGNOSIS — R50.9 FEVER, UNSPECIFIED FEVER CAUSE: ICD-10-CM

## 2020-03-12 DIAGNOSIS — G43.009 MIGRAINE WITHOUT AURA AND WITHOUT STATUS MIGRAINOSUS, NOT INTRACTABLE: ICD-10-CM

## 2020-03-12 DIAGNOSIS — R10.11 RUQ ABDOMINAL PAIN: Primary | ICD-10-CM

## 2020-03-12 DIAGNOSIS — N94.89 PELVIC CONGESTION: ICD-10-CM

## 2020-03-12 DIAGNOSIS — R10.2 PELVIC PRESSURE IN FEMALE: ICD-10-CM

## 2020-03-12 DIAGNOSIS — R10.2 PELVIC PAIN IN FEMALE: Primary | ICD-10-CM

## 2020-03-12 DIAGNOSIS — E55.9 VITAMIN D DEFICIENCY: ICD-10-CM

## 2020-03-12 DIAGNOSIS — R79.89 ELEVATED LFTS: ICD-10-CM

## 2020-03-12 DIAGNOSIS — N94.89 PELVIC CONGESTIVE SYNDROME: ICD-10-CM

## 2020-03-12 LAB
ALBUMIN SERPL-MCNC: 3.7 G/DL (ref 3.4–5)
ALP SERPL-CCNC: 74 U/L (ref 40–150)
ALT SERPL W P-5'-P-CCNC: 93 U/L (ref 0–50)
ANION GAP SERPL CALCULATED.3IONS-SCNC: 5 MMOL/L (ref 3–14)
AST SERPL W P-5'-P-CCNC: 33 U/L (ref 0–45)
BASOPHILS # BLD AUTO: 0 10E9/L (ref 0–0.2)
BASOPHILS NFR BLD AUTO: 0.3 %
BILIRUB SERPL-MCNC: 0.4 MG/DL (ref 0.2–1.3)
BUN SERPL-MCNC: 11 MG/DL (ref 7–30)
CALCIUM SERPL-MCNC: 8.5 MG/DL (ref 8.5–10.1)
CHLORIDE SERPL-SCNC: 106 MMOL/L (ref 94–109)
CO2 SERPL-SCNC: 29 MMOL/L (ref 20–32)
CREAT SERPL-MCNC: 0.6 MG/DL (ref 0.52–1.04)
DIFFERENTIAL METHOD BLD: NORMAL
EOSINOPHIL # BLD AUTO: 0 10E9/L (ref 0–0.7)
EOSINOPHIL NFR BLD AUTO: 0.5 %
ERYTHROCYTE [DISTWIDTH] IN BLOOD BY AUTOMATED COUNT: 12.5 % (ref 10–15)
GFR SERPL CREATININE-BSD FRML MDRD: >90 ML/MIN/{1.73_M2}
GLUCOSE SERPL-MCNC: 73 MG/DL (ref 70–99)
HCT VFR BLD AUTO: 41.1 % (ref 35–47)
HGB BLD-MCNC: 13.7 G/DL (ref 11.7–15.7)
LIPASE SERPL-CCNC: 106 U/L (ref 73–393)
LYMPHOCYTES # BLD AUTO: 1.5 10E9/L (ref 0.8–5.3)
LYMPHOCYTES NFR BLD AUTO: 23.1 %
MCH RBC QN AUTO: 30.5 PG (ref 26.5–33)
MCHC RBC AUTO-ENTMCNC: 33.3 G/DL (ref 31.5–36.5)
MCV RBC AUTO: 92 FL (ref 78–100)
MONOCYTES # BLD AUTO: 0.5 10E9/L (ref 0–1.3)
MONOCYTES NFR BLD AUTO: 7.7 %
NEUTROPHILS # BLD AUTO: 4.4 10E9/L (ref 1.6–8.3)
NEUTROPHILS NFR BLD AUTO: 68.4 %
PLATELET # BLD AUTO: 243 10E9/L (ref 150–450)
POTASSIUM SERPL-SCNC: 3.8 MMOL/L (ref 3.4–5.3)
PROT SERPL-MCNC: 7.3 G/DL (ref 6.8–8.8)
RBC # BLD AUTO: 4.49 10E12/L (ref 3.8–5.2)
SODIUM SERPL-SCNC: 140 MMOL/L (ref 133–144)
WBC # BLD AUTO: 6.5 10E9/L (ref 4–11)

## 2020-03-12 PROCEDURE — 36415 COLL VENOUS BLD VENIPUNCTURE: CPT | Performed by: PHYSICIAN ASSISTANT

## 2020-03-12 PROCEDURE — 82306 VITAMIN D 25 HYDROXY: CPT | Performed by: PHYSICIAN ASSISTANT

## 2020-03-12 PROCEDURE — 99214 OFFICE O/P EST MOD 30 MIN: CPT | Performed by: PHYSICIAN ASSISTANT

## 2020-03-12 PROCEDURE — 83690 ASSAY OF LIPASE: CPT | Performed by: PHYSICIAN ASSISTANT

## 2020-03-12 PROCEDURE — 80053 COMPREHEN METABOLIC PANEL: CPT | Performed by: PHYSICIAN ASSISTANT

## 2020-03-12 PROCEDURE — 85025 COMPLETE CBC W/AUTO DIFF WBC: CPT | Performed by: PHYSICIAN ASSISTANT

## 2020-03-12 ASSESSMENT — MIFFLIN-ST. JEOR: SCORE: 1197.81

## 2020-03-12 ASSESSMENT — PAIN SCALES - GENERAL: PAINLEVEL: SEVERE PAIN (7)

## 2020-03-12 NOTE — TELEPHONE ENCOUNTER
Called pt and informed her that I did go ahead and schedule her for her procedure.     Gonadal vein embolization with Dr. Sun    Date: MOn 4/6   Check in at 630am for an 8am start time    Pending insurance approval.     Prep provided in clinic.     Will keep her updated regarding insurance decision.     Left my direct line for her to return my call     Margret JAUREGUI RN, BSN  Interventional Radiology Nurse Coordinator   Phone: [phone]

## 2020-03-12 NOTE — TELEPHONE ENCOUNTER
Patients Name: Erin Ashley    Patients MRN: [id number]    Doctor s Name: Jason Sun    Procedure CPT codes:     09708- arteriogram    06425- embolization    Diagnosis Codes:     Pelvic venous congestion: N94.89    Pelvic pain: R10.2    Pelvic pressure: R10.2      Past treatment:    None       Scheduled: Monday 4/6/2020      Response: pending

## 2020-03-12 NOTE — PATIENT INSTRUCTIONS
Will schedule US for tomorrow to evaluate your gallbladder.  Continue with a bland diet and plenty of fluids.  If you have a higher fever (above 101) or significant worsening pain or vomiting, you should be seen right away.

## 2020-03-13 ENCOUNTER — MYC MEDICAL ADVICE (OUTPATIENT)
Dept: FAMILY MEDICINE | Facility: OTHER | Age: 35
End: 2020-03-13

## 2020-03-13 ENCOUNTER — ANCILLARY PROCEDURE (OUTPATIENT)
Dept: ULTRASOUND IMAGING | Facility: OTHER | Age: 35
End: 2020-03-13
Attending: PHYSICIAN ASSISTANT
Payer: COMMERCIAL

## 2020-03-13 DIAGNOSIS — R10.11 RUQ ABDOMINAL PAIN: ICD-10-CM

## 2020-03-13 DIAGNOSIS — R50.9 FEVER, UNSPECIFIED FEVER CAUSE: ICD-10-CM

## 2020-03-13 DIAGNOSIS — R79.89 ELEVATED LFTS: ICD-10-CM

## 2020-03-13 DIAGNOSIS — E55.9 VITAMIN D DEFICIENCY: Primary | ICD-10-CM

## 2020-03-13 LAB — DEPRECATED CALCIDIOL+CALCIFEROL SERPL-MC: 24 UG/L (ref 20–75)

## 2020-03-13 PROCEDURE — 76705 ECHO EXAM OF ABDOMEN: CPT

## 2020-03-13 RX ORDER — CHOLECALCIFEROL (VITAMIN D3) 50 MCG
1 TABLET ORAL DAILY
Qty: 90 TABLET | Refills: 1 | Status: SHIPPED | OUTPATIENT
Start: 2020-03-13 | End: 2020-10-21

## 2020-03-16 NOTE — PROGRESS NOTES
Ms. Ashley presents to care for discussion and evaluation of symptoms which may be related to pelvic congestion syndrome.    She reports that starting in approximately June 2018, she has experienced right-sided pelvic pressure which has been consistent described as fullness without any focal pain, more described as a pressure feeling.  She also has some discomfort in the leg and groin area.  For a long period of time, she was amenorrheic, was started on some hormonal therapies and started to have some periods but were not full or normal menses.  She does report the presence of some prominent veins in her lower abdomen/pelvic area.  She denies lower extremity swelling.  She has had 2 children who are 6 and 7 years old.    The pain does not appear to be positional, and that, the pain does not seem to worsen with standing or improved with lying down.  There is no dramatic difference in pain when she wakes up in the morning.  She does report that when she is active and is running, the pain is present and is described as aching.  She denies dyspareunia or pain with intercourse or sexual activity.    /77 (BP Location: Right arm, Patient Position: Chair, Cuff Size: Adult Regular)   Pulse 116   LMP  (LMP Unknown)   SpO2 100%   Gen: alert, nad    Imaging: I reviewed the ultrasound from 1/30//2020 which demonstrates prominent bilateral adnexal vascularity. CT imaging from 6/27/2018 demonstrates dilated ovarian veins bilaterally with prominent parametrial veins.     A/P: Ms. Ashley is a healthy 34-year-old with signs and symptoms and imaging findings which are highly suggestive of pelvic congestion syndrome as the etiology of her symptoms.  She has seen a gynecologist and is interested in minimally invasive treatment of these veins with embolization. Minimal involvement endometriosis is considered less likely given amenorrhea and noncyclical nature of the pain.  We will plan for bilateral gonadal vein venography and  embolization with coils and foam sclerotherapy.    Total approximately 30 minutes spent with the patient which greater than 50% spent on counseling.  Answers for HPI/ROS submitted by the patient on 3/11/2020   General Symptoms: Yes  Skin Symptoms: Yes  HENT Symptoms: Yes  EYE SYMPTOMS: Yes  HEART SYMPTOMS: Yes  LUNG SYMPTOMS: No  INTESTINAL SYMPTOMS: Yes  URINARY SYMPTOMS: Yes  GYNECOLOGIC SYMPTOMS: Yes  BREAST SYMPTOMS: No  SKELETAL SYMPTOMS: Yes  BLOOD SYMPTOMS: Yes  NERVOUS SYSTEM SYMPTOMS: Yes  MENTAL HEALTH SYMPTOMS: Yes  Fever: Yes  Loss of appetite: Yes  Weight loss: Yes  Weight gain: No  Fatigue: Yes  Night sweats: No  Chills: Yes  Increased stress: Yes  Excessive hunger: No  Excessive thirst: No  Feeling hot or cold when others believe the temperature is normal: Yes  Loss of height: No  Post-operative complications: No  Surgical site pain: No  Hallucinations: No  Change in or Loss of Energy: Yes  Hyperactivity: No  Confusion: No  Changes in hair: Yes  Changes in moles/birth marks: No  Itching: No  Rashes: No  Changes in nails: No  Acne: No  Hair in places you don't want it: No  Change in facial hair: No  Warts: No  Non-healing sores: No  Scarring: No  Flaking of skin: No  Color changes of hands/feet in cold : Yes  Sun sensitivity: No  Skin thickening: No  Ear pain: Yes  Ear discharge: No  Hearing loss: No  Tinnitus: Yes  Nosebleeds: No  Congestion: Yes  Sinus pain: Yes  Trouble swallowing: No   Voice hoarseness: No  Sore throat: No  Tooth pain: Yes  Gum tenderness: Yes  Bleeding gums: Yes  Change in taste: Yes  Change in sense of smell: Yes  Dry mouth: No  Hearing aid used: No  Neck lump: No  Eye pain: Yes  Vision loss: Yes  Dry eyes: No  Watery eyes: Yes  Double vision: No  Flashing of lights: No  Spots: Yes  Floaters: Yes  Redness: Yes  Crossed eyes: No  Tunnel Vision: No  Yellowing of eyes: No  Eye irritation: No  Chest pain or pressure: Yes  Fast or irregular heartbeat: Yes  Pain in legs with walking:  No  Trouble breathing while lying down: No  Fingers or toes appear blue: No  Low blood pressure: No  Fainting: No  Murmurs: No  Pacemaker: No  Varicose veins: No  Edema or swelling: No  Wake up at night with shortness of breath: No  Light-headedness: Yes  Exercise intolerance: Yes  Heart burn or indigestion: No  Nausea: Yes  Vomiting: Yes  Abdominal pain: Yes  Bloating: No  Constipation: Yes  Diarrhea: No  Blood in stool: Yes  Black stools: No  Rectal or Anal pain: No  Fecal incontinence: No  Yellowing of skin or eyes: No  Vomit with blood: No  Change in stools: Yes  Trouble holding urine or incontinence: No  Pain or burning: No  Trouble starting or stopping: No  Increased frequency of urination: Yes  Blood in urine: No  Decreased frequency of urination: No  Frequent nighttime urination: No  Flank pain: No  Difficulty emptying bladder: Yes  Back pain: Yes  Muscle aches: Yes  Neck pain: Yes  Swollen joints: No  Joint pain: No  Bone pain: No  Muscle cramps: Yes  Muscle weakness: Yes  Joint stiffness: No  Bone fracture: No  Anemia: No  Swollen glands: Yes  Easy bleeding or bruising: Yes  Trouble with coordination: Yes  Dizziness or trouble with balance: Yes  Fainting or black-out spells: No  Memory loss: No  Headache: Yes  Seizures: No  Speech problems: No  Tingling: Yes  Tremor: No  Weakness: Yes  Difficulty walking: No  Paralysis: No  Numbness: Yes  Bleeding or spotting between periods: Yes  Heavy or painful periods: No  Irregular periods: Yes  Vaginal discharge: No  Hot flashes: No  Vaginal dryness: No  Genital ulcers: No  Reduced libido: Yes  Painful intercourse: No  Difficulty with sexual arousal: Yes  Post-menopausal bleeding: No  Nervous or Anxious: Yes  Depression: Yes  Trouble sleeping: Yes  Trouble thinking or concentrating: Yes  Mood changes: Yes  Panic attacks: Yes

## 2020-03-19 ENCOUNTER — TELEPHONE (OUTPATIENT)
Dept: VASCULAR SURGERY | Facility: CLINIC | Age: 35
End: 2020-03-19

## 2020-03-19 NOTE — TELEPHONE ENCOUNTER
Called pt and left a VM    Asked that she return my call regarding her procedure with Dr. Li for pelvic venous congestion.     Left my direct line.   Margret JAUREGUI RN, BSN  Interventional Radiology Nurse Coordinator   Phone: [phone]

## 2020-03-19 NOTE — TELEPHONE ENCOUNTER
Called pt to inform her that we did reschedule her procedure to Wednesday 4/1.     She states that she'll have to check with her ride situation.     She will call me back.     *pt did return my call. She states that she can attend the 4/1 Procedure     Informed her that she will need to check into [facility] waiting room at 7am for an 830am procedure.     She agrees to  Plan.     Dr. Sun is made aware of this.     Margret JAUREGUI RN, BSN  Interventional Radiology Nurse Coordinator   Phone: [phone]

## 2020-03-24 ENCOUNTER — TELEPHONE (OUTPATIENT)
Dept: VASCULAR SURGERY | Facility: CLINIC | Age: 35
End: 2020-03-24

## 2020-03-24 NOTE — TELEPHONE ENCOUNTER
Called pt and informed her that we are canceling her procedure.     Informed her that we are referring her out to [facility] to Dr. Gabriel Choi.     Asked her permission to transfer her medical records to [facility] with her permission.   She states that this is fine. She is also currently sick and so wouldn't want to come in anyways.    Informed her that I will work on this and then update her after I've connected with Dr. Choi's office.     She verbalized understanding.     Margret JAUREGUI RN, BSN  Interventional Radiology Nurse Coordinator   Phone: [phone]

## 2020-03-26 ENCOUNTER — TELEPHONE (OUTPATIENT)
Dept: VASCULAR SURGERY | Facility: CLINIC | Age: 35
End: 2020-03-26

## 2020-03-26 NOTE — TELEPHONE ENCOUNTER
Called pt and unable to leave VM on her cell as VM box is full    Will mychart her msg.     *Prior Auth for ovarian vein embolization has been DENIED.     Margret JAUREGUI RN, BSN  Interventional Radiology Nurse Coordinator   Phone: [phone]

## 2020-03-31 ENCOUNTER — TELEPHONE (OUTPATIENT)
Dept: FAMILY MEDICINE | Facility: OTHER | Age: 35
End: 2020-03-31

## 2020-03-31 ENCOUNTER — VIRTUAL VISIT (OUTPATIENT)
Dept: URGENT CARE | Facility: CLINIC | Age: 35
End: 2020-03-31

## 2020-03-31 DIAGNOSIS — Z53.9 ERRONEOUS ENCOUNTER--DISREGARD: Primary | ICD-10-CM

## 2020-03-31 NOTE — TELEPHONE ENCOUNTER
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 Positive for fever and cough in past 14 days, sent to her car for triage.    Fever resolved 2 days ago  Still has a cough  Needs 2nd step mantoux for employment    Scheduled for virtual urgent care visit to discuss alternate option for getting a mantoux today.  Declines oncare visit for her symptoms - is simply wanting to ask about getting a mantoux somewhere today.    Danielle Maldonado, BSN, RN, PHN

## 2020-03-31 NOTE — PROGRESS NOTES
"The patient has been notified of following:     \"This telephone visit will be conducted via a call between you and your physician/provider. We have found that certain health care needs can be provided without the need for a physical exam.  This service lets us provide the care you need with a short phone conversation.  If a prescription is necessary we can send it directly to your pharmacy.  If lab work is needed we can place an order for that and you can then stop by our lab to have the test done at a later time.    If during the course of the call the physician/provider feels a telephone visit is not appropriate, you will not be charged for this service.\"       Patient called requesting a Mantoux test from [facility] urgent care. Unfortunately, we are not able to provide this service. She was referred back to her PCP.         Staffed wit Dr. Alamo    Non-billable encounter.     Electronically signed by:  Khurram Petersen M.D.   Pager: [phone]  3/31/2020, 2:12 PM          "

## 2020-04-02 ENCOUNTER — TELEPHONE (OUTPATIENT)
Dept: FAMILY MEDICINE | Facility: OTHER | Age: 35
End: 2020-04-02

## 2020-04-02 ENCOUNTER — TELEPHONE (OUTPATIENT)
Dept: NEUROLOGY | Facility: CLINIC | Age: 35
End: 2020-04-02

## 2020-04-02 DIAGNOSIS — Z78.9 IMMUNE TO VARICELLA: ICD-10-CM

## 2020-04-02 DIAGNOSIS — Z11.1 VISIT FOR TB SKIN TEST: Primary | ICD-10-CM

## 2020-04-02 NOTE — TELEPHONE ENCOUNTER
I called patient and Left message for her to call back, calling to see if we can convert Follow-up to video appt.    Yajaira Oviedo LPN

## 2020-04-02 NOTE — TELEPHONE ENCOUNTER
The pt has access to a smart phone and is able to change her visit to video.   Corinne Fitch, RNCC  Neurology

## 2020-04-02 NOTE — TELEPHONE ENCOUNTER
Reason for Call: Request for an order or referral:    Order or referral being requested: titer for chicken pox    Date needed: as soon as possible    Has the patient been seen by the PCP for this problem? no    Additional comments: patient needing a titer done for chicken pox. She is also needing a 2nd step mantoux. She states that she has been cleared from her fever and passed screening.    Phone number Patient can be reached at:  Home number on file [phone] (home)    Best Time:  any    Can we leave a detailed message on this number?  YES    Call taken on 4/2/2020 at 3:56 PM by Akanksha Garza

## 2020-04-03 ENCOUNTER — HOSPITAL ENCOUNTER (EMERGENCY)
Facility: CLINIC | Age: 35
Discharge: STILL ADMITTED OR ADMINISTRATIVE | End: 2020-04-03
Payer: COMMERCIAL

## 2020-04-03 ENCOUNTER — VIRTUAL VISIT (OUTPATIENT)
Dept: FAMILY MEDICINE | Facility: OTHER | Age: 35
End: 2020-04-03
Payer: COMMERCIAL

## 2020-04-03 DIAGNOSIS — R53.83 FATIGUE, UNSPECIFIED TYPE: ICD-10-CM

## 2020-04-03 DIAGNOSIS — K92.1 HEMATOCHEZIA: Primary | ICD-10-CM

## 2020-04-03 DIAGNOSIS — R06.09 DOE (DYSPNEA ON EXERTION): ICD-10-CM

## 2020-04-03 DIAGNOSIS — R50.9 FEVER, UNSPECIFIED FEVER CAUSE: ICD-10-CM

## 2020-04-03 PROCEDURE — 99441 ZZC PHYSICIAN TELEPHONE EVALUATION 5-10 MIN: CPT | Performed by: PHYSICIAN ASSISTANT

## 2020-04-03 NOTE — TELEPHONE ENCOUNTER
Second step mantoux typically means that she needs the original mantoux test read by an RN so did she get the original mantoux within 72 hours? If not, she need to come in to have this done. I have ordered this and a varicella titer.    Hussain Ryder PA-C

## 2020-04-03 NOTE — TELEPHONE ENCOUNTER
Patient notified of message below. She states the Mantoux is complete. Asked if Brendan could address her Stomach issues. Scheduled for Phone visit today at 1300.

## 2020-04-05 ENCOUNTER — HOSPITAL ENCOUNTER (EMERGENCY)
Facility: CLINIC | Age: 35
Discharge: HOME | End: 2020-04-05
Attending: PHYSICIAN ASSISTANT | Admitting: PHYSICIAN ASSISTANT
Payer: COMMERCIAL

## 2020-04-05 ENCOUNTER — APPOINTMENT (OUTPATIENT)
Dept: CT IMAGING | Facility: CLINIC | Age: 35
End: 2020-04-05
Attending: PHYSICIAN ASSISTANT
Payer: COMMERCIAL

## 2020-04-05 VITALS
OXYGEN SATURATION: 98 % | TEMPERATURE: 98.7 F | RESPIRATION RATE: 18 BRPM | HEIGHT: 64 IN | HEART RATE: 95 BPM | DIASTOLIC BLOOD PRESSURE: 56 MMHG | BODY MASS INDEX: 17.93 KG/M2 | SYSTOLIC BLOOD PRESSURE: 93 MMHG | WEIGHT: 105 LBS

## 2020-04-05 DIAGNOSIS — J20.9 ACUTE BRONCHITIS: ICD-10-CM

## 2020-04-05 DIAGNOSIS — R00.0 TACHYCARDIA: ICD-10-CM

## 2020-04-05 DIAGNOSIS — E86.0 DEHYDRATION: ICD-10-CM

## 2020-04-05 DIAGNOSIS — R19.5 LOOSE STOOLS: ICD-10-CM

## 2020-04-05 LAB
ALBUMIN SERPL-MCNC: 3.3 G/DL (ref 3.4–5)
ALBUMIN UR-MCNC: NEGATIVE MG/DL
ALP SERPL-CCNC: 74 U/L (ref 40–150)
ALT SERPL W P-5'-P-CCNC: 48 U/L (ref 0–50)
ANION GAP SERPL CALCULATED.3IONS-SCNC: 7 MMOL/L (ref 3–14)
APPEARANCE UR: ABNORMAL
AST SERPL W P-5'-P-CCNC: 54 U/L (ref 0–45)
BASOPHILS # BLD AUTO: 0 10E9/L (ref 0–0.2)
BASOPHILS NFR BLD AUTO: 0.2 %
BILIRUB SERPL-MCNC: 0.2 MG/DL (ref 0.2–1.3)
BILIRUB UR QL STRIP: NEGATIVE
BUN SERPL-MCNC: 10 MG/DL (ref 7–30)
CALCIUM SERPL-MCNC: 8.4 MG/DL (ref 8.5–10.1)
CHLORIDE SERPL-SCNC: 107 MMOL/L (ref 94–109)
CO2 SERPL-SCNC: 25 MMOL/L (ref 20–32)
COLOR UR AUTO: YELLOW
CREAT SERPL-MCNC: 0.53 MG/DL (ref 0.52–1.04)
CRP SERPL-MCNC: <2.9 MG/L (ref 0–8)
D DIMER PPP FEU-MCNC: 0.9 UG/ML FEU (ref 0–0.5)
DIFFERENTIAL METHOD BLD: ABNORMAL
EOSINOPHIL NFR BLD AUTO: 0.1 %
ERYTHROCYTE [DISTWIDTH] IN BLOOD BY AUTOMATED COUNT: 11.8 % (ref 10–15)
GFR SERPL CREATININE-BSD FRML MDRD: >90 ML/MIN/{1.73_M2}
GLUCOSE SERPL-MCNC: 107 MG/DL (ref 70–99)
GLUCOSE UR STRIP-MCNC: NEGATIVE MG/DL
HCG UR QL: NEGATIVE
HCT VFR BLD AUTO: 38.9 % (ref 35–47)
HGB BLD-MCNC: 13.6 G/DL (ref 11.7–15.7)
HGB UR QL STRIP: ABNORMAL
IMM GRANULOCYTES # BLD: 0.1 10E9/L (ref 0–0.4)
IMM GRANULOCYTES NFR BLD: 0.6 %
KETONES UR STRIP-MCNC: NEGATIVE MG/DL
LACTATE BLD-SCNC: 1.9 MMOL/L (ref 0.7–2)
LEUKOCYTE ESTERASE UR QL STRIP: NEGATIVE
LYMPHOCYTES # BLD AUTO: 0.7 10E9/L (ref 0.8–5.3)
LYMPHOCYTES NFR BLD AUTO: 4.1 %
MCH RBC QN AUTO: 30.6 PG (ref 26.5–33)
MCHC RBC AUTO-ENTMCNC: 35 G/DL (ref 31.5–36.5)
MCV RBC AUTO: 88 FL (ref 78–100)
MONOCYTES # BLD AUTO: 1.1 10E9/L (ref 0–1.3)
MONOCYTES NFR BLD AUTO: 6.7 %
MUCOUS THREADS #/AREA URNS LPF: PRESENT /LPF
NEUTROPHILS # BLD AUTO: 14.1 10E9/L (ref 1.6–8.3)
NEUTROPHILS NFR BLD AUTO: 88.3 %
NITRATE UR QL: NEGATIVE
NRBC # BLD AUTO: 0 10*3/UL
NRBC BLD AUTO-RTO: 0 /100
PH UR STRIP: 6 PH (ref 5–7)
PLATELET # BLD AUTO: 242 10E9/L (ref 150–450)
POTASSIUM SERPL-SCNC: 3.4 MMOL/L (ref 3.4–5.3)
PROT SERPL-MCNC: 6.6 G/DL (ref 6.8–8.8)
RBC # BLD AUTO: 4.44 10E12/L (ref 3.8–5.2)
RBC #/AREA URNS AUTO: 0 /HPF (ref 0–2)
SODIUM SERPL-SCNC: 139 MMOL/L (ref 133–144)
SOURCE: ABNORMAL
SP GR UR STRIP: 1.02 (ref 1–1.03)
SQUAMOUS #/AREA URNS AUTO: 1 /HPF (ref 0–1)
TROPONIN I SERPL-MCNC: <0.015 UG/L (ref 0–0.04)
UROBILINOGEN UR STRIP-MCNC: 0 MG/DL (ref 0–2)
WBC # BLD AUTO: 15.9 10E9/L (ref 4–11)
WBC #/AREA URNS AUTO: 1 /HPF (ref 0–5)

## 2020-04-05 PROCEDURE — 81001 URINALYSIS AUTO W/SCOPE: CPT | Performed by: PHYSICIAN ASSISTANT

## 2020-04-05 PROCEDURE — 96374 THER/PROPH/DIAG INJ IV PUSH: CPT | Performed by: PHYSICIAN ASSISTANT

## 2020-04-05 PROCEDURE — 71275 CT ANGIOGRAPHY CHEST: CPT

## 2020-04-05 PROCEDURE — 93005 ELECTROCARDIOGRAM TRACING: CPT | Performed by: PHYSICIAN ASSISTANT

## 2020-04-05 PROCEDURE — 96361 HYDRATE IV INFUSION ADD-ON: CPT | Mod: 59 | Performed by: PHYSICIAN ASSISTANT

## 2020-04-05 PROCEDURE — 93010 ELECTROCARDIOGRAM REPORT: CPT | Mod: Z6 | Performed by: PHYSICIAN ASSISTANT

## 2020-04-05 PROCEDURE — 99285 EMERGENCY DEPT VISIT HI MDM: CPT | Mod: 25 | Performed by: PHYSICIAN ASSISTANT

## 2020-04-05 PROCEDURE — 85379 FIBRIN DEGRADATION QUANT: CPT | Performed by: PHYSICIAN ASSISTANT

## 2020-04-05 PROCEDURE — 84484 ASSAY OF TROPONIN QUANT: CPT | Performed by: PHYSICIAN ASSISTANT

## 2020-04-05 PROCEDURE — 80053 COMPREHEN METABOLIC PANEL: CPT | Performed by: PHYSICIAN ASSISTANT

## 2020-04-05 PROCEDURE — 25000128 H RX IP 250 OP 636: Performed by: PHYSICIAN ASSISTANT

## 2020-04-05 PROCEDURE — 81025 URINE PREGNANCY TEST: CPT | Performed by: PHYSICIAN ASSISTANT

## 2020-04-05 PROCEDURE — 86140 C-REACTIVE PROTEIN: CPT | Performed by: PHYSICIAN ASSISTANT

## 2020-04-05 PROCEDURE — 85025 COMPLETE CBC W/AUTO DIFF WBC: CPT | Performed by: PHYSICIAN ASSISTANT

## 2020-04-05 PROCEDURE — 25000132 ZZH RX MED GY IP 250 OP 250 PS 637: Performed by: PHYSICIAN ASSISTANT

## 2020-04-05 PROCEDURE — 83605 ASSAY OF LACTIC ACID: CPT | Performed by: PHYSICIAN ASSISTANT

## 2020-04-05 PROCEDURE — 25800030 ZZH RX IP 258 OP 636: Performed by: PHYSICIAN ASSISTANT

## 2020-04-05 RX ORDER — AZITHROMYCIN 250 MG/1
250 TABLET, FILM COATED ORAL DAILY
Qty: 4 TABLET | Refills: 0 | Status: SHIPPED | OUTPATIENT
Start: 2020-04-06 | End: 2020-04-23

## 2020-04-05 RX ORDER — SODIUM CHLORIDE 9 MG/ML
INJECTION, SOLUTION INTRAVENOUS CONTINUOUS
Status: DISCONTINUED | OUTPATIENT
Start: 2020-04-05 | End: 2020-04-06 | Stop reason: HOSPADM

## 2020-04-05 RX ORDER — AZITHROMYCIN 250 MG/1
500 TABLET, FILM COATED ORAL ONCE
Status: COMPLETED | OUTPATIENT
Start: 2020-04-05 | End: 2020-04-05

## 2020-04-05 RX ORDER — KETOROLAC TROMETHAMINE 15 MG/ML
15 INJECTION, SOLUTION INTRAMUSCULAR; INTRAVENOUS ONCE
Status: COMPLETED | OUTPATIENT
Start: 2020-04-05 | End: 2020-04-05

## 2020-04-05 RX ORDER — IOPAMIDOL 755 MG/ML
100 INJECTION, SOLUTION INTRAVASCULAR ONCE
Status: COMPLETED | OUTPATIENT
Start: 2020-04-05 | End: 2020-04-05

## 2020-04-05 RX ADMIN — SODIUM CHLORIDE 1000 ML: 9 INJECTION, SOLUTION INTRAVENOUS at 20:48

## 2020-04-05 RX ADMIN — KETOROLAC TROMETHAMINE 15 MG: 15 INJECTION, SOLUTION INTRAMUSCULAR; INTRAVENOUS at 20:46

## 2020-04-05 RX ADMIN — AZITHROMYCIN 500 MG: 250 TABLET, FILM COATED ORAL at 22:38

## 2020-04-05 RX ADMIN — SODIUM CHLORIDE 1000 ML: 9 INJECTION, SOLUTION INTRAVENOUS at 20:11

## 2020-04-05 RX ADMIN — IOPAMIDOL 60 ML: 755 INJECTION, SOLUTION INTRAVENOUS at 21:03

## 2020-04-05 ASSESSMENT — MIFFLIN-ST. JEOR: SCORE: 1166.28

## 2020-04-05 NOTE — ED AVS SNAPSHOT
[facility] Emergency Department  [address] [place] [address]  Phone:  [phone]  Fax:  [phone]                                    Erin sAhley   MRN: [id number]    Department:  [facility] Emergency Department   Date of Visit:  4/5/2020           After Visit Summary Signature Page    I have received my discharge instructions, and my questions have been answered. I have discussed any challenges I see with this plan with the nurse or doctor.    ..........................................................................................................................................  Patient/Patient Representative Signature      ..........................................................................................................................................  Patient Representative Print Name and Relationship to Patient    ..................................................               ................................................  Date                                   Time    ..........................................................................................................................................  Reviewed by Signature/Title    ...................................................              ..............................................  Date                                               Time          22EPIC Rev 08/18

## 2020-04-06 NOTE — ED NOTES
Bed: ED16  Expected date: 4/5/20  Expected time: 6:57 PM  Means of arrival: Walked  Comments:  
03-Nov-2019 19:24

## 2020-04-06 NOTE — ED PROVIDER NOTES
"  History     Chief Complaint   Patient presents with     Cough     Fever     HPI  Erin Ashley is a 35 year old female who presents to the emergency department complaining of a cough and fever.  The patient reports on around March 20 her daughter developed a fever up to 104  F and a cough.  The following day the patient also spiked fevers up to 103  F and had a phlegmy cough.  During that time she also had diarrhea.  The cough persisted over a week and a half but has slowly gotten better, though now she is coughing up more green phlegm.  Patient still is running fevers and today had another temp up to 103  F by ear thermometer.  She took 2 extra strength Tylenol about 2 hours ago then decided to come to the ED after talking with her clinic provider.  She states that she now feels very winded with going upstairs and when she takes deep breaths she feels palpitations like her heart is beating fast.  Her diarrhea has gotten a little more formed it is now described as \"soft\" but she has noticed some bright red blood streaks in her stool.  She has abdominal pain but reports it is chronic and unchanged from her usual left-sided upper abdominal pain.  She had a sore throat and some congestion when the symptoms first started 2 weeks ago but those symptoms have since resolved.  She denies any dysuria.  She does not get her periods but has low concern for pregnancy at this time.  She got a new baby kitten 3 weeks ago that was scratching and biting her for the last several weeks but denies any rashes or significant injuries or redness from these wounds currently.  She denies any chest pain or pain with deep breathing.  Denies any swelling in the legs, estrogen use, smoking history.      Allergies:  Allergies   Allergen Reactions     Hydrocodone-Acetaminophen      headaches     No Known Allergies        Problem List:    Patient Active Problem List    Diagnosis Date Noted     Pelvic congestion 03/12/2020     Priority: Medium "     Pelvic pressure in female 01/27/2020     Priority: Medium     GABE (generalized anxiety disorder) 01/24/2020     Priority: Medium     Iron deficiency anemia, unspecified iron deficiency anemia type 11/30/2018     Priority: Medium     Anemia 11/14/2018     Priority: Medium     Somatic complaints, multiple 07/12/2018     Priority: Medium     Health Care Home 09/15/2017     Priority: Medium     Status:  Accepted  Care Coordinator:  Bassam Sesay RN    See Letters for [facility] Care Plan  Date:  September 15, 2017         Anxiety 07/26/2017     Priority: Medium     Palpitations 07/26/2017     Priority: Medium     Thyroid nodule 07/18/2017     Priority: Medium     Tachycardia 07/18/2017     Priority: Medium     Pelvic pain in female 03/28/2017     Priority: Medium     Elevated LFTs 01/30/2017     Priority: Medium     Insomnia due to medical condition 05/23/2016     Priority: Medium              Personal history of ovarian cyst 05/23/2016     Priority: Medium     Skin tags, anus or rectum 05/23/2016     Priority: Medium     Acne 11/01/2013     Priority: Medium     TALIB 3 - cervical intraepithelial neoplasia grade 3 06/04/2008     Priority: Medium     4/9/08 pap- ASCUS + high risk HPV  5/29/08 colposcopy- bx (TALIB 2/3) ECC (neg)  6/4/08 consult- [facility] recommended  6/13/08 [facility]- (pathology- TALIB 1/2) repeat pap 3 months  11/7/08 pap-NIL- repeat pap in 4 months  6/5/09 pap-ASCUS negative HPV- recommend repeat pap in 1 year  6/7/10 pap- NIL- repeat in 1 year (6/2011)  7/29/11 pap NIL. Plan--pap in 1 year  6/14/12 pap NIL  8/1/13 pap NIL/neg HPV. Plan-- pap in 3 years  05/23/16 Pap= NIL.   1/27/20 NIL Pap, Neg HPV. Plan Pap in 3 years.           Past Medical History:    Past Medical History:   Diagnosis Date     Abnormal Pap smear      Appendicitis with perforation 08/04/08     Back pain      TALIB II (cervical intraepithelial neoplasia II) 2008     Insomnia      Renal calculus or stone        Past Surgical History:    Past Surgical  History:   Procedure Laterality Date     APPENDECTOMY  8/4/2008     EXAM UNDER ANESTHESIA RECTUM N/A 2/24/2017    Procedure: EXAM UNDER ANESTHESIA RECTUM;  Surgeon: Britton Palomo MD;  Location: [facility]     HC CONIZATION CERVIX,KNIFE/LASER  06/130/8    cervix.  TALIB 1/2     HEMORRHOIDECTOMY EXTERNAL N/A 2/24/2017    Procedure: HEMORRHOIDECTOMY EXTERNAL;  Surgeon: Britton Palomo MD;  Location: [facility] OR     TONSILLECTOMY  7/3/2007       Family History:    Family History   Problem Relation Age of Onset     Hypertension Maternal Grandmother         borderline     Lipids Maternal Grandmother      Respiratory Maternal Grandmother         COPD     Lung Cancer Maternal Grandmother      Hypertension Maternal Grandfather         borderline     Cancer Maternal Grandfather         lung cancer, smoker     Gastrointestinal Disease Mother         cholecystitis     Lipids Mother      Prostate Cancer Paternal Grandfather      Hearing Loss Paternal Grandfather      Other Cancer Paternal Grandmother         pancreatic cancer     Unknown/Adopted No family hx of      Asthma No family hx of      C.A.D. No family hx of      Diabetes No family hx of      Cerebrovascular Disease No family hx of      Breast Cancer No family hx of      Cancer - colorectal No family hx of      Alcohol/Drug No family hx of      Allergies No family hx of      Alzheimer Disease No family hx of      Anesthesia Reaction No family hx of      Arthritis No family hx of      Blood Disease No family hx of      Cardiovascular No family hx of      Circulatory No family hx of      Congenital Anomalies No family hx of      Connective Tissue Disorder No family hx of      Depression No family hx of      Genetic Disorder No family hx of      Genitourinary Problems No family hx of      Gynecology No family hx of      Heart Disease No family hx of      Musculoskeletal Disorder No family hx of      Neurologic Disorder No family hx of      Obesity No family hx of      Osteoporosis  "No family hx of      Psychotic Disorder No family hx of        Social History:  Marital Status:   [4]  Social History     Tobacco Use     Smoking status: Never Smoker     Smokeless tobacco: Never Used     Tobacco comment: no smokers in the household   Substance Use Topics     Alcohol use: No     Alcohol/week: 0.0 standard drinks     Drug use: No        Medications:    Acetaminophen (TYLENOL PO)  [START ON 4/6/2020] azithromycin (ZITHROMAX) 250 MG tablet  dicyclomine (BENTYL) 10 MG capsule  divalproex sodium delayed-release (DEPAKOTE) 500 MG DR tablet  DULoxetine (CYMBALTA) 60 MG capsule  LORazepam (ATIVAN) 0.5 MG tablet  ondansetron (ZOFRAN-ODT) 8 MG ODT tab  SUMAtriptan (IMITREX) 25 MG tablet  vitamin D2 (ERGOCALCIFEROL) 22521 units (1250 mcg) capsule  vitamin D3 (CHOLECALCIFEROL) 2000 units (50 mcg) tablet          Review of Systems   All other systems reviewed and are negative.      Physical Exam   BP: 104/72  Pulse: 137  Heart Rate: 154  Temp: 100.5  F (38.1  C)  Resp: 18  Height: 162.6 cm (5' 4\")  Weight: 47.6 kg (105 lb)  SpO2: 97 %      Physical Exam  Vitals signs and nursing note reviewed.   Constitutional:       General: She is not in acute distress.     Appearance: Normal appearance. She is normal weight. She is ill-appearing. She is not toxic-appearing or diaphoretic.   HENT:      Head: Normocephalic and atraumatic.      Nose: Nose normal.      Mouth/Throat:      Mouth: Mucous membranes are dry.      Pharynx: Oropharynx is clear. No oropharyngeal exudate or posterior oropharyngeal erythema.   Eyes:      Extraocular Movements: Extraocular movements intact.      Conjunctiva/sclera: Conjunctivae normal.   Cardiovascular:      Rate and Rhythm: Regular rhythm. Tachycardia present.      Heart sounds: Normal heart sounds.   Pulmonary:      Effort: Pulmonary effort is normal. No respiratory distress.      Breath sounds: Normal breath sounds. No wheezing.   Abdominal:      General: Abdomen is flat.      " Tenderness: There is abdominal tenderness (mild, suprapubic). There is no right CVA tenderness, left CVA tenderness, guarding or rebound.   Musculoskeletal:         General: No deformity.      Right lower leg: No edema.      Left lower leg: No edema.   Skin:     General: Skin is warm and dry.   Neurological:      Mental Status: She is alert and oriented to person, place, and time. Mental status is at baseline.   Psychiatric:         Mood and Affect: Mood normal.         Behavior: Behavior normal.         ED Course        Procedures         EKG Interpretation:      Interpreted by Kristen Mtz PA-C  Time reviewed: 2040  Symptoms at time of EKG: dyspnea   Rhythm: sinus tachycardia  Rate: 120-130  Axis: Normal  Ectopy: none  Conduction: normal  ST Segments/ T Waves: No acute ischemic changes  Q Waves: none  Comparison to prior: Unchanged    Clinical Impression: no acute changes, sinus tachycardia        Results for orders placed or performed during the hospital encounter of 04/05/20 (from the past 24 hour(s))   CBC with platelets differential   Result Value Ref Range    WBC 15.9 (H) 4.0 - 11.0 10e9/L    RBC Count 4.44 3.8 - 5.2 10e12/L    Hemoglobin 13.6 11.7 - 15.7 g/dL    Hematocrit 38.9 35.0 - 47.0 %    MCV 88 78 - 100 fl    MCH 30.6 26.5 - 33.0 pg    MCHC 35.0 31.5 - 36.5 g/dL    RDW 11.8 10.0 - 15.0 %    Platelet Count 242 150 - 450 10e9/L    Diff Method Automated Method     % Neutrophils 88.3 %    % Lymphocytes 4.1 %    % Monocytes 6.7 %    % Eosinophils 0.1 %    % Basophils 0.2 %    % Immature Granulocytes 0.6 %    Nucleated RBCs 0 0 /100    Absolute Neutrophil 14.1 (H) 1.6 - 8.3 10e9/L    Absolute Lymphocytes 0.7 (L) 0.8 - 5.3 10e9/L    Absolute Monocytes 1.1 0.0 - 1.3 10e9/L    Absolute Basophils 0.0 0.0 - 0.2 10e9/L    Abs Immature Granulocytes 0.1 0 - 0.4 10e9/L    Absolute Nucleated RBC 0.0    Comprehensive metabolic panel   Result Value Ref Range    Sodium 139 133 - 144 mmol/L    Potassium 3.4 3.4 -  5.3 mmol/L    Chloride 107 94 - 109 mmol/L    Carbon Dioxide 25 20 - 32 mmol/L    Anion Gap 7 3 - 14 mmol/L    Glucose 107 (H) 70 - 99 mg/dL    Urea Nitrogen 10 7 - 30 mg/dL    Creatinine 0.53 0.52 - 1.04 mg/dL    GFR Estimate >90 >60 mL/min/[1.73_m2]    GFR Estimate If Black >90 >60 mL/min/[1.73_m2]    Calcium 8.4 (L) 8.5 - 10.1 mg/dL    Bilirubin Total 0.2 0.2 - 1.3 mg/dL    Albumin 3.3 (L) 3.4 - 5.0 g/dL    Protein Total 6.6 (L) 6.8 - 8.8 g/dL    Alkaline Phosphatase 74 40 - 150 U/L    ALT 48 0 - 50 U/L    AST 54 (H) 0 - 45 U/L   Lactic acid whole blood   Result Value Ref Range    Lactic Acid 1.9 0.7 - 2.0 mmol/L   Troponin I   Result Value Ref Range    Troponin I ES <0.015 0.000 - 0.045 ug/L   D dimer quantitative   Result Value Ref Range    D Dimer 0.9 (H) 0.0 - 0.50 ug/ml FEU   CRP inflammation   Result Value Ref Range    CRP Inflammation <2.9 0.0 - 8.0 mg/L   CT Chest Pulmonary Embolism w Contrast    Narrative    CT CHEST PULMONARY EMBOLISM WITH CONTRAST  4/5/2020 9:20 PM    HISTORY:  Dyspnea, tachycardia, elevated D-dimer.    TECHNIQUE: Scans obtained from the apices through the diaphragm with  IV contrast. Isovue 370, 60mL injected. Radiation dose for this scan  was reduced using automated exposure control, adjustment of the mA  and/or kV according to patient size, or iterative reconstruction  technique.    COMPARISON:  Chest x-rays dated 4/27/2018.    FINDINGS:  The lungs are clear without nodule, mass, infiltrate,  effusion or pneumothorax.      No mediastinal, hilar, or axillary adenopathy.  No evidence for aortic  dissection or aneurysm.  Mediastinal contents and visualized portions  of the upper abdominal contents are normal.  There are no aggressive  osseous lesions.    The central to third order pulmonary arteries demonstrate no filling  defects to suggest pulmonary artery embolism.      Impression    IMPRESSION:   1.  No evidence for pulmonary artery embolism.  2.  Otherwise negative chest CT.  Etiology for patient's symptoms is  not definitely seen.    TETE PAREDES MD   UA with Microscopic   Result Value Ref Range    Color Urine Yellow     Appearance Urine Slightly Cloudy     Glucose Urine Negative NEG^Negative mg/dL    Bilirubin Urine Negative NEG^Negative    Ketones Urine Negative NEG^Negative mg/dL    Specific Gravity Urine 1.016 1.003 - 1.035    Blood Urine Small (A) NEG^Negative    pH Urine 6.0 5.0 - 7.0 pH    Protein Albumin Urine Negative NEG^Negative mg/dL    Urobilinogen mg/dL 0.0 0.0 - 2.0 mg/dL    Nitrite Urine Negative NEG^Negative    Leukocyte Esterase Urine Negative NEG^Negative    Source Midstream Urine     WBC Urine 1 0 - 5 /HPF    RBC Urine 0 0 - 2 /HPF    Squamous Epithelial /HPF Urine 1 0 - 1 /HPF    Mucous Urine Present (A) NEG^Negative /LPF   HCG qualitative urine (UPT)   Result Value Ref Range    HCG Qual Urine Negative NEG^Negative       Medications   0.9% sodium chloride BOLUS (0 mLs Intravenous Stopped 4/5/20 2223)     Followed by   0.9% sodium chloride BOLUS (0 mLs Intravenous Stopped 4/5/20 2207)     Followed by   sodium chloride 0.9% infusion (has no administration in time range)   ketorolac (TORADOL) injection 15 mg (15 mg Intravenous Given 4/5/20 2046)   sodium chloride (PF) 0.9% PF flush 100 mL (65 mLs Intracatheter Given 4/5/20 2103)   sodium chloride (PF) 0.9% PF flush 3 mL (10 mLs Intravenous Given 4/5/20 2103)   iopamidol (ISOVUE-370) solution 100 mL (60 mLs Intravenous Given 4/5/20 2103)   azithromycin (ZITHROMAX) tablet 500 mg (500 mg Oral Given 4/5/20 2238)       Assessments & Plan (with Medical Decision Making)  Erin Ashley is a 35 year old female who presented to the ED for a 2-week history of cough, fever, and diarrhea.  Fevers up to 103  F at home with otic thermometer.  Complains of shortness of breath with exertion now.  Diarrhea has improved but now she notes some blood streaking at times in the soft stools.  On arrival to the ED she was febrile with a  temp of 100.5  F, tachycardic in the 150s.  Otherwise normal vital signs.  She did appear mildly ill but nontoxic.  Exam with mild suprapubic tenderness but no significant abdominal findings, clear lung sounds throughout.  With her fever and tachycardia IV was established and labs are drawn per sepsis protocol.  Given 2 L bolus of fluids and Toradol.  Given her tachycardia EKG obtained to rule out arrhythmia and this showed sinus tachycardia with no ischemic changes.  Troponin undetectable today.  Labs reviewed, white count elevated at 15.9 but CRP undetectable, lactic acid normal.  With her tachycardia and complaints of shortness of breath d-dimer ordered and was elevated at 0.9.  Subsequent CT showed no evidence of PE however and no signs of pneumonia or other cardiopulmonary abnormalities.  Urinalysis was negative for signs of infection.  After IV fluids and Toradol her fever resolved and heart rate improved to the low 100s.  She was overall feeling improved.  Overall work-up today is actually fairly reassuring.  Her hemoglobin was normal in regard to her bloody stools and with her reassuring abdominal exam I did not feel imaging warranted.  Low concern for surgical process in abdomen causing her bloody stools, inflammatory markers negative so low concern for inflammatory bowel disease.  Questions anal fissure versus hemorrhoids contributing to bleeding.  With stable exam and stable hemoglobin patient comfortable monitoring this for now.  In regard to her ongoing fever and cough, I suspect she may have a bronchitis given the productive nature on the cough and persistent symptoms, possibly bacterial given her elevated white count.  She was agreeable to plan to start 5-day course of azithromycin for treatment, first dose given here and she will  the 4-day supply in the morning.  Her symptomology is also mildly concerning for COVID-19 however I think it is less likely.  She was advised to practice  precautionary measures of social isolation until she is without symptoms for 72 hours.  I advised that she increase her fluid intake because I think her tachycardia was more related to dehydration.  Tachycardia from dehydration also may have been contributing to some of her dyspnea with exertion.  I advised that she contact her clinic provider in a couple days for follow-up.  She was also given instructions on when to return to the ED.  All questions answered and patient discharged home in suitable condition.     I have reviewed the nursing notes.    I have reviewed the findings, diagnosis, plan and need for follow up with the patient.    New Prescriptions    AZITHROMYCIN (ZITHROMAX) 250 MG TABLET    Take 1 tablet (250 mg) by mouth daily for 4 days       Final diagnoses:   Acute bronchitis   Dehydration   Tachycardia   Loose stools     Note: Chart documentation done in part with Dragon Voice Recognition software. Although reviewed after completion, some word and grammatical errors may remain.     4/5/2020   [facility] EMERGENCY DEPARTMENT     Kristen Mtz PA-C  04/05/20 [id number]

## 2020-04-06 NOTE — DISCHARGE INSTRUCTIONS
Overall your work-up today was reassuring.  I think your ongoing fevers and cough are related to bronchitis.  Please start taking the azithromycin as prescribed tomorrow since you got a dose tonight.  Be sure you increase your fluid intake because I think your heart rate was elevated due to dehydration.  Continue using Tylenol or ibuprofen for fever control.  Follow-up with your clinic provider in a couple days if symptoms are not improving.  If you develop any worsening symptoms please return to the emergency department.    Thank you for choosing [facility]'s Emergency Department. It was a pleasure taking care of you today. If you have any questions, please call [phone].    Kristen Mtz PA-C

## 2020-04-08 ENCOUNTER — VIRTUAL VISIT (OUTPATIENT)
Dept: NEUROLOGY | Facility: CLINIC | Age: 35
End: 2020-04-08
Payer: COMMERCIAL

## 2020-04-08 DIAGNOSIS — G43.719 INTRACTABLE CHRONIC MIGRAINE WITHOUT AURA AND WITHOUT STATUS MIGRAINOSUS: Primary | ICD-10-CM

## 2020-04-08 PROCEDURE — 99212 OFFICE O/P EST SF 10 MIN: CPT | Mod: GT | Performed by: PSYCHIATRY & NEUROLOGY

## 2020-04-08 NOTE — PROGRESS NOTES
Visit Date:   04/08/2020      This is a virtual video visit with Erin Ashley.  This is being done virtually because of the COVID-19 pandemic.       She is a patient who has been having frequent headaches for the last 2 years.  I saw her originally on 03/04/2020.  I thought she likely had migraine.  She had had a brain MRI scan done in 11/2019, which was essentially normal.      Her headaches in the past did not respond to amitriptyline at a dose of 25 mg, and she is now on duloxetine, which has not helped her headaches but was started for other reasons.  She has a history of kidney stones.  Gabapentin was ineffective, and she could only tolerate a dose of 300 mg a day.      When I saw her a month ago, we discussed other treatment options, which were limited.  I was reluctant to use a beta blocker, because of depression and a somewhat low blood pressure.  I did not want to use Topamax or Zonegran because of her history of kidney stones.      It was decided to try Depakote at a dose of 500 mg.  Unfortunately, this has not been effective.      Shortly after she saw me, she developed a febrile illness with cough and diarrhea.  Her daughter had a similar illness.  She had a transient increase in her transaminases, but these improved, although her AST was still slightly elevated at 54 when she was seen in the emergency room on 04/05/2020.          She still feels unwell, although she is not short of breath.      IMPRESSION:  Chronic headaches -- likely migraine.      PLAN:  Depakote has not been effective.  With her recent increase in liver functions, I am reluctant to increase the dose further, and it was decided to discontinue the Depakote.      I had originally discussed consideration of referral to the headache specialist at the [facility], and she would like to pursue that.  She might be a candidate for Botox or a CGRP antagonist, and I did discuss this with her.  A referral was made.  She is aware  that there may be some delay because of the COVID-19 pandemic.      TOTAL VIDEO TIME:  7 minutes from 11:20 to 11:27.         KRYSTA ROBISON MD             D: 2020   T: 2020   MT: EMILY      Name:     FELIPE BOCANEGRA   MRN:      [id number]        Account:      [id number]   :      1985           Visit Date:   2020      Document: [id number]

## 2020-04-08 NOTE — PROGRESS NOTES
"I left message for patient to call back -did not answer rooming call.    Erin Ashley is a 35 year old female who is being evaluated via a billable video visit.      The patient has been notified of following:     \"This video visit will be conducted via a call between you and your physician/provider. We have found that certain health care needs can be provided without the need for an in-person physical exam.  This service lets us provide the care you need with a video conversation.  If a prescription is necessary we can send it directly to your pharmacy.  If lab work is needed we can place an order for that and you can then stop by our lab to have the test done at a later time.    Video visits are billed at different rates depending on your insurance coverage.  Please reach out to your insurance provider with any questions.    If during the course of the call the physician/provider feels a video visit is not appropriate, you will not be charged for this service.\"    Patient has given verbal consent for Video visit? Yes    Patient would like the video invitation sent by: Text to cell phone: [phone]     Video Start Time: 11:20    Erin Ashley complains of  No chief complaint on file.      I have reviewed and updated the patient's Past Medical History, Social History, Family History and Medication List.    ALLERGIES  Hydrocodone-acetaminophen and No known allergies      Video-Visit Details    Type of service:  Video Visit    Video End Time (time video stopped):11:27    Originating Location (pt. Location): Home    Distant Location (provider location):  [facility]     Mode of Communication:  Video Conference via [employer]            "

## 2020-04-09 ENCOUNTER — TELEPHONE (OUTPATIENT)
Dept: FAMILY MEDICINE | Facility: OTHER | Age: 35
End: 2020-04-09

## 2020-04-09 NOTE — TELEPHONE ENCOUNTER
Left detailed message for patient that she needs to reschedule her lab and float appointments until her cough has been resolved for two weeks.     Please transfer to RN with additional questions or assist in rescheduling.     Cathy Medel, RN, BSN

## 2020-04-09 NOTE — TELEPHONE ENCOUNTER
I spoke with patient.   She states that she needs the Varicella titer done so she can start work - her work will not let her start until this is complete.   Her cough has been gone for three days and she has been fever free since the morning of 4/3/20 (however, this does not coincide with documentation of ED note on 4/5/2020).   I spoke with DEVON Benavides as well as Michelle Stone MD.   Dr. Stone has given approval for patient to have lab drawn in clinic- see instructions below that were given to patient.     1. Park on the west side of the [facility], near the wheelchair ramp - there is an orange cone near the door.   2. Upon arrival, stay in your car and call [phone] to check in for your appointment.   3. The  staff will notify the lab that you are checked in.   4. Lab should dress in full PPE. Patient will in her car until the  motions patient to come in the building.   5. Patient will be masked immediately and escorted into the negative airflow room for specimen collection.     Patient was informed that she will not have a mantoux placement. She can check with her employer to see if they are in agreement to draw a TB gold - if so, orders will need to be placed by SF or in house provider.     Cathy Medel, RN, BSN

## 2020-04-09 NOTE — TELEPHONE ENCOUNTER
Reason for call:  Patient reporting a symptom    Symptom or request: cough    Duration (how long have symptoms been present):     Have you been treated for this before? Yes    Additional comments: while screening for lab and float appt patient states she has had a cough in the last 15 days but it was bronchitis and her cough is gone now. Is it still ok for her to come in?     Phone Number patient can be reached at:  Home number on file [phone] (home)    Best Time:  any    Can we leave a detailed message on this number:  YES    Call taken on 4/9/2020 at 4:05 PM by Akanksha Garza

## 2020-04-10 DIAGNOSIS — Z78.9 IMMUNE TO VARICELLA: ICD-10-CM

## 2020-04-10 PROCEDURE — 86787 VARICELLA-ZOSTER ANTIBODY: CPT | Performed by: PHYSICIAN ASSISTANT

## 2020-04-10 PROCEDURE — 36415 COLL VENOUS BLD VENIPUNCTURE: CPT | Performed by: PHYSICIAN ASSISTANT

## 2020-04-13 LAB — VZV IGG SER QL IA: 1.7 AI (ref 0–0.8)

## 2020-04-16 ENCOUNTER — MYC MEDICAL ADVICE (OUTPATIENT)
Dept: FAMILY MEDICINE | Facility: OTHER | Age: 35
End: 2020-04-16

## 2020-04-16 ASSESSMENT — ANXIETY QUESTIONNAIRES
3. WORRYING TOO MUCH ABOUT DIFFERENT THINGS: NEARLY EVERY DAY
7. FEELING AFRAID AS IF SOMETHING AWFUL MIGHT HAPPEN: SEVERAL DAYS
GAD7 TOTAL SCORE: 19
6. BECOMING EASILY ANNOYED OR IRRITABLE: NEARLY EVERY DAY
5. BEING SO RESTLESS THAT IT IS HARD TO SIT STILL: NEARLY EVERY DAY
1. FEELING NERVOUS, ANXIOUS, OR ON EDGE: NEARLY EVERY DAY
GAD7 TOTAL SCORE: 19
7. FEELING AFRAID AS IF SOMETHING AWFUL MIGHT HAPPEN: SEVERAL DAYS
4. TROUBLE RELAXING: NEARLY EVERY DAY
GAD7 TOTAL SCORE: 19
2. NOT BEING ABLE TO STOP OR CONTROL WORRYING: NEARLY EVERY DAY

## 2020-04-16 ASSESSMENT — PATIENT HEALTH QUESTIONNAIRE - PHQ9
SUM OF ALL RESPONSES TO PHQ QUESTIONS 1-9: 22
SUM OF ALL RESPONSES TO PHQ QUESTIONS 1-9: 22
10. IF YOU CHECKED OFF ANY PROBLEMS, HOW DIFFICULT HAVE THESE PROBLEMS MADE IT FOR YOU TO DO YOUR WORK, TAKE CARE OF THINGS AT HOME, OR GET ALONG WITH OTHER PEOPLE: EXTREMELY DIFFICULT

## 2020-04-16 NOTE — PROGRESS NOTES
"Erin Ashley is a 35 year old female who is being evaluated via a billable telephone visit.      The patient has been notified of following:     \"This telephone visit will be conducted via a call between you and your physician/provider. We have found that certain health care needs can be provided without the need for a physical exam.  This service lets us provide the care you need with a short phone conversation.  If a prescription is necessary we can send it directly to your pharmacy.  If lab work is needed we can place an order for that and you can then stop by our lab to have the test done at a later time.    Telephone visits are billed at different rates depending on your insurance coverage. During this emergency period, for some insurers they may be billed the same as an in-person visit.  Please reach out to your insurance provider with any questions.    If during the course of the call the physician/provider feels a telephone visit is not appropriate, you will not be charged for this service.\"    Patient has given verbal consent for Telephone visit?  Yes    How would you like to obtain your AVS? MyChart    Subjective     Erin Ashley is a 35 year old female who presents to clinic today for the following health issues:      ABDOMINAL   PAIN     Onset: Ongoing    Description:   Character: Dull ache and Cramping  Location: left upper quadrant  Radiation: Back    Intensity: mild    Progression of Symptoms:  same    Accompanying Signs & Symptoms:  Fever/Chills?: no   Gas/Bloating: no   Nausea: YES  Vomitting: no   Diarrhea?: no   Constipation:no   Dysuria or Hematuria: no    History:   Trauma: no   Previous similar pain: YES- Ongoing   Previous tests done: none    Precipitating factors:   Does the pain change with:     Food: no      BM: no     Urination: no     Alleviating factors:  None    Therapies Tried and outcome: None    LMP:  not applicable     Chronic left upper quadrant pain for the past few years " "without a clear cause. She has been found to have pelvic congestion syndrome which explains lower abdominal pain/pressure and surgical repair is recommended but this was denied by insurance so she is going through the appeal process. She was noting right upper quadrant last month and earlier this month and initially had elevated LFTs but these resolved and her abdominal ultrasound was normal. She was also experiencing a fever, cough and shortness of breath for 4+ weeks and so was seen in the ED on 4/5. She had an elevated D dimer but chest CTA was negative. She also had an elevated WBC count so was given azithromycin and the cough, fever, and shortness of breath have been gone for a few weeks. Her right upper quadrant pain has also resolved. She was told she could have had COVID-19 but she was not tested. 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     Headache  Onset: Ongoing    Description:   Location: Right side, front   Character: Pressure  Frequency:  Every other day  Duration:  1 hour to all day, varies    Intensity: varies    Progression of Symptoms:  same    Accompanying Signs & Symptoms:  Stiff neck: no  Neck or upper back pain: YES  Fever: no  Sinus pressure: no  Nausea or vomiting: YES  Dizziness: no  Numbness: YES- back of head, left leg  Weakness: no  Visual changes: YES- Vision gets darker, \"blotchy in the right eye when I get bad headache\"    History:   Head trauma: no  Family history of migraines: YES  Previous tests for headaches: no  Neurologist evaluations: YES  Able to do daily activities: YES  Wake with a headaches: YES  Do headaches wake you up: YES- not very often  Daily pain medication use: no  Work/school stressors/changes: no    Precipitating factors:   Does light make it worse: " YES  Does sound make it worse: no    Alleviating factors:  Does sleep help: no    Therapies Tried and outcome: Depakote - did not help, Imitrex - was told she was taking it so often that it wasn't helping.     Chronic right sided headaches with intermittent visual changes diagnosed as migraines by neurology. She has tried and failed amitriptyline, gabapentin, duloxetine, and Depakote. Topamax has been avoided due to her low BMI and history of kidney stones. Her neurologist, Dr. Ramirez, has set her up with a headache specialist to discuss other options such as Botox injections and she has this appointment on June 1.    Her depression has been worse due to her chronic symptoms and being unable to work but she continues to follow closely with psychiatry and counseling and [facility]. She denies any serious thoughts of self harm.     Reviewed and updated as needed this visit by Provider  Allergies  Meds  Problems  Med Hx    Review of Systems   GENERAL: Denies fever, fatigue, weakness, weight gain, or weight loss.  CARDIOVASCULAR: Denies chest pain, shortness of breath, irregular heartbeats, palpitations, or edema.  RESPIRATORY: Denies cough, hemoptysis, and shortness of breath.  GASTROINTESTINAL: +Chronic left upper quadrant pain. Denies nausea, vomiting, change in appetite, diarrhea, or constipation.  NEUROLOGIC: +Chronic right sided headaches and intermittent blurred vision on the right. Denies fainting, dizziness, memory loss, or seizures.  PSYCHIATRIC: +Depression and anxiety. Denies mood swings or thoughts of suicide.      Objective   Reported vitals:  There were no vitals taken for this visit.   Per her report, her weight has been stable at 106 over the past few weeks.  General: alert and no distress over the phone  PSYCH: Alert and oriented times 3; coherent speech, normal   rate and volume, able to articulate logical thoughts, able   to abstract reason, no tangential thoughts, no hallucinations   or delusions   His affect is normal  RESP: No cough, no audible wheezing, able to talk in full sentences  Remainder of exam unable to be completed due to telephone visits    Assessment/Plan:    ICD-10-CM    1. Moderate major depression (H)  F32.1    2. GABE (generalized anxiety disorder)  F41.1    3. Pelvic congestion  N94.89    4. Chronic abdominal pain  R10.9     G89.29    5. Nausea  R11.0 ondansetron (ZOFRAN-ODT) 8 MG ODT tab   6. Migraine without aura and without status migrainosus, not intractable  G43.009        1-2. Her depression and anxiety have been worse due to being at home and not working but Cymbalta was recently increased by psychiatry at [facility] and she continues to undergo counseling as well. I think that getting back to work will help with her mood and I feel she is safe from a medical standpoint to start working again. I have suspicion that she may have had COVID-19 last month given her extended fevers although this cannot be confirmed without antibody testing. She will be sure to wear the appropriate PPE to protect herself when she starts working.    3. She is following with gynecology and a vascular specialist to try and get the surgery approved.    4-5. She continues to experience chronic left upper quadrant pain without a clear source having undergone a plethora of diagnostic testing along with multiple GI visits. Will continue with Zofran as needed and should continue to follow with GI.    6. She will be seeing a new headache specialist in 6 weeks for further evaluation of her continued right sided headaches/migraines.    Follow up in 3 months for a clinic recheck.       Phone call duration:  8 minutes    Hussain Ryder PA-C

## 2020-04-16 NOTE — TELEPHONE ENCOUNTER
Spoke to patient, she denies suicidal ideation, thoughts of hurting herself or someone else. She has a counselor at [facility], just spoke to them yesterday, working with them on her depression/anxiety. Feels they are supportive and she denies needing further assistance from her PCP with this at this time. Currently taking medication for mood, states she feels they are helping. With shelter in place and covid19 pandemic, feels this has added stress. States she 'gets sick of her headaches and abdominal issues and just wants it to go away,' but states she knows she has to keep working on the problems with her doctors.    Reassured her that she can call the clinic anytime for support as needed. She verbalizes understanding and agreement with this.    Helped her change her visit with ASHIA for next week from clinic to telephone. She declines trying a video visit.      Danielle Maldonado, JANNYN, RN, PHN

## 2020-04-16 NOTE — TELEPHONE ENCOUNTER
Please call patient to triage. Patient returned completed PHQ9 with a high score and suicidal thoughts.     Farhana Hancock, CMA

## 2020-04-17 ASSESSMENT — ANXIETY QUESTIONNAIRES: GAD7 TOTAL SCORE: 19

## 2020-04-17 ASSESSMENT — PATIENT HEALTH QUESTIONNAIRE - PHQ9: SUM OF ALL RESPONSES TO PHQ QUESTIONS 1-9: 22

## 2020-04-22 ENCOUNTER — MYC MEDICAL ADVICE (OUTPATIENT)
Dept: FAMILY MEDICINE | Facility: OTHER | Age: 35
End: 2020-04-22

## 2020-04-22 NOTE — TELEPHONE ENCOUNTER
Gallito Bowen is aware this is being sent, will await to hear from [facility] when received.   Katherin Noonan MA

## 2020-04-23 ENCOUNTER — VIRTUAL VISIT (OUTPATIENT)
Dept: OBGYN | Facility: CLINIC | Age: 35
End: 2020-04-23
Payer: COMMERCIAL

## 2020-04-23 DIAGNOSIS — N94.89 PELVIC CONGESTION SYNDROME: Primary | ICD-10-CM

## 2020-04-23 DIAGNOSIS — R10.2 PELVIC PAIN IN FEMALE: ICD-10-CM

## 2020-04-23 PROCEDURE — 99442 ZZC PHYSICIAN TELEPHONE EVALUATION 11-20 MIN: CPT | Performed by: OBSTETRICS & GYNECOLOGY

## 2020-04-23 RX ORDER — NORETHINDRONE ACETATE AND ETHINYL ESTRADIOL 1MG-20(21)
1 KIT ORAL DAILY
Qty: 84 TABLET | Refills: 3 | Status: SHIPPED | OUTPATIENT
Start: 2020-04-23 | End: 2020-05-26

## 2020-04-23 NOTE — PROGRESS NOTES
"Erin Ashley is a 35 year old female who is being evaluated via a billable telephone visit.      The patient has been notified of following:     \"This telephone visit will be conducted via a call between you and your physician/provider. We have found that certain health care needs can be provided without the need for a physical exam.  This service lets us provide the care you need with a short phone conversation.  If a prescription is necessary we can send it directly to your pharmacy.  If lab work is needed we can place an order for that and you can then stop by our lab to have the test done at a later time.    Telephone visits are billed at different rates depending on your insurance coverage. During this emergency period, for some insurers they may be billed the same as an in-person visit.  Please reach out to your insurance provider with any questions.    If during the course of the call the physician/provider feels a telephone visit is not appropriate, you will not be charged for this service.\"    Patient has given verbal consent for Telephone visit?  Yes    How would you like to obtain your AVS? [email or web address]    Phone call duration: 11 minutes      OB/GYN      NAME:  Erin Ashley  PCP:  Hussain Ryder  MRN:  [id number]    Impression / Plan     35 year old  with:      ICD-10-CM    1. Pelvic congestion syndrome  N94.89 norethindrone-ethinyl estradiol (JUNEL FE 1/20) 1-20 MG-MCG tablet   2. Pelvic pain in female  R10.2        We will review the appeal process for IR for treatment of pelvic congestion syndrome.      Her last BABAK was Junel, so that was restarted.  She has tolerated it ok in the past.  Instructions and precautions given.  She can start that anytime as long as she denies the possibility of pregnancy.  She has irregular cycles, so start anytime.     Follow up as needed.     Chief Complaint     Chief Complaint   Patient presents with     Follow Up       HPI     Erin Ashley is a  " 35 year old female who is seen via phone visit for follow up.  I last saw her for her routine annual exam in Feb 2020.  She has only one period since then.  Labs suggest hypothalamic amenorrhea.  She was considering OCPs and would like to try those now.      Her main concern today is the pelvic congestion syndrome that was noted on recent US.  IR referral was placed, but insurance denied it.    She received information regarding the appeal process and will send that in.      No LMP recorded. (Menstrual status: Irregular Periods).           Problem List     Patient Active Problem List    Diagnosis Date Noted     Pelvic congestion 03/12/2020     Priority: Medium     Pelvic pressure in female 01/27/2020     Priority: Medium     GABE (generalized anxiety disorder) 01/24/2020     Priority: Medium     Iron deficiency anemia, unspecified iron deficiency anemia type 11/30/2018     Priority: Medium     Anemia 11/14/2018     Priority: Medium     Somatic complaints, multiple 07/12/2018     Priority: Medium     Health Care Home 09/15/2017     Priority: Medium     Status:  Accepted  Care Coordinator:  Bassam Sesay RN    See Letters for [facility] Care Plan  Date:  September 15, 2017         Anxiety 07/26/2017     Priority: Medium     Palpitations 07/26/2017     Priority: Medium     Thyroid nodule 07/18/2017     Priority: Medium     Tachycardia 07/18/2017     Priority: Medium     Pelvic pain in female 03/28/2017     Priority: Medium     Elevated LFTs 01/30/2017     Priority: Medium     Insomnia due to medical condition 05/23/2016     Priority: Medium              Personal history of ovarian cyst 05/23/2016     Priority: Medium     Skin tags, anus or rectum 05/23/2016     Priority: Medium     Acne 11/01/2013     Priority: Medium     TALIB 3 - cervical intraepithelial neoplasia grade 3 06/04/2008     Priority: Medium     4/9/08 pap- ASCUS + high risk HPV  5/29/08 colposcopy- bx (TALIB 2/3) ECC (neg)  6/4/08 consult- [facility] recommended  6/13/08  [facility]- (pathology- TALIB 1/2) repeat pap 3 months  11/7/08 pap-NIL- repeat pap in 4 months  6/5/09 pap-ASCUS negative HPV- recommend repeat pap in 1 year  6/7/10 pap- NIL- repeat in 1 year (6/2011)  7/29/11 pap NIL. Plan--pap in 1 year  6/14/12 pap NIL  8/1/13 pap NIL/neg HPV. Plan-- pap in 3 years  05/23/16 Pap= NIL.   1/27/20 NIL Pap, Neg HPV. Plan Pap in 3 years.          Medications     Current Outpatient Medications   Medication     Acetaminophen (TYLENOL PO)     dicyclomine (BENTYL) 10 MG capsule     DULoxetine (CYMBALTA) 60 MG capsule     LORazepam (ATIVAN) 0.5 MG tablet     ondansetron (ZOFRAN-ODT) 8 MG ODT tab     vitamin D3 (CHOLECALCIFEROL) 2000 units (50 mcg) tablet     SUMAtriptan (IMITREX) 25 MG tablet     vitamin D2 (ERGOCALCIFEROL) 91720 units (1250 mcg) capsule     No current facility-administered medications for this visit.         Allergies     Allergies   Allergen Reactions     Hydrocodone-Acetaminophen      headaches     No Known Allergies        ROS     A 6 organ review of systems was asked and the pertinent positives and negatives are listed in the HPI. All other organ systems can be considered negative.     Physical Exam   Vitals: There were no vitals taken for this visit.      Telephone encounter done due to social distancing recommendations in light of COVID 19      Labs/Imaging       Labs were reviewed in [facility]     Imaging was reviewed in Epic.       11 minutes was spent with patient, more than 50% counseling and coordinating care as noted above in the A/P    Nursing notes read and reviewed    Amada Dallas MD

## 2020-04-23 NOTE — TELEPHONE ENCOUNTER
Form faxed, email scanned to patient from fax machine. Form sent to scanning.   Katherin Noonan MA

## 2020-04-23 NOTE — TELEPHONE ENCOUNTER
Left detailed message for Jazmine Post with the employer at [phone], will need fax number to send form to.   Katherin Noonan MA

## 2020-04-23 NOTE — TELEPHONE ENCOUNTER
Huddled with JM, he was able to sign form. I have it at my desk in [employer], waiting for MyChart response with what to do with it.   Katherin Noonan MA

## 2020-04-24 ENCOUNTER — VIRTUAL VISIT (OUTPATIENT)
Dept: FAMILY MEDICINE | Facility: OTHER | Age: 35
End: 2020-04-24
Payer: COMMERCIAL

## 2020-04-24 DIAGNOSIS — R11.0 NAUSEA: ICD-10-CM

## 2020-04-24 DIAGNOSIS — F32.1 MODERATE MAJOR DEPRESSION (H): Primary | ICD-10-CM

## 2020-04-24 DIAGNOSIS — F41.1 GAD (GENERALIZED ANXIETY DISORDER): ICD-10-CM

## 2020-04-24 DIAGNOSIS — N94.89 PELVIC CONGESTION: ICD-10-CM

## 2020-04-24 DIAGNOSIS — G89.29 CHRONIC ABDOMINAL PAIN: ICD-10-CM

## 2020-04-24 DIAGNOSIS — G43.009 MIGRAINE WITHOUT AURA AND WITHOUT STATUS MIGRAINOSUS, NOT INTRACTABLE: ICD-10-CM

## 2020-04-24 DIAGNOSIS — R10.9 CHRONIC ABDOMINAL PAIN: ICD-10-CM

## 2020-04-24 PROCEDURE — 99214 OFFICE O/P EST MOD 30 MIN: CPT | Mod: 95 | Performed by: PHYSICIAN ASSISTANT

## 2020-04-24 RX ORDER — ONDANSETRON 8 MG/1
TABLET, ORALLY DISINTEGRATING ORAL
Qty: 12 TABLET | Refills: 3 | Status: SHIPPED | OUTPATIENT
Start: 2020-04-24 | End: 2020-10-30

## 2020-05-16 ENCOUNTER — MYC MEDICAL ADVICE (OUTPATIENT)
Dept: OBGYN | Facility: OTHER | Age: 35
End: 2020-05-16

## 2020-05-16 DIAGNOSIS — R10.2 PELVIC PAIN IN FEMALE: Primary | ICD-10-CM

## 2020-05-18 NOTE — TELEPHONE ENCOUNTER
Pt having nausea and dizziness    Pt had VV 4/24/2020 and started on Junel FE 1/20, pt had negative home pregnancy test today.    Pt having nausea, dizziness, headaches , unrelieved by zofran since starting on OCPs, no longer wants to take it    RN routing to provider for advisement on continuing same pill or starting a new OCP.    Cynthia Ryder RN on 5/22/2020 at 11:07 AM

## 2020-05-26 RX ORDER — ETONOGESTREL AND ETHINYL ESTRADIOL VAGINAL RING .015; .12 MG/D; MG/D
1 RING VAGINAL
Qty: 3 EACH | Refills: 3 | Status: SHIPPED | OUTPATIENT
Start: 2020-05-26 | End: 2021-05-24

## 2020-05-28 ENCOUNTER — MYC MEDICAL ADVICE (OUTPATIENT)
Dept: FAMILY MEDICINE | Facility: OTHER | Age: 35
End: 2020-05-28

## 2020-05-28 NOTE — LETTER
[address]  [facility] [address]  Phone: [phone]    May 29, 2020        Erin VAZQUEZ Gabi  [address] [facility] [address]          To whom it may concern:    RE: Erin TAYLOR Gabi    I am the primary care provider for Erin Ashley. Over the past 3 years, she has been experiencing multiple health concerns including headache/migraines, severe anemia, depression and anxiety and severe abdominal pain. She has been seen by multiple specialists to determine a cause for her symptoms and continues to undergo evaluation. Her symptoms have caused difficulties with focus along with confusion, lack of motivation and unrelenting physical pain which explains her multiples absences from work over the past few years.     Please contact me for questions or concerns. Thank you.       Sincerely,        Hussain Ryder PA-C

## 2020-06-01 ENCOUNTER — VIRTUAL VISIT (OUTPATIENT)
Dept: NEUROLOGY | Facility: CLINIC | Age: 35
End: 2020-06-01
Attending: PSYCHIATRY & NEUROLOGY
Payer: COMMERCIAL

## 2020-06-01 DIAGNOSIS — G43.719 INTRACTABLE CHRONIC MIGRAINE WITHOUT AURA AND WITHOUT STATUS MIGRAINOSUS: Primary | ICD-10-CM

## 2020-06-01 RX ORDER — RIZATRIPTAN BENZOATE 10 MG/1
10 TABLET ORAL
Qty: 9 TABLET | Refills: 11 | Status: SHIPPED | OUTPATIENT
Start: 2020-06-01 | End: 2021-06-21

## 2020-06-01 RX ORDER — CLONAZEPAM 0.5 MG/1
TABLET ORAL
COMMUNITY
Start: 2020-05-20 | End: 2020-12-01

## 2020-06-01 NOTE — PROGRESS NOTES
"Erin Ashley is a 35 year old female who is being evaluated via a billable telephone visit.      The patient has been notified of following:     \"This telephone visit will be conducted via a call between you and your physician/provider. We have found that certain health care needs can be provided without the need for a physical exam.  This service lets us provide the care you need with a short phone conversation.  If a prescription is necessary we can send it directly to your pharmacy.  If lab work is needed we can place an order for that and you can then stop by our lab to have the test done at a later time.    Telephone visits are billed at different rates depending on your insurance coverage. During this emergency period, for some insurers they may be billed the same as an in-person visit.  Please reach out to your insurance provider with any questions.    If during the course of the call the physician/provider feels a telephone visit is not appropriate, you will not be charged for this service.\"    Patient has given verbal consent for Telephone visit?  Yes    What phone number would you like to be contacted at? [phone]    How would you like to obtain your AVS? Radha    Phone call duration: 16 minutes    Paty Whitney MD    This is a telephone visit.  Unable to connect via video.  The patient was referred for assessment of whether she would be a good candidate for Botox.    The patient reports the following information regarding her migraines.  She has 28 to 30 days of  headache a month over half of which are migraines typically lasting over 4 hours but can last 24 hours.  Typically the location is on the right side and is a pressure and throbbing quality associated with sound sensitivity and nausea.    No clear triggers.    Past preventive options include gabapentin, Depakote, duloxetine, amitriptyline and Lamictal without benefit.  She has a history of depression and low blood pressures which " contraindicates antihypertensives and beta blockers.  She also has a history of kidney stones was contraindicates topiramate and zonisamide usage.    In that setting she is trying to explore other options for prevention.  Questioning whether Botox would be beneficial.    Meeting with the patient we discussed treatment options including Botox and CGRP antagonist.  She preferred to try Botox due to concerns over safety.  I do believe she meets criteria and we will pursue prior authorization.  In the meantime the patient can use rizatriptan up to 2 times a week to treat her headaches.

## 2020-06-01 NOTE — LETTER
"6/1/2020       RE: Erin Ashley  [address]  [facility] [address]     Dear Colleague,    Thank you for referring your patient, Erin Ashley, to the [facility]UROLOG[facility] at [facility]. Please see a copy of my visit note below.    Erin Ashley is a 35 year old female who is being evaluated via a billable telephone visit.      The patient has been notified of following:     \"This telephone visit will be conducted via a call between you and your physician/provider. We have found that certain health care needs can be provided without the need for a physical exam.  This service lets us provide the care you need with a short phone conversation.  If a prescription is necessary we can send it directly to your pharmacy.  If lab work is needed we can place an order for that and you can then stop by our lab to have the test done at a later time.    Telephone visits are billed at different rates depending on your insurance coverage. During this emergency period, for some insurers they may be billed the same as an in-person visit.  Please reach out to your insurance provider with any questions.    If during the course of the call the physician/provider feels a telephone visit is not appropriate, you will not be charged for this service.\"    Patient has given verbal consent for Telephone visit?  Yes    What phone number would you like to be contacted at? [phone]    How would you like to obtain your AVS? Radha    Phone call duration: 16 minutes    Paty Whitney MD    This is a telephone visit.  Unable to connect via video.  The patient was referred for assessment of whether she would be a good candidate for Botox.    The patient reports the following information regarding her migraines.  She has 28 to 30 days of  headache a month over half of which are migraines typically lasting over 4 hours but can last 24 hours.  Typically the location is on the right side and " is a pressure and throbbing quality associated with sound sensitivity and nausea.    No clear triggers.    Past preventive options include gabapentin, Depakote, duloxetine, amitriptyline and Lamictal without benefit.  She has a history of depression and low blood pressures which contraindicates antihypertensives and beta blockers.  She also has a history of kidney stones was contraindicates topiramate and zonisamide usage.    In that setting she is trying to explore other options for prevention.  Questioning whether Botox would be beneficial.    Meeting with the patient we discussed treatment options including Botox and CGRP antagonist.  She preferred to try Botox due to concerns over safety.  I do believe she meets criteria and we will pursue prior authorization.  In the meantime the patient can use rizatriptan up to 2 times a week to treat her headaches.    Again, thank you for allowing me to participate in the care of your patient.      Sincerely,    Paty Whitney MD

## 2020-06-02 RX ORDER — ONABOTULINUMTOXINA 200 [USP'U]/1
155 INJECTION, POWDER, LYOPHILIZED, FOR SOLUTION INTRADERMAL; INTRAMUSCULAR ONCE
Qty: 155 UNITS | Refills: 3 | Status: SHIPPED
Start: 2020-06-02 | End: 2020-06-02

## 2020-06-14 ASSESSMENT — HEADACHE IMPACT TEST (HIT 6)
WHEN YOU HAVE A HEADACHE HOW OFTEN DO YOU WISH YOU COULD LIE DOWN: ALWAYS
WHEN YOU HAVE HEADACHES HOW OFTEN IS THE PAIN SEVERE: ALWAYS
HOW OFTEN DID HEADACHS LIMIT CONCENTRATION ON WORK OR DAILY ACTIVITY: ALWAYS
HOW OFTEN DO HEADACHES LIMIT YOUR DAILY ACTIVITIES: ALWAYS
HIT6 TOTAL SCORE: 76
HOW OFTEN HAVE YOU FELT TOO TIRED TO WORK BECAUSE OF YOUR HEADACHES: VERY OFTEN
HOW OFTEN HAVE YOU FELT FED UP OR IRRITATED BECAUSE OF YOUR HEADACHES: ALWAYS

## 2020-06-16 ENCOUNTER — OFFICE VISIT (OUTPATIENT)
Dept: NEUROLOGY | Facility: CLINIC | Age: 35
End: 2020-06-16
Payer: COMMERCIAL

## 2020-06-16 DIAGNOSIS — G43.719 INTRACTABLE CHRONIC MIGRAINE WITHOUT AURA AND WITHOUT STATUS MIGRAINOSUS: ICD-10-CM

## 2020-06-16 PROBLEM — F33.1 MODERATE RECURRENT MAJOR DEPRESSION (H): Status: ACTIVE | Noted: 2020-06-16

## 2020-06-16 NOTE — PROGRESS NOTES
[facility] Dept of Neurology  Botox Procedure    Erin Ashley  [id number]  1985    June 16, 2020    The procedure was explained to the patient. Benefits of the treatment were discussed including headache and migraine reduction. Risks of the procedure were reviewed including but not limited to pain, bruising, bleeding, infection, weakness of muscles injected or those distal to injection. The patient voiced understanding of the risks and benefits. All questions answered and the patient consented to proceed.     Prior to receiving Botox injections the patient reported the following:  Baseline headache frequency: 28-30 days a month  Baseline headache duration: 4-24 hours  Associated migrainous features: photophobia and nausea    Previous treatment trials: gabapentin, Depakote, duloxetine, amitriptyline and Lamictal without benefit.  She has a history of depression and low blood pressures which contraindicates antihypertensives and beta blockers.  She also has a history of kidney stones was contraindicates topiramate and zonisamide usage.    Last Botox procedure: This is first one  Current migraine frequency: N/A  Current migraine duration: N/A    A 200 unit vial of Botox was diluted with 4ml of 0.9% sodium chloride    Botox lot #: [id number]  Botox expiration date: 12/2022  0.9% sodium chloride lot #: [id number]  0.9% sodium chloride expiration date: 1 Jan 2021    The following muscles were injected using a 30 gauge needle:  Procerus 1 site 5 units   2 sites (bilat) 10 units  Frontalis 4 sites (bilat) 20 units  Temporalis 8 sites (bilat) 40 units  Trapezius muscles 6 sites (bilat) 30 units  Cervical paraspinals (bilat) 4 sites 20 units  Occipitalis 6 sites (bilat) 30 units    A total of 155 units were injected, 45 wasted.   The patient tolerated the injections well. I was present for and performed the entire procedure.     Paty Whitney MD FAAN  Department of Neurology  Pager  [phone]

## 2020-06-16 NOTE — LETTER
6/16/2020       RE: Erin Ashley  [address]  [facility] [address]     Dear Colleague,    Thank you for referring your patient, Erin Ashley, to the [facility] NEUROLOGY at [facility]. Please see a copy of my visit note below.    [facility] Dept of Neurology  Botox Procedure    Erin Ashley  [id number]  1985    June 16, 2020    The procedure was explained to the patient. Benefits of the treatment were discussed including headache and migraine reduction. Risks of the procedure were reviewed including but not limited to pain, bruising, bleeding, infection, weakness of muscles injected or those distal to injection. The patient voiced understanding of the risks and benefits. All questions answered and the patient consented to proceed.     Prior to receiving Botox injections the patient reported the following:  Baseline headache frequency: 28-30 days a month  Baseline headache duration: 4-24 hours  Associated migrainous features: photophobia and nausea    Previous treatment trials: gabapentin, Depakote, duloxetine, amitriptyline and Lamictal without benefit.  She has a history of depression and low blood pressures which contraindicates antihypertensives and beta blockers.  She also has a history of kidney stones was contraindicates topiramate and zonisamide usage.    Last Botox procedure: This is first one  Current migraine frequency: N/A  Current migraine duration: N/A    A 200 unit vial of Botox was diluted with 4ml of 0.9% sodium chloride    Botox lot #: [id number]  Botox expiration date: 12/2022  0.9% sodium chloride lot #: [id number]  0.9% sodium chloride expiration date: 1 Jan 2021    The following muscles were injected using a 30 gauge needle:  Procerus 1 site 5 units   2 sites (bilat) 10 units  Frontalis 4 sites (bilat) 20 units  Temporalis 8 sites (bilat) 40 units  Trapezius muscles 6 sites (bilat) 30 units  Cervical paraspinals  (bilat) 4 sites 20 units  Occipitalis 6 sites (bilat) 30 units    A total of 155 units were injected, 45 wasted.   The patient tolerated the injections well. I was present for and performed the entire procedure.     Paty Whitney MD [facility]  Department of Neurology  Pager [phone]

## 2020-07-24 ENCOUNTER — MYC MEDICAL ADVICE (OUTPATIENT)
Dept: FAMILY MEDICINE | Facility: OTHER | Age: 35
End: 2020-07-24

## 2020-08-10 ENCOUNTER — MYC MEDICAL ADVICE (OUTPATIENT)
Dept: FAMILY MEDICINE | Facility: OTHER | Age: 35
End: 2020-08-10

## 2020-08-13 ENCOUNTER — VIRTUAL VISIT (OUTPATIENT)
Dept: FAMILY MEDICINE | Facility: OTHER | Age: 35
End: 2020-08-13
Payer: COMMERCIAL

## 2020-08-13 DIAGNOSIS — Z20.822 SUSPECTED 2019 NOVEL CORONAVIRUS INFECTION: Primary | ICD-10-CM

## 2020-08-13 PROCEDURE — 99202 OFFICE O/P NEW SF 15 MIN: CPT | Mod: 95 | Performed by: INTERNAL MEDICINE

## 2020-08-13 NOTE — PROGRESS NOTES
"Date: 2020 10:10:08  Clinician: Brittny Monk  Clinician NPI: [id number]  Patient: Erin Ashley  Patient : 1985  Patient Address: [address] [place], [place] [address]  Patient Phone: [phone]  Visit Protocol: URI  Patient Summary:  Erin is a 35 year old ( : 1985 ) female who initiated a Visit for COVID-19 (Coronavirus) evaluation and screening. When asked the question \"Please sign me up to receive news, health information and promotions from [employer].\", Erin responded \"No\".    Erin states her symptoms started today.   Her symptoms consist of a headache, a sore throat, nasal congestion, myalgia, facial pain or pressure, and malaise.   Symptom details     Nasal secretions: The color of her mucus is yellow and clear.    Sore throat: Erin reports having mild throat pain (1-3 on a 10 point pain scale), does not have exudate on her tonsils, and can swallow liquids. She is not sure if the lymph nodes in her neck are enlarged. A rash has not appeared on the skin since the sore throat started.     Facial pain or pressure: The facial pain or pressure feels worse when bending over or leaning forward.     Headache: She states the headache is severe (7-9 on a 10 point pain scale).      Erin denies having ear pain, chills, nausea, teeth pain, ageusia, diarrhea, cough, vomiting, rhinitis, anosmia, fever, and wheezing. She also denies having a sinus infection within the past year, taking antibiotic medication in the past month, and having recent facial or sinus surgery in the past 60 days. She is not experiencing dyspnea.   Precipitating events  Within the past week, Erin has not been exposed to someone with strep throat. She has not recently been exposed to someone with influenza. Erin has been in close contact with the following high risk individuals: children under the age of 5.   Pertinent COVID-19 (Coronavirus) information  In the past 14 days, Erin has not worked in a congregate " living setting.   She does not work or volunteer as healthcare worker or a  and does not work or volunteer in a healthcare facility.   Erin also has not lived in a congregate living setting in the past 14 days. She does not live with a healthcare worker.   Erin has had a close contact with a laboratory-confirmed COVID-19 patient within 14 days of symptom onset.   Since December 2019, Erin and has not had upper respiratory infection or influenza-like illness. Has not been diagnosed with lab-confirmed COVID-19 test   Pertinent medical history  Erin does not get yeast infections when she takes antibiotics.   Erin needs a return to work/school note.   Weight: 120 lbs   Erin does not smoke or use smokeless tobacco.   She is not sure if she is pregnant and denies breastfeeding. Her last period was over a month ago.   Weight: 120 lbs    MEDICATIONS: thiamine HCl (vitamin B1) oral, Complete Multivitamin-Multimineral oral, cholecalciferol (vitamin D3) oral, sertraline oral, buspirone oral, Seroquel oral, ALLERGIES: Vicodin  Clinician Response:  Dear Erin,   Your symptoms show that you may have coronavirus (COVID-19). This illness can cause fever, cough and trouble breathing. Many people get a mild case and get better on their own. Some people can get very sick.  What should I do?  We would like to test you for this virus.   1. Please call [phone] to schedule your visit. Explain that you were referred by [facility] to have a COVID-19 test. Be ready [facility].  The following will serve as your written order for this COVID Test, ordered by me, for the indication of suspected COVID [Z20.828]: The test will be ordered in [email or web address], our electronic health record, after you are scheduled. It will show as ordered and authorized by Cuco Multani MD.  Order: COVID-19 (Coronavirus) PCR for SYMPTOMATIC testing from [facility].      2. When it's time for your COVID test:  Stay at least 6 feet away  "from others. (If someone will drive you to your test, stay in the backseat, as far away from the  as you can.)   Cover your mouth and nose with a mask, tissue or washcloth.  Go straight to the testing site. Don't make any stops on the way there or back.      3.Starting now: Stay home and away from others (self-isolate) until:   You've had no fever---and no medicine that reduces fever---for one full day (24 hours). And...   Your other symptoms have gotten better. For example, your cough or breathing has improved. And...   At least 10 days have passed since your symptoms started.       During this time, don't leave the house except for testing or medical care.   Stay in your own room, even for meals. Use your own bathroom if you can.   Stay away from others in your home. No hugging, kissing or shaking hands. No visitors.  Don't go to work, school or anywhere else.    Clean \"high touch\" surfaces often (doorknobs, counters, handles, etc.). Use a household cleaning spray or wipes. You'll find a full list of  on the EPA website: www.epa.gov/pesticide-registration/list-n-disinfectants-use-against-sars-cov-2.   Cover your mouth and nose with a mask, tissue or washcloth to avoid spreading germs.  Wash your hands and face often. Use soap and water.  Caregivers in these groups are at risk for severe illness due to COVID-19:  o People 65 years and older  o People who live in a nursing home or long-term care facility  o People with chronic disease (lung, heart, cancer, diabetes, kidney, liver, immunologic)  o People who have a weakened immune system, including those who:   Are in cancer treatment  Take medicine that weakens the immune system, such as corticosteroids  Had a bone marrow or organ transplant  Have an immune deficiency  Have poorly controlled HIV or AIDS  Are obese (body mass index of 40 or higher)  Smoke regularly   o Caregivers should wear gloves while washing dishes, handling laundry and cleaning " bedrooms and bathrooms.  o Use caution when washing and drying laundry: Don't shake dirty laundry, and use the warmest water setting that you can.  o For more tips, go to www.cdc.gov/coronavirus/2019-ncov/downloads/10Things.pdf.       How can I take care of myself?   Get lots of rest. Drink extra fluids (unless a doctor has told you not to).   Take Tylenol (acetaminophen) for fever or pain. If you have liver or kidney problems, ask your family doctor if it's okay to take Tylenol.   Adults can take either:    650 mg (two 325 mg pills) every 4 to 6 hours, or...   1,000 mg (two 500 mg pills) every 8 hours as needed.    Note: Don't take more than 3,000 mg in one day. Acetaminophen is found in many medicines (both prescribed and over-the-counter medicines). Read all labels to be sure you don't take too much.   For children, check the Tylenol bottle for the right dose. The dose is based on the child's age or weight.    If you have other health problems (like cancer, heart failure, an organ transplant or severe kidney disease): Call your specialty clinic if you don't feel better in the next 2 days.       Know when to call 911. Emergency warning signs include:    Trouble breathing or shortness of breath Pain or pressure in the chest that doesn't go away Feeling confused like you haven't felt before, or not being able to wake up Bluish-colored lips or face.  Where can I get more information?   [facility] [place] -- About COVID-19: [email or web address]   CDC -- What to Do If You're Sick: www.cdc.gov/coronavirus/2019-ncov/about/steps-when-sick.html   CDC -- Ending Home Isolation: www.cdc.gov/coronavirus/2019-ncov/hcp/disposition-in-home-patients.html   CDC -- Caring for Someone: www.cdc.gov/coronavirus/2019-ncov/if-you-are-sick/care-for-someone.html   [facility] -- Interim Guidance for Hospital Discharge to Home: www.health.[facility].[place].us/diseases/coronavirus/hcp/hospdischarge.pdf   [facility] clinical trials  (COVID-19 research studies): clinicalaffairs.[facility]-clinical-trials    Below are the COVID-19 hotlines at the [place] Department of Health ([facility]). Interpreters are available.    For health questions: Call 364-613-1728 or 1-750.882.2397 (7 a.m. to 7 p.m.) For questions about schools and childcare: Call 460-772-5949 or 1-531.116.5456 (7 a.m. to 7 p.m.)    Diagnosis: Myalgia, unspecified site  Diagnosis ICD: M79.10

## 2020-08-14 DIAGNOSIS — Z20.822 SUSPECTED 2019 NOVEL CORONAVIRUS INFECTION: ICD-10-CM

## 2020-08-14 PROCEDURE — U0003 INFECTIOUS AGENT DETECTION BY NUCLEIC ACID (DNA OR RNA); SEVERE ACUTE RESPIRATORY SYNDROME CORONAVIRUS 2 (SARS-COV-2) (CORONAVIRUS DISEASE [COVID-19]), AMPLIFIED PROBE TECHNIQUE, MAKING USE OF HIGH THROUGHPUT TECHNOLOGIES AS DESCRIBED BY CMS-2020-01-R: HCPCS | Performed by: FAMILY MEDICINE

## 2020-08-15 LAB
SARS-COV-2 RNA SPEC QL NAA+PROBE: NOT DETECTED
SPECIMEN SOURCE: NORMAL

## 2020-09-08 ENCOUNTER — OFFICE VISIT (OUTPATIENT)
Dept: NEUROLOGY | Facility: CLINIC | Age: 35
End: 2020-09-08
Payer: COMMERCIAL

## 2020-09-08 DIAGNOSIS — G43.719 INTRACTABLE CHRONIC MIGRAINE WITHOUT AURA AND WITHOUT STATUS MIGRAINOSUS: Primary | ICD-10-CM

## 2020-09-08 ASSESSMENT — HEADACHE IMPACT TEST (HIT 6)
HOW OFTEN HAVE YOU FELT TOO TIRED TO WORK BECAUSE OF YOUR HEADACHES: SOMETIMES
HIT6 TOTAL SCORE: 65
HOW OFTEN DO HEADACHES LIMIT YOUR DAILY ACTIVITIES: VERY OFTEN
WHEN YOU HAVE HEADACHES HOW OFTEN IS THE PAIN SEVERE: ALWAYS
HOW OFTEN HAVE YOU FELT FED UP OR IRRITATED BECAUSE OF YOUR HEADACHES: SOMETIMES
WHEN YOU HAVE A HEADACHE HOW OFTEN DO YOU WISH YOU COULD LIE DOWN: VERY OFTEN
HOW OFTEN DID HEADACHS LIMIT CONCENTRATION ON WORK OR DAILY ACTIVITY: SOMETIMES

## 2020-09-08 NOTE — LETTER
9/8/2020       RE: Erin Ashley  [address]  [facility] [address]     Dear Colleague,    Thank you for referring your patient, Erin Ashley, to the [facility] NEUROLOGY at [facility]. Please see a copy of my visit note below.    [facility] Dept of Neurology  Botox Procedure     Erin Ashley  [id number]  1985     September 8, 2020    The procedure was explained to the patient. Benefits of the treatment were discussed including headache and migraine reduction. Risks of the procedure were reviewed including but not limited to pain, bruising, bleeding, infection, weakness of muscles injected or those distal to injection. The patient voiced understanding of the risks and benefits. All questions answered and the patient consented to proceed.      Prior to receiving Botox injections the patient reported the following:  Baseline headache frequency: 28-30 days a month  Baseline headache duration: 4-24 hours  Associated migrainous features: photophobia and nausea     Previous treatment trials: gabapentin, Depakote, duloxetine, amitriptyline and Lamictal without benefit.  She has a history of depression and low blood pressures which contraindicates antihypertensives and beta blockers.  She also has a history of kidney stones was contraindicates topiramate and zonisamide usage.     Last Botox procedure: 6/16/2020  Current migraine frequency: first 5 days intense pain and then headaches 2 days a week then wore off after 10 weeks.  Current migraine duration: 24 hours     A 200 unit vial of Botox was diluted with 4ml of 0.9% sodium chloride     Botox lot #: [id number]  Botox expiration date: 04 2023  0.9% sodium chloride lot #: [id number]  0.9% sodium chloride expiration date: 01 Dec 2021     The following muscles were injected using a 30 gauge needle:  Procerus 1 site 5 units   2 sites (bilat) 10 units  Frontalis 4 sites (bilat) 20 units  Temporalis 8  sites (bilat) 40 units  Trapezius muscles 6 sites (bilat) 30 units  Cervical paraspinals (bilat) 4 sites 20 units  Occipitalis 6 sites (bilat) 30 units     A total of 155 units were injected, 45 wasted.   The patient tolerated the injections well. I was present for and performed the entire procedure.      Again, thank you for allowing me to participate in the care of your patient.  Sincerely,    Paty Whitney MD [facility]  Department of Neurology  Pager [phone]

## 2020-09-08 NOTE — PROGRESS NOTES
[facility] Dept of Neurology  Botox Procedure     Erin Ashley  [id number]  1985     September 8, 2020       The procedure was explained to the patient. Benefits of the treatment were discussed including headache and migraine reduction. Risks of the procedure were reviewed including but not limited to pain, bruising, bleeding, infection, weakness of muscles injected or those distal to injection. The patient voiced understanding of the risks and benefits. All questions answered and the patient consented to proceed.      Prior to receiving Botox injections the patient reported the following:  Baseline headache frequency: 28-30 days a month  Baseline headache duration: 4-24 hours  Associated migrainous features: photophobia and nausea     Previous treatment trials: gabapentin, Depakote, duloxetine, amitriptyline and Lamictal without benefit.  She has a history of depression and low blood pressures which contraindicates antihypertensives and beta blockers.  She also has a history of kidney stones was contraindicates topiramate and zonisamide usage.     Last Botox procedure: 6/16/2020  Current migraine frequency: first 5 days intense pain and then headaches 2 days a week then wore off after 10 weeks.  Current migraine duration: 24 hours     A 200 unit vial of Botox was diluted with 4ml of 0.9% sodium chloride     Botox lot #: [id number]  Botox expiration date: 04 2023  0.9% sodium chloride lot #: [id number]  0.9% sodium chloride expiration date: 01 Dec 2021     The following muscles were injected using a 30 gauge needle:  Procerus 1 site 5 units   2 sites (bilat) 10 units  Frontalis 4 sites (bilat) 20 units  Temporalis 8 sites (bilat) 40 units  Trapezius muscles 6 sites (bilat) 30 units  Cervical paraspinals (bilat) 4 sites 20 units  Occipitalis 6 sites (bilat) 30 units     A total of 155 units were injected, 45 wasted.   The patient tolerated the injections well. I was present for and performed  the entire procedure.      Paty Whitney MD [facility]  Department of Neurology  Pager [phone]

## 2020-09-08 NOTE — LETTER
9/8/2020       RE: Erin Ashley  [address]  [facility] [address]     Dear Colleague,    Thank you for referring your patient, Erin Ashley, to the [facility] NEUROLOGY at [facility]. Please see a copy of my visit note below.    [facility] Dept of Neurology  Botox Procedure     Erin Ashley  [id number]  1985     September 8, 2020       The procedure was explained to the patient. Benefits of the treatment were discussed including headache and migraine reduction. Risks of the procedure were reviewed including but not limited to pain, bruising, bleeding, infection, weakness of muscles injected or those distal to injection. The patient voiced understanding of the risks and benefits. All questions answered and the patient consented to proceed.      Prior to receiving Botox injections the patient reported the following:  Baseline headache frequency: 28-30 days a month  Baseline headache duration: 4-24 hours  Associated migrainous features: photophobia and nausea     Previous treatment trials: gabapentin, Depakote, duloxetine, amitriptyline and Lamictal without benefit.  She has a history of depression and low blood pressures which contraindicates antihypertensives and beta blockers.  She also has a history of kidney stones was contraindicates topiramate and zonisamide usage.     Last Botox procedure: 6/16/2020  Current migraine frequency: first 5 days intense pain and then headaches 2 days a week then wore off after 10 weeks.  Current migraine duration: 24 hours     A 200 unit vial of Botox was diluted with 4ml of 0.9% sodium chloride     Botox lot #: [id number]  Botox expiration date: 04 2023  0.9% sodium chloride lot #: [id number]  0.9% sodium chloride expiration date: 01 Dec 2021     The following muscles were injected using a 30 gauge needle:  Procerus 1 site 5 units   2 sites (bilat) 10 units  Frontalis 4 sites (bilat) 20 units  Temporalis 8  sites (bilat) 40 units  Trapezius muscles 6 sites (bilat) 30 units  Cervical paraspinals (bilat) 4 sites 20 units  Occipitalis 6 sites (bilat) 30 units     A total of 155 units were injected, 45 wasted.   The patient tolerated the injections well. I was present for and performed the entire procedure.      Paty Whitney MD [facility]  Department of Neurology  Pager [phone]    Again, thank you for allowing me to participate in the care of your patient.      Sincerely,    Paty Whitney MD

## 2020-10-20 DIAGNOSIS — E55.9 VITAMIN D DEFICIENCY: ICD-10-CM

## 2020-10-21 RX ORDER — CHOLECALCIFEROL (VITAMIN D3) 50 MCG
TABLET ORAL
Qty: 30 TABLET | Refills: 5 | Status: SHIPPED | OUTPATIENT
Start: 2020-10-21 | End: 2022-08-04

## 2020-10-21 NOTE — TELEPHONE ENCOUNTER
Prescription approved per [facility] Refill Protocol.  Zac Edmondson RN  October 21, 2020

## 2020-10-28 DIAGNOSIS — R11.0 NAUSEA: ICD-10-CM

## 2020-10-30 RX ORDER — ONDANSETRON 8 MG/1
TABLET, ORALLY DISINTEGRATING ORAL
Qty: 12 TABLET | Refills: 3 | Status: SHIPPED | OUTPATIENT
Start: 2020-10-30 | End: 2021-03-02

## 2020-10-30 NOTE — TELEPHONE ENCOUNTER
"Requested Prescriptions   Pending Prescriptions Disp Refills     ondansetron (ZOFRAN-ODT) 8 MG ODT tab [Pharmacy Med Name: ONDANSETRON ODT 8 MG TABLET] 12 tablet 3     Sig: TAKE 1 TABLET BY MOUTH EVERY 8 HOURS AS NEEDED FOR NAUSEA        Antivertigo/Antiemetic Agents Passed - 10/28/2020 10:08 PM        Passed - Recent (12 mo) or future (30 days) visit within the authorizing provider's specialty     Patient has had an office visit with the authorizing provider or a provider within the authorizing providers department within the previous 12 mos or has a future within next 30 days. See \"Patient Info\" tab in inbasket, or \"Choose Columns\" in Meds & Orders section of the refill encounter.              Passed - Medication is active on med list        Passed - Patient is 18 years of age or older           Prescription approved per [facility] Refill Protocol.    Lucien Garcia RN, BSN    "

## 2020-11-05 ENCOUNTER — MYC MEDICAL ADVICE (OUTPATIENT)
Dept: FAMILY MEDICINE | Facility: OTHER | Age: 35
End: 2020-11-05

## 2020-11-05 DIAGNOSIS — R30.0 DYSURIA: Primary | ICD-10-CM

## 2020-11-06 ENCOUNTER — E-VISIT (OUTPATIENT)
Dept: FAMILY MEDICINE | Facility: OTHER | Age: 35
End: 2020-11-06

## 2020-11-06 DIAGNOSIS — R30.0 DYSURIA: ICD-10-CM

## 2020-11-06 DIAGNOSIS — R30.0 DYSURIA: Primary | ICD-10-CM

## 2020-11-06 LAB
ALBUMIN UR-MCNC: NEGATIVE MG/DL
APPEARANCE UR: CLEAR
BILIRUB UR QL STRIP: NEGATIVE
COLOR UR AUTO: YELLOW
GLUCOSE UR STRIP-MCNC: NEGATIVE MG/DL
HGB UR QL STRIP: NEGATIVE
KETONES UR STRIP-MCNC: NEGATIVE MG/DL
LEUKOCYTE ESTERASE UR QL STRIP: NEGATIVE
NITRATE UR QL: NEGATIVE
PH UR STRIP: 7 PH (ref 5–7)
SOURCE: NORMAL
SP GR UR STRIP: 1.02 (ref 1–1.03)
UROBILINOGEN UR STRIP-ACNC: 0.2 EU/DL (ref 0.2–1)

## 2020-11-06 PROCEDURE — 81003 URINALYSIS AUTO W/O SCOPE: CPT | Performed by: PHYSICIAN ASSISTANT

## 2020-11-06 PROCEDURE — 99421 OL DIG E/M SVC 5-10 MIN: CPT | Performed by: PHYSICIAN ASSISTANT

## 2020-11-12 ENCOUNTER — VIRTUAL VISIT (OUTPATIENT)
Dept: FAMILY MEDICINE | Facility: OTHER | Age: 35
End: 2020-11-12
Payer: COMMERCIAL

## 2020-11-12 PROCEDURE — 99421 OL DIG E/M SVC 5-10 MIN: CPT | Performed by: PHYSICIAN ASSISTANT

## 2020-11-12 NOTE — PROGRESS NOTES
"Date: 2020 13:03:41  Clinician: Tomasz Alston  Clinician NPI: [id number]  Patient: Erin Ashley  Patient : 1985  Patient Address: [address] [place] [address]  Patient Phone: [phone]  Visit Protocol: URI  Patient Summary:  Erin is a 35 year old ( : 1985 ) female who initiated a [facility] Visit for COVID-19 (Coronavirus) evaluation and screening. When asked the question \"Please sign me up to receive news, health information and promotions from [facility].\", Erin responded \"No\".    Erin states her symptoms started gradually 3-4 days ago.   Her symptoms consist of myalgia, malaise, a sore throat, a headache, a cough, nasal congestion, and nausea.   Symptom details     Nasal secretions: The color of her mucus is yellow.    Cough: Erin coughs a few times an hour and her cough is not more bothersome at night. Phlegm comes into her throat when she coughs. She does not believe her cough is caused by post-nasal drip. The color of the phlegm is yellow.     Sore throat: Erin reports having mild throat pain (1-3 on a 10 point pain scale), does not have exudate on her tonsils, and can swallow liquids. She is not sure if the lymph nodes in her neck are enlarged. A rash has not appeared on the skin since the sore throat started.     Headache: She states the headache is moderate (4-6 on a 10 point pain scale).      Erin denies having vomiting, rhinitis, facial pain or pressure, chills, teeth pain, ageusia, diarrhea, ear pain, wheezing, fever, and anosmia. She also denies taking antibiotic medication in the past month, having recent facial or sinus surgery in the past 60 days, double sickening (worsening symptoms after initial improvement), and having a sinus infection within the past year. She is not experiencing dyspnea.   Precipitating events  Within the past week, Erin has not been exposed to someone with strep throat. She has not recently been exposed to someone with influenza. " Erin has not been in close contact with any high risk individuals.   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 In the past 14 days, Erin has not worked or volunteered at a healthcare facility or group living setting.   In the past 14 days, she also has not lived in a congregate living setting.   Erin has had a close contact with a laboratory-confirmed COVID-19 patient within 14 days of symptom onset. She was not exposed at her work. Date Erin was exposed to the laboratory-confirmed COVID-19 patient: 11/09/2020   Additional information about contact with COVID-19 (Coronavirus) patient as reported by the patient (free text): Ex  whom I share custody of 2 children has tested +, my kids were with him Monday Wednesday and he was notified today of positive COVID test. Daughter also had close contact with a classmate over 3 days last week and school notified parents of this today, she needs quarantined until 11/19.    Since December 2019, Erin has been tested for COVID-19 and has had upper respiratory infection (URI) or influenza-like illness.      Result of COVID-19 test: Negative     Date of her COVID-19 test: 08/14/2020     Date(s) of previous URI or influenza-like illness (free-text): 4/5/2020     Symptoms Erin experienced during previous URI or influenza-like illness as reported by the patient (free-text): Went to ER with 15 day fever, cough, + d-dimer. Was diagnosed with bronchitis at that time        Pertinent medical history  Erin does not get yeast infections when she takes antibiotics.   Erin needs a return to work/school note.   Weight: 113 lbs   Erin does not smoke or use smokeless tobacco.   She denies pregnancy and denies breastfeeding. Her last period was over a month ago.   Weight: 113 lbs    MEDICATIONS: Wellbutrin XL oral, naltrexone oral, thiamine HCl (vitamin B1) oral,  Complete Multivitamin-Multimineral oral, cholecalciferol (vitamin D3) oral, sertraline oral, buspirone oral, Seroquel oral, ALLERGIES: Vicodin  Clinician Response:  Dear Erin,   Your symptoms show that you may have coronavirus (COVID-19). This illness can cause fever, cough and trouble breathing. Many people get a mild case and get better on their own. Some people can get very sick.  What should I do?  We would like to test you for this virus.   1. Please call [phone] to schedule your visit. Explain that you were referred by [facility] to have a COVID-19 test. Be ready to share your [facility] visit ID number.  * If you need to schedule in [facility] please call [phone] or for [employer] employees please call [phone].  * If you need to schedule in the [place] area please call [phone]. [employer] employees call [phone].  The following will serve as your written order for this COVID Test, ordered by me, for the indication of suspected COVID [Z20.828]: The test will be ordered in [facility], our electronic health record, after you are scheduled. It will show as ordered and authorized by Cuco Multani MD.  Order: COVID-19 (Coronavirus) PCR for SYMPTOMATIC testing from [facility].   2. When it's time for your COVID test:  Stay at least 6 feet away from others. (If someone will drive you to your test, stay in the backseat, as far away from the  as you can.)   Cover your mouth and nose with a mask, tissue or washcloth.  Go straight to the testing site. Don't make any stops on the way there or back.      3.Starting now: Stay home and away from others (self-isolate) until:   You've had no fever---and no medicine that reduces fever---for one full day (24 hours). And...   Your other symptoms have gotten better. For example, your cough or breathing has improved. And...   At least 10 days have passed since your symptoms started.       During this time, don't leave the house except for testing or medical care.   Stay in  "your own room, even for meals. Use your own bathroom if you can.   Stay away from others in your home. No hugging, kissing or shaking hands. No visitors.  Don't go to work, school or anywhere else.    Clean \"high touch\" surfaces often (doorknobs, counters, handles, etc.). Use a household cleaning spray or wipes. You'll find a full list of  on the EPA website: www.epa.gov/pesticide-registration/list-n-disinfectants-use-against-sars-cov-2.   Cover your mouth and nose with a mask, tissue or washcloth to avoid spreading germs.  Wash your hands and face often. Use soap and water.  Caregivers in these groups are at risk for severe illness due to COVID-19:  o People 65 years and older  o People who live in a nursing home or long-term care facility  o People with chronic disease (lung, heart, cancer, diabetes, kidney, liver, immunologic)  o People who have a weakened immune system, including those who:   Are in cancer treatment  Take medicine that weakens the immune system, such as corticosteroids  Had a bone marrow or organ transplant  Have an immune deficiency  Have poorly controlled HIV or AIDS  Are obese (body mass index of 40 or higher)  Smoke regularly   o Caregivers should wear gloves while washing dishes, handling laundry and cleaning bedrooms and bathrooms.  o Use caution when washing and drying laundry: Don't shake dirty laundry, and use the warmest water setting that you can.  o For more tips, go to www.cdc.gov/coronavirus/2019-ncov/downloads/10Things.pdf.    How can I take care of myself?    Get lots of rest. Drink extra fluids (unless a doctor has told you not to).   Take Tylenol (acetaminophen) for fever or pain. If you have liver or kidney problems, ask your family doctor if it's okay to take Tylenol.   Adults can take either:    650 mg (two 325 mg pills) every 4 to 6 hours, or...   1,000 mg (two 500 mg pills) every 8 hours as needed.    Note: Don't take more than 3,000 mg in one day. Acetaminophen is " found in many medicines (both prescribed and over-the-counter medicines). Read all labels to be sure you don't take too much.   For children, check the Tylenol bottle for the right dose. The dose is based on the child's age or weight.    If you have other health problems (like cancer, heart failure, an organ transplant or severe kidney disease): Call your specialty clinic if you don't feel better in the next 2 days.       Know when to call 911. Emergency warning signs include:    Trouble breathing or shortness of breath Pain or pressure in the chest that doesn't go away Feeling confused like you haven't felt before, or not being able to wake up Bluish-colored lips or face.  Where can I get more information?    [facility] -- About COVID-19: [email or web address]   CDC -- What to Do If You're Sick: www.cdc.gov/coronavirus/2019-ncov/about/steps-when-sick.html   CDC -- Ending Home Isolation: www.cdc.gov/coronavirus/2019-ncov/hcp/disposition-in-home-patients.html   CDC -- Caring for Someone: www.cdc.gov/coronavirus/2019-ncov/if-you-are-sick/care-for-someone.html   [facility] -- Interim Guidance for Hospital Discharge to Home: www.[facility].[place]/diseases/coronavirus/hcp/hospdischarge.pdf   [facility] clinical trials (COVID-19 research studies): clinicalaffairs.[facility]-clinical-trials    Below are the COVID-19 hotlines at the [facility] ([facility]). Interpreters are available.    For health questions: Call 127-760-0305 or 1-681.412.2100 (7 a.m. to 7 p.m.) For questions about schools and childcare: Call 109-327-8934 or 1-627.727.6646 (7 a.m. to 7 p.m.)    Diagnosis: Contact with and (suspected) exposure to other viral communicable diseases  Diagnosis ICD: Z20.828

## 2020-11-18 DIAGNOSIS — Z20.822 SUSPECTED 2019 NOVEL CORONAVIRUS INFECTION: Primary | ICD-10-CM

## 2020-11-30 ASSESSMENT — HEADACHE IMPACT TEST (HIT 6)
HOW OFTEN HAVE YOU FELT FED UP OR IRRITATED BECAUSE OF YOUR HEADACHES: VERY OFTEN
HOW OFTEN DID HEADACHS LIMIT CONCENTRATION ON WORK OR DAILY ACTIVITY: VERY OFTEN
HOW OFTEN DO HEADACHES LIMIT YOUR DAILY ACTIVITIES: SOMETIMES
WHEN YOU HAVE A HEADACHE HOW OFTEN DO YOU WISH YOU COULD LIE DOWN: ALWAYS
HOW OFTEN HAVE YOU FELT TOO TIRED TO WORK BECAUSE OF YOUR HEADACHES: SOMETIMES
WHEN YOU HAVE HEADACHES HOW OFTEN IS THE PAIN SEVERE: ALWAYS
HIT6 TOTAL SCORE: 68

## 2020-12-01 ENCOUNTER — OFFICE VISIT (OUTPATIENT)
Dept: NEUROLOGY | Facility: CLINIC | Age: 35
End: 2020-12-01
Payer: COMMERCIAL

## 2020-12-01 DIAGNOSIS — G43.719 INTRACTABLE CHRONIC MIGRAINE WITHOUT AURA AND WITHOUT STATUS MIGRAINOSUS: Primary | ICD-10-CM

## 2020-12-01 PROCEDURE — 64615 CHEMODENERV MUSC MIGRAINE: CPT | Performed by: PSYCHIATRY & NEUROLOGY

## 2020-12-01 NOTE — LETTER
RE: Erin Ashley  [address]  [place] [address]     Dear Colleague,    Thank you for referring your patient, Erin Ashley, to the [facility] [place] at [facility]. Please see a copy of my visit note below.    [facility] Dept of Neurology  Botox Procedure     Erin Ashley  [id number]  1985     December 1, 2020        The procedure was explained to the patient. Benefits of the treatment were discussed including headache and migraine reduction. Risks of the procedure were reviewed including but not limited to pain, bruising, bleeding, infection, weakness of muscles injected or those distal to injection. The patient voiced understanding of the risks and benefits. All questions answered and the patient consented to proceed.      Prior to receiving Botox injections the patient reported the following:  Baseline headache frequency: 28-30 days a month  Baseline headache duration: 4-24 hours  Associated migrainous features: photophobia and nausea     Previous treatment trials: gabapentin, Depakote, duloxetine, amitriptyline and Lamictal without benefit.  She has a history of depression and low blood pressures which contraindicates antihypertensives and beta blockers.  She also has a history of kidney stones was contraindicates topiramate and zonisamide usage.     Last Botox procedure: 9/8/2020  Current migraine frequency: Worked well until about one month ago and now has constant pain on righ tocciput region  Current migraine duration:  currently constant     A 200 unit vial of Botox was diluted with 4ml of 0.9% sodium chloride     Botox lot #: [id number]  Botox expiration date: 08 2023  0.9% sodium chloride lot #: [id number]  0.9% sodium chloride expiration date: 01 Apr 2022     The following muscles were injected using a 30 gauge needle:  Procerus 1 site 5 units   2 sites (bilat) 10 units  Frontalis 4 sites  (bilat) 20 units  Temporalis 8 sites (bilat) 40 units  Trapezius muscles 6 sites (bilat) 30 units  Cervical paraspinals (bilat) 4 sites 20 units  Occipitalis 7 sites (bilat) 15 units on the left and 20 units on the right     A total of 160 units were injected, 40 units wasted.   The patient tolerated the injections well. I was present for and performed the entire procedure.      Paty Whitney MD FAAN  Department of Neurology

## 2020-12-21 ENCOUNTER — MYC MEDICAL ADVICE (OUTPATIENT)
Dept: OBGYN | Facility: CLINIC | Age: 35
End: 2020-12-21

## 2020-12-21 DIAGNOSIS — Z34.91 PREGNANCY WITH UNCERTAIN DATES IN FIRST TRIMESTER: Primary | ICD-10-CM

## 2020-12-21 NOTE — TELEPHONE ENCOUNTER
Pt had VV on 4/23/2020 for pelvic congestion syndrome.    Pt took 3 positive home pregnancy tests on 12/20/2020, pt has not had a regular cycle in a few years. Pt states she had some bleeding that was heavier at the end of October.    RN routing to provider for advisement on labs/US.    Cynthia Ryder RN on 12/21/2020 at 11:57 AM

## 2020-12-22 DIAGNOSIS — Z34.91 PREGNANCY WITH UNCERTAIN DATES IN FIRST TRIMESTER: ICD-10-CM

## 2020-12-22 DIAGNOSIS — Z36.87 UNSURE OF LMP (LAST MENSTRUAL PERIOD) AS REASON FOR ULTRASOUND SCAN: Primary | ICD-10-CM

## 2020-12-22 LAB — B-HCG SERPL-ACNC: ABNORMAL IU/L (ref 0–5)

## 2020-12-22 PROCEDURE — 36415 COLL VENOUS BLD VENIPUNCTURE: CPT | Performed by: OBSTETRICS & GYNECOLOGY

## 2020-12-22 PROCEDURE — 84702 CHORIONIC GONADOTROPIN TEST: CPT | Performed by: OBSTETRICS & GYNECOLOGY

## 2020-12-23 ENCOUNTER — ANCILLARY PROCEDURE (OUTPATIENT)
Dept: ULTRASOUND IMAGING | Facility: CLINIC | Age: 35
End: 2020-12-23
Attending: OBSTETRICS & GYNECOLOGY
Payer: COMMERCIAL

## 2020-12-23 DIAGNOSIS — Z36.87 UNSURE OF LMP (LAST MENSTRUAL PERIOD) AS REASON FOR ULTRASOUND SCAN: Primary | ICD-10-CM

## 2020-12-23 DIAGNOSIS — Z36.87 UNSURE OF LMP (LAST MENSTRUAL PERIOD) AS REASON FOR ULTRASOUND SCAN: ICD-10-CM

## 2020-12-23 NOTE — RESULT ENCOUNTER NOTE
Hi,    The ultrasound shows a gestational sac in the uterus, but no baby yet.  It may be too early to see the baby, so I recommend waiting at least a week to repeat the ultrasound.  We will plan to repeat the pregnancy hormone level at the same time.  Please let us know if you have bleeding or pain before then.      Thanks and Happy Holidays!  Dr. Dallas

## 2020-12-30 ENCOUNTER — ANCILLARY PROCEDURE (OUTPATIENT)
Dept: ULTRASOUND IMAGING | Facility: OTHER | Age: 35
End: 2020-12-30
Attending: OBSTETRICS & GYNECOLOGY
Payer: COMMERCIAL

## 2020-12-30 DIAGNOSIS — Z36.87 UNSURE OF LMP (LAST MENSTRUAL PERIOD) AS REASON FOR ULTRASOUND SCAN: ICD-10-CM

## 2020-12-30 LAB — B-HCG SERPL-ACNC: ABNORMAL IU/L (ref 0–5)

## 2020-12-30 PROCEDURE — 36415 COLL VENOUS BLD VENIPUNCTURE: CPT | Performed by: OBSTETRICS & GYNECOLOGY

## 2020-12-30 PROCEDURE — 84702 CHORIONIC GONADOTROPIN TEST: CPT | Performed by: OBSTETRICS & GYNECOLOGY

## 2021-01-06 ENCOUNTER — ANCILLARY PROCEDURE (OUTPATIENT)
Dept: ULTRASOUND IMAGING | Facility: OTHER | Age: 36
End: 2021-01-06
Attending: OBSTETRICS & GYNECOLOGY
Payer: COMMERCIAL

## 2021-01-06 DIAGNOSIS — O03.9 MISCARRIAGE: Primary | ICD-10-CM

## 2021-01-06 DIAGNOSIS — O20.9 VAGINAL BLEEDING AFFECTING EARLY PREGNANCY: ICD-10-CM

## 2021-01-06 LAB
B-HCG SERPL-ACNC: 8297 IU/L (ref 0–5)
ERYTHROCYTE [DISTWIDTH] IN BLOOD BY AUTOMATED COUNT: 13.1 % (ref 10–15)
HCT VFR BLD AUTO: 36.9 % (ref 35–47)
HGB BLD-MCNC: 12.6 G/DL (ref 11.7–15.7)
MCH RBC QN AUTO: 31.3 PG (ref 26.5–33)
MCHC RBC AUTO-ENTMCNC: 34.1 G/DL (ref 31.5–36.5)
MCV RBC AUTO: 92 FL (ref 78–100)
PLATELET # BLD AUTO: 235 10E9/L (ref 150–450)
RBC # BLD AUTO: 4.03 10E12/L (ref 3.8–5.2)
WBC # BLD AUTO: 6.1 10E9/L (ref 4–11)

## 2021-01-06 PROCEDURE — 85027 COMPLETE CBC AUTOMATED: CPT | Performed by: OBSTETRICS & GYNECOLOGY

## 2021-01-06 PROCEDURE — 84702 CHORIONIC GONADOTROPIN TEST: CPT | Performed by: OBSTETRICS & GYNECOLOGY

## 2021-01-06 PROCEDURE — 36415 COLL VENOUS BLD VENIPUNCTURE: CPT | Performed by: OBSTETRICS & GYNECOLOGY

## 2021-01-07 NOTE — RESULT ENCOUNTER NOTE
Hi,    I am very sorry you are experiencing a miscarriage.  Your pregnancy hormone level has dropped.  Your hemoglobin is still good.  I will send another message with your ultrasound results.  I am happy to talk with you anytime, just let me know.  You can expect the bleeding to taper off.  It occasionally lasts a few weeks. If you have severe pain or heavy bleeding, please let me know right away.  Also please let me know if you have prolonged bleeding.  We will plan to repeat the pregnancy hormone level in a couple of weeks to make sure it is dropping appropriately.  We can test sooner if you have concerns.      Again, I am very sorry for your loss.   Dr. Dallas

## 2021-01-09 ENCOUNTER — HEALTH MAINTENANCE LETTER (OUTPATIENT)
Age: 36
End: 2021-01-09

## 2021-01-12 ENCOUNTER — MYC MEDICAL ADVICE (OUTPATIENT)
Dept: FAMILY MEDICINE | Facility: OTHER | Age: 36
End: 2021-01-12

## 2021-01-12 ENCOUNTER — MYC MEDICAL ADVICE (OUTPATIENT)
Dept: OBGYN | Facility: CLINIC | Age: 36
End: 2021-01-12

## 2021-01-12 DIAGNOSIS — O03.9 MISCARRIAGE: Primary | ICD-10-CM

## 2021-01-13 NOTE — TELEPHONE ENCOUNTER
Pt's last HCG quant was done on 1/6 and it was [id number].  Per result note:   Amada Dallas MD   1/6/2021  6:56 PM CST      Hi,     I am very sorry you are experiencing a miscarriage.  Your pregnancy hormone level has dropped.  Your hemoglobin is still good.  I will send another message with your ultrasound results.  I am happy to talk with you anytime, just let me know.  You can expect the bleeding to taper off.  It occasionally lasts a few weeks. If you have severe pain or heavy bleeding, please let me know right away.  Also please let me know if you have prolonged bleeding.  We will plan to repeat the pregnancy hormone level in a couple of weeks to make sure it is dropping appropriately.  We can test sooner if you have concerns.       Again, I am very sorry for your loss.   Dr. Dallas

## 2021-01-20 DIAGNOSIS — O03.9 MISCARRIAGE: ICD-10-CM

## 2021-01-20 LAB
B-HCG SERPL-ACNC: 2036 IU/L (ref 0–5)
HGB BLD-MCNC: 12.6 G/DL (ref 11.7–15.7)

## 2021-01-20 PROCEDURE — 85018 HEMOGLOBIN: CPT | Performed by: OBSTETRICS & GYNECOLOGY

## 2021-01-20 PROCEDURE — 36415 COLL VENOUS BLD VENIPUNCTURE: CPT | Performed by: OBSTETRICS & GYNECOLOGY

## 2021-01-20 PROCEDURE — 84702 CHORIONIC GONADOTROPIN TEST: CPT | Performed by: OBSTETRICS & GYNECOLOGY

## 2021-02-16 ASSESSMENT — HEADACHE IMPACT TEST (HIT 6)
HOW OFTEN DO HEADACHES LIMIT YOUR DAILY ACTIVITIES: VERY OFTEN
WHEN YOU HAVE A HEADACHE HOW OFTEN DO YOU WISH YOU COULD LIE DOWN: ALWAYS
HOW OFTEN HAVE YOU FELT FED UP OR IRRITATED BECAUSE OF YOUR HEADACHES: SOMETIMES
HOW OFTEN DID HEADACHS LIMIT CONCENTRATION ON WORK OR DAILY ACTIVITY: VERY OFTEN
WHEN YOU HAVE HEADACHES HOW OFTEN IS THE PAIN SEVERE: VERY OFTEN
HIT6 TOTAL SCORE: 67
HOW OFTEN HAVE YOU FELT TOO TIRED TO WORK BECAUSE OF YOUR HEADACHES: VERY OFTEN

## 2021-02-22 DIAGNOSIS — R10.2 PELVIC PAIN IN FEMALE: ICD-10-CM

## 2021-02-22 RX ORDER — ETONOGESTREL AND ETHINYL ESTRADIOL VAGINAL RING .015; .12 MG/D; MG/D
1 RING VAGINAL
Qty: 3 EACH | Refills: 3 | Status: CANCELLED | OUTPATIENT
Start: 2021-02-22

## 2021-02-22 NOTE — TELEPHONE ENCOUNTER
"Requested Prescriptions   Pending Prescriptions Disp Refills     etonogestrel-ethinyl estradiol (NUVARING) 0.12-0.015 MG/24HR vaginal ring 3 each 3     Sig: Place 1 each vaginally every 28 days       Contraceptives Protocol Failed - 2/22/2021  1:27 PM        Failed - No positive pregnancy test in past 12 months        Passed - Patient is not a current smoker if age is 35 or older        Passed - Recent (12 mo) or future (30 days) visit within the authorizing provider's specialty     Patient has had an office visit with the authorizing provider or a provider within the authorizing providers department within the previous 12 mos or has a future within next 30 days. See \"Patient Info\" tab in inbasket, or \"Choose Columns\" in Meds & Orders section of the refill encounter.              Passed - Medication is active on med list        Passed - No active pregnancy on record           Pt had a recent miscarriage.  She is scheduled for an HCG lab draw on 2/24.    Sending pt a [email or web address] message reminding her to  schedule a yearly physical with Dr. Dallas as it has been over a year, 2/6/2020.    Malika Holt RN    "

## 2021-02-22 NOTE — TELEPHONE ENCOUNTER
See [employer] message dated today, 2/22.  Pt states this was an automated request and still has some Nuvaring left.    Malika Holt RN

## 2021-02-23 ENCOUNTER — OFFICE VISIT (OUTPATIENT)
Dept: NEUROLOGY | Facility: CLINIC | Age: 36
End: 2021-02-23
Payer: COMMERCIAL

## 2021-02-23 DIAGNOSIS — G43.719 INTRACTABLE CHRONIC MIGRAINE WITHOUT AURA AND WITHOUT STATUS MIGRAINOSUS: Primary | ICD-10-CM

## 2021-02-23 PROCEDURE — 64615 CHEMODENERV MUSC MIGRAINE: CPT | Performed by: PSYCHIATRY & NEUROLOGY

## 2021-02-23 NOTE — LETTER
2/23/2021        RE: Erin Ashley  [address]  [place] [address]     Dear Colleague,    Thank you for referring your patient, Erin Ashley, to the [facility] NEUROLOGY CL[facility]IC [place] at [facility]. Please see a copy of my visit note below.    [facility] Dept of Neurology  Botox Procedure     Erin Ashley  [id number]  1985     February 23, 2021       The procedure was explained to the patient. Benefits of the treatment were discussed including headache and migraine reduction. Risks of the procedure were reviewed including but not limited to pain, bruising, bleeding, infection, weakness of muscles injected or those distal to injection. The patient voiced understanding of the risks and benefits. All questions answered and the patient consented to proceed.      Prior to receiving Botox injections the patient reported the following:  Baseline headache frequency: 28-30 days a month  Baseline headache duration: 4-24 hours  Associated migrainous features: photophobia and nausea     Previous treatment trials: gabapentin, Depakote, duloxetine, amitriptyline and Lamictal without benefit.  She has a history of depression and low blood pressures which contraindicates antihypertensives and beta blockers.  She also has a history of kidney stones was contraindicates topiramate and zonisamide usage.     Last Botox procedure: 12/1/2020  Current migraine frequency: Worked better this time. She had 1-2 migraines a month before lst week then 4 days this week  Current migraine duration:  4 hours  No missed work     A 200 unit vial of Botox was diluted with 4ml of 0.9% sodium chloride     Botox lot #: [id number]  Botox expiration date: 10/31/2023  0.9% sodium chloride lot #: [id number]  0.9% sodium chloride expiration date: 1 Feb 2022     The following muscles were injected using a 30 gauge needle:  Procerus 1 site 5 units   2 sites  (bilat) 10 units  Frontalis 4 sites (bilat) 20 units  Temporalis 8 sites (bilat) 40 units  Trapezius muscles 6 sites (bilat) 30 units  Cervical paraspinals (bilat) 4 sites 20 units  Occipitalis 7 sites (bilat) 15 units on the left and 20 units on the right     A total of 160 units were injected, 40 units wasted.   The patient tolerated the injections well. I was present for and performed the entire procedure.      Paty Whitney MD [facility]  Department of Neurology      Again, thank you for allowing me to participate in the care of your patient.      Sincerely,    Paty Whitney MD

## 2021-02-23 NOTE — PROGRESS NOTES
[facility] Dept of Neurology  Botox Procedure     Erin Ashley  [id number]  1985     February 23, 2021       The procedure was explained to the patient. Benefits of the treatment were discussed including headache and migraine reduction. Risks of the procedure were reviewed including but not limited to pain, bruising, bleeding, infection, weakness of muscles injected or those distal to injection. The patient voiced understanding of the risks and benefits. All questions answered and the patient consented to proceed.      Prior to receiving Botox injections the patient reported the following:  Baseline headache frequency: 28-30 days a month  Baseline headache duration: 4-24 hours  Associated migrainous features: photophobia and nausea     Previous treatment trials: gabapentin, Depakote, duloxetine, amitriptyline and Lamictal without benefit.  She has a history of depression and low blood pressures which contraindicates antihypertensives and beta blockers.  She also has a history of kidney stones was contraindicates topiramate and zonisamide usage.     Last Botox procedure: 12/1/2020  Current migraine frequency: Worked better this time. She had 1-2 migraines a month before lst week then 4 days this week  Current migraine duration:  4 hours  No missed work     A 200 unit vial of Botox was diluted with 4ml of 0.9% sodium chloride     Botox lot #: [id number]  Botox expiration date: 10/31/2023  0.9% sodium chloride lot #: [id number]  0.9% sodium chloride expiration date: 1 Feb 2022     The following muscles were injected using a 30 gauge needle:  Procerus 1 site 5 units   2 sites (bilat) 10 units  Frontalis 4 sites (bilat) 20 units  Temporalis 8 sites (bilat) 40 units  Trapezius muscles 6 sites (bilat) 30 units  Cervical paraspinals (bilat) 4 sites 20 units  Occipitalis 7 sites (bilat) 15 units on the left and 20 units on the right     A total of 160 units were injected, 40 units wasted.   The  patient tolerated the injections well. I was present for and performed the entire procedure.      Paty Whitney MD FAAN  Department of Neurology

## 2021-02-24 DIAGNOSIS — O03.9 MISCARRIAGE: ICD-10-CM

## 2021-02-24 DIAGNOSIS — O03.9 MISCARRIAGE: Primary | ICD-10-CM

## 2021-02-24 LAB
B-HCG SERPL-ACNC: 19 IU/L (ref 0–5)
HGB BLD-MCNC: 13.4 G/DL (ref 11.7–15.7)

## 2021-02-24 PROCEDURE — 85018 HEMOGLOBIN: CPT | Performed by: OBSTETRICS & GYNECOLOGY

## 2021-02-24 PROCEDURE — 84702 CHORIONIC GONADOTROPIN TEST: CPT | Performed by: OBSTETRICS & GYNECOLOGY

## 2021-02-24 PROCEDURE — 36415 COLL VENOUS BLD VENIPUNCTURE: CPT | Performed by: OBSTETRICS & GYNECOLOGY

## 2021-03-02 DIAGNOSIS — R11.0 NAUSEA: ICD-10-CM

## 2021-03-02 RX ORDER — ONDANSETRON 8 MG/1
TABLET, ORALLY DISINTEGRATING ORAL
Qty: 12 TABLET | Refills: 0 | Status: SHIPPED | OUTPATIENT
Start: 2021-03-02 | End: 2022-08-04

## 2021-03-13 ENCOUNTER — HEALTH MAINTENANCE LETTER (OUTPATIENT)
Age: 36
End: 2021-03-13

## 2021-03-19 ENCOUNTER — MYC MEDICAL ADVICE (OUTPATIENT)
Dept: FAMILY MEDICINE | Facility: OTHER | Age: 36
End: 2021-03-19

## 2021-04-12 DIAGNOSIS — O03.9 MISCARRIAGE: ICD-10-CM

## 2021-04-12 LAB — B-HCG SERPL-ACNC: 2 IU/L (ref 0–5)

## 2021-04-12 PROCEDURE — 84702 CHORIONIC GONADOTROPIN TEST: CPT | Performed by: OBSTETRICS & GYNECOLOGY

## 2021-04-12 PROCEDURE — 36415 COLL VENOUS BLD VENIPUNCTURE: CPT | Performed by: OBSTETRICS & GYNECOLOGY

## 2021-04-16 NOTE — PROGRESS NOTES
Assessment & Plan       ICD-10-CM    1. RUQ abdominal pain  R10.11 Comprehensive metabolic panel (BMP + Alb, Alk Phos, ALT, AST, Total. Bili, TP)     CBC with platelets and differential     US Abdomen Limited     Lipase       1. Symptoms are concerning for a gallbladder etiology such as cholecystitis or gallstones.  Will check a right upper quadrant ultrasound today, even though she has only been fasting for 4 hours as the rest of the week would be difficult to schedule for her.  Will also check CMP, CBC, and lipase. Encouraged patient to try antacids such as Pepcid or Prilosec to cover for heartburn/gastric ulcer. Encouraged patient to keep up with fluid intake as well. Will determine further plan when results are completed. Discussed red flag symptoms that would warrant ED visit (nausea and vomiting, significantly worsening pain, fevers).     Patient seen in conjunction with PRAKASH Tse.     [facility] REINIER Khan is a 36 year old who presents for the following health issues     History of Present Illness       She eats 0-1 servings of fruits and vegetables daily.She consumes 2 sweetened beverage(s) daily.She exercises with enough effort to increase her heart rate 9 or less minutes per day.  She exercises with enough effort to increase her heart rate 3 or less days per week. She is missing 1 dose(s) of medications per week.  She is not taking prescribed medications regularly due to remembering to take.      Concern - RUQ discomfort   Onset: 1 week  Description: started as intermittent and now is constand  Intensity: moderate  Progression of Symptoms:  worsening  Accompanying Signs & Symptoms: sharp that takes her breath away  Previous history of similar problem: no  Precipitating factors:        Worsened by: unknown  Alleviating factors:        Improved by: nothing  Therapies tried and outcome:  none     Patient presents with right upper quadrant  pain that began about 1 week ago. She first noted epigastric burning pain that was worse after eating a meal, but she now has sharp, right upper quadrant abdominal pain that radiates to her upper back and shoulder blade region. She does not report any specific type of food that exacerbates the pain. Patient endorses that she has pain even when hitting bumps on the road in a vehicle. Significant family history multiple cholecystectomies and she still has her gallbladder. She does not note a change in bowel movements, but notes that she his typically constipated. She has not tried anything for the pain. Denies any fever, chills, nausea, or vomiting.     Answers for HPI/ROS submitted by the patient on 4/19/2021   Chronic problems general questions HPI Form  If you checked off any problems, how difficult have these problems made it for you to do your work, take care of things at home, or get along with other people?: Somewhat difficult  PHQ9 TOTAL SCORE: 10  GABE 7 TOTAL SCORE: 8    Review of Systems   GENERAL: Denies fever, fatigue, weakness, weight gain, or weight loss.   CARDIOVASCULAR: Denies chest pain, shortness of breath, irregular heartbeats, palpitations, or edema.  RESPIRATORY: Denies cough, hemoptysis, and shortness of breath.  GASTROINTESTINAL: Denies nausea, vomiting, change in appetite, diarrhea, or constipation. POSITIVE for RUQ abdominal pain.   NEUROLOGIC: Denies headache, fainting, dizziness, memory loss, numbness, tingling, or seizures.  PSYCHIATRIC: Denies depression, anxiety, mood swings, and thoughts of suicide.      Objective    /64   Pulse 92   Temp 98.1  F (36.7  C) (Temporal)   Resp 14   Wt 55.5 kg (122 lb 6.4 oz)   LMP  (LMP Unknown)   SpO2 99%   BMI 21.01 kg/m    Body mass index is 21.01 kg/m .  Physical Exam   GENERAL: healthy, alert and no distress  RESP: lungs clear to auscultation - no rales, rhonchi or wheezes  CV: regular rate and rhythm, normal S1 S2, no S3 or S4, no  murmur, click or rub, no peripheral edema and peripheral pulses strong  ABDOMEN: mild/moderate tenderness RUQ with rebound tenderness, no guarding and negative Tang's sign, no organomegaly or masses, bowel sounds normal, no palpable or pulsatile masses, no bruits heard, no palpable renal abnormalities  and no scars, striae, dilated veins, rashes, or lesions  MS: no gross musculoskeletal defects noted, no edema  NEURO: Normal strength and tone, mentation intact and speech normal. Gait is stable.  PSYCH: mentation appears normal, affect normal/bright

## 2021-04-19 ENCOUNTER — APPOINTMENT (OUTPATIENT)
Dept: URGENT CARE | Facility: CLINIC | Age: 36
End: 2021-04-19
Payer: COMMERCIAL

## 2021-04-19 ASSESSMENT — ANXIETY QUESTIONNAIRES
6. BECOMING EASILY ANNOYED OR IRRITABLE: MORE THAN HALF THE DAYS
3. WORRYING TOO MUCH ABOUT DIFFERENT THINGS: NOT AT ALL
4. TROUBLE RELAXING: SEVERAL DAYS
7. FEELING AFRAID AS IF SOMETHING AWFUL MIGHT HAPPEN: NOT AT ALL
2. NOT BEING ABLE TO STOP OR CONTROL WORRYING: NOT AT ALL
GAD7 TOTAL SCORE: 8
1. FEELING NERVOUS, ANXIOUS, OR ON EDGE: MORE THAN HALF THE DAYS
5. BEING SO RESTLESS THAT IT IS HARD TO SIT STILL: NEARLY EVERY DAY
7. FEELING AFRAID AS IF SOMETHING AWFUL MIGHT HAPPEN: NOT AT ALL

## 2021-04-19 ASSESSMENT — PATIENT HEALTH QUESTIONNAIRE - PHQ9
10. IF YOU CHECKED OFF ANY PROBLEMS, HOW DIFFICULT HAVE THESE PROBLEMS MADE IT FOR YOU TO DO YOUR WORK, TAKE CARE OF THINGS AT HOME, OR GET ALONG WITH OTHER PEOPLE: SOMEWHAT DIFFICULT
SUM OF ALL RESPONSES TO PHQ QUESTIONS 1-9: 10
SUM OF ALL RESPONSES TO PHQ QUESTIONS 1-9: 10

## 2021-04-20 ASSESSMENT — PATIENT HEALTH QUESTIONNAIRE - PHQ9: SUM OF ALL RESPONSES TO PHQ QUESTIONS 1-9: 10

## 2021-04-20 ASSESSMENT — ANXIETY QUESTIONNAIRES: GAD7 TOTAL SCORE: 8

## 2021-04-21 ENCOUNTER — ANCILLARY PROCEDURE (OUTPATIENT)
Dept: ULTRASOUND IMAGING | Facility: OTHER | Age: 36
End: 2021-04-21
Attending: PHYSICIAN ASSISTANT
Payer: COMMERCIAL

## 2021-04-21 ENCOUNTER — OFFICE VISIT (OUTPATIENT)
Dept: FAMILY MEDICINE | Facility: OTHER | Age: 36
End: 2021-04-21
Payer: COMMERCIAL

## 2021-04-21 VITALS
SYSTOLIC BLOOD PRESSURE: 106 MMHG | DIASTOLIC BLOOD PRESSURE: 64 MMHG | OXYGEN SATURATION: 99 % | TEMPERATURE: 98.1 F | BODY MASS INDEX: 21.01 KG/M2 | WEIGHT: 122.4 LBS | RESPIRATION RATE: 14 BRPM | HEART RATE: 92 BPM

## 2021-04-21 DIAGNOSIS — R10.11 RUQ ABDOMINAL PAIN: ICD-10-CM

## 2021-04-21 DIAGNOSIS — R10.11 RUQ ABDOMINAL PAIN: Primary | ICD-10-CM

## 2021-04-21 LAB
ALBUMIN SERPL-MCNC: 3.2 G/DL (ref 3.4–5)
ALP SERPL-CCNC: 42 U/L (ref 40–150)
ALT SERPL W P-5'-P-CCNC: 19 U/L (ref 0–50)
ANION GAP SERPL CALCULATED.3IONS-SCNC: 8 MMOL/L (ref 3–14)
AST SERPL W P-5'-P-CCNC: 12 U/L (ref 0–45)
BASOPHILS # BLD AUTO: 0 10E9/L (ref 0–0.2)
BASOPHILS NFR BLD AUTO: 0.4 %
BILIRUB SERPL-MCNC: 0.4 MG/DL (ref 0.2–1.3)
BUN SERPL-MCNC: 7 MG/DL (ref 7–30)
CALCIUM SERPL-MCNC: 8.7 MG/DL (ref 8.5–10.1)
CHLORIDE SERPL-SCNC: 109 MMOL/L (ref 94–109)
CO2 SERPL-SCNC: 22 MMOL/L (ref 20–32)
CREAT SERPL-MCNC: 0.58 MG/DL (ref 0.52–1.04)
DIFFERENTIAL METHOD BLD: NORMAL
EOSINOPHIL # BLD AUTO: 0 10E9/L (ref 0–0.7)
EOSINOPHIL NFR BLD AUTO: 0.4 %
ERYTHROCYTE [DISTWIDTH] IN BLOOD BY AUTOMATED COUNT: 12.5 % (ref 10–15)
GFR SERPL CREATININE-BSD FRML MDRD: >90 ML/MIN/{1.73_M2}
GLUCOSE SERPL-MCNC: 89 MG/DL (ref 70–99)
HCT VFR BLD AUTO: 39.1 % (ref 35–47)
HGB BLD-MCNC: 13.4 G/DL (ref 11.7–15.7)
LIPASE SERPL-CCNC: 115 U/L (ref 73–393)
LYMPHOCYTES # BLD AUTO: 1.6 10E9/L (ref 0.8–5.3)
LYMPHOCYTES NFR BLD AUTO: 29.9 %
MCH RBC QN AUTO: 30.2 PG (ref 26.5–33)
MCHC RBC AUTO-ENTMCNC: 34.3 G/DL (ref 31.5–36.5)
MCV RBC AUTO: 88 FL (ref 78–100)
MONOCYTES # BLD AUTO: 0.4 10E9/L (ref 0–1.3)
MONOCYTES NFR BLD AUTO: 7.8 %
NEUTROPHILS # BLD AUTO: 3.3 10E9/L (ref 1.6–8.3)
NEUTROPHILS NFR BLD AUTO: 61.5 %
PLATELET # BLD AUTO: 247 10E9/L (ref 150–450)
POTASSIUM SERPL-SCNC: 3.6 MMOL/L (ref 3.4–5.3)
PROT SERPL-MCNC: 7 G/DL (ref 6.8–8.8)
RBC # BLD AUTO: 4.44 10E12/L (ref 3.8–5.2)
SODIUM SERPL-SCNC: 139 MMOL/L (ref 133–144)
WBC # BLD AUTO: 5.4 10E9/L (ref 4–11)

## 2021-04-21 PROCEDURE — 83690 ASSAY OF LIPASE: CPT | Performed by: PHYSICIAN ASSISTANT

## 2021-04-21 PROCEDURE — 36415 COLL VENOUS BLD VENIPUNCTURE: CPT | Performed by: PHYSICIAN ASSISTANT

## 2021-04-21 PROCEDURE — 99214 OFFICE O/P EST MOD 30 MIN: CPT | Performed by: PHYSICIAN ASSISTANT

## 2021-04-21 PROCEDURE — 85025 COMPLETE CBC W/AUTO DIFF WBC: CPT | Performed by: PHYSICIAN ASSISTANT

## 2021-04-21 PROCEDURE — 80053 COMPREHEN METABOLIC PANEL: CPT | Performed by: PHYSICIAN ASSISTANT

## 2021-04-21 RX ORDER — QUETIAPINE FUMARATE 25 MG/1
TABLET, FILM COATED ORAL
COMMUNITY
Start: 2021-04-20 | End: 2021-09-27

## 2021-04-21 NOTE — PATIENT INSTRUCTIONS
Will recheck an ultrasound today to rule out gallbladder issues.  Stay hydrated and try Pepcid or omeprazole to cover for acid reflux.  Will determine further plan once results are back.

## 2021-04-22 ENCOUNTER — MYC MEDICAL ADVICE (OUTPATIENT)
Dept: FAMILY MEDICINE | Facility: OTHER | Age: 36
End: 2021-04-22

## 2021-04-22 DIAGNOSIS — K21.9 GASTROESOPHAGEAL REFLUX DISEASE, UNSPECIFIED WHETHER ESOPHAGITIS PRESENT: Primary | ICD-10-CM

## 2021-04-23 ENCOUNTER — TELEPHONE (OUTPATIENT)
Dept: FAMILY MEDICINE | Facility: OTHER | Age: 36
End: 2021-04-23

## 2021-05-17 ASSESSMENT — HEADACHE IMPACT TEST (HIT 6)
HOW OFTEN DO HEADACHES LIMIT YOUR DAILY ACTIVITIES: VERY OFTEN
WHEN YOU HAVE HEADACHES HOW OFTEN IS THE PAIN SEVERE: VERY OFTEN
HOW OFTEN HAVE YOU FELT TOO TIRED TO WORK BECAUSE OF YOUR HEADACHES: VERY OFTEN
HIT6 TOTAL SCORE: 66
HOW OFTEN HAVE YOU FELT FED UP OR IRRITATED BECAUSE OF YOUR HEADACHES: VERY OFTEN
WHEN YOU HAVE A HEADACHE HOW OFTEN DO YOU WISH YOU COULD LIE DOWN: VERY OFTEN
HOW OFTEN DID HEADACHS LIMIT CONCENTRATION ON WORK OR DAILY ACTIVITY: VERY OFTEN

## 2021-05-18 ENCOUNTER — OFFICE VISIT (OUTPATIENT)
Dept: NEUROLOGY | Facility: CLINIC | Age: 36
End: 2021-05-18
Payer: COMMERCIAL

## 2021-05-18 DIAGNOSIS — G43.719 INTRACTABLE CHRONIC MIGRAINE WITHOUT AURA AND WITHOUT STATUS MIGRAINOSUS: Primary | ICD-10-CM

## 2021-05-18 PROCEDURE — 64615 CHEMODENERV MUSC MIGRAINE: CPT | Mod: GC | Performed by: PSYCHIATRY & NEUROLOGY

## 2021-05-18 NOTE — PROGRESS NOTES
[facility] Dept of Neurology  Botox Procedure     Erin Ashley  [id number]  1985     May 18, 2021        The procedure was explained to the patient. Benefits of the treatment were discussed including headache and migraine reduction. Risks of the procedure were reviewed including but not limited to pain, bruising, bleeding, infection, weakness of muscles injected or those distal to injection. The patient voiced understanding of the risks and benefits. All questions answered and the patient consented to proceed.      Prior to receiving Botox injections the patient reported the following:  Baseline headache frequency: 28-30 days a month  Baseline headache duration: 4-24 hours  Associated migrainous features: photophobia and nausea     Previous treatment trials: gabapentin, Depakote, duloxetine, amitriptyline and Lamictal without benefit.  She has a history of depression and low blood pressures which contraindicates antihypertensives and beta blockers.  She also has a history of kidney stones was contraindicates topiramate and zonisamide usage.     Last Botox procedure: 2/23/2021  Current migraine frequency: last 8 days wore off and lots of jaw tightening, stayed home from work x 1. Lots of jaw tightness  Current migraine duration:  4 hours       A 200 unit vial of Botox was diluted with 4ml of 0.9% sodium chloride     Botox lot #: [id number]  Botox expiration date: 02 2024  0.9% sodium chloride lot #: [id number]  0.9% sodium chloride expiration date: 1Jan2022     The following muscles were injected using a 30 gauge needle:  Procerus 1 site 5 units   2 sites (bilat) 10 units  Frontalis 4 sites (bilat) 20 units  Temporalis 8 sites (bilat) 50 units  Trapezius muscles 6 sites (bilat) 30 units  Cervical paraspinals (bilat) 4 sites 20 units  Occipitalis 7 sites (bilat) 15 units on the left and 20 units on the right     A total of 170 units were injected, 30 units wasted.   The patient tolerated the  injections well. I was present for the entire procedure. I performed the occipitalis injections. All other injections were performed under my direct supervision by Dr Yari Darnell PGY3.     Paty Whitney MD [facility]  Department of Neurology

## 2021-05-18 NOTE — LETTER
5/18/2021       RE: Erin Ashley  [address]  [place] [address]     Dear Colleague,    Thank you for referring your patient, Erin Ashley, to the [facility] NEUROLOGY CLINIC [place] at [facility]. Please see a copy of my visit note below.    [facility] Dept of Neurology  Botox Procedure     Erin Ashley  [id number]  1985     May 18, 2021        The procedure was explained to the patient. Benefits of the treatment were discussed including headache and migraine reduction. Risks of the procedure were reviewed including but not limited to pain, bruising, bleeding, infection, weakness of muscles injected or those distal to injection. The patient voiced understanding of the risks and benefits. All questions answered and the patient consented to proceed.      Prior to receiving Botox injections the patient reported the following:  Baseline headache frequency: 28-30 days a month  Baseline headache duration: 4-24 hours  Associated migrainous features: photophobia and nausea     Previous treatment trials: gabapentin, Depakote, duloxetine, amitriptyline and Lamictal without benefit.  She has a history of depression and low blood pressures which contraindicates antihypertensives and beta blockers.  She also has a history of kidney stones was contraindicates topiramate and zonisamide usage.     Last Botox procedure: 2/23/2021  Current migraine frequency: last 8 days wore off and lots of jaw tightening, stayed home from work x 1. Lots of jaw tightness  Current migraine duration:  4 hours       A 200 unit vial of Botox was diluted with 4ml of 0.9% sodium chloride     Botox lot #: [id number]  Botox expiration date: 02 2024  0.9% sodium chloride lot #: [id number]  0.9% sodium chloride expiration date: 1Jan2022     The following muscles were injected using a 30 gauge needle:  Procerus 1 site 5 units   2 sites (bilat) 10  units  Frontalis 4 sites (bilat) 20 units  Temporalis 8 sites (bilat) 50 units  Trapezius muscles 6 sites (bilat) 30 units  Cervical paraspinals (bilat) 4 sites 20 units  Occipitalis 7 sites (bilat) 15 units on the left and 20 units on the right     A total of 170 units were injected, 30 units wasted.   The patient tolerated the injections well. I was present for the entire procedure. I performed the occipitalis injections. All other injections were performed under my direct supervision by Dr Yari Darnell PGY3.     Paty Whitney MD [facility]  Department of Neurology

## 2021-05-24 ENCOUNTER — MYC MEDICAL ADVICE (OUTPATIENT)
Dept: NEUROLOGY | Facility: CLINIC | Age: 36
End: 2021-05-24

## 2021-05-24 DIAGNOSIS — G43.711 INTRACTABLE CHRONIC MIGRAINE WITHOUT AURA AND WITH STATUS MIGRAINOSUS: Primary | ICD-10-CM

## 2021-05-26 RX ORDER — METHYLPREDNISOLONE 4 MG
TABLET, DOSE PACK ORAL
Qty: 21 TABLET | Refills: 0 | Status: SHIPPED | OUTPATIENT
Start: 2021-05-26 | End: 2022-02-01

## 2021-06-21 DIAGNOSIS — G43.719 INTRACTABLE CHRONIC MIGRAINE WITHOUT AURA AND WITHOUT STATUS MIGRAINOSUS: ICD-10-CM

## 2021-06-21 RX ORDER — RIZATRIPTAN BENZOATE 10 MG/1
10 TABLET ORAL
Qty: 9 TABLET | Refills: 11 | Status: SHIPPED | OUTPATIENT
Start: 2021-06-21 | End: 2022-08-05

## 2021-06-21 NOTE — TELEPHONE ENCOUNTER
Rx Authorization:    Requested Medication/ Dose rizatriptan (MAXALT) 10 MG tablet    Date last refill ordered: 6/1/20    Quantity ordered: 9 Tablets    # refills: 11    Date of last clinic visit with ordering provider: 5/18/2    Date of next clinic visit with ordering provider: 8/10/21    All pertinent protocol data (lab date/result):     Include pertinent information from patients message:

## 2021-07-07 ENCOUNTER — MYC MEDICAL ADVICE (OUTPATIENT)
Dept: FAMILY MEDICINE | Facility: OTHER | Age: 36
End: 2021-07-07

## 2021-07-07 NOTE — TELEPHONE ENCOUNTER
Reason for Call:  Form, our goal is to have forms completed with 72 hours, however, some forms may require a visit or additional information.    Type of letter, form or note:  Treatment    Who is the form from?: [place] health professionals service program (if other please explain)    Where did the form come from: form was sent in [employer] (PR[employer]) message     What clinic location was the form placed at?: [facility] [phone]    Where the form was placed: .    What number is listed as a contact on the form?: [phone]       Additional comments:     Shannen PAYNE CMA

## 2021-07-14 ENCOUNTER — OFFICE VISIT (OUTPATIENT)
Dept: OBGYN | Facility: CLINIC | Age: 36
End: 2021-07-14
Payer: COMMERCIAL

## 2021-07-14 VITALS
OXYGEN SATURATION: 99 % | HEIGHT: 64 IN | DIASTOLIC BLOOD PRESSURE: 68 MMHG | BODY MASS INDEX: 21.03 KG/M2 | SYSTOLIC BLOOD PRESSURE: 106 MMHG | WEIGHT: 123.2 LBS | HEART RATE: 76 BPM

## 2021-07-14 DIAGNOSIS — Z00.00 ROUTINE GENERAL MEDICAL EXAMINATION AT A HEALTH CARE FACILITY: Primary | ICD-10-CM

## 2021-07-14 DIAGNOSIS — N91.2 AMENORRHEA: ICD-10-CM

## 2021-07-14 PROCEDURE — 99395 PREV VISIT EST AGE 18-39: CPT | Performed by: ADVANCED PRACTICE MIDWIFE

## 2021-07-14 ASSESSMENT — PATIENT HEALTH QUESTIONNAIRE - PHQ9
SUM OF ALL RESPONSES TO PHQ QUESTIONS 1-9: 8
SUM OF ALL RESPONSES TO PHQ QUESTIONS 1-9: 8
10. IF YOU CHECKED OFF ANY PROBLEMS, HOW DIFFICULT HAVE THESE PROBLEMS MADE IT FOR YOU TO DO YOUR WORK, TAKE CARE OF THINGS AT HOME, OR GET ALONG WITH OTHER PEOPLE: SOMEWHAT DIFFICULT

## 2021-07-14 ASSESSMENT — MIFFLIN-ST. JEOR: SCORE: 1243.83

## 2021-07-14 ASSESSMENT — PAIN SCALES - GENERAL: PAINLEVEL: NO PAIN (0)
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
## 2021-07-15 ASSESSMENT — PATIENT HEALTH QUESTIONNAIRE - PHQ9: SUM OF ALL RESPONSES TO PHQ QUESTIONS 1-9: 8

## 2021-08-09 ASSESSMENT — HEADACHE IMPACT TEST (HIT 6)
WHEN YOU HAVE HEADACHES HOW OFTEN IS THE PAIN SEVERE: ALWAYS
HOW OFTEN HAVE YOU FELT TOO TIRED TO WORK BECAUSE OF YOUR HEADACHES: SOMETIMES
HOW OFTEN DO HEADACHES LIMIT YOUR DAILY ACTIVITIES: VERY OFTEN
HOW OFTEN DID HEADACHS LIMIT CONCENTRATION ON WORK OR DAILY ACTIVITY: SOMETIMES
HIT6 TOTAL SCORE: 65
WHEN YOU HAVE A HEADACHE HOW OFTEN DO YOU WISH YOU COULD LIE DOWN: VERY OFTEN
HOW OFTEN HAVE YOU FELT FED UP OR IRRITATED BECAUSE OF YOUR HEADACHES: SOMETIMES

## 2021-08-10 ENCOUNTER — OFFICE VISIT (OUTPATIENT)
Dept: NEUROLOGY | Facility: CLINIC | Age: 36
End: 2021-08-10
Payer: COMMERCIAL

## 2021-08-10 DIAGNOSIS — G43.719 INTRACTABLE CHRONIC MIGRAINE WITHOUT AURA AND WITHOUT STATUS MIGRAINOSUS: Primary | ICD-10-CM

## 2021-08-10 PROCEDURE — 64615 CHEMODENERV MUSC MIGRAINE: CPT | Performed by: PSYCHIATRY & NEUROLOGY

## 2021-08-10 NOTE — LETTER
8/10/2021       RE: Erin Ashley  [address]  [place] [address]     Dear Colleague,    Thank you for referring your patient, Erin Ashley, to the [facility] NEUROLOGY CLINIC [place] at [facility]. Please see a copy of my visit note below.    [facility] Dept of Neurology  Botox Procedure     Erin Ashley  [id number]  1985     August 10, 2021          The procedure was explained to the patient. Benefits of the treatment were discussed including headache and migraine reduction. Risks of the procedure were reviewed including but not limited to pain, bruising, bleeding, infection, weakness of muscles injected or those distal to injection. The patient voiced understanding of the risks and benefits. All questions answered and the patient consented to proceed.      Prior to receiving Botox injections the patient reported the following:  Baseline headache frequency: 28-30 days a month  Baseline headache duration: 4-24 hours  Associated migrainous features: photophobia and nausea     Previous treatment trials: gabapentin, Depakote, duloxetine, amitriptyline and Lamictal without benefit.  She has a history of depression and low blood pressures which contraindicates antihypertensives and beta blockers.  She also has a history of kidney stones was contraindicates topiramate and zonisamide usage      Last Botox procedure: 5/18/2021  Current migraine frequency:Current migraine duration:    No missed work     A 200 unit vial of Botox was diluted with 4ml of 0.9% sodium chloride     Botox lot # and expiration date: See MAR for details  0.9% sodium chloride lot # and expiration date: See MAR for details     The following muscles were injected using a 30 gauge needle:  Procerus 1 site 5 units   2 sites (bilat) 10 units  Frontalis 4 sites (bilat) 20 units  Temporalis 8 sites (bilat) 50 units  Trapezius muscles 6 sites (bilat) 30  units  Cervical paraspinals (bilat) 4 sites 20 units  Occipitalis 7 sites (bilat) 15 units on the left and 20 units on the right     A total of 170 units were injected, 30 units wasted.     The patient tolerated the injections well. I was present for and performed the entire procedure.      Paty Whitney MD [facility]  Department of Neurology              Again, thank you for allowing me to participate in the care of your patient.      Sincerely,    Paty Whitney MD

## 2021-08-10 NOTE — PROGRESS NOTES
[facility] Dept of Neurology  Botox Procedure     Erin Ashley  [id number]  1985     August 10, 2021          The procedure was explained to the patient. Benefits of the treatment were discussed including headache and migraine reduction. Risks of the procedure were reviewed including but not limited to pain, bruising, bleeding, infection, weakness of muscles injected or those distal to injection. The patient voiced understanding of the risks and benefits. All questions answered and the patient consented to proceed.      Prior to receiving Botox injections the patient reported the following:  Baseline headache frequency: 28-30 days a month  Baseline headache duration: 4-24 hours  Associated migrainous features: photophobia and nausea     Previous treatment trials: gabapentin, Depakote, duloxetine, amitriptyline and Lamictal without benefit.  She has a history of depression and low blood pressures which contraindicates antihypertensives and beta blockers.  She also has a history of kidney stones was contraindicates topiramate and zonisamide usage      Last Botox procedure: 5/18/2021  Current migraine frequency:Current migraine duration:    No missed work     A 200 unit vial of Botox was diluted with 4ml of 0.9% sodium chloride     Botox lot # and expiration date: See MAR for details  0.9% sodium chloride lot # and expiration date: See MAR for details     The following muscles were injected using a 30 gauge needle:  Procerus 1 site 5 units   2 sites (bilat) 10 units  Frontalis 4 sites (bilat) 20 units  Temporalis 8 sites (bilat) 50 units  Trapezius muscles 6 sites (bilat) 30 units  Cervical paraspinals (bilat) 4 sites 20 units  Occipitalis 7 sites (bilat) 15 units on the left and 20 units on the right     A total of 170 units were injected, 30 units wasted.     The patient tolerated the injections well. I was present for and performed the entire procedure.      Paty Whitney MD  [facility]  Department of Neurology

## 2021-08-30 ENCOUNTER — MYC MEDICAL ADVICE (OUTPATIENT)
Dept: NEUROLOGY | Facility: CLINIC | Age: 36
End: 2021-08-30

## 2021-08-30 ENCOUNTER — TRANSFERRED RECORDS (OUTPATIENT)
Dept: HEALTH INFORMATION MANAGEMENT | Facility: CLINIC | Age: 36
End: 2021-08-30

## 2021-08-30 ENCOUNTER — NURSE TRIAGE (OUTPATIENT)
Dept: NURSING | Facility: CLINIC | Age: 36
End: 2021-08-30

## 2021-08-30 DIAGNOSIS — G43.711 INTRACTABLE CHRONIC MIGRAINE WITHOUT AURA AND WITH STATUS MIGRAINOSUS: Primary | ICD-10-CM

## 2021-08-30 RX ORDER — METHYLPREDNISOLONE 4 MG
TABLET, DOSE PACK ORAL
Qty: 21 TABLET | Refills: 0 | Status: SHIPPED | OUTPATIENT
Start: 2021-08-30 | End: 2022-02-01

## 2021-08-30 NOTE — TELEPHONE ENCOUNTER
Pt reporting, having a severe headache for over 24-48 hours that would not go away.   Woke up this morning with numbness on the right side of her face, her lip, and her right arm was very numb.      When she first woke up she felt very weak and felt wobbly on her feet.     She went to work this morning.   Still having numbness on the right side of her face and arm.   But is able to hold a cup of water to drink it.    But still feels really foggy and still has numbness on the right side of her face and right arm.  Pt went to work today.     But still not feeling that great.     I suggested ER due to the Numbness on the right side of her face and right arm.        Pt agreed with plan of care and will go to the ER for an evaluation for Pt care.    Haven Ferreira RN  Central Triage Red Flags/Med Refills      Reason for Disposition    Patient sounds very sick or weak to the triager    Additional Information    Negative: Difficult to awaken or acting confused (e.g., disoriented, slurred speech)    Negative: Weakness of the face, arm or leg on one side of the body and new onset    Negative: Numbness of the face, arm or leg on one side of the body and new onset    Negative: Loss of speech or garbled speech and new onset    Negative: Passed out (i.e., fainted, collapsed and was not responding)    Negative: Sounds like a life-threatening emergency to the triager    Negative: Followed a head injury within last 3 days    Negative: Traumatic Brain Injury (TBI) is suspected    Negative: Sinus pain of forehead and yellow or green nasal discharge    Negative: Pregnant    Negative: Unable to walk without falling    Negative: Stiff neck (can't touch chin to chest)    Negative: Possibility of carbon monoxide exposure    Negative: SEVERE headache, states 'worst headache' of life    Negative: SEVERE headache, sudden-onset (i.e., reaching maximum intensity within seconds to 1 hour)    Negative: Severe pain in one eye    Negative: Loss of  vision or double vision (Exception: same as prior migraines)    Protocols used: HEADACHE-A-OH

## 2021-09-01 ENCOUNTER — TELEPHONE (OUTPATIENT)
Dept: ANESTHESIOLOGY | Facility: CLINIC | Age: 36
End: 2021-09-01

## 2021-09-01 DIAGNOSIS — M54.81 BILATERAL OCCIPITAL NEURALGIA: Primary | ICD-10-CM

## 2021-09-01 NOTE — TELEPHONE ENCOUNTER
Attempt # 1    I called Erin to schedule a procedure with Dr. Taylor. Left a voicemail with my call back number and request that they leave a good call back time if they get my voicemail.    Radha SHETH  Radha-Op Coordinator

## 2021-09-02 DIAGNOSIS — Z11.59 ENCOUNTER FOR SCREENING FOR OTHER VIRAL DISEASES: ICD-10-CM

## 2021-09-02 PROBLEM — M54.81 BILATERAL OCCIPITAL NEURALGIA: Status: ACTIVE | Noted: 2021-09-02

## 2021-09-13 DIAGNOSIS — R10.2 PELVIC PAIN IN FEMALE: ICD-10-CM

## 2021-09-13 RX ORDER — ETONOGESTREL AND ETHINYL ESTRADIOL VAGINAL RING .015; .12 MG/D; MG/D
1 RING VAGINAL
Qty: 3 EACH | Refills: 2 | Status: SHIPPED | OUTPATIENT
Start: 2021-09-13 | End: 2022-08-04

## 2021-09-13 NOTE — TELEPHONE ENCOUNTER
"Requested Prescriptions   Pending Prescriptions Disp Refills     etonogestrel-ethinyl estradiol (NUVARING) 0.12-0.015 MG/24HR vaginal ring 3 each 0     Sig: Place 1 each vaginally every 28 days       Contraceptives Protocol Passed - 9/13/2021 11:44 AM        Passed - Patient is not a current smoker if age is 35 or older        Passed - Recent (12 mo) or future (30 days) visit within the authorizing provider's specialty     Patient has had an office visit with the authorizing provider or a provider within the authorizing providers department within the previous 12 mos or has a future within next 30 days. See \"Patient Info\" tab in inbasket, or \"Choose Columns\" in Meds & Orders section of the refill encounter.              Passed - Medication is active on med list        Passed - No active pregnancy on record        Passed - No positive pregnancy test in past 12 months           Prescription approved per [facility] Refill Protocol.    Malika Holt RN    "

## 2021-09-14 ENCOUNTER — HOSPITAL ENCOUNTER (OUTPATIENT)
Facility: AMBULATORY SURGERY CENTER | Age: 36
End: 2021-09-14
Attending: ANESTHESIOLOGY
Payer: COMMERCIAL

## 2021-09-14 DIAGNOSIS — M54.81 BILATERAL OCCIPITAL NEURALGIA: ICD-10-CM

## 2021-09-16 ENCOUNTER — TELEPHONE (OUTPATIENT)
Dept: ANESTHESIOLOGY | Facility: CLINIC | Age: 36
End: 2021-09-16

## 2021-09-22 NOTE — TELEPHONE ENCOUNTER
Attempt # 2    I called Erin to schedule a procedure with Dr. Taylor. Left a voicemail with my call back number and request that they leave a good call back time if they get my voicemail.    Radha SHETH  Radha-Op Coordinator

## 2021-09-27 ENCOUNTER — OFFICE VISIT (OUTPATIENT)
Dept: FAMILY MEDICINE | Facility: OTHER | Age: 36
End: 2021-09-27
Payer: COMMERCIAL

## 2021-09-27 VITALS
SYSTOLIC BLOOD PRESSURE: 126 MMHG | OXYGEN SATURATION: 97 % | TEMPERATURE: 98 F | RESPIRATION RATE: 18 BRPM | BODY MASS INDEX: 20.77 KG/M2 | HEART RATE: 104 BPM | DIASTOLIC BLOOD PRESSURE: 70 MMHG | WEIGHT: 121 LBS

## 2021-09-27 DIAGNOSIS — H81.10 BENIGN PAROXYSMAL POSITIONAL VERTIGO, UNSPECIFIED LATERALITY: Primary | ICD-10-CM

## 2021-09-27 PROCEDURE — 99214 OFFICE O/P EST MOD 30 MIN: CPT | Performed by: PHYSICIAN ASSISTANT

## 2021-09-27 RX ORDER — QUETIAPINE FUMARATE 50 MG/1
TABLET, FILM COATED ORAL
COMMUNITY
Start: 2021-09-10 | End: 2022-08-04

## 2021-09-27 RX ORDER — BUPROPION HYDROCHLORIDE 150 MG/1
150 TABLET ORAL DAILY
COMMUNITY
Start: 2021-09-20 | End: 2022-08-04

## 2021-09-27 RX ORDER — MECLIZINE HYDROCHLORIDE 25 MG/1
25 TABLET ORAL 3 TIMES DAILY PRN
Qty: 30 TABLET | Refills: 0 | Status: SHIPPED | OUTPATIENT
Start: 2021-09-27 | End: 2022-02-01

## 2021-09-27 RX ORDER — VILAZODONE HYDROCHLORIDE 10 MG/1
10 TABLET ORAL DAILY
COMMUNITY
Start: 2021-08-05 | End: 2022-08-04

## 2021-09-27 RX ORDER — SERTRALINE HYDROCHLORIDE 100 MG/1
TABLET, FILM COATED ORAL
COMMUNITY
Start: 2021-09-20 | End: 2022-08-04

## 2021-09-27 ASSESSMENT — PAIN SCALES - GENERAL: PAINLEVEL: MILD PAIN (3)

## 2021-09-27 NOTE — LETTER
September 27, 2021      Erin Ashley  [address]  [facility] [address]        To Whom It May Concern:    Erin Ashley was seen on September 27, 2021 .  Please excuse her from work on Sept. 24, 25, and 27 due to her non-Covid symptoms.  She may return to work as her symptoms improve which we anticipate to be within the week.      Sincerely,        Pauly Lilly PA-C

## 2021-09-27 NOTE — PROGRESS NOTES
Assessment & Plan     Benign paroxysmal positional vertigo, unspecified laterality  We will have her do PT, increase fluids avoid sodium alcohol and caffeine including chocolate.  Should she have any acute worsening or abrupt changes to her symptoms she will let me know at once.  She may also take Zyrtec instead of meclizine if it is overly sedating and follow-up without improvement  - meclizine (ANTIVERT) 25 MG tablet; Take 1 tablet (25 mg) by mouth 3 times daily as needed for dizziness  - Physical Therapy Referral; Future                 Return in about 2 weeks (around 10/11/2021), or if symptoms worsen or fail to improve.    CORY Gonzalez [facility] REINIER Khan is a 36 year old who presents for the following health issues     HPI     Dizziness  Onset/Duration: Last Thursday started, 4 days ago.. Anytime she turns her head she feels vertigo.  It does not matter which way she turns it she has vertigo.  It does calm down when she has remained still.  She missed work the 24th 25th and then again today due to her dizziness.  This form of dizziness is not related to her typical migraine symptoms  Description:   Do you feel faint: no  Does it feel like the surroundings (bed, room) are moving: YES somtimes, has a definite spinning sensation  Unsteady/off balance: YES  Have you passed out or fallen: no  Intensity: moderate  Progression of Symptoms: constant  Accompanying Signs & Symptoms:  Heart palpitations or chest pain: no  Nausea, vomiting: Nausea sometimes when the vertigo is more intense  Weakness or lack of coordination in arms or legs: no  Vision or speech changes: no  Numbness or tingling: no  Ringing in ears (Tinnitus): no  Hearing Loss: no  She denies any recent illness or recent head injury  History:   Head trauma/concussion history: Maybe last summer after tubing  Previous similar symptoms: no  Recent bleeding history: no  Any new medications (BP?): YES Zoloft, but  started that 3 weeks ago she states.  Precipitating factors:   Worse with activity: YES  Worse with head movement: YES  Alleviating factors:   Does staying in a fixed position give relief: YES sitting still.   Therapies tried and outcome: None, not moving.     Patient is a triage RN in peds    Review of Systems   Constitutional, HEENT, cardiovascular, pulmonary, gi and gu systems are negative, except as otherwise noted.      Objective    /70   Pulse 104   Temp 98  F (36.7  C) (Temporal)   Resp 18   Wt 54.9 kg (121 lb)   SpO2 97%   BMI 20.77 kg/m    Body mass index is 20.77 kg/m .  Physical Exam   GENERAL: healthy, alert and no distress  EYES: Eyes grossly normal to inspection, PERRL and conjunctivae and sclerae normal  EYES: EOMI and nystagmus is noted bilaterally and eye movement to the lateral aspect  HENT: ear canals and TM's normal, nose and mouth without ulcers or lesions  NECK: no adenopathy, no asymmetry, masses, or scars and thyroid normal to palpation  RESP: lungs clear to auscultation - no rales, rhonchi or wheezes  CV: regular rate and rhythm, normal S1 S2, no S3 or S4, no murmur, click or rub, no peripheral edema and peripheral pulses strong  ABDOMEN: soft, nontender, no hepatosplenomegaly, no masses and bowel sounds normal  MS: no gross musculoskeletal defects noted, no edema  SKIN: no suspicious lesions or rashes  NEURO: Normal strength and tone, mentation intact and speech normal  NEURO: speech normal, cranial nerves 2-12 intact, DTR's normal and symmetric , gait normal  and Romberg normal  PSYCH: mentation appears normal, affect normal/bright    No results found for any visits on 09/27/21.

## 2021-09-28 NOTE — TELEPHONE ENCOUNTER
Attempt # 3    I called Erin to schedule a procedure with Dr. Taylor. Left a voicemail with my call back number and request that they leave a good call back time if they get my voicemail.    Radha SHETH  Radha-Op Coordinator

## 2021-10-04 ENCOUNTER — MYC MEDICAL ADVICE (OUTPATIENT)
Dept: FAMILY MEDICINE | Facility: OTHER | Age: 36
End: 2021-10-04

## 2021-10-21 ENCOUNTER — E-VISIT (OUTPATIENT)
Dept: FAMILY MEDICINE | Facility: OTHER | Age: 36
End: 2021-10-21
Payer: COMMERCIAL

## 2021-10-21 ENCOUNTER — MYC MEDICAL ADVICE (OUTPATIENT)
Dept: FAMILY MEDICINE | Facility: OTHER | Age: 36
End: 2021-10-21

## 2021-10-21 DIAGNOSIS — J01.01 ACUTE RECURRENT MAXILLARY SINUSITIS: Primary | ICD-10-CM

## 2021-10-21 PROCEDURE — 99421 OL DIG E/M SVC 5-10 MIN: CPT | Performed by: PHYSICIAN ASSISTANT

## 2021-10-21 NOTE — PATIENT INSTRUCTIONS
Dear Erin Ashley    After reviewing your responses, I've been able to diagnose you with?a sinus infection caused by bacteria.?     Based on your responses and diagnosis, I have prescribed Augmentin to treat your symptoms. I have sent this to your pharmacy.?     It is also important to stay well hydrated, get lots of rest and take over-the-counter decongestants,?tylenol?or ibuprofen if you?are able to?take those medications per your primary care provider to help relieve discomfort.?     It is important that you take?all of?your prescribed medication even if your symptoms are improving after a few doses.? Taking?all of?your medicine helps prevent the symptoms from returning.?     If your symptoms worsen, you develop severe headache, vomiting, high fever (>102), or are not improving in 7 days, please contact your primary care provider for an appointment or visit any of our convenient Walk-in Care or Urgent Care Centers to be seen which can be found on our website?here.?     Thanks again for choosing?us?as your health care partner,?   ?  Hussain Ryder PA-C?

## 2021-10-23 ENCOUNTER — HEALTH MAINTENANCE LETTER (OUTPATIENT)
Age: 36
End: 2021-10-23

## 2021-11-09 ENCOUNTER — OFFICE VISIT (OUTPATIENT)
Dept: NEUROLOGY | Facility: CLINIC | Age: 36
End: 2021-11-09
Payer: COMMERCIAL

## 2021-11-09 DIAGNOSIS — G43.719 INTRACTABLE CHRONIC MIGRAINE WITHOUT AURA AND WITHOUT STATUS MIGRAINOSUS: Primary | ICD-10-CM

## 2021-11-09 PROCEDURE — 64615 CHEMODENERV MUSC MIGRAINE: CPT | Performed by: PSYCHIATRY & NEUROLOGY

## 2021-11-09 NOTE — LETTER
11/9/2021       RE: Erin Ashley  [address]  [place] [address]     Dear Colleague,    Thank you for referring your patient, Erin Ashley, to the [facility] NEUROLOGY CLINIC [place] at [facility]. Please see a copy of my visit note below.        [facility]    Erin Ashley MRN# [id number]   Age: 36 year old YOB: 1985     Requesting physician: No ref. provider found  Hussain Ryder       The procedure was explained to the patient. Benefits of the treatment were discussed including headache and migraine reduction. Risks of the procedure were reviewed including but not limited to pain, bruising, bleeding, infection, weakness of muscles injected or those distal to injection. The patient voiced understanding of the risks and benefits. All questions answered and the patient consented to proceed.      Prior to receiving Botox injections the patient reported the following:  Baseline headache frequency: 28-30 days a month  Baseline headache duration: 4-24 hours  Associated migrainous features: photophobia and nausea     Previous treatment trials: gabapentin, Depakote, duloxetine, amitriptyline and Lamictal without benefit.  She has a history of depression and low blood pressures which contraindicates antihypertensives and beta blockers.  She also has a history of kidney stones was contraindicates topiramate and zonisamide usage        Last Botox procedure: 8/10/2021  Current migraine frequency: Bad spell at end of August with ED visit but since then doing fine       A 200 unit vial of Botox was diluted with 4ml of 0.9% sodium chloride     Botox lot # and expiration date: See MAR for details  0.9% sodium chloride lot # and expiration date: See MAR for details     The following muscles were injected using a 30 gauge needle:  Procerus 1 site 5 units   2 sites (bilat) 10 units  Frontalis 4 sites (bilat) 20  units  Temporalis 8 sites (bilat) 50 units  Trapezius muscles 6 sites (bilat) 30 units  Cervical paraspinals (bilat) 4 sites 20 units  Occipitalis 7 sites (bilat) 15 units on the left and 20 units on the right     A total of 170 units were injected, 30 units wasted.      The patient tolerated the injections well. I was present for and performed the entire procedure.      Paty Whitney MD [facility]  Department of Neurology

## 2021-11-09 NOTE — PROGRESS NOTES
[facility]    Erin Ashley MRN# [id number]   Age: 36 year old YOB: 1985     Requesting physician: No ref. provider found  Hussain Ryder       The procedure was explained to the patient. Benefits of the treatment were discussed including headache and migraine reduction. Risks of the procedure were reviewed including but not limited to pain, bruising, bleeding, infection, weakness of muscles injected or those distal to injection. The patient voiced understanding of the risks and benefits. All questions answered and the patient consented to proceed.      Prior to receiving Botox injections the patient reported the following:  Baseline headache frequency: 28-30 days a month  Baseline headache duration: 4-24 hours  Associated migrainous features: photophobia and nausea     Previous treatment trials: gabapentin, Depakote, duloxetine, amitriptyline and Lamictal without benefit.  She has a history of depression and low blood pressures which contraindicates antihypertensives and beta blockers.  She also has a history of kidney stones was contraindicates topiramate and zonisamide usage        Last Botox procedure: 8/10/2021  Current migraine frequency: Bad spell at end of August with ED visit but since then doing fine       A 200 unit vial of Botox was diluted with 4ml of 0.9% sodium chloride     Botox lot # and expiration date: See MAR for details  0.9% sodium chloride lot # and expiration date: See MAR for details     The following muscles were injected using a 30 gauge needle:  Procerus 1 site 5 units   2 sites (bilat) 10 units  Frontalis 4 sites (bilat) 20 units  Temporalis 8 sites (bilat) 50 units  Trapezius muscles 6 sites (bilat) 30 units  Cervical paraspinals (bilat) 4 sites 20 units  Occipitalis 7 sites (bilat) 15 units on the left and 20 units on the right     A total of 170 units were injected, 30 units wasted.      The patient tolerated the injections well. I was present  for and performed the entire procedure.      Paty Whitney MD [facility]  Department of Neurology

## 2021-11-15 ENCOUNTER — E-VISIT (OUTPATIENT)
Dept: URGENT CARE | Facility: CLINIC | Age: 36
End: 2021-11-15
Payer: COMMERCIAL

## 2021-11-15 DIAGNOSIS — J01.01 ACUTE RECURRENT MAXILLARY SINUSITIS: ICD-10-CM

## 2021-11-15 DIAGNOSIS — B96.89 ACUTE BACTERIAL SINUSITIS: Primary | ICD-10-CM

## 2021-11-15 DIAGNOSIS — J01.90 ACUTE BACTERIAL SINUSITIS: Primary | ICD-10-CM

## 2021-11-15 PROCEDURE — 99421 OL DIG E/M SVC 5-10 MIN: CPT | Performed by: FAMILY MEDICINE

## 2021-11-15 NOTE — PATIENT INSTRUCTIONS
Dear Erin Ashley    After reviewing your responses, I've been able to diagnose you with?a sinus infection caused by bacteria.?     Based on your responses and diagnosis, I have prescribed Augmentin to treat your symptoms. I have sent this to your pharmacy.?     It is also important to stay well hydrated, get lots of rest and take over-the-counter decongestants,?tylenol?or ibuprofen if you?are able to?take those medications per your primary care provider to help relieve discomfort.?     It is important that you take?all of?your prescribed medication even if your symptoms are improving after a few doses.? Taking?all of?your medicine helps prevent the symptoms from returning.?     If your symptoms worsen, you develop severe headache, vomiting, high fever (>102), or are not improving in 7 days, please contact your primary care provider for an appointment or visit any of our convenient Walk-in Care or Urgent Care Centers to be seen which can be found on our website?here.?     Thanks again for choosing?us?as your health care partner,?   ?  Jammie Galvan MD?

## 2021-12-05 NOTE — NURSING NOTE
"Chief Complaint   Patient presents with     Pelvic Pain       Initial /74 (BP Location: Left arm, Patient Position: Chair, Cuff Size: Adult Regular)  Pulse 68  Wt 116 lb (52.6 kg)  LMP 2017 (Approximate)  BMI 19.91 kg/m2 Estimated body mass index is 19.91 kg/(m^2) as calculated from the following:    Height as of 3/27/17: 5' 4\" (1.626 m).    Weight as of this encounter: 116 lb (52.6 kg).  BP completed using cuff size: regular        The following HM Due: NONE      The following patient reported/Care Every where data was sent to:  P ABSTRACT QUALITY INITIATIVES [10736]       N/a  Hannah Tobias CMA               "
To meet nutrition needs

## 2021-12-16 ENCOUNTER — E-VISIT (OUTPATIENT)
Dept: URGENT CARE | Facility: CLINIC | Age: 36
End: 2021-12-16
Payer: COMMERCIAL

## 2021-12-16 DIAGNOSIS — B96.89 BACTERIAL VAGINOSIS: Primary | ICD-10-CM

## 2021-12-16 DIAGNOSIS — N76.0 BACTERIAL VAGINOSIS: Primary | ICD-10-CM

## 2021-12-16 PROCEDURE — 99421 OL DIG E/M SVC 5-10 MIN: CPT | Performed by: PHYSICIAN ASSISTANT

## 2021-12-16 RX ORDER — METRONIDAZOLE 500 MG/1
500 TABLET ORAL 2 TIMES DAILY
Qty: 14 TABLET | Refills: 0 | Status: SHIPPED | OUTPATIENT
Start: 2021-12-16 | End: 2021-12-23

## 2021-12-16 NOTE — PATIENT INSTRUCTIONS
Bacterial Vaginosis    You have a vaginal infection called bacterial vaginosis (BV). Both good and bad bacteria are present in a healthy vagina. BV occurs when these bacteria get out of balance. The number of bad bacteria increase. And the number of good bacteria decrease. BV is linked with sexual activity, but it's not a sexually transmitted infection (STI).   BV may or may not cause symptoms. If symptoms do occur, they can include:     Thin, gray, milky-white, or sometimes green discharge    Unpleasant odor or  fishy  smell    Itching, burning, or pain in or around the vagina  It is not known what causes BV, but certain factors can make the problem more likely. These can include:     Douching    Spermicides    Use of antibiotics    Change in hormone levels with pregnancy, breastfeeding, or menopause    Having sex with a new partner    Having sex with more than one partner  BV will sometimes go away on its own. But treatment is often advised. This is because untreated BV can raise the risk of more serious health problems such as:     Pelvic inflammatory disease (PID)     delivery (giving birth to a baby early if you re pregnant)    HIV and some other sexually transmitted infections (STIs)    Infection after surgery on the reproductive organs  Home care  General care    BV is most often treated with medicines called antibiotics. These may be given as pills or as a vaginal cream. If antibiotics are prescribed, be sure to use them exactly as directed. And complete all of the medicine, even if your symptoms go away.    Don't douche or having sex during treatment.    If you have sex with a female partner, ask your healthcare provider if she should also be treated.  Prevention    Don't douche.    Don't have sex. If you do have sex, then take steps to lower your risk:  ? Use condoms when having sex.  ? Limit the number of sex partners you have.    Follow-up care  Follow up with your healthcare provider, or as  advised.   When to get medical advice  Call your healthcare provider right away if:     You have a fever of 100.4 F (38 C) or higher, or as directed by your provider.    Your symptoms get worse, or they don t go away within a few days of starting treatment.    You have new pain in the lower belly or pelvic region.    You have side effects that bother you or a reaction to the pills or cream you re prescribed.    You or any of your sex partners have new symptoms, such as a rash, joint pain, or sores.  [employer] last reviewed this educational content on 6/1/2020 2000-2021 [employer]. All rights reserved. This information is not intended as a substitute for professional medical care. Always follow your healthcare professional's instructions.

## 2022-01-03 ENCOUNTER — MYC MEDICAL ADVICE (OUTPATIENT)
Dept: FAMILY MEDICINE | Facility: OTHER | Age: 37
End: 2022-01-03
Payer: COMMERCIAL

## 2022-01-31 ASSESSMENT — HEADACHE IMPACT TEST (HIT 6)
HOW OFTEN HAVE YOU FELT FED UP OR IRRITATED BECAUSE OF YOUR HEADACHES: SOMETIMES
WHEN YOU HAVE A HEADACHE HOW OFTEN DO YOU WISH YOU COULD LIE DOWN: VERY OFTEN
HOW OFTEN DO HEADACHES LIMIT YOUR DAILY ACTIVITIES: VERY OFTEN
HOW OFTEN HAVE YOU FELT TOO TIRED TO WORK BECAUSE OF YOUR HEADACHES: SOMETIMES
HOW OFTEN DID HEADACHS LIMIT CONCENTRATION ON WORK OR DAILY ACTIVITY: SOMETIMES
HIT6 TOTAL SCORE: 63
WHEN YOU HAVE HEADACHES HOW OFTEN IS THE PAIN SEVERE: VERY OFTEN

## 2022-02-01 ENCOUNTER — OFFICE VISIT (OUTPATIENT)
Dept: NEUROLOGY | Facility: CLINIC | Age: 37
End: 2022-02-01
Payer: COMMERCIAL

## 2022-02-01 DIAGNOSIS — G43.719 INTRACTABLE CHRONIC MIGRAINE WITHOUT AURA AND WITHOUT STATUS MIGRAINOSUS: Primary | ICD-10-CM

## 2022-02-01 PROCEDURE — 64615 CHEMODENERV MUSC MIGRAINE: CPT | Performed by: PSYCHIATRY & NEUROLOGY

## 2022-02-01 NOTE — PROGRESS NOTES
[facility]     Erin Ashley MRN# [id number]   Age: 36 year old YOB: 1985      Requesting physician: No ref. provider found  Hussain Ryder       The procedure was explained to the patient. Benefits of the treatment were discussed including headache and migraine reduction. Risks of the procedure were reviewed including but not limited to pain, bruising, bleeding, infection, weakness of muscles injected or those distal to injection. The patient voiced understanding of the risks and benefits. All questions answered and the patient consented to proceed.     Prior to receiving Botox injections the patient reported the following:  Baseline headache frequency: 28-30 days a month  Baseline headache duration: 4-24 hours  Associated migrainous features: photophobia and nausea      Previous treatment trials: gabapentin, Depakote, duloxetine, amitriptyline and Lamictal without benefit.  She has a history of depression and low blood pressures which contraindicates antihypertensives and beta blockers.  She also has a history of kidney stones was contraindicates topiramate and zonisamide usage    Last Botox procedure: 11/9/2021  Current migraine frequency: migraines more than 50% reduced with Botox.   Current migraine duration: 24 hours    A 200 unit vial of Botox was diluted with 4ml of 0.9% sodium chloride    Botox lot # and expiration date: See MAR for details  0.9% sodium chloride lot # and expiration date: See MAR for details    The following muscles were injected using a 30 gauge needle:  Procerus 1 site 5 units   2 sites (bilat) 10 units  Frontalis 4 sites (bilat) 20 units  Temporalis 8 sites (bilat) 50 units  Trapezius muscles 6 sites (bilat) 30 units  Cervical paraspinals (bilat) 4 sites 20 units  Occipitalis 7 sites (bilat) 15 units on the left and 20 units on the right      A total of 170 units were injected, 30 units wasted.   The patient tolerated the injections well. I was  present for and performed the entire procedure.     Paty Whitney MD   not applicable

## 2022-02-01 NOTE — LETTER
2/1/2022       RE: Eirn Ashley  [address]  [place] [address]     Dear Colleague,    Thank you for referring your patient, Erin Ashley, to the [facility] NEUROLOGY CLINIC [place] at [facility]. Please see a copy of my visit note below.         [facility]     Erin Ashley MRN# [id number]   Age: 36 year old YOB: 1985      Requesting physician: No ref. provider found  Hussain Ryder       The procedure was explained to the patient. Benefits of the treatment were discussed including headache and migraine reduction. Risks of the procedure were reviewed including but not limited to pain, bruising, bleeding, infection, weakness of muscles injected or those distal to injection. The patient voiced understanding of the risks and benefits. All questions answered and the patient consented to proceed.     Prior to receiving Botox injections the patient reported the following:  Baseline headache frequency: 28-30 days a month  Baseline headache duration: 4-24 hours  Associated migrainous features: photophobia and nausea      Previous treatment trials: gabapentin, Depakote, duloxetine, amitriptyline and Lamictal without benefit.  She has a history of depression and low blood pressures which contraindicates antihypertensives and beta blockers.  She also has a history of kidney stones was contraindicates topiramate and zonisamide usage    Last Botox procedure: 11/9/2021  Current migraine frequency: migraines more than 50% reduced with Botox.   Current migraine duration: 24 hours    A 200 unit vial of Botox was diluted with 4ml of 0.9% sodium chloride    Botox lot # and expiration date: See MAR for details  0.9% sodium chloride lot # and expiration date: See MAR for details    The following muscles were injected using a 30 gauge needle:  Procerus 1 site 5 units   2 sites (bilat) 10 units  Frontalis 4 sites (bilat) 20  units  Temporalis 8 sites (bilat) 50 units  Trapezius muscles 6 sites (bilat) 30 units  Cervical paraspinals (bilat) 4 sites 20 units  Occipitalis 7 sites (bilat) 15 units on the left and 20 units on the right      A total of 170 units were injected, 30 units wasted.   The patient tolerated the injections well. I was present for and performed the entire procedure.     Paty Whitney MD

## 2022-04-01 ENCOUNTER — MYC MEDICAL ADVICE (OUTPATIENT)
Dept: FAMILY MEDICINE | Facility: OTHER | Age: 37
End: 2022-04-01
Payer: COMMERCIAL

## 2022-05-31 NOTE — ED AVS SNAPSHOT
[facility] Emergency Department    [address] [place] [address]    Phone:  [phone]    Fax:  [phone]                                       Erin Ashley   MRN: [id number]    Department:  [facility] Emergency Department   Date of Visit:  8/24/2017           After Visit Summary Signature Page     I have received my discharge instructions, and my questions have been answered. I have discussed any challenges I see with this plan with the nurse or doctor.    ..........................................................................................................................................  Patient/Patient Representative Signature      ..........................................................................................................................................  Patient Representative Print Name and Relationship to Patient    ..................................................               ................................................  Date                                            Time    ..........................................................................................................................................  Reviewed by Signature/Title    ...................................................              ..............................................  Date                                                            Time           Metronidazole Counseling:  I discussed with the patient the risks of metronidazole including but not limited to seizures, nausea/vomiting, a metallic taste in the mouth, nausea/vomiting and severe allergy.

## 2022-07-08 ENCOUNTER — MYC MEDICAL ADVICE (OUTPATIENT)
Dept: FAMILY MEDICINE | Facility: OTHER | Age: 37
End: 2022-07-08

## 2022-07-08 NOTE — TELEPHONE ENCOUNTER
Routing to provider.  See mychart questions.    Please advise.      Anna Kumar RN  [facility] ~ Registered Nurse  Clinic Triage ~ [facility]  July 8, 2022

## 2022-08-02 ASSESSMENT — ENCOUNTER SYMPTOMS
SHORTNESS OF BREATH: 0
HEMATOCHEZIA: 0
PARESTHESIAS: 0
COUGH: 0
HEARTBURN: 0
ABDOMINAL PAIN: 0
NERVOUS/ANXIOUS: 0
MYALGIAS: 0
ARTHRALGIAS: 0
CHILLS: 0
PALPITATIONS: 0
HEMATURIA: 0
JOINT SWELLING: 0
DYSURIA: 0
SORE THROAT: 0
CONSTIPATION: 0
EYE PAIN: 0
WEAKNESS: 0
BREAST MASS: 0
FREQUENCY: 0
HEADACHES: 1
FEVER: 0
DIARRHEA: 0
DIZZINESS: 0
NAUSEA: 0

## 2022-08-04 ENCOUNTER — OFFICE VISIT (OUTPATIENT)
Dept: FAMILY MEDICINE | Facility: OTHER | Age: 37
End: 2022-08-04
Payer: COMMERCIAL

## 2022-08-04 VITALS
OXYGEN SATURATION: 100 % | SYSTOLIC BLOOD PRESSURE: 100 MMHG | WEIGHT: 117 LBS | BODY MASS INDEX: 20.08 KG/M2 | HEART RATE: 87 BPM | TEMPERATURE: 98.4 F | DIASTOLIC BLOOD PRESSURE: 60 MMHG

## 2022-08-04 DIAGNOSIS — Z30.011 ENCOUNTER FOR INITIAL PRESCRIPTION OF CONTRACEPTIVE PILLS: ICD-10-CM

## 2022-08-04 DIAGNOSIS — R23.2 HOT FLASHES: ICD-10-CM

## 2022-08-04 DIAGNOSIS — G43.719 INTRACTABLE CHRONIC MIGRAINE WITHOUT AURA AND WITHOUT STATUS MIGRAINOSUS: ICD-10-CM

## 2022-08-04 DIAGNOSIS — F41.1 GAD (GENERALIZED ANXIETY DISORDER): ICD-10-CM

## 2022-08-04 DIAGNOSIS — Z00.00 ENCOUNTER FOR ROUTINE ADULT HEALTH EXAMINATION WITHOUT ABNORMAL FINDINGS: Primary | ICD-10-CM

## 2022-08-04 DIAGNOSIS — F33.1 MODERATE RECURRENT MAJOR DEPRESSION (H): ICD-10-CM

## 2022-08-04 LAB
ALBUMIN SERPL-MCNC: 4.1 G/DL (ref 3.4–5)
ALP SERPL-CCNC: 66 U/L (ref 40–150)
ALT SERPL W P-5'-P-CCNC: 34 U/L (ref 0–50)
ANION GAP SERPL CALCULATED.3IONS-SCNC: 5 MMOL/L (ref 3–14)
AST SERPL W P-5'-P-CCNC: 16 U/L (ref 0–45)
BILIRUB SERPL-MCNC: 0.7 MG/DL (ref 0.2–1.3)
BUN SERPL-MCNC: 8 MG/DL (ref 7–30)
CALCIUM SERPL-MCNC: 9 MG/DL (ref 8.5–10.1)
CHLORIDE BLD-SCNC: 107 MMOL/L (ref 94–109)
CO2 SERPL-SCNC: 26 MMOL/L (ref 20–32)
CREAT SERPL-MCNC: 0.73 MG/DL (ref 0.52–1.04)
ERYTHROCYTE [DISTWIDTH] IN BLOOD BY AUTOMATED COUNT: 11.9 % (ref 10–15)
FSH SERPL IRP2-ACNC: 60.6 MIU/ML
GFR SERPL CREATININE-BSD FRML MDRD: >90 ML/MIN/1.73M2
GLUCOSE BLD-MCNC: 73 MG/DL (ref 70–99)
HCG UR QL: NEGATIVE
HCT VFR BLD AUTO: 40.7 % (ref 35–47)
HGB BLD-MCNC: 13.9 G/DL (ref 11.7–15.7)
LH SERPL-ACNC: 43.9 MIU/ML
MCH RBC QN AUTO: 30 PG (ref 26.5–33)
MCHC RBC AUTO-ENTMCNC: 34.2 G/DL (ref 31.5–36.5)
MCV RBC AUTO: 88 FL (ref 78–100)
PLATELET # BLD AUTO: 217 10E3/UL (ref 150–450)
POTASSIUM BLD-SCNC: 3.6 MMOL/L (ref 3.4–5.3)
PROT SERPL-MCNC: 7.4 G/DL (ref 6.8–8.8)
RBC # BLD AUTO: 4.63 10E6/UL (ref 3.8–5.2)
SODIUM SERPL-SCNC: 138 MMOL/L (ref 133–144)
TSH SERPL DL<=0.005 MIU/L-ACNC: 0.94 MU/L (ref 0.4–4)
WBC # BLD AUTO: 4.6 10E3/UL (ref 4–11)

## 2022-08-04 PROCEDURE — 83002 ASSAY OF GONADOTROPIN (LH): CPT | Performed by: PHYSICIAN ASSISTANT

## 2022-08-04 PROCEDURE — 99214 OFFICE O/P EST MOD 30 MIN: CPT | Mod: 25 | Performed by: PHYSICIAN ASSISTANT

## 2022-08-04 PROCEDURE — 83001 ASSAY OF GONADOTROPIN (FSH): CPT | Performed by: PHYSICIAN ASSISTANT

## 2022-08-04 PROCEDURE — 80053 COMPREHEN METABOLIC PANEL: CPT | Performed by: PHYSICIAN ASSISTANT

## 2022-08-04 PROCEDURE — 85027 COMPLETE CBC AUTOMATED: CPT | Performed by: PHYSICIAN ASSISTANT

## 2022-08-04 PROCEDURE — 81025 URINE PREGNANCY TEST: CPT | Performed by: PHYSICIAN ASSISTANT

## 2022-08-04 PROCEDURE — 36415 COLL VENOUS BLD VENIPUNCTURE: CPT | Performed by: PHYSICIAN ASSISTANT

## 2022-08-04 PROCEDURE — 84443 ASSAY THYROID STIM HORMONE: CPT | Performed by: PHYSICIAN ASSISTANT

## 2022-08-04 PROCEDURE — 99395 PREV VISIT EST AGE 18-39: CPT | Performed by: PHYSICIAN ASSISTANT

## 2022-08-04 RX ORDER — BUPROPION HYDROCHLORIDE 300 MG/1
300 TABLET ORAL DAILY
Qty: 90 TABLET | Refills: 3 | Status: SHIPPED | OUTPATIENT
Start: 2022-08-04 | End: 2023-10-05

## 2022-08-04 RX ORDER — NORGESTIMATE AND ETHINYL ESTRADIOL 0.25-0.035
1 KIT ORAL DAILY
Qty: 84 TABLET | Refills: 3 | Status: SHIPPED | OUTPATIENT
Start: 2022-08-04 | End: 2023-01-06

## 2022-08-04 RX ORDER — BUPROPION HYDROCHLORIDE 300 MG/1
300 TABLET ORAL DAILY
COMMUNITY
Start: 2022-07-22 | End: 2022-08-04

## 2022-08-04 ASSESSMENT — ENCOUNTER SYMPTOMS
HEADACHES: 1
DIZZINESS: 0
DIARRHEA: 0
WEAKNESS: 0
JOINT SWELLING: 0
SHORTNESS OF BREATH: 0
HEMATURIA: 0
ARTHRALGIAS: 0
HEARTBURN: 0
DYSURIA: 0
PALPITATIONS: 0
HEMATOCHEZIA: 0
CONSTIPATION: 0
FREQUENCY: 0
CHILLS: 0
NAUSEA: 0
NERVOUS/ANXIOUS: 0
EYE PAIN: 0
SORE THROAT: 0
MYALGIAS: 0
COUGH: 0
FEVER: 0
BREAST MASS: 0
PARESTHESIAS: 0
ABDOMINAL PAIN: 0

## 2022-08-04 ASSESSMENT — PAIN SCALES - GENERAL: PAINLEVEL: NO PAIN (0)

## 2022-08-04 ASSESSMENT — PATIENT HEALTH QUESTIONNAIRE - PHQ9
SUM OF ALL RESPONSES TO PHQ QUESTIONS 1-9: 2
SUM OF ALL RESPONSES TO PHQ QUESTIONS 1-9: 2
10. IF YOU CHECKED OFF ANY PROBLEMS, HOW DIFFICULT HAVE THESE PROBLEMS MADE IT FOR YOU TO DO YOUR WORK, TAKE CARE OF THINGS AT HOME, OR GET ALONG WITH OTHER PEOPLE: NOT DIFFICULT AT ALL

## 2022-08-04 NOTE — PROGRESS NOTES
Assessment & Plan       ICD-10-CM    1. Encounter for routine adult health examination without abnormal findings  Z00.00    2. Hot flashes  R23.2 HCG Qual, Urine ([id number])     TSH with free T4 reflex     Comprehensive metabolic panel (BMP + Alb, Alk Phos, ALT, AST, Total. Bili, TP)     CBC with platelets     Follicle stimulating hormone     Luteinizing Hormone, Adult     HCG Qual, Urine ([id number])     TSH with free T4 reflex     Comprehensive metabolic panel (BMP + Alb, Alk Phos, ALT, AST, Total. Bili, TP)     CBC with platelets     Follicle stimulating hormone     Luteinizing Hormone, Adult   3. Encounter for initial prescription of contraceptive pills  Z30.011 norgestimate-ethinyl estradiol (ORTHO-CYCLEN) 0.25-35 MG-MCG tablet   4. GABE (generalized anxiety disorder)  F41.1 buPROPion (WELLBUTRIN XL) 300 MG 24 hr tablet   5. Moderate recurrent major depression (H)  F33.1 buPROPion (WELLBUTRIN XL) 300 MG 24 hr tablet   6. Intractable chronic migraine without aura and without status migrainosus  G43.719        2-3. Will order labs to further evaluation her hot flashes. This could very well be secondary to hormonal changes since stopping her Nuva-ring as I have a low suspicion for perimenopause given her young age. However, will rule out pregnancy today. If negative, she is interested in starting OCP's again so will start Ortho-Cyclen to take as directed. Okay to skip the placebo week. She is not a smoker and denies any family history of breast cancer.    4-5. Continue Wellbutrin as this is working well. Recheck in 6 months.    6. She denies any recent migraines even though she missed her last Botox injections. She uses Maxalt as needed.      CORY [facility]ig[facility] REINIER Khan is a 37 year old, presenting for the following health issues:  Recheck Medication      Healthy Habits:     Getting at least 3 servings of Calcium per day:  Yes    Bi-annual eye exam:  NO    Dental  care twice a year:  Yes    Sleep apnea or symptoms of sleep apnea:  None    Diet:  Regular (no restrictions)    Frequency of exercise:  2-3 days/week    Duration of exercise:  15-30 minutes    Taking medications regularly:  Yes    Medication side effects:  Not applicable    PHQ-2 Total Score: 1    Additional concerns today:  No       Medication Followup of WELLBUTRIN    Taking Medication as prescribed: yes    Side Effects:  None    Medication Helping Symptoms:  yes    Depression Followup    How are you doing with your depression since your last visit? Improved     Are you having other symptoms that might be associated with depression? Yes:  NO INTEREST    Have you had a significant life event?  No     Are you feeling anxious or having panic attacks?   No    Do you have any concerns with your use of alcohol or other drugs? No     She has been noticing hot flashes 2-3 times daily for the past 1-2 months. She quit using her Nuva-Ring a few months ago as it was causing heavy bleeding and has not had a period since. She did not really have regular periods prior to her Nuva-Ring. She is sexually active but does not think she is pregnant. She is interested in starting an oral contraceptive. She is not a smoker. No family history of breast cancer.    Social History     Tobacco Use     Smoking status: Never Smoker     Smokeless tobacco: Never Used     Tobacco comment: no smokers in the household   Vaping Use     Vaping Use: Never used   Substance Use Topics     Alcohol use: No     Alcohol/week: 0.0 standard drinks     Drug use: No     PHQ 4/19/2021 7/14/2021 8/4/2022   PHQ-9 Total Score 10 8 2   Q9: Thoughts of better off dead/self-harm past 2 weeks Not at all Not at all Not at all   F/U: Thoughts of suicide or self-harm - - -   F/U: Safety concerns - - -     GABE-7 SCORE 1/24/2020 4/16/2020 4/19/2021   Total Score 20 (severe anxiety) 19 (severe anxiety) 8 (mild anxiety)   Total Score 20 19 8     Last PHQ-9 8/4/2022   1.   Little interest or pleasure in doing things 1   2.  Feeling down, depressed, or hopeless 0   3.  Trouble falling or staying asleep, or sleeping too much 0   4.  Feeling tired or having little energy 1   5.  Poor appetite or overeating 0   6.  Feeling bad about yourself 0   7.  Trouble concentrating 0   8.  Moving slowly or restless 0   Q9: Thoughts of better off dead/self-harm past 2 weeks 0   PHQ-9 Total Score 2   Difficulty at work, home, or with people -   In the past two weeks have you had thoughts of suicide or self harm? -   Do you have concerns about your personal safety or the safety of others? -         Review of Systems   Constitutional: Negative for chills and fever.   HENT: Negative for congestion, ear pain, hearing loss and sore throat.    Eyes: Negative for pain and visual disturbance.   Respiratory: Negative for cough and shortness of breath.    Cardiovascular: Negative for chest pain, palpitations and peripheral edema.   Gastrointestinal: Negative for abdominal pain, constipation, diarrhea, heartburn, hematochezia and nausea.   Breasts:  Negative for tenderness, breast mass and discharge.   Genitourinary: Negative for dysuria, frequency, genital sores, hematuria, pelvic pain, urgency, vaginal bleeding and vaginal discharge.   Musculoskeletal: Negative for arthralgias, joint swelling and myalgias.   Skin: Negative for rash.   Neurological: Positive for headaches. Negative for dizziness, weakness and paresthesias.   Psychiatric/Behavioral: Negative for mood changes. The patient is not nervous/anxious.          Objective    /60   Pulse 87   Temp 98.4  F (36.9  C) (Temporal)   Wt 53.1 kg (117 lb)   SpO2 100%   BMI 20.08 kg/m    Body mass index is 20.08 kg/m .  Physical Exam   GENERAL: healthy, alert and no distress  EYES: Eyes grossly normal to inspection, PERRL and conjunctivae and sclerae normal  HENT: ear canals and TM's normal, nose and mouth without ulcers or lesions  NECK: no adenopathy,  no asymmetry, masses, or scars and thyroid normal to palpation  RESP: lungs clear to auscultation - no rales, rhonchi or wheezes  CV: regular rate and rhythm, normal S1 S2, no S3 or S4, no murmur, click or rub, no peripheral edema and peripheral pulses strong  ABDOMEN: soft, nontender, no hepatosplenomegaly, no masses and bowel sounds normal  MS: no gross musculoskeletal defects noted, no edema  SKIN: no suspicious lesions or rashes  NEURO: Normal strength and tone, mentation intact and speech normal. Cranial nerves II-XII are grossly intact. DTRs are 2+/4 throughout and symmetric. Gait is stable.   PSYCH: mentation appears normal, affect normal/bright          .  ..  Answers for HPI/ROS submitted by the patient on 8/4/2022  If you checked off any problems, how difficult have these problems made it for you to do your work, take care of things at home, or get along with other people?: Not difficult at all  PHQ9 TOTAL SCORE: 2

## 2022-08-04 NOTE — PATIENT INSTRUCTIONS
Preventive Health Recommendations  Female Ages 26 - 39  Yearly exam:   See your health care provider every year in order to  Review health changes.   Discuss preventive care.    Review your medicines if you your doctor has prescribed any.    Until age 30: Get a Pap test every three years (more often if you have had an abnormal result).    After age 30: Talk to your doctor about whether you should have a Pap test every 3 years or have a Pap test with HPV screening every 5 years.   You do not need a Pap test if your uterus was removed (hysterectomy) and you have not had cancer.  You should be tested each year for STDs (sexually transmitted diseases), if you're at risk.   Talk to your provider about how often to have your cholesterol checked.  If you are at risk for diabetes, you should have a diabetes test (fasting glucose).  Shots: Get a flu shot each year. Get a tetanus shot every 10 years.   Nutrition:   Eat at least 5 servings of fruits and vegetables each day.  Eat whole-grain bread, whole-wheat pasta and brown rice instead of white grains and rice.  Get adequate Calcium and Vitamin D.     Lifestyle  Exercise at least 150 minutes a week (30 minutes a day, 5 days of the week). This will help you control your weight and prevent disease.  Limit alcohol to one drink per day.  No smoking.   Wear sunscreen to prevent skin cancer.  See your dentist every six months for an exam and cleaning.    Will start oral birth control. Okay to skip the placebo week.    Will check labs for the hot flashes.    Follow-up in 6 months.

## 2022-08-05 ENCOUNTER — MYC MEDICAL ADVICE (OUTPATIENT)
Dept: FAMILY MEDICINE | Facility: OTHER | Age: 37
End: 2022-08-05

## 2022-08-05 DIAGNOSIS — G43.719 INTRACTABLE CHRONIC MIGRAINE WITHOUT AURA AND WITHOUT STATUS MIGRAINOSUS: ICD-10-CM

## 2022-08-05 RX ORDER — RIZATRIPTAN BENZOATE 10 MG/1
10 TABLET ORAL
Qty: 9 TABLET | Refills: 11 | Status: SHIPPED | OUTPATIENT
Start: 2022-08-05

## 2022-08-05 NOTE — TELEPHONE ENCOUNTER
Rx Authorization:    Requested Medication/ Dose rizatriptan (MAXALT) 10 MG tablet    Date last refill ordered: 6/21/21    Quantity ordered: 9 tablets    # refills: 11    Date of last clinic visit with ordering provider: 2/1/22    Date of next clinic visit with ordering provider:     All pertinent protocol data (lab date/result):     Include pertinent information from patients message:

## 2022-09-12 ENCOUNTER — MYC MEDICAL ADVICE (OUTPATIENT)
Dept: FAMILY MEDICINE | Facility: OTHER | Age: 37
End: 2022-09-12

## 2022-10-04 ENCOUNTER — MYC MEDICAL ADVICE (OUTPATIENT)
Dept: FAMILY MEDICINE | Facility: OTHER | Age: 37
End: 2022-10-04

## 2022-10-09 ENCOUNTER — HEALTH MAINTENANCE LETTER (OUTPATIENT)
Age: 37
End: 2022-10-09

## 2022-12-08 ASSESSMENT — HEADACHE IMPACT TEST (HIT 6)
WHEN YOU HAVE HEADACHES HOW OFTEN IS THE PAIN SEVERE: VERY OFTEN
HOW OFTEN HAVE YOU FELT TOO TIRED TO WORK BECAUSE OF YOUR HEADACHES: VERY OFTEN
WHEN YOU HAVE A HEADACHE HOW OFTEN DO YOU WISH YOU COULD LIE DOWN: VERY OFTEN
HOW OFTEN DID HEADACHS LIMIT CONCENTRATION ON WORK OR DAILY ACTIVITY: VERY OFTEN
HIT6 TOTAL SCORE: 64
HOW OFTEN HAVE YOU FELT FED UP OR IRRITATED BECAUSE OF YOUR HEADACHES: SOMETIMES
HOW OFTEN DO HEADACHES LIMIT YOUR DAILY ACTIVITIES: SOMETIMES

## 2022-12-13 ENCOUNTER — OFFICE VISIT (OUTPATIENT)
Dept: NEUROLOGY | Facility: CLINIC | Age: 37
End: 2022-12-13
Payer: COMMERCIAL

## 2022-12-13 DIAGNOSIS — G43.719 INTRACTABLE CHRONIC MIGRAINE WITHOUT AURA AND WITHOUT STATUS MIGRAINOSUS: Primary | ICD-10-CM

## 2022-12-13 PROCEDURE — 64615 CHEMODENERV MUSC MIGRAINE: CPT | Performed by: PSYCHIATRY & NEUROLOGY

## 2022-12-13 NOTE — PROGRESS NOTES
[facility]     Erin Ashley MRN# [id number]   Age: 36 year old YOB: 1985      Requesting physician: No ref. provider found  Hussain Ryder         The procedure was explained to the patient. Benefits of the treatment were discussed including headache and migraine reduction. Risks of the procedure were reviewed including but not limited to pain, bruising, bleeding, infection, weakness of muscles injected or those distal to injection. The patient voiced understanding of the risks and benefits. All questions answered and the patient consented to proceed.      Prior to receiving Botox injections the patient reported the following:  Baseline headache frequency: 28-30 days a month  Baseline headache duration: 4-24 hours  Associated migrainous features: photophobia and nausea        Previous treatment trials: gabapentin, Depakote, duloxetine, amitriptyline and Lamictal without benefit.  She has a history of depression and low blood pressures which contraindicates antihypertensives and beta blockers.  She also has a history of kidney stones was contraindicates topiramate and zonisamide usage     Last Botox procedure: 2/1/2022  Current migraine frequency: delayed due to insurance changes related to job switches. 2-3 times a week minimal  Current migraine duration: 4-24 hours     A 200 unit vial of Botox was diluted with 4ml of 0.9% sodium chloride     Botox lot # and expiration date: See MAR for details  0.9% sodium chloride lot # and expiration date: See MAR for details     The following muscles were injected using a 30 gauge needle:  Procerus 1 site 5 units   2 sites (bilat) 10 units  Frontalis 4 sites (bilat) 20 units  Temporalis 8 sites (bilat) 50 units  Trapezius muscles 6 sites (bilat) 30 units  Cervical paraspinals (bilat) 4 sites 20 units  Occipitalis 7 sites (bilat) 15 units on the left and 20 units on the right        A total of 170 units were injected, 30 units wasted.    The patient tolerated the injections well. I was present for and performed the entire procedure.      Paty Whitney MD

## 2022-12-13 NOTE — LETTER
12/13/2022       RE: Erin Ashley  [address] [address]     Dear Colleague,    Thank you for referring your patient, Erin Ashley, to the [facility]. Please see a copy of my visit note below.       [facility]     Erin Ashley MRN# [id number]   Age: 36 year old YOB: 1985      Requesting physician: No ref. provider found  Hussain Ryder         The procedure was explained to the patient. Benefits of the treatment were discussed including headache and migraine reduction. Risks of the procedure were reviewed including but not limited to pain, bruising, bleeding, infection, weakness of muscles injected or those distal to injection. The patient voiced understanding of the risks and benefits. All questions answered and the patient consented to proceed.      Prior to receiving Botox injections the patient reported the following:  Baseline headache frequency: 28-30 days a month  Baseline headache duration: 4-24 hours  Associated migrainous features: photophobia and nausea        Previous treatment trials: gabapentin, Depakote, duloxetine, amitriptyline and Lamictal without benefit.  She has a history of depression and low blood pressures which contraindicates antihypertensives and beta blockers.  She also has a history of kidney stones was contraindicates topiramate and zonisamide usage     Last Botox procedure: 2/1/2022  Current migraine frequency: delayed due to insurance changes related to job switches. 2-3 times a week minimal  Current migraine duration: 4-24 hours     A 200 unit vial of Botox was diluted with 4ml of 0.9% sodium chloride     Botox lot # and expiration date: See MAR for details  0.9% sodium chloride lot # and expiration date: See MAR for details     The following muscles were injected using a 30 gauge needle:  Procerus 1 site 5 units   2 sites  (bilat) 10 units  Frontalis 4 sites (bilat) 20 units  Temporalis 8 sites (bilat) 50 units  Trapezius muscles 6 sites (bilat) 30 units  Cervical paraspinals (bilat) 4 sites 20 units  Occipitalis 7 sites (bilat) 15 units on the left and 20 units on the right        A total of 170 units were injected, 30 units wasted.   The patient tolerated the injections well. I was present for and performed the entire procedure.            Again, thank you for allowing me to participate in the care of your patient.      Sincerely,    Paty Whitney MD

## 2023-01-06 ENCOUNTER — OFFICE VISIT (OUTPATIENT)
Dept: FAMILY MEDICINE | Facility: OTHER | Age: 38
End: 2023-01-06
Payer: COMMERCIAL

## 2023-01-06 ENCOUNTER — TELEPHONE (OUTPATIENT)
Dept: FAMILY MEDICINE | Facility: OTHER | Age: 38
End: 2023-01-06

## 2023-01-06 VITALS
HEART RATE: 108 BPM | RESPIRATION RATE: 14 BRPM | SYSTOLIC BLOOD PRESSURE: 118 MMHG | DIASTOLIC BLOOD PRESSURE: 60 MMHG | BODY MASS INDEX: 20.49 KG/M2 | TEMPERATURE: 96.8 F | HEIGHT: 64 IN | OXYGEN SATURATION: 99 % | WEIGHT: 120 LBS

## 2023-01-06 DIAGNOSIS — F33.1 MODERATE RECURRENT MAJOR DEPRESSION (H): ICD-10-CM

## 2023-01-06 DIAGNOSIS — S29.011A MUSCLE STRAIN OF CHEST WALL, INITIAL ENCOUNTER: Primary | ICD-10-CM

## 2023-01-06 DIAGNOSIS — F41.1 GAD (GENERALIZED ANXIETY DISORDER): ICD-10-CM

## 2023-01-06 DIAGNOSIS — L98.9 SKIN LESION: ICD-10-CM

## 2023-01-06 PROCEDURE — 99214 OFFICE O/P EST MOD 30 MIN: CPT | Performed by: PHYSICIAN ASSISTANT

## 2023-01-06 ASSESSMENT — PAIN SCALES - GENERAL: PAINLEVEL: NO PAIN (0)

## 2023-01-06 NOTE — PATIENT INSTRUCTIONS
No concern for any breast malignancy. This is consistent with a chest wall/muscle strain.  I recommend home stretching, heat/ice, and ibuprofen as needed.  If not improving, let me know.    Will schedule mole removal for Monday.

## 2023-01-06 NOTE — TELEPHONE ENCOUNTER
Completed form faxed to [phone].    Confirmed delivery via Right[employer]ax. Forms placed in TC bin for scanning.

## 2023-01-06 NOTE — PROGRESS NOTES
Assessment & Plan     ICD-10-CM    1. Muscle strain of chest wall, initial encounter  S29.011A       2. Skin lesion  L98.9       3. GABE (generalized anxiety disorder)  F41.1       4. Moderate recurrent major depression (H)  F33.1           1. Both breasts are symmetrical with no masses/nodules or concerning findings. She has tenderness overlying some ribs of the upper left chest, most consistent with a musculoskeletal strain, likely costochondritis versus a pectoralis strain given the intermittent radiation of pain into the axilla. I reassured her that this is not a breast issue but rather a muscular issue. I recommend home stretching, heat/ice, and ibuprofen/Tylenol as needed. If symptoms are not improving or are worsening, can consider a breast ultrasound but no concerning findings on exam.     2. Two skin lesions over the posterior scalp, likely skin tags versus benign dermal nevi. They cause bleeding at times when brushing so I recommend shave excision next week. Will need size 8 gloves, 1% lidocaine with epi, shave blade, Betadine swabs, Drysol, pick ups, scissors, formalin container, Bacitracin, gauze, and a bandage.     3-4. Mood stable on Wellbutrin.     [facility] 37 [facility], presenting for the following health issues:  Painful lump      History of Present Illness       Reason for visit:  Painful area on left side of chest for 5 weeks  Symptom onset:  More than a month  Symptoms include:  Pain and tenderness over area on left chest.  Symptom intensity:  Moderate  Symptom progression:  Staying the same  Had these symptoms before:  No  What makes it worse:  Touching the area  What makes it better:  Ice    She eats 0-1 servings of fruits and vegetables daily.She consumes 3 sweetened beverage(s) daily.She exercises with enough effort to increase her heart rate 10 to 19 minutes per day.  She exercises with enough effort to increase her  "heart rate 3 or less days per week. She is missing 1 dose(s) of medications per week.  She is not taking prescribed medications regularly due to remembering to take.     She has had pain in the left chest for the past month. The area is tender to palpation and she notices it most if she sleeps on her belly or if she is hugging someone. She has been feeling the area frequently to make sure there are no breast nodules and she does not think there is but she is getting anxious about it so wants to have it checked out. She denies any known injury to the area but the pain does radiate into the left arm pit at times. No swelling, redness, or warmth. She has never had symptoms like this in the past. She denies noticeable pain when doing normal activities such as sitting, standing or walking.     She has two lesions on the back of her scalp that bleed sometimes when she brushes her hair so she would like these removed.     Review of Systems   Constitutional, cardiovascular, pulmonary, musculoskeletal, psych, gi and gu systems are negative, except as otherwise noted.        Objective    /60   Pulse 108   Temp 96.8  F (36  C) (Temporal)   Resp 14   Ht 1.626 m (5' 4\")   Wt 54.4 kg (120 lb)   SpO2 99%   BMI 20.60 kg/m    Body mass index is 20.6 kg/m .  Physical Exam   GENERAL: healthy, alert and no distress  SKIN: Two skin colored, round papules over the posterior scalp, the inferior being slightly larger.   RESP: lungs clear to auscultation - no rales, rhonchi or wheezes  BREAST: Chaperone present for exam. Mild tenderness over the superior breast overlying the rib area. No masses, tenderness or nipple discharge and no palpable axillary masses or adenopathy  CV: regular rate and rhythm, normal S1 S2, no S3 or S4, no murmur, click or rub, no peripheral edema and peripheral pulses strong  MS: no gross musculoskeletal defects noted, no edema. Mild tenderness overlying the upper chest area along a rib.   NEURO: Normal " strength and tone, mentation intact and speech normal. Gait is stable.   PSYCH: mentation appears normal, affect normal/bright

## 2023-01-09 ENCOUNTER — OFFICE VISIT (OUTPATIENT)
Dept: FAMILY MEDICINE | Facility: OTHER | Age: 38
End: 2023-01-09
Payer: COMMERCIAL

## 2023-01-09 VITALS
HEART RATE: 81 BPM | DIASTOLIC BLOOD PRESSURE: 60 MMHG | BODY MASS INDEX: 20.83 KG/M2 | WEIGHT: 122 LBS | OXYGEN SATURATION: 99 % | SYSTOLIC BLOOD PRESSURE: 118 MMHG | HEIGHT: 64 IN | TEMPERATURE: 98.5 F

## 2023-01-09 DIAGNOSIS — L98.9 SKIN LESION: Primary | ICD-10-CM

## 2023-01-09 PROCEDURE — 11306 SHAVE SKIN LESION 0.6-1.0 CM: CPT | Performed by: PHYSICIAN ASSISTANT

## 2023-01-09 PROCEDURE — 88305 TISSUE EXAM BY PATHOLOGIST: CPT | Mod: 59 | Performed by: PATHOLOGY

## 2023-01-09 PROCEDURE — 88342 IMHCHEM/IMCYTCHM 1ST ANTB: CPT | Performed by: PATHOLOGY

## 2023-01-09 ASSESSMENT — PATIENT HEALTH QUESTIONNAIRE - PHQ9
10. IF YOU CHECKED OFF ANY PROBLEMS, HOW DIFFICULT HAVE THESE PROBLEMS MADE IT FOR YOU TO DO YOUR WORK, TAKE CARE OF THINGS AT HOME, OR GET ALONG WITH OTHER PEOPLE: SOMEWHAT DIFFICULT
SUM OF ALL RESPONSES TO PHQ QUESTIONS 1-9: 4
SUM OF ALL RESPONSES TO PHQ QUESTIONS 1-9: 4

## 2023-01-09 NOTE — PROGRESS NOTES
"SUBJECTIVE:   Erin Ashley is a 37 year old female who presents today for lesion(s) excision. She has 2 lesion(s) that she would like removed. They have not been changing. They have become irritated with occasional bleeding by hair brushing.      OBJECTIVE:   /60 (BP Location: Right arm, Patient Position: Sitting, Cuff Size: Adult Regular)   Pulse 81   Temp 98.5  F (36.9  C) (Temporal)   Ht 1.626 m (5' 4\")   Wt 55.3 kg (122 lb)   SpO2 99%   BMI 20.94 kg/m    Exam shows two skin colored papules over the posterior scalp, the more superior lesion measuring ~0.4 x 0.3 cm and the more inferior lesion measuring ~0.5 x 0.6 cm.     Discussed with Erin regarding technique, risk of infection, and scarring. Informed consent completed. Betadine and alcohol are used for cleansing. 1% xylocaine with epinephrine is used for anesthesia, ~2 cc. Using the usual technique, each lesion was removed using a bendable shave blade. Hemostasis achieved with Drysol and a Bacitracin ointment and a bandage were applied. Erin tolerated procedure well. No complications.    ASSESSMENT:   Excision of two skin lesion    PLAN: Specimens submitted to pathology. Wound care instructions given.       Wound Care Instructions    1.  Keep area dry today.    2.  Starting tomorrow wash gently with soap and water once daily.      3.  Apply Vaseline or antibiotic ointment to the area and cover with a bandage if desired.  Do not let it dry out and form a scab as this will make the resulting scar more noticeable.    4. Protect the area from sun for up to one year afterward as the scar is continuing to remodel.  Sun exposure will also make the resulting scar more noticeable.    5.  Call if the area is very red, tender, has a discharge or is very itchy while healing, or if you have any other questions.  These may be signs of early infection or allergy.    "

## 2023-01-09 NOTE — PROGRESS NOTES
"  {PROVIDER CHARTING PREFERENCE:646710}    Subjective   Erin is a 37 year old, presenting for the following health issues:  Lesion Removal (Two moles to remove)      HPI       2 moles to remove.Answers for HPI/ROS submitted by the patient on 1/9/2023  If you checked off any problems, how difficult have these problems made it for you to do your work, take care of things at home, or get along with other people?: Somewhat difficult  PHQ9 TOTAL SCORE: 4        Review of Systems   {ROS COMP (Optional):699200}      Objective    /60 (BP Location: Right arm, Patient Position: Sitting, Cuff Size: Adult Regular)   Pulse 81   Temp 98.5  F (36.9  C) (Temporal)   Ht 1.626 m (5' 4\")   Wt 55.3 kg (122 lb)   SpO2 99%   BMI 20.94 kg/m    Body mass index is 20.94 kg/m .  Physical Exam   {Exam List (Optional):668889}    {Diagnostic Test Results (Optional):488356}    {AMBULATORY ATTESTATION (Optional):742202}            "

## 2023-01-12 LAB
PATH REPORT.COMMENTS IMP SPEC: NORMAL
PATH REPORT.FINAL DX SPEC: NORMAL
PATH REPORT.GROSS SPEC: NORMAL
PATH REPORT.MICROSCOPIC SPEC OTHER STN: NORMAL
PATH REPORT.RELEVANT HX SPEC: NORMAL

## 2023-01-23 ENCOUNTER — MYC MEDICAL ADVICE (OUTPATIENT)
Dept: FAMILY MEDICINE | Facility: OTHER | Age: 38
End: 2023-01-23
Payer: COMMERCIAL

## 2023-01-23 DIAGNOSIS — N64.4 BREAST PAIN: Primary | ICD-10-CM

## 2023-01-30 DIAGNOSIS — G43.719 INTRACTABLE CHRONIC MIGRAINE WITHOUT AURA AND WITHOUT STATUS MIGRAINOSUS: Primary | ICD-10-CM

## 2023-02-06 ENCOUNTER — ANCILLARY PROCEDURE (OUTPATIENT)
Dept: MAMMOGRAPHY | Facility: CLINIC | Age: 38
End: 2023-02-06
Attending: PHYSICIAN ASSISTANT
Payer: COMMERCIAL

## 2023-02-06 DIAGNOSIS — N64.4 BREAST PAIN: ICD-10-CM

## 2023-02-06 PROCEDURE — 76642 ULTRASOUND BREAST LIMITED: CPT | Mod: LT

## 2023-02-06 PROCEDURE — 77066 DX MAMMO INCL CAD BI: CPT

## 2023-02-06 PROCEDURE — G0279 TOMOSYNTHESIS, MAMMO: HCPCS

## 2023-02-07 ENCOUNTER — MYC MEDICAL ADVICE (OUTPATIENT)
Dept: FAMILY MEDICINE | Facility: OTHER | Age: 38
End: 2023-02-07
Payer: COMMERCIAL

## 2023-02-08 NOTE — TELEPHONE ENCOUNTER
Patient seen in office 1/6/23 for muscle strain of chest wall.     Had ultrasound and mammogram 2/6/23.     Patient still experiencing left chest pain and wondering what next steps are.     Raeann SOLIMANN, RN  [facility]

## 2023-02-09 ENCOUNTER — OFFICE VISIT (OUTPATIENT)
Dept: FAMILY MEDICINE | Facility: OTHER | Age: 38
End: 2023-02-09
Payer: COMMERCIAL

## 2023-02-09 ENCOUNTER — ANCILLARY PROCEDURE (OUTPATIENT)
Dept: GENERAL RADIOLOGY | Facility: OTHER | Age: 38
End: 2023-02-09
Attending: PHYSICIAN ASSISTANT
Payer: COMMERCIAL

## 2023-02-09 VITALS
WEIGHT: 120 LBS | SYSTOLIC BLOOD PRESSURE: 110 MMHG | RESPIRATION RATE: 18 BRPM | BODY MASS INDEX: 19.29 KG/M2 | TEMPERATURE: 98.3 F | HEART RATE: 74 BPM | DIASTOLIC BLOOD PRESSURE: 62 MMHG | OXYGEN SATURATION: 98 % | HEIGHT: 66 IN

## 2023-02-09 DIAGNOSIS — M94.0 COSTOCHONDRITIS: Primary | ICD-10-CM

## 2023-02-09 DIAGNOSIS — M94.0 COSTOCHONDRITIS: ICD-10-CM

## 2023-02-09 PROCEDURE — 93000 ELECTROCARDIOGRAM COMPLETE: CPT | Performed by: PHYSICIAN ASSISTANT

## 2023-02-09 PROCEDURE — 71101 X-RAY EXAM UNILAT RIBS/CHEST: CPT | Mod: TC | Performed by: RADIOLOGY

## 2023-02-09 PROCEDURE — 99214 OFFICE O/P EST MOD 30 MIN: CPT | Performed by: PHYSICIAN ASSISTANT

## 2023-02-09 ASSESSMENT — PAIN SCALES - GENERAL: PAINLEVEL: MODERATE PAIN (4)

## 2023-02-09 NOTE — PATIENT INSTRUCTIONS
Your symptoms are most consistent with costochondritis.  Will prescribe Voltaren gel to use as needed.  Will also refer you to physical therapy.  Avoid heavy lifting and twisting.

## 2023-02-09 NOTE — PROGRESS NOTES
Assessment & Plan       ICD-10-CM    1. Costochondritis  M94.0 EKG 12-lead complete w/read - Clinics     XR Ribs & Chest Left G/E 3 Views     diclofenac (VOLTAREN) 1 % topical gel     Physical Therapy Referral          Continued left anterior chest/rib pain for the past 2 months. She had a recently normal mammogram and ultrasound of the left breast and her EKG today is normal so there is no obvious cardiac cause. Her symptoms are still most consistent with chest wall pain/costochondritis. Will order a chest x-ray to rule out any bony abnormalities/mass but suspicion for this is low. I recommend she try Voltaren gel over the area as needed and will refer her to physical therapy for stretching exercises with modalities. If symptoms are not improving, she will let me know but she was reassured that the red flag causes have been ruled out.       CORY Quigley [facility] REINIER Khan is a 37 year old, presenting for the following health issues:  Chest Pain      History of Present Illness       Reason for visit:  Continued chest pain sob    She eats 0-1 servings of fruits and vegetables daily.She consumes 3 sweetened beverage(s) daily.She exercises with enough effort to increase her heart rate 10 to 19 minutes per day.  She exercises with enough effort to increase her heart rate 3 or less days per week.   She is taking medications regularly.     .  Concern - Chest Pain f/u  Onset: 2 months  Description: tenderness, spasms, aching in arm and left side of chest  Intensity: moderate  Progression of Symptoms:  Same - comes and goes  Accompanying Signs & Symptoms: aching arms  Previous history of similar problem: N/A  Precipitating factors:        Worsened by: touch  Alleviating factors:        Improved by: None  Therapies tried and outcome: ibuprofen/tylenol    She has noticed left anterior chest pain/aching for the past few months. She was seen 1 month ago and did not have any  "concerning symptoms for a cardiac or breast pathology so conservative treatments were recommended for a chest wall/musculoskeletal strain. However, the pain has persisted and has worsened over the past month. She underwent a mammogram a few weeks ago which was normal. She states the pain is now into her left shoulder and left upper back. She woke up the other day and when she twisted, she felt a pain in her left shoulder blade with some muscle tightness. Ibuprofen and Tylenol have not been helpful. She has noticed intermittent aching down her entire left arm recently but denies any numbness, tingling or weakness. No injury to the area. She feels like she cannot take a full breath at times because of the left sided pain but denies shortness of breath. Her anxiety has been slightly worse due to the pain as she is concerned something else may be going on.     Review of Systems   Constitutional, HEENT, cardiovascular, pulmonary, musculoskeletal, neuro, gi and gu systems are negative, except as otherwise noted.      Objective    /62 (Cuff Size: Adult Regular)   Pulse 74   Temp 98.3  F (36.8  C) (Temporal)   Resp 18   Ht 1.664 m (5' 5.5\")   Wt 54.4 kg (120 lb)   SpO2 98%   BMI 19.67 kg/m    Body mass index is 19.67 kg/m .  Physical Exam   GENERAL: healthy, alert and no distress  RESP: lungs clear to auscultation - no rales, rhonchi or wheezes  CV: regular rate and rhythm, normal S1 S2, no S3 or S4, no murmur, click or rub, no peripheral edema and peripheral pulses strong  MS: no gross musculoskeletal defects noted, no edema. There is moderate tenderness over the sternocostal junction of a few of the left mid-chest ribs. No bony deformity, swelling or bruising. Muscle tightness and tenderness of the left medial scapula.   NEURO: Normal strength and tone, mentation intact and speech normal. Cranial nerves II-XII are grossly intact. DTRs are 2+/4 throughout and symmetric. Gait is stable.   PSYCH: mentation " appears normal, affect normal/bright      EKG: Normal sinus rhythm. No acute ST/T wave changes. Unchanged from previous tracings.

## 2023-03-23 ENCOUNTER — MYC MEDICAL ADVICE (OUTPATIENT)
Dept: FAMILY MEDICINE | Facility: OTHER | Age: 38
End: 2023-03-23
Payer: COMMERCIAL

## 2023-03-24 ENCOUNTER — VIRTUAL VISIT (OUTPATIENT)
Dept: FAMILY MEDICINE | Facility: OTHER | Age: 38
End: 2023-03-24
Payer: COMMERCIAL

## 2023-03-24 DIAGNOSIS — R82.90 ABNORMAL URINE FINDINGS: Primary | ICD-10-CM

## 2023-03-24 PROCEDURE — 99213 OFFICE O/P EST LOW 20 MIN: CPT | Mod: 93 | Performed by: PHYSICIAN ASSISTANT

## 2023-03-24 NOTE — PROGRESS NOTES
Erin is a 38 year old who is being evaluated via a billable telephone visit.      What phone number would you like to be contacted at? [phone]   How would you like to obtain your AVS? Radha  Distant Location (provider location):  Off-site    Assessment & Plan       ICD-10-CM    1. Abnormal urine findings  R82.90 Ethanol urine     UA Macro with Reflex to Micro and Culture - lab collect     Comprehensive metabolic panel (BMP + Alb, Alk Phos, ALT, AST, Total. Bili, TP)     Ethyl Glucuronide Screen with Reflex to Confirmation, Urine          Recent UA and urine drug screen from 3/15/23 showing protein in the urine, elevated creatinine and ETG. She denies any alcohol use in 7 years and I have no reason to doubt her. Based on UpToDate findings, ETG can be found in individuals who use a lot of hand , which she does, so this finding is consistent with her hand  use. Will check an updated ETG level along with urine ethanol, UA and CMP to be sure but I have no concerns of any substance abuse/misuse with Erin and will notify [facility] of this when her labs are back. Her elevated urine creatinine and protein were likely due to dehydration. She was encouraged to stay well hydrated.      Hussain Ryder PA-C  [facility]   rEin is a 38 year old, presenting for the following health issues:  Lab Result Notice (Results/questions)    Additional Questions 3/24/2023   Roomed by Jonathon Deras     History of Present Illness       Reason for visit:  Discuss lab results    She eats 0-1 servings of fruits and vegetables daily.She consumes 3 sweetened beverage(s) daily.She exercises with enough effort to increase her heart rate 10 to 19 minutes per day.  She exercises with enough effort to increase her heart rate 3 or less days per week.   She is taking medications regularly.     She is followed by the [facility] given her medical history and they perform intermittent urine tests/urine drug screens. She currently works as a dialysis nurse and on March 15, 2023 they concepcion a UA and urine drug screen. Her UA showed some protein and elevated specific gravity and UDS showed elevated creatinine and positive ETG (urine ethylglucuronide). She denies any alcohol use in 7 years but states she uses hand  frequently at her job so thinks this is why it showed up. She wants to discuss with me further so that we can find a medical reason for these findings. She also states she was dehydrated that day as she was very busy at work so was not drinking much. She denies any UTI symptoms or any other health changes as she overall feels great.       Review of Systems   Constitutional, HEENT, cardiovascular, pulmonary, gi and gu systems are negative, except as otherwise noted.      Objective           Vitals:  No vitals were obtained today due to virtual visit.    Physical Exam   healthy, alert and no distress  PSYCH: Alert and oriented times 3; coherent speech, normal   rate and volume, able to articulate logical thoughts, able   to abstract reason, no tangential thoughts, no hallucinations   or delusions  Her affect is normal  RESP: No cough, no audible wheezing, able to talk in full sentences  Remainder of exam unable to be completed due to telephone visits        Phone call duration: 8 minutes

## 2023-03-28 ENCOUNTER — LAB (OUTPATIENT)
Dept: LAB | Facility: OTHER | Age: 38
End: 2023-03-28
Payer: COMMERCIAL

## 2023-03-28 DIAGNOSIS — R82.90 ABNORMAL URINE FINDINGS: ICD-10-CM

## 2023-03-28 LAB
ALBUMIN SERPL BCG-MCNC: 4.2 G/DL (ref 3.5–5.2)
ALBUMIN UR-MCNC: NEGATIVE MG/DL
ALP SERPL-CCNC: 52 U/L (ref 35–104)
ALT SERPL W P-5'-P-CCNC: 27 U/L (ref 10–35)
ANION GAP SERPL CALCULATED.3IONS-SCNC: 10 MMOL/L (ref 7–15)
APPEARANCE UR: CLEAR
AST SERPL W P-5'-P-CCNC: 17 U/L (ref 10–35)
BILIRUB SERPL-MCNC: 0.4 MG/DL
BILIRUB UR QL STRIP: NEGATIVE
BUN SERPL-MCNC: 8.8 MG/DL (ref 6–20)
CALCIUM SERPL-MCNC: 8.7 MG/DL (ref 8.6–10)
CHLORIDE SERPL-SCNC: 104 MMOL/L (ref 98–107)
COLOR UR AUTO: YELLOW
CREAT SERPL-MCNC: 0.61 MG/DL (ref 0.51–0.95)
DEPRECATED HCO3 PLAS-SCNC: 24 MMOL/L (ref 22–29)
ETHANOL UR QL SCN: NORMAL
GFR SERPL CREATININE-BSD FRML MDRD: >90 ML/MIN/1.73M2
GLUCOSE SERPL-MCNC: 70 MG/DL (ref 70–99)
GLUCOSE UR STRIP-MCNC: NEGATIVE MG/DL
HGB UR QL STRIP: ABNORMAL
KETONES UR STRIP-MCNC: NEGATIVE MG/DL
LEUKOCYTE ESTERASE UR QL STRIP: NEGATIVE
MUCOUS THREADS #/AREA URNS LPF: PRESENT /LPF
NITRATE UR QL: NEGATIVE
PH UR STRIP: 6.5 [PH] (ref 5–7)
POTASSIUM SERPL-SCNC: 4 MMOL/L (ref 3.4–5.3)
PROT SERPL-MCNC: 6.6 G/DL (ref 6.4–8.3)
RBC #/AREA URNS AUTO: ABNORMAL /HPF
SODIUM SERPL-SCNC: 138 MMOL/L (ref 136–145)
SP GR UR STRIP: 1.01 (ref 1–1.03)
SQUAMOUS #/AREA URNS AUTO: ABNORMAL /LPF
UROBILINOGEN UR STRIP-ACNC: 0.2 E.U./DL
WBC #/AREA URNS AUTO: ABNORMAL /HPF

## 2023-03-28 PROCEDURE — 80320 DRUG SCREEN QUANTALCOHOLS: CPT

## 2023-03-28 PROCEDURE — 80307 DRUG TEST PRSMV CHEM ANLYZR: CPT | Mod: 90

## 2023-03-28 PROCEDURE — 99000 SPECIMEN HANDLING OFFICE-LAB: CPT

## 2023-03-28 PROCEDURE — 36415 COLL VENOUS BLD VENIPUNCTURE: CPT

## 2023-03-28 PROCEDURE — 81001 URINALYSIS AUTO W/SCOPE: CPT

## 2023-03-28 PROCEDURE — 80053 COMPREHEN METABOLIC PANEL: CPT

## 2023-03-30 ENCOUNTER — MYC MEDICAL ADVICE (OUTPATIENT)
Dept: FAMILY MEDICINE | Facility: OTHER | Age: 38
End: 2023-03-30
Payer: COMMERCIAL

## 2023-03-30 LAB — ETHYL GLUCURONIDE UR QL SCN: NEGATIVE NG/ML

## 2023-03-30 NOTE — TELEPHONE ENCOUNTER
"See visit dated 3/24/23.     Provider states:L \"Will check an updated ETG level along with urine ethanol, UA and CMP to be sure but I have no concerns of any substance abuse/misuse with Erin and will notify [facility] of this when her labs are back.\"    Routing to PCP.    Herlinda Moe, JANNYN, RN, PHN  Registered Nurse-Clinic Triage  [facility]  3/30/2023 at 10:35 AM    "

## 2023-03-30 NOTE — LETTER
[facility]  [address]  [place] [address]  Phone: [phone]    March 30, 2023        Erin Ashley  [address] [address]          To whom it may concern:    RE: Erin Ashley    I had a recent visit with Erin Ashley given some abnormal findings on a few different urine tests through [facility] over the past few months. She had an elevated urine creatinine and positive ethyl glucuronide (ETG) levels. It is my professional medical opinion that this elevated ETG level is directly correlated to the frequent alcohol based hand  that she uses during her 12 hour work shifts. I repeated her urine tests on a day in which she did not work and urine ethanol, ETG and UA were all normal. Thank you.     Please contact me for questions or concerns.      Sincerely,        Hussain Ryder PA-C

## 2023-04-05 ENCOUNTER — MYC MEDICAL ADVICE (OUTPATIENT)
Dept: FAMILY MEDICINE | Facility: OTHER | Age: 38
End: 2023-04-05
Payer: COMMERCIAL

## 2023-04-11 NOTE — TELEPHONE ENCOUNTER
Pt was in [facility] for CP.  They did an ultrasound on gallbladder and abdomen and they found a renal mass on his kidney .  Can you refer to kidney specialist?   QUEtiapine (SEROQUEL) 25 MG tablet     Last Written Prescription Date: 7/26/17  Last Fill Quantity: 30, # refills: 1  Last Office Visit with [facility], [facility] or [facility] prescribing provider: 9/8/17       BP Readings from Last 3 Encounters:   09/21/17 100/70   09/08/17 94/60   09/06/17 110/70     Pulse Readings from Last 2 Encounters:   09/21/17 122   09/08/17 104     Lab Results   Component Value Date     09/05/2017     Lab Results   Component Value Date    WBC 8.5 09/05/2017     Lab Results   Component Value Date    RBC 4.12 09/05/2017     Lab Results   Component Value Date    HGB 12.4 09/05/2017     Lab Results   Component Value Date    HCT 37.2 09/05/2017     No components found for: MCT  Lab Results   Component Value Date    MCV 90 09/05/2017     Lab Results   Component Value Date    MCH 30.1 09/05/2017     Lab Results   Component Value Date    MCHC 33.3 09/05/2017     Lab Results   Component Value Date    RDW 13.1 09/05/2017     Lab Results   Component Value Date     09/05/2017     Lab Results   Component Value Date    CHOL 157 06/13/2016     Lab Results   Component Value Date    HDL 52 06/13/2016     Lab Results   Component Value Date    LDL 91 06/13/2016     Lab Results   Component Value Date    TRIG 72 06/13/2016     Lab Results   Component Value Date    CHOLHDLRATIO 4.1 05/14/2015

## 2023-05-26 ENCOUNTER — LAB (OUTPATIENT)
Dept: LAB | Facility: OTHER | Age: 38
End: 2023-05-26
Payer: COMMERCIAL

## 2023-05-26 ENCOUNTER — TELEPHONE (OUTPATIENT)
Dept: OBGYN | Facility: OTHER | Age: 38
End: 2023-05-26

## 2023-05-26 DIAGNOSIS — Z32.00 ENCOUNTER FOR PREGNANCY TEST: Primary | ICD-10-CM

## 2023-05-26 DIAGNOSIS — Z32.00 ENCOUNTER FOR PREGNANCY TEST: ICD-10-CM

## 2023-05-26 LAB — HCG INTACT+B SERPL-ACNC: 1369 MIU/ML

## 2023-05-26 PROCEDURE — 36415 COLL VENOUS BLD VENIPUNCTURE: CPT

## 2023-05-26 PROCEDURE — 84702 CHORIONIC GONADOTROPIN TEST: CPT

## 2023-05-26 NOTE — TELEPHONE ENCOUNTER
HCG quant not resulted yet. RN called pt to check in updating her that results are not likely to be back before the weekend.    ED precautions reiterated. Patient verbalized understanding and agreed to plan.     Cynthia Ryder RN on 5/26/2023 at 4:40 PM

## 2023-05-26 NOTE — TELEPHONE ENCOUNTER
Pt having concerns for intermittent RLQ pain. Positive HPT beginning of April.    LMP sometime in March, hx of irregular period.    Pt had bleeding for 2 days a month ago, has had intermittent spotting concerns since.     Denies fever.    RN relayed ED precautions for ectopic. HCG quant ordered and scheduled for today. Will follow up with pt prior to weekend again.    Cynthia Ryder RN on 5/26/2023 at 1:47 PM

## 2023-05-30 ENCOUNTER — LAB (OUTPATIENT)
Dept: LAB | Facility: OTHER | Age: 38
End: 2023-05-30
Payer: COMMERCIAL

## 2023-05-30 DIAGNOSIS — Z32.00 ENCOUNTER FOR PREGNANCY TEST: ICD-10-CM

## 2023-05-30 LAB — HCG INTACT+B SERPL-ACNC: 649 MIU/ML

## 2023-05-30 PROCEDURE — 36415 COLL VENOUS BLD VENIPUNCTURE: CPT

## 2023-05-30 PROCEDURE — 84702 CHORIONIC GONADOTROPIN TEST: CPT

## 2023-06-01 DIAGNOSIS — O36.80X0 PREGNANCY WITH UNCERTAIN FETAL VIABILITY, SINGLE OR UNSPECIFIED FETUS: Primary | ICD-10-CM

## 2023-07-12 ENCOUNTER — MYC MEDICAL ADVICE (OUTPATIENT)
Dept: FAMILY MEDICINE | Facility: OTHER | Age: 38
End: 2023-07-12
Payer: COMMERCIAL

## 2023-07-12 NOTE — TELEPHONE ENCOUNTER
INCOMING FORMS    Sender: patient    Type of Form, letter or note (What is requested?): report    How was the form received?: Fax    How should forms be returned?:  Fax : [phone]    Form placed in JM bin for review/signature if appropriate.

## 2023-08-03 ENCOUNTER — MYC MEDICAL ADVICE (OUTPATIENT)
Dept: FAMILY MEDICINE | Facility: OTHER | Age: 38
End: 2023-08-03
Payer: COMMERCIAL

## 2023-10-05 ENCOUNTER — TELEPHONE (OUTPATIENT)
Dept: FAMILY MEDICINE | Facility: OTHER | Age: 38
End: 2023-10-05

## 2023-10-05 ENCOUNTER — OFFICE VISIT (OUTPATIENT)
Dept: FAMILY MEDICINE | Facility: OTHER | Age: 38
End: 2023-10-05
Payer: COMMERCIAL

## 2023-10-05 VITALS
OXYGEN SATURATION: 99 % | HEIGHT: 64 IN | HEART RATE: 89 BPM | SYSTOLIC BLOOD PRESSURE: 102 MMHG | RESPIRATION RATE: 14 BRPM | BODY MASS INDEX: 19.55 KG/M2 | DIASTOLIC BLOOD PRESSURE: 58 MMHG | TEMPERATURE: 97.5 F | WEIGHT: 114.5 LBS

## 2023-10-05 DIAGNOSIS — F33.1 MODERATE RECURRENT MAJOR DEPRESSION (H): ICD-10-CM

## 2023-10-05 DIAGNOSIS — Z00.00 ENCOUNTER FOR ROUTINE ADULT HEALTH EXAMINATION WITHOUT ABNORMAL FINDINGS: Primary | ICD-10-CM

## 2023-10-05 DIAGNOSIS — R07.89 CHEST WALL PAIN: ICD-10-CM

## 2023-10-05 DIAGNOSIS — G43.719 INTRACTABLE CHRONIC MIGRAINE WITHOUT AURA AND WITHOUT STATUS MIGRAINOSUS: ICD-10-CM

## 2023-10-05 DIAGNOSIS — F41.1 GAD (GENERALIZED ANXIETY DISORDER): ICD-10-CM

## 2023-10-05 PROCEDURE — 90471 IMMUNIZATION ADMIN: CPT | Performed by: PHYSICIAN ASSISTANT

## 2023-10-05 PROCEDURE — 90715 TDAP VACCINE 7 YRS/> IM: CPT | Performed by: PHYSICIAN ASSISTANT

## 2023-10-05 PROCEDURE — 99395 PREV VISIT EST AGE 18-39: CPT | Mod: 25 | Performed by: PHYSICIAN ASSISTANT

## 2023-10-05 PROCEDURE — 99213 OFFICE O/P EST LOW 20 MIN: CPT | Mod: 25 | Performed by: PHYSICIAN ASSISTANT

## 2023-10-05 RX ORDER — RIZATRIPTAN BENZOATE 10 MG/1
10 TABLET ORAL
Qty: 9 TABLET | Refills: 11 | Status: CANCELLED | OUTPATIENT
Start: 2023-10-05

## 2023-10-05 RX ORDER — BUPROPION HYDROCHLORIDE 300 MG/1
300 TABLET ORAL DAILY
Qty: 90 TABLET | Refills: 3 | Status: SHIPPED | OUTPATIENT
Start: 2023-10-05

## 2023-10-05 ASSESSMENT — ENCOUNTER SYMPTOMS
HEARTBURN: 0
NERVOUS/ANXIOUS: 0
COUGH: 0
ABDOMINAL PAIN: 0
NAUSEA: 1
JOINT SWELLING: 0
DIARRHEA: 0
SHORTNESS OF BREATH: 0
CHILLS: 0
HEADACHES: 1
EYE PAIN: 0
MYALGIAS: 0
WEAKNESS: 0
DIZZINESS: 0
DYSURIA: 0
HEMATOCHEZIA: 0
CONSTIPATION: 0
FREQUENCY: 0
HEMATURIA: 0
PARESTHESIAS: 0
FEVER: 0
SORE THROAT: 0
BREAST MASS: 0
PALPITATIONS: 0
ARTHRALGIAS: 0

## 2023-10-05 ASSESSMENT — ANXIETY QUESTIONNAIRES
4. TROUBLE RELAXING: NOT AT ALL
GAD7 TOTAL SCORE: 0
5. BEING SO RESTLESS THAT IT IS HARD TO SIT STILL: NOT AT ALL
2. NOT BEING ABLE TO STOP OR CONTROL WORRYING: NOT AT ALL
1. FEELING NERVOUS, ANXIOUS, OR ON EDGE: NOT AT ALL
3. WORRYING TOO MUCH ABOUT DIFFERENT THINGS: NOT AT ALL
6. BECOMING EASILY ANNOYED OR IRRITABLE: NOT AT ALL
7. FEELING AFRAID AS IF SOMETHING AWFUL MIGHT HAPPEN: NOT AT ALL
GAD7 TOTAL SCORE: 0

## 2023-10-05 ASSESSMENT — PAIN SCALES - GENERAL: PAINLEVEL: NO PAIN (0)

## 2023-10-05 ASSESSMENT — PATIENT HEALTH QUESTIONNAIRE - PHQ9
10. IF YOU CHECKED OFF ANY PROBLEMS, HOW DIFFICULT HAVE THESE PROBLEMS MADE IT FOR YOU TO DO YOUR WORK, TAKE CARE OF THINGS AT HOME, OR GET ALONG WITH OTHER PEOPLE: SOMEWHAT DIFFICULT
SUM OF ALL RESPONSES TO PHQ QUESTIONS 1-9: 3
SUM OF ALL RESPONSES TO PHQ QUESTIONS 1-9: 3

## 2023-10-05 NOTE — NURSING NOTE
Prior to immunization administration, verified patients identity using patient s name and date of birth. Please see Immunization Activity for additional information.     Screening Questionnaire for Adult Immunization    Are you sick today?   No   Do you have allergies to medications, food, a vaccine component or latex?   No   Have you ever had a serious reaction after receiving a vaccination?   No   Do you have a long-term health problem with heart, lung, kidney, or metabolic disease (e.g., diabetes), asthma, a blood disorder, no spleen, complement component deficiency, a cochlear implant, or a spinal fluid leak?  Are you on long-term aspirin therapy?   No   Do you have cancer, leukemia, HIV/AIDS, or any other immune system problem?   No   Do you have a parent, brother, or sister with an immune system problem?   No   In the past 3 months, have you taken medications that affect  your immune system, such as prednisone, other steroids, or anticancer drugs; drugs for the treatment of rheumatoid arthritis, Crohn s disease, or psoriasis; or have you had radiation treatments?   No   Have you had a seizure, or a brain or other nervous system problem?   No   During the past year, have you received a transfusion of blood or blood    products, or been given immune (gamma) globulin or antiviral drug?   No   For women: Are you pregnant or is there a chance you could become       pregnant during the next month?   No   Have you received any vaccinations in the past 4 weeks?   No     Immunization questionnaire answers were all negative.      Patient instructed to remain in clinic for 15 minutes afterwards, and to report any adverse reactions.     Screening performed by Nini De León MA on 10/5/2023 at 10:33 AM.

## 2023-10-05 NOTE — PROGRESS NOTES
SUBJECTIVE:   CC: Erin is an 38 year old who presents for preventive health visit.       10/5/2023     9:55 AM   Additional Questions   Roomed by Nini   Accompanied by Self         10/5/2023     9:55 AM   Patient Reported Additional Medications   Patient reports taking the following new medications none       Healthy Habits:     Getting at least 3 servings of Calcium per day:  NO    Bi-annual eye exam:  NO    Dental care twice a year:  Yes    Sleep apnea or symptoms of sleep apnea:  None    Diet:  Regular (no restrictions)    Frequency of exercise:  1 day/week    Duration of exercise:  Less than 15 minutes    Taking medications regularly:  Yes    Medication side effects:  None    Additional concerns today:  No      She had a miscarriage in May. She was not trying to become pregnant. She never followed up after her HCG started to drop but she did have some bleeding and never had a growing abdomen so knew she miscarried.       Today's PHQ-9 Score:       10/5/2023     7:02 AM   PHQ-9 SCORE   PHQ-9 Total Score MyChart 3 (Minimal depression)   PHQ-9 Total Score 3           4/16/2020    11:03 AM 4/19/2021     5:45 PM 10/5/2023     7:02 AM   GABE-7 SCORE   Total Score 19 (severe anxiety) 8 (mild anxiety) 0 (minimal anxiety)   Total Score 19 8 0        Migraine   Since your last clinic visit, how have your headaches changed?  No change  How often are you getting headaches or migraines? 2 times a week   Are you able to do normal daily activities when you have a migraine? Yes  Are you taking rescue/relief medications? (Select all that apply) ibuprofen (Advil, Motrin) and Maxalt  How helpful is your rescue/relief medication?  I get some relief  Are you taking any medications to prevent migraines? (Select all that apply)  No  In the past 4 weeks, how often have you gone to urgent care or the emergency room because of your headaches?  0    Insurance is no longer covering Botox injections. She still has headaches a few  days per week and has Maxalt on hand as needed.    Mood is stable on Wellbutrin.    She still has left chest/sternal pain over the past year. She had a work up for this last year and everything came back normal. Pain has not worsened but the area remains tender to touch.    Social History     Tobacco Use    Smoking status: Never     Passive exposure: Never    Smokeless tobacco: Never    Tobacco comments:     no smokers in the household   Substance Use Topics    Alcohol use: No           10/5/2023     7:06 AM   Alcohol Use   Prescreen: >3 drinks/day or >7 drinks/week? Not Applicable     Reviewed orders with patient.  Reviewed health maintenance and updated orders accordingly - Yes    Breast Cancer Screenin/2/2022     7:28 PM   Breast CA Risk Assessment (FHS-7)   Do you have a family history of breast, colon, or ovarian cancer? No / Unknown       Patient under 40 years of age: Routine Mammogram Screening not recommended.   Pertinent mammograms are reviewed under the imaging tab.    History of abnormal Pap smear: NO - age 30-65 PAP every 5 years with negative HPV co-testing recommended      Latest Ref Rng & Units 2020     3:18 PM 2020     2:22 PM 2016    12:00 AM   PAP / HPV   PAP (Historical)   NIL  NIL    HPV 16 DNA NEG^Negative Negative      HPV 18 DNA NEG^Negative Negative      Other HR HPV NEG^Negative Negative        Reviewed and updated as needed this visit by clinical staff   Tobacco  Allergies  Meds              Reviewed and updated as needed this visit by Provider   Tobacco  Allergies  Meds  Problems  Med Hx  Surg Hx  Fam Hx             Review of Systems   Constitutional:  Negative for chills and fever.   HENT:  Negative for congestion, ear pain, hearing loss and sore throat.    Eyes:  Negative for pain and visual disturbance.   Respiratory:  Negative for cough and shortness of breath.    Cardiovascular:  Negative for chest pain, palpitations and peripheral edema.  "  Gastrointestinal:  Positive for nausea. Negative for abdominal pain, constipation, diarrhea, heartburn and hematochezia.   Breasts:  Negative for tenderness, breast mass and discharge.   Genitourinary:  Negative for dysuria, frequency, genital sores, hematuria, pelvic pain, urgency, vaginal bleeding and vaginal discharge.   Musculoskeletal:  Negative for arthralgias, joint swelling and myalgias.   Skin:  Negative for rash.   Neurological:  Positive for headaches. Negative for dizziness, weakness and paresthesias.   Psychiatric/Behavioral:  Negative for mood changes. The patient is not nervous/anxious.         OBJECTIVE:   /58   Pulse 89   Temp 97.5  F (36.4  C) (Temporal)   Resp 14   Ht 1.63 m (5' 4.17\")   Wt 51.9 kg (114 lb 8 oz)   LMP 09/20/2023 (Approximate)   SpO2 99%   BMI 19.55 kg/m    Physical Exam  GENERAL: healthy, alert and no distress  EYES: Eyes grossly normal to inspection, PERRL and conjunctivae and sclerae normal  HENT: ear canals and TM's normal, nose and mouth without ulcers or lesions  NECK: no adenopathy, no asymmetry, masses, or scars and thyroid normal to palpation  RESP: lungs clear to auscultation - no rales, rhonchi or wheezes  CV: regular rate and rhythm, normal S1 S2, no S3 or S4, no murmur, click or rub, no peripheral edema and peripheral pulses strong  ABDOMEN: soft, nontender, no hepatosplenomegaly, no masses and bowel sounds normal  MS: no gross musculoskeletal defects noted, no edema. Mild tenderness over left mid sternocostal/rib area without any nodules or masses.   SKIN: no suspicious lesions or rashes  NEURO: Normal strength and tone, mentation intact and speech normal. Gait is stable.   PSYCH: mentation appears normal, affect normal/bright    ASSESSMENT/PLAN:       ICD-10-CM    1. Encounter for routine adult health examination without abnormal findings  Z00.00       2. Intractable chronic migraine without aura and without status migrainosus  G43.719       3. GABE " (generalized anxiety disorder)  F41.1 buPROPion (WELLBUTRIN XL) 300 MG 24 hr tablet      4. Moderate recurrent major depression (H)  F33.1 buPROPion (WELLBUTRIN XL) 300 MG 24 hr tablet      5. Chest wall pain  R07.89           2. Continue Maxalt as needed and follow-up with neurology if symptoms worsen.    3-4. Mood is stable on Wellbutrin. Will refill for 1 year.    5. She continues to experience tenderness just lateral to the sternum in the upper chest area. Normal mammogram and chest x-ray last year. Costochondritis remains the most likely diagnosis so I recommend Tylenol/ibuprofen as needed. If symptoms change/worsen, can order a CT scan for further evaluation.    Follow-up annually.      Patient has been advised of split billing requirements and indicates understanding: Yes      COUNSELING:  Reviewed preventive health counseling, as reflected in patient instructions       Regular exercise       Healthy diet/nutrition        She reports that she has never smoked. She has never been exposed to tobacco smoke. She has never used smokeless tobacco.          Hussain Ryder PA-C  [facility] submitted by the patient for this visit:  Patient Health Questionnaire (Submitted on 10/5/2023)  If you checked off any problems, how difficult have these problems made it for you to do your work, take care of things at home, or get along with other people?: Somewhat difficult  PHQ9 TOTAL SCORE: 3  GABE-7 (Submitted on 10/5/2023)  GABE 7 TOTAL SCORE: 0

## 2023-10-05 NOTE — PATIENT INSTRUCTIONS

## 2023-10-11 ENCOUNTER — MYC MEDICAL ADVICE (OUTPATIENT)
Dept: FAMILY MEDICINE | Facility: OTHER | Age: 38
End: 2023-10-11
Payer: COMMERCIAL

## 2023-10-11 NOTE — TELEPHONE ENCOUNTER
Encounter created on 10/05 stating form was faxed. The fax number the patient provided in this MyChart is different than the fax number from encounter on 10/05.

## 2023-11-27 ENCOUNTER — MYC MEDICAL ADVICE (OUTPATIENT)
Dept: FAMILY MEDICINE | Facility: OTHER | Age: 38
End: 2023-11-27
Payer: COMMERCIAL

## 2023-11-29 ENCOUNTER — NURSE TRIAGE (OUTPATIENT)
Dept: FAMILY MEDICINE | Facility: OTHER | Age: 38
End: 2023-11-29
Payer: COMMERCIAL

## 2023-11-29 NOTE — TELEPHONE ENCOUNTER
Provider: Please advise as to when patient should be evaluated.     S: Ongoing headache.    B: Patient states she has chronic headaches.     A: Patient sent in [employer] message and calling in stating she has had a constant headache on the right side of her head. Patient rates pain 5-6/10 constantly. She states she is on day 10 of the headache. Patient says this headache feels like her past headaches but this time she has had episodes of dizziness that last about 20 minutes. Patient says she needs to sit down when she is dizzy. Patient does not coorelate any triggers to the dizziness. She said it happens when she bends down but also when she is just sitting. Patient denies neck stiffness and fevers.     R: Per RN protocol, patient should be seen in office today/tomorrow. Patient doesn't want a clinic appointment if provider would be willing to just place orders for a scan if that's all that the appointment would consist of. RN reviewed red flag symptoms with patient and when to seek emergent care. No further questions or concerns.       Reason for Disposition   Unexplained headache that is present > 24 hours    Additional Information   Negative: Difficult to awaken or acting confused (e.g., disoriented, slurred speech)   Negative: Weakness of the face, arm or leg on one side of the body and new-onset   Negative: Numbness of the face, arm or leg on one side of the body and new-onset   Negative: Loss of speech or garbled speech and new-onset   Negative: Passed out (i.e., fainted, collapsed and was not responding)   Negative: Sounds like a life-threatening emergency to the triager   Negative: Followed a head injury within last 3 days   Negative: Traumatic Brain Injury (TBI) is suspected   Negative: Sinus pain of forehead and yellow or green nasal discharge   Negative: Pregnant   Negative: Unable to walk without falling   Negative: Stiff neck (can't touch chin to chest)   Negative: Possibility of carbon monoxide  exposure   Negative: SEVERE headache, states 'worst headache' of life   Negative: SEVERE headache, sudden-onset (i.e., reaching maximum intensity within seconds to 1 hour)   Negative: Severe pain in one eye   Negative: Loss of vision or double vision  (Exception: Same as prior migraines.)   Negative: Patient sounds very sick or weak to the triager   Negative: Fever > 103 F (39.4 C)   Negative: Fever > 100.0 F (37.8 C) and has diabetes mellitus or a weak immune system (e.g., HIV positive, cancer chemotherapy, organ transplant, splenectomy, chronic steroids)   Negative: SEVERE headache (e.g., excruciating) and has had severe headaches before   Negative: SEVERE headache and not relieved by pain meds   Negative: SEVERE headache and vomiting   Negative: SEVERE headache and fever   Negative: New headache and weak immune system (e.g., HIV positive, cancer chemo, splenectomy, organ transplant, chronic steroids)   Negative: Fever present > 3 days (72 hours)   Negative: Patient wants to be seen    Protocols used: Headache-A-OH

## 2023-11-29 NOTE — TELEPHONE ENCOUNTER
If she has new symptoms like this, she needs an appointment in order for any new tests/imaging to be ordered. Virtual visit would be fine.    Hussain Ryder PA-C

## 2023-11-30 NOTE — TELEPHONE ENCOUNTER
Patient Contact    Attempt # 1    RN did attempt to reach patient. No answer. Message left for patient to call the clinic back and ask to speak to a triage nurse.     Sallie Luz RN on 11/30/2023 at 8:29 AM

## 2023-11-30 NOTE — TELEPHONE ENCOUNTER
RN called and relayed message    Pt requesting virtual appointment Monday     RN assisted in scheduling.     Patient verbalized understanding and in agreement with plan of care.     Taya Dugan RN

## 2024-01-15 ENCOUNTER — TRANSFERRED RECORDS (OUTPATIENT)
Dept: HEALTH INFORMATION MANAGEMENT | Facility: CLINIC | Age: 39
End: 2024-01-15
Payer: COMMERCIAL

## 2024-01-19 ENCOUNTER — TELEPHONE (OUTPATIENT)
Dept: FAMILY MEDICINE | Facility: OTHER | Age: 39
End: 2024-01-19
Payer: COMMERCIAL

## 2024-01-19 NOTE — TELEPHONE ENCOUNTER
----- Message from Hussain Ryder PA-C sent at 1/19/2024  1:16 PM CST -----  Erin,    Your chest CT scan is normal. Thanks!    Hussain Ryder PA-C

## 2024-03-18 NOTE — PROGRESS NOTES
"Erin Ashley is a 33 year old who presents to the clinic for evaluation of pelvic pain and pressure possible vaginal prolapse.   Has been into the ED yesterday for same or similar complaints with negative labs and CT scan.   Also had vaginal bleeding.    Her presenting complaints today are:    Worried that she has intestinal worms.  Feels crawly in her abdomen and her face.  Has a stool sample with her.   Intermittent numbness in the right side of  her face and tongue with blurred vision and HA since yesterday as well as a crawly feeling on her face.   Concerned that she has a prolapse of her vagina.   Pressure with urination and left sided pelvic cramping.   Also reports that she has lost 16 lbs in the past year with out trying.  Unable to gain weight despite eating \"lots of food\"        Histories reviewed and updated  Past Medical History:   Diagnosis Date     Abnormal Pap smear      Appendicitis with perforation 08/04/08    Admit. DIscharged 08/07/08     Back pain      TALIB II (cervical intraepithelial neoplasia II) 2008 5/29/08 on coloposcopy bx TALIB 2/3.  CKC on 6/13/08 showed TALIB 1/2 *yearly paps are indicated*     Insomnia      Renal calculus or stone      Past Surgical History:   Procedure Laterality Date     APPENDECTOMY  8/4/2008     EXAM UNDER ANESTHESIA RECTUM N/A 2/24/2017    Procedure: EXAM UNDER ANESTHESIA RECTUM;  Surgeon: Britton Palomo MD;  Location: [facility]     HC CONIZATION CERVIX,KNIFE/LASER  06/130/8    cervix.  TALIB 1/2     HEMORRHOIDECTOMY EXTERNAL N/A 2/24/2017    Procedure: HEMORRHOIDECTOMY EXTERNAL;  Surgeon: Britton Palomo MD;  Location: [facility] OR     TONSILLECTOMY  7/3/2007     Social History     Social History     Marital status:      Spouse name: N/A     Number of children: 0     Years of education: 16     Occupational History     RN Other     [employer]     Social History Main Topics     Smoking status: Never Smoker     Smokeless tobacco: Never " [facility] DAILY PROGRESS NOTE    ADMISSION DATE:  3/17/2024  DATE:  3/18/2024  CURRENT HOSPITAL DAY:  Hospital Day: 2  ATTENDING PHYSICIAN:  Sola Collins MD  CODE STATUS:  Full Resuscitation    SUBJECTIVE: Patient seen and examined at the bedside patient sitting up in chair.  No acute events overnight.  Patient denies dizziness chest pain or shortness of breath.  Patient has been up ambulating in the hallway.  No focal weakness.  Patient would like to discharge home today.  Discussed with RN labs and imaging reviewed    CURRENT MEDICATIONS:    Current Facility-Administered Medications   Medication    ipratropium-albuterol (DUONEB) 0.5-2.5 (3) MG/3ML nebulizer solution 3 mL    oseltamivir (TAMIFLU) capsule 75 mg    amLODIPine (NORVASC) tablet 10 mg    buPROPion XL (WELLBUTRIN XL) tablet 150 mg    buPROPion XL (WELLBUTRIN XL) tablet 300 mg    haloperidol (HALDOL) tablet 1 mg    sodium chloride 0.9 % flush bag 25 mL    sodium chloride 0.9 % injection 2 mL    sodium chloride (NORMAL SALINE) 0.9 % bolus 500 mL    enoxaparin (LOVENOX) injection 40 mg    meclizine (ANTIVERT) tablet 12.5 mg    sodium chloride 0.9 % flush bag 25 mL    Potassium Standard Replacement Protocol (Levels 3.5 and lower)    Magnesium Standard Replacement Protocol    acetaminophen (TYLENOL) tablet 650 mg    ondansetron (ZOFRAN) injection 4 mg    benzonatate (TESSALON PERLES) capsule 100 mg    zinc sulfate (ZINCATE) capsule 220 mg    ascorbic acid (VITAMIN C) tablet 500 mg    melatonin tablet 3 mg     Current Outpatient Medications   Medication Sig Dispense Refill    acetaminophen (Tylenol) 325 MG tablet Take 2 tablets by mouth every 6 hours as needed for Pain.      [START ON 3/19/2024] ascorbic acid (VITAMIN C) 500 MG tablet Take 1 tablet by mouth daily. Do not start before March 19, 2024.      benzonatate (TESSALON PERLES) 100 MG capsule Take 1 capsule by mouth 3 times daily as needed for Cough. 15 capsule 0    meclizine (ANTIVERT) 12.5 MG tablet  Take 1 tablet by mouth 3 times daily as needed for Dizziness. 12 tablet 0    oseltamivir (TAMIFLU) 75 MG capsule Take 1 capsule by mouth every 12 hours for 2 days. 4 capsule 0    amLODIPine (NORVASC) 10 MG tablet Take 1 tablet by mouth once daily (Patient taking differently: Take 10 mg by mouth nightly.) 90 tablet 0    buPROPion XL (WELLBUTRIN XL) 300 MG 24 hr tablet Take 300 mg by mouth nightly. Total dose 450      buPROPion XL (WELLBUTRIN XL) 150 MG 24 hr tablet Take 150 mg by mouth nightly.      haloperidol (HALDOL) 2 MG tablet TK 1 T PO  BID      inFLIXimab (REMICADE) 100 MG injection           HOME MEDICATIONS:  No medications prior to admission.          OBJECTIVE:    VITAL SIGNS:     Vital Last Value 24 Hour Range   Temperature 97.9 °F (36.6 °C) (03/18/24 0500) Temp  Min: 97.9 °F (36.6 °C)  Max: 98.2 °F (36.8 °C)   Pulse 70 (03/18/24 0500) Pulse  Min: 68  Max: 73   Respiratory 18 (03/18/24 0500) Resp  Min: 16  Max: 20     BP  Min: 129/74  Max: 145/79   Pulse Oximetry 95 % (03/18/24 0800) SpO2  Min: 95 %  Max: 100 %     Non-Invasive  Blood Pressure 129/74 (03/18/24 0500)     Vital Today Admitted   Weight 72.6 kg (160 lb) (03/17/24 1453) Weight: 72.6 kg (160 lb) (03/17/24 1453)   Height N/A Height: 5' 10\" (177.8 cm) (03/17/24 1850)   Body Mass Index N/A       INTAKE/OUTPUT:      No intake/output data recorded.     Weight    03/17/24 1453   Weight: 72.6 kg (160 lb)        PHYSICAL EXAM:    Physical Exam  Vitals and nursing note reviewed.   Constitutional:       General: He is not in acute distress.     Appearance: He is not ill-appearing.   Eyes:      General: No visual field deficit.  Cardiovascular:      Rate and Rhythm: Normal rate and regular rhythm.      Pulses: Normal pulses.      Heart sounds: Normal heart sounds. No murmur heard.  Pulmonary:      Effort: Pulmonary effort is normal. No respiratory distress.      Breath sounds: Normal breath sounds. No wheezing or rales.   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     Hobby Hazards No     Sleep Concern No     Stress Concern No     Weight Concern No     Special Diet No     Back Care No     Exercise No     Bike Helmet No     Seat Belt Yes     Self-Exams Yes     know BSE     Parent/Sibling W/ Cabg, Mi Or Angioplasty Before 65f 55m? No     Social History Narrative     to [place] and lives in [place] with their daughter.  No indoor cats/kittens.  No concerns about domestic violence.  No smokers in the home.     Family History   Problem Relation Age of Onset     Hypertension Maternal Grandmother      borderline     Lipids Maternal Grandmother      Respiratory Maternal Grandmother      COPD     Lung Cancer Maternal Grandmother      Hypertension Maternal Grandfather      borderline     Cancer Maternal Grandfather      lung cancer, smoker     GASTROINTESTINAL DISEASE Mother      cholecystitis     Lipids Mother      Prostate Cancer Paternal Grandfather      Hearing Loss Paternal Grandfather      Other Cancer Paternal Grandmother      pancreatic cancer     Unknown/Adopted No family hx of      Asthma No family hx of      C.A.D. No family hx of      Diabetes No family hx of      Cerebrovascular Disease No family hx of      Breast Cancer No family hx of      Cancer - colorectal No family hx of      Alcohol/Drug No family hx of      Allergies No family hx of      Alzheimer Disease No family hx of      Anesthesia Reaction No family hx of      Arthritis No family hx of      Blood Disease No family hx of      Cardiovascular No family hx of      Circulatory No family hx of      Congenital Anomalies No family hx of      Connective Tissue  Disorder No family hx of      Depression No family hx of      Genetic Disorder No family hx of      Genitourinary Problems No family hx of      Gynecology No family hx of      HEART DISEASE No family hx of      Musculoskeletal Disorder No family hx of      Neurologic Disorder No family hx of      Obesity No family hx of      Osteoperosis No family hx of      Psychotic Disorder No family hx of           ROS: 10 point ROS neg other than the symptoms noted above in the HPI.    EXAM:  /64 (BP Location: Right arm, Patient Position: Chair, Cuff Size: Adult Regular)  Pulse 88  Wt 98 lb 12 oz (44.8 kg)  LMP 06/14/2018 (Approximate)  BMI 16.95 kg/m2  Face features are symmetrical with out drooping.  Smile is equal  : PELVIC EXAM:  Vulva: normal hair distribution, no adenopathy, BUS WNL.  The left labia appears slightly larger that the right and at the hymenal ring on the left at 2 oclock there is an area that looks to be healing trauma (though pt has denied) vs aphthous ulcer about 1 cm size. The hymenal tag just beneath this area at about 5 oclock is edematous.    Vagina: Moist, pink, no abnormal discharge, well rugated, no lesions.  There may be a mild rectocele with significant straining.     Rectal exam: deferred  There are small hemorrhoidal tags.         ASSESSMENT/PLAN:    (R10.2) Pelvic pain in female  (primary encounter diagnosis)  Comment:   Plan: Ova and Parasite Exam Routine            She has had recent ultrasounds and CT scans that are essentially negative except for a one cm area on the left ovary of undetermined significance.    The lesion in question was cultured for HSV and was negative.   She declines blood testing for HSV.   Recommended she see PCP for her facial pain and numbness since she won't go to the ED.   Has follow up planned with Dr Dallas.   No obvious cause found for her symptoms.   Stool sample sent to R/O parasite, thought it is doubtful it contains any by visual inspection.      sounds are normal. There is no distension.      Palpations: Abdomen is soft.      Tenderness: There is no abdominal tenderness. There is no guarding.   Musculoskeletal:      Right lower leg: No edema.      Left lower leg: No edema.   Skin:     General: Skin is warm and dry.   Neurological:      General: No focal deficit present.      Mental Status: He is alert and oriented to person, place, and time.      Cranial Nerves: No dysarthria or facial asymmetry.      Motor: No tremor, abnormal muscle tone or pronator drift.      Coordination: Finger-Nose-Finger Test normal.   Psychiatric:         Mood and Affect: Mood and affect normal.         Speech: Speech normal. Speech is not slurred.          LABORATORY DATA:      Recent Labs   Lab 03/17/24  1503   SODIUM 139   POTASSIUM 4.1   CHLORIDE 103   CO2 24   BUN 19   CREATININE 1.11   GLUCOSE 129*   CALCIUM 8.1*      Recent Labs   Lab 03/17/24  1503   SODIUM 139   POTASSIUM 4.1   CHLORIDE 103   CO2 24   BUN 19   CREATININE 1.11   CALCIUM 8.1*   ALBUMIN 3.2*   BILIRUBIN 0.4   ALKPT 66   GPT 18   AST 30   GLUCOSE 129*      Recent Labs   Lab 03/17/24  1503   WBC 4.8   RBC 4.50   HGB 13.2   HCT 39.6         TSH (mcUnits/mL)   Date Value   04/04/2023 2.817      Cholesterol (mg/dL)   Date Value   04/04/2023 182     HDL (mg/dL)   Date Value   04/04/2023 68     Cholesterol/ HDL Ratio (no units)   Date Value   04/04/2023 2.7     Triglycerides (mg/dL)   Date Value   04/04/2023 64     LDL (mg/dL)   Date Value   04/04/2023 101      No results found for: \"IRON\"     No results found for: \"IRON\"        IMAGING STUDIES:                               ASSESSMENT/PLAN:      Dizziness and balance disturbance, improving, in the setting of influenza A infection, possible dehydration  CT head as above  S/p IVFs  Meclizine TID PRN  PT to evaluate patient  If symptoms persist, may consider neurology consult as OP     Influenza A positive  CXR as above  Tessalon perles and DuoNebs as  needed  Tamiflu twice daily   Symptoms began on Thursday, will continue for 2 days OP    Hypomagnesia  Electrolyte protocol in place     Chronic comorbidities-continue home medications as appropriate  Ankylosing spondylolysis of sacrococcygeal  Early Alzheimer's  Primary hypertension  Constipation                  Nutrition status: Does Not Meet Criteria for Malnutrition       Epic Calculated HASBLED Score = 1     DVT prophylaxis: Lovenox    PCP:     Anjum Miller MD       Disposition: Medically cleared to discharge home today    SWAPNIL: 3/18    Discussed with my attending Dr Sam Ochoa including medical decision making    Sumi Preciado, [facility]  [phone]  Call for questions.       Summary of your Discharge Medications        Take these Medications        Details   acetaminophen 325 MG tablet  Commonly known as: Tylenol   Take 2 tablets by mouth every 6 hours as needed for Pain.     amLODIPine 10 MG tablet  Commonly known as: NORVASC   Take 1 tablet by mouth once daily     ascorbic acid 500 MG tablet  Commonly known as: VITAMIN C  Start taking on: March 19, 2024   Take 1 tablet by mouth daily. Do not start before March 19, 2024.     benzonatate 100 MG capsule  Commonly known as: TESSALON PERLES   Take 1 capsule by mouth 3 times daily as needed for Cough.     * buPROPion  MG 24 hr tablet  Commonly known as: WELLBUTRIN XL   Take 150 mg by mouth nightly.     * buPROPion  MG 24 hr tablet  Commonly known as: WELLBUTRIN XL   Take 300 mg by mouth nightly. Total dose 450     haloperidol 2 MG tablet  Commonly known as: HALDOL   TK 1 T PO  BID     inFLIXimab 100 MG injection  Commonly known as: REMICADE      meclizine 12.5 MG tablet  Commonly known as: ANTIVERT   Take 1 tablet by mouth 3 times daily as needed for Dizziness.     oseltamivir 75 MG capsule  Commonly known as: TAMIFLU   Take 1 capsule by mouth every 12 hours for 2 days.           * This list has 2 medication(s)  that are the same as other medications prescribed for you. Read the directions carefully, and ask your doctor or other care provider to review them with you.

## 2024-06-01 ENCOUNTER — MYC MEDICAL ADVICE (OUTPATIENT)
Dept: FAMILY MEDICINE | Facility: OTHER | Age: 39
End: 2024-06-01
Payer: COMMERCIAL

## 2024-06-17 PROBLEM — Z76.89 HEALTH CARE HOME: Status: RESOLVED | Noted: 2017-09-15 | Resolved: 2024-06-17

## 2024-06-26 ENCOUNTER — OFFICE VISIT (OUTPATIENT)
Dept: FAMILY MEDICINE | Facility: OTHER | Age: 39
End: 2024-06-26
Payer: COMMERCIAL

## 2024-06-26 VITALS
DIASTOLIC BLOOD PRESSURE: 62 MMHG | RESPIRATION RATE: 16 BRPM | SYSTOLIC BLOOD PRESSURE: 100 MMHG | HEART RATE: 88 BPM | OXYGEN SATURATION: 98 % | HEIGHT: 65 IN | TEMPERATURE: 97.3 F | BODY MASS INDEX: 19.83 KG/M2 | WEIGHT: 119 LBS

## 2024-06-26 DIAGNOSIS — M62.830 BACK MUSCLE SPASM: ICD-10-CM

## 2024-06-26 DIAGNOSIS — F33.1 MODERATE RECURRENT MAJOR DEPRESSION (H): ICD-10-CM

## 2024-06-26 DIAGNOSIS — R07.89 CHEST WALL PAIN: Primary | ICD-10-CM

## 2024-06-26 PROCEDURE — 99214 OFFICE O/P EST MOD 30 MIN: CPT | Performed by: PHYSICIAN ASSISTANT

## 2024-06-26 PROCEDURE — 96127 BRIEF EMOTIONAL/BEHAV ASSMT: CPT | Performed by: PHYSICIAN ASSISTANT

## 2024-06-26 PROCEDURE — G2211 COMPLEX E/M VISIT ADD ON: HCPCS | Performed by: PHYSICIAN ASSISTANT

## 2024-06-26 RX ORDER — CYCLOBENZAPRINE HCL 5 MG
5 TABLET ORAL 3 TIMES DAILY PRN
Qty: 30 TABLET | Refills: 1 | Status: SHIPPED | OUTPATIENT
Start: 2024-06-26

## 2024-06-26 ASSESSMENT — PATIENT HEALTH QUESTIONNAIRE - PHQ9
10. IF YOU CHECKED OFF ANY PROBLEMS, HOW DIFFICULT HAVE THESE PROBLEMS MADE IT FOR YOU TO DO YOUR WORK, TAKE CARE OF THINGS AT HOME, OR GET ALONG WITH OTHER PEOPLE: NOT DIFFICULT AT ALL
SUM OF ALL RESPONSES TO PHQ QUESTIONS 1-9: 2
SUM OF ALL RESPONSES TO PHQ QUESTIONS 1-9: 2

## 2024-06-26 ASSESSMENT — PAIN SCALES - GENERAL: PAINLEVEL: MODERATE PAIN (4)

## 2024-06-26 NOTE — PROGRESS NOTES
Assessment & Plan       ICD-10-CM    1. Chest wall pain  R07.89 Orthopedic  Referral     cyclobenzaprine (FLEXERIL) 5 MG tablet      2. Back muscle spasm  M62.830 Orthopedic  Referral     cyclobenzaprine (FLEXERIL) 5 MG tablet      3. Moderate recurrent major depression (H)  F33.1           1-2. Continued left chest wall pain, now with pain into her back as well and intermittent left hand numbness and tingling. Chest CT earlier this year was completely normal and x-ray along with normal mammogram/left breast ultrasound last year. There is likely some lower cervical nerve irritation contributing to her left hand numbness but she denies neck pain or shooting pain down the arm. Less likely is a thoracic radiculopathy given that there is point rib tenderness rather than a radicular type pain sensation. I recommend she see orthopedics for another opinion/further evaluation. In the meantime, she will continue with ibuprofen and will try Flexeril as needed to help with the muscle tightness. She will not take prior to work or driving.     The longitudinal plan of care for the diagnosis(es)/condition(s) as documented were addressed during this visit. Due to the added complexity in care, I will continue to support Erin in the subsequent management and with ongoing continuity of care.    3. Stable on Wellbutrin.    Subjective   Erin is a 39 year old, presenting for the following health issues:  Left Rib cage pain        6/26/2024     9:11 AM   Additional Questions   Roomed by Na GOMES     History of Present Illness       Reason for visit:  Chest/rib pain    She eats 0-1 servings of fruits and vegetables daily.She consumes 2 sweetened beverage(s) daily.She exercises with enough effort to increase her heart rate 10 to 19 minutes per day.  She exercises with enough effort to increase her heart rate 3 or less days per week.   She is taking medications regularly.       Pain History:  When did you first notice  your pain? Over years   Have you seen this provider for your pain in the past? Yes   Where in your body do you have pain? Left rib pain  Are you seeing anyone else for your pain? No    She is still experiencing pain in the left anterior chest radiating across the front of the chest to the ribs on her side for the past 1.5 years. She is also having pain in her upper back near her left shoulder blade with increased muscle tightness and spasms. The area of pain seems to be growing larger. She knows that all of her imaging has been normal these past few years without a clear explanation for her pain. She is wondering if this is something she just has to live with. It does not significantly interfere with her daily activities but is more of a nuisance. Ibuprofen typically helps. She has also noticed intermittent left 4th and 5th finger numbness and tingling. She denies any neck pain or arm weakness.           1/9/2023     7:27 AM 10/5/2023     7:02 AM 6/26/2024     8:55 AM   PHQ-9 SCORE   PHQ-9 Total Score MyChart 4 (Minimal depression) 3 (Minimal depression) 2 (Minimal depression)   PHQ-9 Total Score 4 3 2           4/16/2020    11:03 AM 4/19/2021     5:45 PM 10/5/2023     7:02 AM   AGBE-7 SCORE   Total Score 19 (severe anxiety) 8 (mild anxiety) 0 (minimal anxiety)   Total Score 19 8 0           1/9/2023     7:27 AM 10/5/2023     7:02 AM 6/26/2024     8:55 AM   PHQ-9 SCORE   PHQ-9 Total Score MyChart 4 (Minimal depression) 3 (Minimal depression) 2 (Minimal depression)   PHQ-9 Total Score 4 3 2           4/16/2020    11:03 AM 4/19/2021     5:45 PM 10/5/2023     7:02 AM   GABE-7 SCORE   Total Score 19 (severe anxiety) 8 (mild anxiety) 0 (minimal anxiety)   Total Score 19 8 0           6/26/2024     9:14 AM   PEG Score   PEG Total Score 1.33       Review of Systems  Constitutional, cardiovascular, pulmonary, musculoskeletal, neuro, gi and gu systems are negative, except as otherwise noted.      Objective    /62    "Pulse 88   Temp 97.3  F (36.3  C) (Temporal)   Resp 16   Ht 1.64 m (5' 4.57\")   Wt 54 kg (119 lb)   LMP  (LMP Unknown)   SpO2 98%   BMI 20.07 kg/m    Body mass index is 20.07 kg/m .  Physical Exam   GENERAL: alert and no distress  RESP: lungs clear to auscultation - no rales, rhonchi or wheezes  CV: regular rate and rhythm, normal S1 S2, no S3 or S4, no murmur, click or rub, no peripheral edema  MS: no gross musculoskeletal defects noted, no edema. No cervical spinal tenderness. Tenderness over the left anterior chest along the mid to upper sternocostal junction. Mild tenderness over the lateral and posterior upper ribs as well with tenderness and muscle tightness medial to the left scapula.   NEURO: Normal strength and tone, mentation intact and speech normal. Cranial nerves II-XII are grossly intact. Negative Tinel testing of the left elbow. Increased tingling into the left 4th and 5th fingers when palpation medial left scapula muscles. DTRs are 2+/4 throughout and symmetric. Gait is stable.   PSYCH: mentation appears normal, affect normal/bright          Signed Electronically by: Hussain Ryder PA-C  "

## 2024-09-05 ENCOUNTER — PATIENT OUTREACH (OUTPATIENT)
Dept: CARE COORDINATION | Facility: CLINIC | Age: 39
End: 2024-09-05
Payer: COMMERCIAL

## 2024-09-19 ENCOUNTER — PATIENT OUTREACH (OUTPATIENT)
Dept: CARE COORDINATION | Facility: CLINIC | Age: 39
End: 2024-09-19
Payer: COMMERCIAL

## 2024-11-05 NOTE — TELEPHONE ENCOUNTER
Prescription approved per [facility] Refill Protocol.    Litzy Lay RN on 3/2/2021 at 3:42 PM     yes

## 2024-12-21 ENCOUNTER — HEALTH MAINTENANCE LETTER (OUTPATIENT)
Age: 39
End: 2024-12-21

## 2025-01-23 NOTE — PATIENT INSTRUCTIONS
If you have labs or imaging done, the results will automatically release in [facility] without an interpretation.  Your health care professional will review those results and send an interpretation with recommendations as soon as possible, but this may be 1-3 business days.    If you have any questions regarding your visit, please contact your care team.     [facility]: [phone]  [facility] CLINIC HOURS TELEPHONE NUMBER   Nikki Blake, SIDDHARTH Marie-MARLEE Daly-MARLEE Barry-Surgery Scheduler  Charity-       Monday- [place]  8:00 am-4:00 pm    Tuesday- [place]  8:00 am-4:00 pm    Wednesday- [place] 8:00 am-4:00 pm    Thursday- [place] 8:00 am-4:00 pm    Friday- [place]  8:00 am-4:00 pm [facility]  [address] [place]  [place] [address]  PH: [phone]  Fax: [phone]    Imaging Scheduling all locations  PH: [phone]     [facility] Labor and Delivery  [address] [place] [address] [address].  [place], [place] [address] [phone]    [facility]  [address] [place] [address]  PH: [phone]     **Surgeries** Our Surgery Schedulers will contact you to schedule. If you do not receive a call within 3 business days, please call [phone].  Urgent Care locations:  [facility]       Monday-Friday   10 am - 8 pm    Saturday and Sunday   9 am - 5 pm   [phone]   If you need a medication refill, please contact your pharmacy. Please allow 3 business days for your refill to be completed.  As always, Thank you for trusting us with your healthcare needs!

## 2025-01-27 ENCOUNTER — OFFICE VISIT (OUTPATIENT)
Dept: OBGYN | Facility: CLINIC | Age: 40
End: 2025-01-27
Payer: COMMERCIAL

## 2025-01-27 VITALS
DIASTOLIC BLOOD PRESSURE: 65 MMHG | HEIGHT: 65 IN | SYSTOLIC BLOOD PRESSURE: 103 MMHG | WEIGHT: 120.2 LBS | BODY MASS INDEX: 20.03 KG/M2

## 2025-01-27 DIAGNOSIS — R79.89 HIGH SERUM FOLLICLE STIMULATING HORMONE (FSH): ICD-10-CM

## 2025-01-27 DIAGNOSIS — Z12.4 PAP SMEAR FOR CERVICAL CANCER SCREENING: ICD-10-CM

## 2025-01-27 DIAGNOSIS — Z30.011 ENCOUNTER FOR INITIAL PRESCRIPTION OF CONTRACEPTIVE PILLS: Primary | ICD-10-CM

## 2025-01-27 PROCEDURE — 87624 HPV HI-RISK TYP POOLED RSLT: CPT

## 2025-01-27 PROCEDURE — 99203 OFFICE O/P NEW LOW 30 MIN: CPT

## 2025-01-27 PROCEDURE — 99459 PELVIC EXAMINATION: CPT

## 2025-01-27 RX ORDER — NORETHINDRONE ACETATE AND ETHINYL ESTRADIOL 1.5-30(21)
1 KIT ORAL DAILY
Qty: 84 TABLET | Refills: 3 | Status: SHIPPED | OUTPATIENT
Start: 2025-01-27

## 2025-01-27 ASSESSMENT — ANXIETY QUESTIONNAIRES
GAD7 TOTAL SCORE: 1
3. WORRYING TOO MUCH ABOUT DIFFERENT THINGS: NOT AT ALL
GAD7 TOTAL SCORE: 1
1. FEELING NERVOUS, ANXIOUS, OR ON EDGE: SEVERAL DAYS
IF YOU CHECKED OFF ANY PROBLEMS ON THIS QUESTIONNAIRE, HOW DIFFICULT HAVE THESE PROBLEMS MADE IT FOR YOU TO DO YOUR WORK, TAKE CARE OF THINGS AT HOME, OR GET ALONG WITH OTHER PEOPLE: NOT DIFFICULT AT ALL
5. BEING SO RESTLESS THAT IT IS HARD TO SIT STILL: NOT AT ALL
7. FEELING AFRAID AS IF SOMETHING AWFUL MIGHT HAPPEN: NOT AT ALL
2. NOT BEING ABLE TO STOP OR CONTROL WORRYING: NOT AT ALL
6. BECOMING EASILY ANNOYED OR IRRITABLE: NOT AT ALL

## 2025-01-27 ASSESSMENT — PATIENT HEALTH QUESTIONNAIRE - PHQ9
5. POOR APPETITE OR OVEREATING: NOT AT ALL
SUM OF ALL RESPONSES TO PHQ QUESTIONS 1-9: 6

## 2025-01-27 NOTE — PROGRESS NOTES
Subjective:  Erin is a 39 year old   is here today with the following concerns:    Birth control: she is wanting to re-start contraception at this time. Previously used NuvaRing, ParaGard, and Mirena. She was unhappy with bleeding on both IUDs and felt she had chronic grey, milky discharge while using NuvaRing. She was treated with Metronidazole 1 time and reports it made the discharge go away initially but it came back after stopping treatment. She has heard of boric acid and is aware this can help.    Pap: due today    Pap Hx  08 pap- ASCUS + high risk HPV  08 colposcopy- bx (TALIB 2/3) ECC (neg)  08 consult- [facility] recommended  08 [facility]- (pathology- TALIB 1/2) repeat pap 3 months  08 pap-NIL- repeat pap in 4 months  09 pap-ASCUS negative HPV- recommend repeat pap in 1 year  6/7/10 pap- NIL- repeat in 1 year (2011)  11 pap NIL. Plan--pap in 1 year  12 pap NIL  13 pap NIL/neg HPV. Plan-- pap in 3 years  16 Pap= NIL.  20 NIL Pap, Neg HPV.  25: pending    *Upon chart review before patient visit, her LH and FSH were drawn in  and found to be significantly elevated. She reports she not on any specific medications/birth control at that time and she has continue to have monthly cycles and even got pregnant after this lab work was done. We discussed rechecking these levels on day 3 of her next cycle to further investigate what her baseline is. She is agreeable.    ROS: Pertinent ROS as above.    Medical history  OB History    Para Term  AB Living   2 2 2 0 0 2   SAB IAB Ectopic Multiple Live Births   0 0 0 0 2      # Outcome Date GA Lbr Burak/2nd Weight Sex Type Anes PTL Lv   2 Term 13 39w0d 01:59 / 00:31 3.062 kg (6 lb 12 oz) F Vag-Spont EPI  EVE      Name: GAMA BOCANEGRA      Apgar1: 9  Apgar5: 10   1 Term 12 40w0d 10:10 / 01:01 3.232 kg (7 lb 2 oz) F Vag-Vacuum EPI N EVE      Birth Comments: Kidney stones       Name: Savannah       "Apgar1: 9  Apgar5: 10      Past Medical History:   Diagnosis Date    Abnormal Pap smear     Appendicitis with perforation 08/04/2008    Admit. DIscharged 08/07/08    Back pain     TALIB II (cervical intraepithelial neoplasia II) 2008 5/29/08 on coloposcopy bx TALIB 2/3.  [facility] on 6/13/08 showed TALIB 1/2 *yearly paps are indicated*    Depressive disorder 2017    Insomnia     Renal calculus or stone       Past Surgical History:   Procedure Laterality Date    APPENDECTOMY  08/04/2008    BIOPSY      [facility] biopsy of cervix    COLONOSCOPY  December 2018    Neg    EXAM UNDER ANESTHESIA RECTUM N/A 02/24/2017    Procedure: EXAM UNDER ANESTHESIA RECTUM;  Surgeon: Britton Palomo MD;  Location: [facility]    HC CONIZATION CERVIX,KNIFE/LASER  06/130/8    cervix.  TALIB 1/2    HEMORRHOIDECTOMY EXTERNAL N/A 02/24/2017    Procedure: HEMORRHOIDECTOMY EXTERNAL;  Surgeon: Britton Palomo MD;  Location: [facility] OR    TONSILLECTOMY  07/03/2007       ALL/Meds: Her medication and allergy histories were reviewed and are documented in their appropriate chart areas.    SH: Reviewed and documented in the appropriate area of the chart.  FH:  Her family history is reviewed and updated in the chart, today.  PMH: Her past medical, surgical, and obstetric histories were reviewed and updated today in the appropriate chart areas.    Objective:  PE: /65 (BP Location: Left arm, Cuff Size: Adult Regular)   Ht 1.638 m (5' 4.5\")   Wt 54.5 kg (120 lb 3.2 oz)   LMP 01/13/2025 (Approximate)   BMI 20.31 kg/m    Body mass index is 20.31 kg/m .    Pertinent Physical exam findings:    GENERAL: Active, alert, in no acute distress.  SKIN: Clear. No significant rash, abnormal pigmentation or lesions  MS: no gross musculoskeletal defects noted, no edema  PSYCH: Age-appropriate alertness and orientation    Pelvic:       - Ext: Vulva and perineum are normal without lesion, mass or discharge        - Urethra: normal without discharge or scarring        - Bladder: no " tenderness, no masses       - Vagina: without discharge and rugated       - Cervix: normal and multiparous       - Uterus: Normal shape, position and consistency       - Adnexa: Normal without masses or tenderness    A/P:  Erin Ashley is a 39 year old  here today with the following concerns:  (Z30.011) Encounter for initial prescription of contraceptive pills  (primary encounter diagnosis)  Comment: The use of the oral contraceptive pill has been fully discussed with the patient. This includes the proper method to initiate and continue the pill, the need for regular compliance to ensure adequate contraceptive effect, the physiology which makes the pill effective, the instructions for what to do in event of a missed pill, and warnings about anticipated minor side effects such as breakthrough spotting, nausea, breast tenderness, weight changes, acne, headaches, etc. She was informed of the irregular bleeding pattern that can occur when the pill is first started or a new form is changed over for the first 2-3 months. She has been told of the more serious potential side effects such as MI, stroke, and deep vein thrombosis, all of which are very unlikely. She has been asked to report any signs of such serious problems immediately. She understands and wishes to take the medication as prescribed. Denies history of or current migraines with aura, HTN, smoking, blood/stroke or clotting disorder, known ischemic heart disease, thrombogenic mutations, VTE, history of stroke, complicated valvular heart disease, liver disease, recent prolonged immobility, gallbladder disease, systemic lupus erythematosus or current breast/estrogen sensitive cancers.   Plan: norethindrone-ethinyl estradiol-iron         (MICROGESTIN FE1.5/30) 1.5-30 MG-MCG tablet          (Z12.4) Pap smear for cervical cancer screening  Plan: HPV and Gynecologic Cytology Panel -         Recommended Age 30 - 65 Years          (R79.89) High serum follicle  stimulating hormone (FSH)  Comment: We discussed returning on day 3 of her next cycle to have this lab work done. She is OK with waiting to start OCPs until after her next Day 3.  Plan: Luteinizing Hormone, Estradiol, Follicle         stimulating hormone          She is agreeable to the plan above and has no further questions or concerns. She did not request a chaperone for the physical exam component of the visit today.     GUANACO Pugh CNP

## 2025-01-28 ENCOUNTER — MYC MEDICAL ADVICE (OUTPATIENT)
Dept: OBGYN | Facility: CLINIC | Age: 40
End: 2025-01-28
Payer: COMMERCIAL

## 2025-01-28 NOTE — TELEPHONE ENCOUNTER
Nikki Blake APRN CNP   Ob/Gyn TriageJust now (1:53 PM)       I would still like to recheck the lab work if she is agreeable. Often times we see FSH be low on birth control.    GUANACO Pugh CNP

## 2025-01-28 NOTE — TELEPHONE ENCOUNTER
Pt last seen yesterday for annual, LH, FSH and estradiol ordered as future    Pt states labs previously done in 2022 were drawn right after stopping use of nuvaring due to having hot flashes.    Pt asking if she should still have labs checked again    Cynthia Ryder RN on 1/28/2025 at 1:48 PM

## 2025-01-29 LAB
HPV HR 12 DNA CVX QL NAA+PROBE: NEGATIVE
HPV16 DNA CVX QL NAA+PROBE: NEGATIVE
HPV18 DNA CVX QL NAA+PROBE: NEGATIVE
HUMAN PAPILLOMA VIRUS FINAL DIAGNOSIS: NORMAL
